# Patient Record
Sex: FEMALE | Race: WHITE | NOT HISPANIC OR LATINO | URBAN - METROPOLITAN AREA
[De-identification: names, ages, dates, MRNs, and addresses within clinical notes are randomized per-mention and may not be internally consistent; named-entity substitution may affect disease eponyms.]

---

## 2015-11-05 RX ORDER — INSULIN LISPRO 100/ML
0 VIAL (ML) SUBCUTANEOUS
Qty: 0 | Refills: 0 | COMMUNITY
Start: 2015-11-05

## 2017-01-09 ENCOUNTER — INPATIENT (INPATIENT)
Facility: HOSPITAL | Age: 77
LOS: 2 days | Discharge: HOME | DRG: 602 | End: 2017-01-12
Attending: INTERNAL MEDICINE | Admitting: INTERNAL MEDICINE
Payer: MEDICARE

## 2017-01-09 VITALS
SYSTOLIC BLOOD PRESSURE: 142 MMHG | DIASTOLIC BLOOD PRESSURE: 56 MMHG | OXYGEN SATURATION: 96 % | RESPIRATION RATE: 18 BRPM | TEMPERATURE: 98 F | HEART RATE: 76 BPM

## 2017-01-09 DIAGNOSIS — L03.116 CELLULITIS OF LEFT LOWER LIMB: ICD-10-CM

## 2017-01-09 DIAGNOSIS — Z95.1 PRESENCE OF AORTOCORONARY BYPASS GRAFT: Chronic | ICD-10-CM

## 2017-01-09 DIAGNOSIS — Z98.89 OTHER SPECIFIED POSTPROCEDURAL STATES: Chronic | ICD-10-CM

## 2017-01-09 LAB
ALBUMIN SERPL ELPH-MCNC: 4.1 G/DL — SIGNIFICANT CHANGE UP (ref 3.3–5)
ALP SERPL-CCNC: 138 U/L — HIGH (ref 40–120)
ALT FLD-CCNC: 11 U/L RC — SIGNIFICANT CHANGE UP (ref 10–45)
ANION GAP SERPL CALC-SCNC: 19 MMOL/L — HIGH (ref 5–17)
AST SERPL-CCNC: 18 U/L — SIGNIFICANT CHANGE UP (ref 10–40)
BASOPHILS # BLD AUTO: 0 K/UL — SIGNIFICANT CHANGE UP (ref 0–0.2)
BASOPHILS NFR BLD AUTO: 0.2 % — SIGNIFICANT CHANGE UP (ref 0–2)
BILIRUB SERPL-MCNC: 0.7 MG/DL — SIGNIFICANT CHANGE UP (ref 0.2–1.2)
BUN SERPL-MCNC: 31 MG/DL — HIGH (ref 7–23)
CALCIUM SERPL-MCNC: 9.4 MG/DL — SIGNIFICANT CHANGE UP (ref 8.4–10.5)
CHLORIDE SERPL-SCNC: 91 MMOL/L — LOW (ref 96–108)
CO2 SERPL-SCNC: 25 MMOL/L — SIGNIFICANT CHANGE UP (ref 22–31)
CREAT SERPL-MCNC: 4.9 MG/DL — HIGH (ref 0.5–1.3)
EOSINOPHIL # BLD AUTO: 0 K/UL — SIGNIFICANT CHANGE UP (ref 0–0.5)
EOSINOPHIL NFR BLD AUTO: 0.5 % — SIGNIFICANT CHANGE UP (ref 0–6)
GAS PNL BLDV: SIGNIFICANT CHANGE UP
GLUCOSE SERPL-MCNC: 392 MG/DL — HIGH (ref 70–99)
HCT VFR BLD CALC: 37.9 % — SIGNIFICANT CHANGE UP (ref 34.5–45)
HGB BLD-MCNC: 12.9 G/DL — SIGNIFICANT CHANGE UP (ref 11.5–15.5)
LYMPHOCYTES # BLD AUTO: 0.8 K/UL — LOW (ref 1–3.3)
LYMPHOCYTES # BLD AUTO: 9.7 % — LOW (ref 13–44)
MCHC RBC-ENTMCNC: 33.4 PG — SIGNIFICANT CHANGE UP (ref 27–34)
MCHC RBC-ENTMCNC: 34 GM/DL — SIGNIFICANT CHANGE UP (ref 32–36)
MCV RBC AUTO: 98.2 FL — SIGNIFICANT CHANGE UP (ref 80–100)
MONOCYTES # BLD AUTO: 0.6 K/UL — SIGNIFICANT CHANGE UP (ref 0–0.9)
MONOCYTES NFR BLD AUTO: 7.6 % — SIGNIFICANT CHANGE UP (ref 2–14)
NEUTROPHILS # BLD AUTO: 6.5 K/UL — SIGNIFICANT CHANGE UP (ref 1.8–7.4)
NEUTROPHILS NFR BLD AUTO: 82 % — HIGH (ref 43–77)
PLATELET # BLD AUTO: 181 K/UL — SIGNIFICANT CHANGE UP (ref 150–400)
POTASSIUM SERPL-MCNC: 3.8 MMOL/L — SIGNIFICANT CHANGE UP (ref 3.5–5.3)
POTASSIUM SERPL-SCNC: 3.8 MMOL/L — SIGNIFICANT CHANGE UP (ref 3.5–5.3)
PROT SERPL-MCNC: 7.7 G/DL — SIGNIFICANT CHANGE UP (ref 6–8.3)
RBC # BLD: 3.86 M/UL — SIGNIFICANT CHANGE UP (ref 3.8–5.2)
RBC # FLD: 13.3 % — SIGNIFICANT CHANGE UP (ref 10.3–14.5)
SODIUM SERPL-SCNC: 135 MMOL/L — SIGNIFICANT CHANGE UP (ref 135–145)
WBC # BLD: 8 K/UL — SIGNIFICANT CHANGE UP (ref 3.8–10.5)
WBC # FLD AUTO: 8 K/UL — SIGNIFICANT CHANGE UP (ref 3.8–10.5)

## 2017-01-09 PROCEDURE — 99285 EMERGENCY DEPT VISIT HI MDM: CPT

## 2017-01-09 PROCEDURE — 99223 1ST HOSP IP/OBS HIGH 75: CPT

## 2017-01-09 PROCEDURE — 93971 EXTREMITY STUDY: CPT | Mod: 26

## 2017-01-09 RX ORDER — VANCOMYCIN HCL 1 G
1000 VIAL (EA) INTRAVENOUS ONCE
Qty: 0 | Refills: 0 | Status: COMPLETED | OUTPATIENT
Start: 2017-01-09 | End: 2017-01-09

## 2017-01-09 RX ORDER — INSULIN HUMAN 100 [IU]/ML
6 INJECTION, SOLUTION SUBCUTANEOUS ONCE
Qty: 0 | Refills: 0 | Status: COMPLETED | OUTPATIENT
Start: 2017-01-09 | End: 2017-01-09

## 2017-01-09 RX ORDER — SODIUM CHLORIDE 9 MG/ML
500 INJECTION INTRAMUSCULAR; INTRAVENOUS; SUBCUTANEOUS ONCE
Qty: 0 | Refills: 0 | Status: COMPLETED | OUTPATIENT
Start: 2017-01-09 | End: 2017-01-09

## 2017-01-09 RX ADMIN — SODIUM CHLORIDE 1000 MILLILITER(S): 9 INJECTION INTRAMUSCULAR; INTRAVENOUS; SUBCUTANEOUS at 20:30

## 2017-01-09 RX ADMIN — Medication 250 MILLIGRAM(S): at 22:30

## 2017-01-09 NOTE — ED PROVIDER NOTE - MEDICAL DECISION MAKING DETAILS
circumfrential cellulitis. labs. fluids- cautiously, vanco TBA circumfrential cellulitis. labs. fluids- cautiously, johnnie Thomas DO; see attending attestation

## 2017-01-09 NOTE — ED PROVIDER NOTE - PHYSICAL EXAMINATION
Gen: NAD, AOx3  Head: NCAT  HEENT: PERRL, oral mucosa moist, normal conjunctiva, neck supple  Lung: CTAB, no respiratory distress  CV: rrr, no murmur, Normal perfusion, midline sternotomy scar well healed, +1 b/l DP with cap refill <2 b/l  Abd: soft, NTND  MSK: + edema LT LE, from ankle to knee with ttp over proximal calf, no visible deformities, Lt UE fistula intact with palpable thrill  Neuro: decreased sensation b/l LE in stocking distribution up to mid calf  Skin: circumfrential erythema left LE with increased warmth  Psych: normal affect

## 2017-01-09 NOTE — H&P ADULT. - PROBLEM SELECTOR PLAN 5
As per pt and daughter she doses levemir every night her self and is on sliding scale.  -Frequent fingersticks  -Sliding scale  -Start basal insulin as needed

## 2017-01-09 NOTE — H&P ADULT. - PROBLEM SELECTOR PLAN 1
Doppler negative for DVT, also with limited risk factors. Most likely cellulitis at this point. Pt with severe rash to augmentin and levaquin, caution with this class of antibiotics.  -Will cont. renal dose vanc for now  -Vanc trough in AM. Give again s/p dialysis.

## 2017-01-09 NOTE — ED PROVIDER NOTE - PMH
Anemia  CKD  Charcot Foot  Right Foot  CHF (congestive heart failure)  Oct 2015- still sob  Chronic kidney disease (CKD)  ON DIALYSIS - Tue, Thur, Sat  Diabetic Neuropathy    Herniated Disc    Hydronephrosis  Right 1/2012  Hyperlipidemia    Hypertension  NOT ANY MORE  Hypotension    Hypothyroidism    Nephrolithiasis  2001  Shortness of breath    Type 2 diabetes mellitus    Ulcer  2001

## 2017-01-09 NOTE — ED PROVIDER NOTE - ATTENDING CONTRIBUTION TO CARE
I performed a history and physical exam of the patient and discussed their management with the resident. I reviewed the resident's note and agree with the documented findings and plan of care.    76yoF w/ ESRD with extensive cellulitis of L leg, hx of previous surgery on same leg (vein removal for graft.)  Well appearing with redness from knee to ankle on L. Warm to touch.   Labs, duplex r/o DVT, IV abx and admit.

## 2017-01-09 NOTE — H&P ADULT. - PROBLEM SELECTOR PROBLEM 5
Hypothyroidism, unspecified type Type 2 diabetes mellitus with diabetic nephropathy, with long-term current use of insulin

## 2017-01-09 NOTE — H&P ADULT. - PROBLEM SELECTOR PROBLEM 4
Type 2 diabetes mellitus with diabetic nephropathy, with long-term current use of insulin Chronic systolic congestive heart failure

## 2017-01-09 NOTE — H&P ADULT. - PROBLEM SELECTOR PLAN 4
EF 35% on TTE. Pt states her dyspnea is at baseline and is followed by cards and nephrology  -F/u with cardiology regarding need to start BB, although as pt on midodrine there may be a contraindication that is not clear at this point.

## 2017-01-09 NOTE — ED PROVIDER NOTE - OBJECTIVE STATEMENT
77yo F with swelling/redness x3 days, much worse today. +pain when walking. no fever/chills. never had before. no blood thinners. feels like leg going to explode when walking. redness tracking up leg. chronic numbness/paresthesias from DM. no change. sugars elevated recently, urinates small amount.     PMH- DM, ESRD (T/TH/SA)   PCP- Dr. Joaquim Dobbs

## 2017-01-09 NOTE — H&P ADULT. - PROBLEM SELECTOR PLAN 2
On HD, Tu, Th, Sat  -Need to call nephrology in AM to schedule dialysis tomorrow  -Sees Dr. Briones for nephrology?

## 2017-01-09 NOTE — ED PROVIDER NOTE - PSH
History of extraction of renal calculus    S/P Breast Lumpectomy  1994, 2003 - left breast benign  S/P CABG (coronary artery bypass graft)  x 4, May 2015  S/P Cholecystectomy  2001  S/P Foot Surgery  2002, 2014  S/P mitral valve repair  with CABG X 4 MAY 2015

## 2017-01-09 NOTE — H&P ADULT. - HISTORY OF PRESENT ILLNESS
76F w/ hx of CHF, CAD s/p CABG, ESRD on HD Tu, Th, Sat, IDDM2, hypothyroid, HTN, HLD p/w LLE pain. Pt states she was in her usual state of health until 3 days ago she noticed a little redness in her L leg which increased the following day. Otherwise she was at her baseline and did not recall injuring the extremity. Today the pain in her LLE was too much to bear and limited her mobility. She also stated the redness spread that day. She denies any recent travel including bus travel, flights or train travel. She denies fevers, chills, chest pain, palpitations. Has dyspnea and orthopnea at baseline. Due for dialysis tomorrow.    In ER: Given Vanc 1gm, Insulin 6u, NS 500ccs

## 2017-01-10 ENCOUNTER — TRANSCRIPTION ENCOUNTER (OUTPATIENT)
Age: 77
End: 2017-01-10

## 2017-01-10 DIAGNOSIS — E78.5 HYPERLIPIDEMIA, UNSPECIFIED: ICD-10-CM

## 2017-01-10 DIAGNOSIS — N18.6 END STAGE RENAL DISEASE: ICD-10-CM

## 2017-01-10 DIAGNOSIS — I50.22 CHRONIC SYSTOLIC (CONGESTIVE) HEART FAILURE: ICD-10-CM

## 2017-01-10 DIAGNOSIS — L03.116 CELLULITIS OF LEFT LOWER LIMB: ICD-10-CM

## 2017-01-10 DIAGNOSIS — Z95.1 PRESENCE OF AORTOCORONARY BYPASS GRAFT: ICD-10-CM

## 2017-01-10 DIAGNOSIS — E03.9 HYPOTHYROIDISM, UNSPECIFIED: ICD-10-CM

## 2017-01-10 DIAGNOSIS — E11.21 TYPE 2 DIABETES MELLITUS WITH DIABETIC NEPHROPATHY: ICD-10-CM

## 2017-01-10 DIAGNOSIS — Z29.9 ENCOUNTER FOR PROPHYLACTIC MEASURES, UNSPECIFIED: ICD-10-CM

## 2017-01-10 LAB
ANION GAP SERPL CALC-SCNC: 17 MMOL/L — SIGNIFICANT CHANGE UP (ref 5–17)
APTT BLD: 32.4 SEC — SIGNIFICANT CHANGE UP (ref 27.5–37.4)
BASOPHILS # BLD AUTO: 0.05 K/UL — SIGNIFICANT CHANGE UP (ref 0–0.2)
BASOPHILS NFR BLD AUTO: 0.7 % — SIGNIFICANT CHANGE UP (ref 0–2)
BUN SERPL-MCNC: 32 MG/DL — HIGH (ref 7–23)
CALCIUM SERPL-MCNC: 9 MG/DL — SIGNIFICANT CHANGE UP (ref 8.4–10.5)
CHLORIDE SERPL-SCNC: 94 MMOL/L — LOW (ref 96–108)
CO2 SERPL-SCNC: 24 MMOL/L — SIGNIFICANT CHANGE UP (ref 22–31)
CREAT SERPL-MCNC: 4.92 MG/DL — HIGH (ref 0.5–1.3)
EOSINOPHIL # BLD AUTO: 0.05 K/UL — SIGNIFICANT CHANGE UP (ref 0–0.5)
EOSINOPHIL NFR BLD AUTO: 0.7 % — SIGNIFICANT CHANGE UP (ref 0–6)
GLUCOSE SERPL-MCNC: 121 MG/DL — HIGH (ref 70–99)
HAV IGM SER-ACNC: SIGNIFICANT CHANGE UP
HBA1C BLD-MCNC: 10 % — HIGH (ref 4–5.6)
HBV CORE AB SER-ACNC: SIGNIFICANT CHANGE UP
HBV CORE IGM SER-ACNC: SIGNIFICANT CHANGE UP
HBV SURFACE AB SER-ACNC: <3 MIU/ML — LOW
HBV SURFACE AG SER-ACNC: SIGNIFICANT CHANGE UP
HCT VFR BLD CALC: 35.2 % — SIGNIFICANT CHANGE UP (ref 34.5–45)
HCV AB S/CO SERPL IA: 0.08 S/CO — SIGNIFICANT CHANGE UP
HCV AB SERPL-IMP: SIGNIFICANT CHANGE UP
HGB BLD-MCNC: 11.5 G/DL — SIGNIFICANT CHANGE UP (ref 11.5–15.5)
IMM GRANULOCYTES NFR BLD AUTO: 0.3 % — SIGNIFICANT CHANGE UP (ref 0–1.5)
INR BLD: 1.12 RATIO — SIGNIFICANT CHANGE UP (ref 0.88–1.16)
LYMPHOCYTES # BLD AUTO: 0.93 K/UL — LOW (ref 1–3.3)
LYMPHOCYTES # BLD AUTO: 12.7 % — LOW (ref 13–44)
MAGNESIUM SERPL-MCNC: 1.8 MG/DL — SIGNIFICANT CHANGE UP (ref 1.6–2.6)
MCHC RBC-ENTMCNC: 31 PG — SIGNIFICANT CHANGE UP (ref 27–34)
MCHC RBC-ENTMCNC: 32.7 GM/DL — SIGNIFICANT CHANGE UP (ref 32–36)
MCV RBC AUTO: 94.9 FL — SIGNIFICANT CHANGE UP (ref 80–100)
MONOCYTES # BLD AUTO: 0.69 K/UL — SIGNIFICANT CHANGE UP (ref 0–0.9)
MONOCYTES NFR BLD AUTO: 9.4 % — SIGNIFICANT CHANGE UP (ref 2–14)
NEUTROPHILS # BLD AUTO: 5.61 K/UL — SIGNIFICANT CHANGE UP (ref 1.8–7.4)
NEUTROPHILS NFR BLD AUTO: 76.2 % — SIGNIFICANT CHANGE UP (ref 43–77)
PLATELET # BLD AUTO: 193 K/UL — SIGNIFICANT CHANGE UP (ref 150–400)
POTASSIUM SERPL-MCNC: 3.3 MMOL/L — LOW (ref 3.5–5.3)
POTASSIUM SERPL-SCNC: 3.3 MMOL/L — LOW (ref 3.5–5.3)
PROTHROM AB SERPL-ACNC: 12.7 SEC — SIGNIFICANT CHANGE UP (ref 10–13.1)
RBC # BLD: 3.71 M/UL — LOW (ref 3.8–5.2)
RBC # FLD: 15.2 % — HIGH (ref 10.3–14.5)
SODIUM SERPL-SCNC: 135 MMOL/L — SIGNIFICANT CHANGE UP (ref 135–145)
VANCOMYCIN TROUGH SERPL-MCNC: 2.3 UG/ML — LOW (ref 10–20)
VANCOMYCIN TROUGH SERPL-MCNC: 9 UG/ML — LOW (ref 10–20)
WBC # BLD: 7.35 K/UL — SIGNIFICANT CHANGE UP (ref 3.8–10.5)
WBC # FLD AUTO: 7.35 K/UL — SIGNIFICANT CHANGE UP (ref 3.8–10.5)

## 2017-01-10 PROCEDURE — 99222 1ST HOSP IP/OBS MODERATE 55: CPT

## 2017-01-10 RX ORDER — PANTOPRAZOLE SODIUM 20 MG/1
40 TABLET, DELAYED RELEASE ORAL
Qty: 0 | Refills: 0 | Status: DISCONTINUED | OUTPATIENT
Start: 2017-01-10 | End: 2017-01-12

## 2017-01-10 RX ORDER — DEXTROSE 50 % IN WATER 50 %
1 SYRINGE (ML) INTRAVENOUS ONCE
Qty: 0 | Refills: 0 | Status: DISCONTINUED | OUTPATIENT
Start: 2017-01-10 | End: 2017-01-12

## 2017-01-10 RX ORDER — INSULIN LISPRO 100/ML
VIAL (ML) SUBCUTANEOUS
Qty: 0 | Refills: 0 | Status: DISCONTINUED | OUTPATIENT
Start: 2017-01-10 | End: 2017-01-12

## 2017-01-10 RX ORDER — DEXTROSE 50 % IN WATER 50 %
25 SYRINGE (ML) INTRAVENOUS ONCE
Qty: 0 | Refills: 0 | Status: DISCONTINUED | OUTPATIENT
Start: 2017-01-10 | End: 2017-01-12

## 2017-01-10 RX ORDER — LEVOTHYROXINE SODIUM 125 MCG
150 TABLET ORAL DAILY
Qty: 0 | Refills: 0 | Status: DISCONTINUED | OUTPATIENT
Start: 2017-01-10 | End: 2017-01-12

## 2017-01-10 RX ORDER — DEXTROSE 50 % IN WATER 50 %
12.5 SYRINGE (ML) INTRAVENOUS ONCE
Qty: 0 | Refills: 0 | Status: DISCONTINUED | OUTPATIENT
Start: 2017-01-10 | End: 2017-01-12

## 2017-01-10 RX ORDER — HEPARIN SODIUM 5000 [USP'U]/ML
5000 INJECTION INTRAVENOUS; SUBCUTANEOUS EVERY 8 HOURS
Qty: 0 | Refills: 0 | Status: DISCONTINUED | OUTPATIENT
Start: 2017-01-09 | End: 2017-01-12

## 2017-01-10 RX ORDER — VANCOMYCIN HCL 1 G
1000 VIAL (EA) INTRAVENOUS
Qty: 0 | Refills: 0 | Status: COMPLETED | OUTPATIENT
Start: 2017-01-10 | End: 2017-01-10

## 2017-01-10 RX ORDER — SODIUM CHLORIDE 9 MG/ML
1000 INJECTION, SOLUTION INTRAVENOUS
Qty: 0 | Refills: 0 | Status: DISCONTINUED | OUTPATIENT
Start: 2017-01-10 | End: 2017-01-12

## 2017-01-10 RX ORDER — ASPIRIN/CALCIUM CARB/MAGNESIUM 324 MG
81 TABLET ORAL DAILY
Qty: 0 | Refills: 0 | Status: DISCONTINUED | OUTPATIENT
Start: 2017-01-10 | End: 2017-01-12

## 2017-01-10 RX ORDER — ATORVASTATIN CALCIUM 80 MG/1
80 TABLET, FILM COATED ORAL AT BEDTIME
Qty: 0 | Refills: 0 | Status: DISCONTINUED | OUTPATIENT
Start: 2017-01-10 | End: 2017-01-12

## 2017-01-10 RX ORDER — INSULIN LISPRO 100/ML
10 VIAL (ML) SUBCUTANEOUS ONCE
Qty: 0 | Refills: 0 | Status: COMPLETED | OUTPATIENT
Start: 2017-01-10 | End: 2017-01-10

## 2017-01-10 RX ORDER — MIDODRINE HYDROCHLORIDE 2.5 MG/1
10 TABLET ORAL THREE TIMES A DAY
Qty: 0 | Refills: 0 | Status: DISCONTINUED | OUTPATIENT
Start: 2017-01-10 | End: 2017-01-12

## 2017-01-10 RX ADMIN — INSULIN HUMAN 6 UNIT(S): 100 INJECTION, SOLUTION SUBCUTANEOUS at 00:27

## 2017-01-10 RX ADMIN — ATORVASTATIN CALCIUM 80 MILLIGRAM(S): 80 TABLET, FILM COATED ORAL at 22:57

## 2017-01-10 RX ADMIN — MIDODRINE HYDROCHLORIDE 10 MILLIGRAM(S): 2.5 TABLET ORAL at 05:00

## 2017-01-10 RX ADMIN — MIDODRINE HYDROCHLORIDE 10 MILLIGRAM(S): 2.5 TABLET ORAL at 22:57

## 2017-01-10 RX ADMIN — Medication 250 MILLIGRAM(S): at 22:58

## 2017-01-10 RX ADMIN — HEPARIN SODIUM 5000 UNIT(S): 5000 INJECTION INTRAVENOUS; SUBCUTANEOUS at 22:57

## 2017-01-10 RX ADMIN — Medication 10 UNIT(S): at 01:38

## 2017-01-10 RX ADMIN — Medication 2: at 13:44

## 2017-01-10 RX ADMIN — Medication 150 MICROGRAM(S): at 05:00

## 2017-01-10 RX ADMIN — PANTOPRAZOLE SODIUM 40 MILLIGRAM(S): 20 TABLET, DELAYED RELEASE ORAL at 10:24

## 2017-01-10 RX ADMIN — HEPARIN SODIUM 5000 UNIT(S): 5000 INJECTION INTRAVENOUS; SUBCUTANEOUS at 05:00

## 2017-01-10 NOTE — ED ADULT NURSE NOTE - OBJECTIVE STATEMENT
76 y.o female A+Ox3 with pmh DM, HTN, CABG, dialysis with fistula to L. upper arm, CHF, and CKD BIBA c./o worsening left lower extremity redness and pain x 2 days. pt states she noticed her left lower leg was mildly red 2 days ago and has now worsened with pain felt upon standing and painful palpation to the leg. Pts left lower leg with significant redness noted to left lower leg with spreading up to behind the left knee. no significant swelling or warmth noted when compared to the right leg. no visible or apparent open wounds or drainage noted. pt c/o pain upon palpation. no pain behind the left knee. pt denies any fever, chills, body aches. weakness, numbness, tingling or cp. pt due for dialysis tomm, makes very little urine. EKG and labs obtained, daughter at the bedside. safety and fall precautions maintained.

## 2017-01-10 NOTE — PROVIDER CONTACT NOTE (OTHER) - ASSESSMENT
Pt received from [facility]. Pt is AO4. Denies any pain at this time. Hx of DM2. Pt reports being noncompliant with medication at home

## 2017-01-10 NOTE — ED ADULT NURSE REASSESSMENT NOTE - NS ED NURSE REASSESS COMMENT FT1
pt resting in stretcher, tba for left lower leg cellulitis, 1g of Vancomycin administered IVPB over 1 hour with 500 ml NS bolus, pt tolerated well. denies any body aches, chills, weakness, dizziness, cp or sob. pending bed assignment, safety and fall precautions maintained.

## 2017-01-10 NOTE — DISCHARGE NOTE ADULT - SECONDARY DIAGNOSIS.
Chronic kidney disease (CKD) Hyperlipidemia Hypothyroidism, unspecified type S/P CABG (coronary artery bypass graft) Type 2 diabetes mellitus with other skin complication

## 2017-01-10 NOTE — ED ADULT NURSE NOTE - ED STAT RN HANDOFF DETAILS
verbal hand off report given to CLAIRE Jeong in holding area, pt pending bed assignment. VS stable. safety and fall precautions maintained.

## 2017-01-10 NOTE — DISCHARGE NOTE ADULT - MEDICATION SUMMARY - MEDICATIONS TO TAKE
I will START or STAY ON the medications listed below when I get home from the hospital:    aspirin 81 mg oral delayed release tablet  -- 1 tab(s) by mouth once a day  -- Indication: For S/P CABG (coronary artery bypass graft)    insulin lispro 100 units/mL subcutaneous solution  -- please take as instructed by sliding scale  -- Indication: For diabetes type 2    insulin lispro 100 units/mL subcutaneous solution  --  subcutaneous 3 times a day (before meals); 2 Unit(s) if Glucose 151 - 200  4 Unit(s) if Glucose 201 - 250  6 Unit(s) if Glucose 251 - 300  8 Unit(s) if Glucose 301 - 350  10  Unit(s) if Glucose 351 - 400  12 Unit(s) if Glucose Greater Than 400  -- Indication: For diabetes type 2    Levemir FlexPen 100 units/mL subcutaneous solution  -- 1 dose(s) subcutaneous once a day (at bedtime)  -- Indication: For diabetes type 2    atorvastatin 80 mg oral tablet  -- 1 tab(s) by mouth once a day (at bedtime)  -- Indication: For dyslipidemia    albuterol-ipratropium 2.5 mg-0.5 mg/3 mL inhalation solution  -- 3 milliliter(s) inhaled every 6 hours  -- Indication: For wheezing    vancomycin 1 g intravenous injection  -- 1 gram(s) intravenous once post dialysis on saturday. Dr chavez will arrange to order for dialysis  -- Indication: For Cellulitis of left lower extremity    midodrine 10 mg oral tablet  -- 1 tab(s) by mouth 3 times a day  -- Indication: For Hypotension    pantoprazole 40 mg oral delayed release tablet  -- 1 tab(s) by mouth once a day (before a meal)  -- Indication: For gerd    levothyroxine 150 mcg (0.15 mg) oral tablet  -- 1 tab(s) by mouth once a day  -- Indication: For Hypothyroidism, unspecified type    Nephro-Tyshawn oral tablet  -- 1 tab(s) by mouth once a day  -- Indication: For Ckd I will START or STAY ON the medications listed below when I get home from the hospital:    commode  -- Indication: For generalized weakness    B-d ultra fine   -- B-D ultra fine christiano needles 32 guage  -- Indication: For diabetes type 2    free style test strips daily   -- 4x a day  -- Indication: For diabetes type 2    aspirin 81 mg oral delayed release tablet  -- 1 tab(s) by mouth once a day  -- Indication: For S/P CABG (coronary artery bypass graft)    insulin lispro 100 units/mL subcutaneous solution  -- please take as instructed by sliding scale  -- Indication: For diabetes type 2    insulin lispro 100 units/mL subcutaneous solution  --  subcutaneous 3 times a day (before meals); 2 Unit(s) if Glucose 151 - 200  4 Unit(s) if Glucose 201 - 250  6 Unit(s) if Glucose 251 - 300  8 Unit(s) if Glucose 301 - 350  10  Unit(s) if Glucose 351 - 400  12 Unit(s) if Glucose Greater Than 400  -- Indication: For diabetes type 2    Levemir FlexPen 100 units/mL subcutaneous solution  -- 50 unit(s) subcutaneous once a day (at bedtime)  -- Check with your doctor before becoming pregnant.  Do not drink alcoholic beverages when taking this medication.  Keep in refrigerator.  Do not freeze.    -- Indication: For diabetes type 2    NovoLOG FlexPen 100 units/mL subcutaneous solution  -- 20 unit(s) subcutaneous 3 times a day (before meals)  3 month supplies  -- Do not drink alcoholic beverages when taking this medication.  Keep in refrigerator.  Do not freeze.  Obtain medical advice before taking any non-prescription drugs as some may affect the action of this medication.    -- Indication: For diabetes type 2    atorvastatin 80 mg oral tablet  -- 1 tab(s) by mouth once a day (at bedtime)  -- Indication: For dyslipidemia    albuterol-ipratropium 2.5 mg-0.5 mg/3 mL inhalation solution  -- 3 milliliter(s) inhaled every 6 hours  -- Indication: For wheezing    vancomycin 1 g intravenous injection  -- 1 gram(s) intravenous once post dialysis on saturday. Dr chavez will arrange to order for dialysis  -- Indication: For Cellulitis of left lower extremity    midodrine 10 mg oral tablet  -- 1 tab(s) by mouth 3 times a day  -- Indication: For Hypotension    pantoprazole 40 mg oral delayed release tablet  -- 1 tab(s) by mouth once a day (before a meal)  -- Indication: For gerd    levothyroxine 150 mcg (0.15 mg) oral tablet  -- 1 tab(s) by mouth once a day  -- Indication: For Hypothyroidism, unspecified type    Nephro-Tyshawn oral tablet  -- 1 tab(s) by mouth once a day  -- Indication: For Ckd I will START or STAY ON the medications listed below when I get home from the hospital:    commode  -- Indication: For generalized weakness    B-d ultra fine   -- B-D ultra fine christiano needles 32 guage  -- Indication: For diabetes type 2    free style test strips daily   -- 4x a day  -- Indication: For diabetes type 2    aspirin 81 mg oral delayed release tablet  -- 1 tab(s) by mouth once a day  -- Indication: For S/P CABG (coronary artery bypass graft)    NovoLOG FlexPen 100 units/mL subcutaneous solution  -- 20 unit(s) subcutaneous 3 times a day (before meals)  3 month supplies  -- Do not drink alcoholic beverages when taking this medication.  Keep in refrigerator.  Do not freeze.  Obtain medical advice before taking any non-prescription drugs as some may affect the action of this medication.    -- Indication: For diabetes type 2    Levemir FlexPen 100 units/mL subcutaneous solution  -- 50 unit(s) subcutaneous once a day (at bedtime)  -- Check with your doctor before becoming pregnant.  Do not drink alcoholic beverages when taking this medication.  Keep in refrigerator.  Do not freeze.    -- Indication: For diabetes type 2    insulin lispro 100 units/mL subcutaneous solution  -- 1 subcutaneous 3 times a day (before meals) ; 2 Unit(s) if Glucose 151 - 200 4 Unit(s) if Glucose 201 - 250 6 Unit(s) if Glucose 251 - 300 8 Unit(s) if Glucose 301 - 350 10 Unit(s) if Glucose 351 - 400 12 Unit(s) if Glucose Greater Than 400  -- Indication: For diabetes type 2    insulin lispro 100 units/mL subcutaneous solution  -- please take as instructed by sliding scale  -- Indication: For diabetes type 2    atorvastatin 80 mg oral tablet  -- 1 tab(s) by mouth once a day (at bedtime)  -- Indication: For dyslipidemia    albuterol-ipratropium 2.5 mg-0.5 mg/3 mL inhalation solution  -- 3 milliliter(s) inhaled every 6 hours  -- Indication: For wheezing    vancomycin 1 g intravenous injection  -- 1 gram(s) intravenous once post dialysis on saturday. Dr chavez will arrange to order for dialysis  -- Indication: For Cellulitis of left lower extremity    midodrine 10 mg oral tablet  -- 1 tab(s) by mouth 3 times a day  -- Indication: For Hypotension    pantoprazole 40 mg oral delayed release tablet  -- 1 tab(s) by mouth once a day (before a meal)  -- Indication: For gerd    levothyroxine 150 mcg (0.15 mg) oral tablet  -- 1 tab(s) by mouth once a day  -- Indication: For Hypothyroidism, unspecified type    Nephro-Tyshawn oral tablet  -- 1 tab(s) by mouth once a day  -- Indication: For Ckd I will START or STAY ON the medications listed below when I get home from the hospital:    commode  -- Indication: For generalized weakness    B-d ultra fine   -- B-D ultra fine christiano needles 32 guage  -- Indication: For diabetes    free style test strips daily   -- 4x a day  -- Indication: For diabetes    aspirin 81 mg oral delayed release tablet  -- 1 tab(s) by mouth once a day  -- Indication: For S/P CABG (coronary artery bypass graft)    NovoLOG FlexPen 100 units/mL subcutaneous solution  -- 20 unit(s) subcutaneous 3 times a day (before meals)  3 month supplies  -- Do not drink alcoholic beverages when taking this medication.  Keep in refrigerator.  Do not freeze.  Obtain medical advice before taking any non-prescription drugs as some may affect the action of this medication.    -- Indication: For diabetes type 2    Levemir FlexPen 100 units/mL subcutaneous solution  -- 50 unit(s) subcutaneous once a day (at bedtime)  -- Check with your doctor before becoming pregnant.  Do not drink alcoholic beverages when taking this medication.  Keep in refrigerator.  Do not freeze.    -- Indication: For diabetes type 2    atorvastatin 80 mg oral tablet  -- 1 tab(s) by mouth once a day (at bedtime)  -- Indication: For dyslipidemia    Proventil HFA 90 mcg/inh inhalation aerosol  -- 1 puff(s) inhaled 4 times a day as needed  -- For inhalation only.  It is very important that you take or use this exactly as directed.  Do not skip doses or discontinue unless directed by your doctor.  Obtain medical advice before taking any non-prescription drugs as some may affect the action of this medication.  Shake well before use.    -- Indication: For asthma    vancomycin 1 g intravenous injection  -- 1 gram(s) intravenous once post dialysis on saturday. Dr chavez will arrange to order for dialysis  -- Indication: For Cellulitis of left lower extremity    midodrine 10 mg oral tablet  -- 1 tab(s) by mouth 3 times a day  -- Indication: For Hypotension    pantoprazole 40 mg oral delayed release tablet  -- 1 tab(s) by mouth once a day (before a meal)  -- Indication: For gerd    levothyroxine 150 mcg (0.15 mg) oral tablet  -- 1 tab(s) by mouth once a day  -- Indication: For Hypothyroidism, unspecified type    Nephro-Tyshawn oral tablet  -- 1 tab(s) by mouth once a day  -- Indication: For Ckd

## 2017-01-10 NOTE — DISCHARGE NOTE ADULT - CARE PLAN
Principal Discharge DX:	Cellulitis of left lower extremity  Goal:	improved with IV antibiotics  Instructions for follow-up, activity and diet:	continue vancomycin 1gm post hemodialysis on sat and same dose on tuesday. this will be the last dose on tuesday  Secondary Diagnosis:	Chronic kidney disease (CKD)  Instructions for follow-up, activity and diet:	Avoid taking (NSAIDs) - (ex: Ibuprofen, Advil, Celebrex, Naprosyn)  Avoid taking any nephrotoxic agents (can harm kidneys) - Intravenous contrast for diagnostic testing, combination cold medications.  Have all medications adjusted for your renal function by your Health Care Provider.  Blood pressure control is important.  Take all medication as prescribed.  Secondary Diagnosis:	Hyperlipidemia  Instructions for follow-up, activity and diet:	Coronary artery disease is a condition where the arteries the supply the heart muscle get clogges with fatty deposits & puts you at risk for a heart attack  Call your doctor if you have any new pain, pressure, or discomfort in the center of your chest, pain, tingling or discomfort in arms, back, neck, jaw, or stomach, shortness of breath, nausea, vomiting, burping or heartburn, sweating, cold and clammy skin, racing or abnormal heartbeat for more than 10 minutes or if they keep coming & going.  Call 911 and do not tr to get to hospital by care  You can help yourself with lefestyle changes (quitting smoking if you smoke), eat lots of fruits & vegetables & low fat dairy products, not a lot of meat & fatty foods, walk or some form of physical activity most days of the week, lose weight if you are overweight  Take your cardiac medication as prescribed to lower cholesterol, to lower blood pressure, aspirin to prevent blood clots, and diabetes control  Make sure to keep appointments with doctor for cardiac follow up care  Secondary Diagnosis:	Hypothyroidism, unspecified type  Instructions for follow-up, activity and diet:	you do not make enough thyroid hormone  signs & symptoms of low levels of thyroid hormone - tired, getting cold easily, coarse or thin hair, constipation, shortness of breath, swelling, irregular periods  your doctor will do thyroid hormone blood tests at least once a year to monitor if medication dose is adequate  take your thyroid medicine as directed by your doctor & on empty stomach  Secondary Diagnosis:	S/P CABG (coronary artery bypass graft)  Instructions for follow-up, activity and diet:	Coronary artery disease is a condition where the arteries the supply the heart muscle get clogges with fatty deposits & puts you at risk for a heart attack  Call your doctor if you have any new pain, pressure, or discomfort in the center of your chest, pain, tingling or discomfort in arms, back, neck, jaw, or stomach, shortness of breath, nausea, vomiting, burping or heartburn, sweating, cold and clammy skin, racing or abnormal heartbeat for more than 10 minutes or if they keep coming & going.  Call 911 and do not tr to get to hospital by care  You can help yourself with lefestyle changes (quitting smoking if you smoke), eat lots of fruits & vegetables & low fat dairy products, not a lot of meat & fatty foods, walk or some form of physical activity most days of the week, lose weight if you are overweight  Take your cardiac medication as prescribed to lower cholesterol, to lower blood pressure, aspirin to prevent blood clots, and diabetes control  Make sure to keep appointments with doctor for cardiac follow up care  Secondary Diagnosis:	Type 2 diabetes mellitus with other skin complication  Instructions for follow-up, activity and diet:	HgA1C this admission.  Make sure you get your HgA1c checked every three months.  If you take oral diabetes medications, check your blood glucose two times a day.  If you take insulin, check your blood glucose before meals and at bedtime.  It's important not to skip any meals.  Keep a log of your blood glucose results and always take it with you to your doctor appointments.  Keep a list of your current medications including injectables and over the counter medications and bring this medication list with you to all your doctor appointments.  If you have not seen your ophthalmologist this year call for appointment.  Check your feet daily for redness, sores, or openings. Do not self treat. If no improvement in two days call your primary care physician for an appointment.  Low blood sugar (hypoglycemia) is a blood sugar below 70mg/dl. Check your blood sugar if you feel signs/symptoms of hypoglycemia. If your blood sugar is below 70 take 15 grams of carbohydrates (ex 4 oz of apple juice, 3-4 glucose tablets, or 4-6 oz of regular soda) wait 15 minutes and repeat blood sugar to make sure it comes up above 70.  If your blood sugar is above 70 and you are due for a meal, have a meal.  If you are not due for a meal have a snack.  This snack helps keeps your blood sugar at a safe range.

## 2017-01-10 NOTE — DISCHARGE NOTE ADULT - HOSPITAL COURSE
left  leg pain,  cellulitis   legs  below  knees, with  small feet  ulcers,  seen by id, on iv  vanco,  crf 5,  dr ling to  f/p , for  hd, anemia  chronic, ,

## 2017-01-10 NOTE — DISCHARGE NOTE ADULT - PATIENT PORTAL LINK FT
“You can access the [facility] Patient Portal, offered by [facility], by registering with the following website: [email or web address]”

## 2017-01-10 NOTE — DISCHARGE NOTE ADULT - PLAN OF CARE
improved with IV antibiotics continue vancomycin 1gm post hemodialysis on sat and same dose on tuesday. this will be the last dose on tuesday Avoid taking (NSAIDs) - (ex: Ibuprofen, Advil, Celebrex, Naprosyn)  Avoid taking any nephrotoxic agents (can harm kidneys) - Intravenous contrast for diagnostic testing, combination cold medications.  Have all medications adjusted for your renal function by your Health Care Provider.  Blood pressure control is important.  Take all medication as prescribed. Coronary artery disease is a condition where the arteries the supply the heart muscle get clogges with fatty deposits & puts you at risk for a heart attack  Call your doctor if you have any new pain, pressure, or discomfort in the center of your chest, pain, tingling or discomfort in arms, back, neck, jaw, or stomach, shortness of breath, nausea, vomiting, burping or heartburn, sweating, cold and clammy skin, racing or abnormal heartbeat for more than 10 minutes or if they keep coming & going.  Call 911 and do not tr to get to hospital by care  You can help yourself with lefestyle changes (quitting smoking if you smoke), eat lots of fruits & vegetables & low fat dairy products, not a lot of meat & fatty foods, walk or some form of physical activity most days of the week, lose weight if you are overweight  Take your cardiac medication as prescribed to lower cholesterol, to lower blood pressure, aspirin to prevent blood clots, and diabetes control  Make sure to keep appointments with doctor for cardiac follow up care you do not make enough thyroid hormone  signs & symptoms of low levels of thyroid hormone - tired, getting cold easily, coarse or thin hair, constipation, shortness of breath, swelling, irregular periods  your doctor will do thyroid hormone blood tests at least once a year to monitor if medication dose is adequate  take your thyroid medicine as directed by your doctor & on empty stomach HgA1C this admission.  Make sure you get your HgA1c checked every three months.  If you take oral diabetes medications, check your blood glucose two times a day.  If you take insulin, check your blood glucose before meals and at bedtime.  It's important not to skip any meals.  Keep a log of your blood glucose results and always take it with you to your doctor appointments.  Keep a list of your current medications including injectables and over the counter medications and bring this medication list with you to all your doctor appointments.  If you have not seen your ophthalmologist this year call for appointment.  Check your feet daily for redness, sores, or openings. Do not self treat. If no improvement in two days call your primary care physician for an appointment.  Low blood sugar (hypoglycemia) is a blood sugar below 70mg/dl. Check your blood sugar if you feel signs/symptoms of hypoglycemia. If your blood sugar is below 70 take 15 grams of carbohydrates (ex 4 oz of apple juice, 3-4 glucose tablets, or 4-6 oz of regular soda) wait 15 minutes and repeat blood sugar to make sure it comes up above 70.  If your blood sugar is above 70 and you are due for a meal, have a meal.  If you are not due for a meal have a snack.  This snack helps keeps your blood sugar at a safe range.

## 2017-01-10 NOTE — DISCHARGE NOTE ADULT - CARE PROVIDER_API CALL
Jaiden Brown), EndocrinologyMetabDiabetes; Internal Medicine  [address]  [place] [address]  Phone: [phone]  Fax: [phone]    Dmitriy Estrada), Internal Medicine; Nephrology  [address]  [place] [address]  Phone: [phone]  Fax: [phone] Jaiden Brown), EndocrinologyMetabDiabetes; Internal Medicine  [address]  [place] [address]  Phone: [phone]  Fax: [phone]    Dilia March (DO), Nephrology  [address]  [place] [address]  Phone: [phone]  Fax: [phone]

## 2017-01-11 LAB
ANION GAP SERPL CALC-SCNC: 15 MMOL/L — SIGNIFICANT CHANGE UP (ref 5–17)
BUN SERPL-MCNC: 19 MG/DL — SIGNIFICANT CHANGE UP (ref 7–23)
CALCIUM SERPL-MCNC: 9.3 MG/DL — SIGNIFICANT CHANGE UP (ref 8.4–10.5)
CHLORIDE SERPL-SCNC: 98 MMOL/L — SIGNIFICANT CHANGE UP (ref 96–108)
CO2 SERPL-SCNC: 22 MMOL/L — SIGNIFICANT CHANGE UP (ref 22–31)
CREAT SERPL-MCNC: 3.55 MG/DL — HIGH (ref 0.5–1.3)
GLUCOSE SERPL-MCNC: 172 MG/DL — HIGH (ref 70–99)
HCT VFR BLD CALC: 37.6 % — SIGNIFICANT CHANGE UP (ref 34.5–45)
HGB BLD-MCNC: 11.9 G/DL — SIGNIFICANT CHANGE UP (ref 11.5–15.5)
MCHC RBC-ENTMCNC: 31.5 PG — SIGNIFICANT CHANGE UP (ref 27–34)
MCHC RBC-ENTMCNC: 31.6 GM/DL — LOW (ref 32–36)
MCV RBC AUTO: 99.5 FL — SIGNIFICANT CHANGE UP (ref 80–100)
PLATELET # BLD AUTO: 203 K/UL — SIGNIFICANT CHANGE UP (ref 150–400)
POTASSIUM SERPL-MCNC: 4.3 MMOL/L — SIGNIFICANT CHANGE UP (ref 3.5–5.3)
POTASSIUM SERPL-SCNC: 4.3 MMOL/L — SIGNIFICANT CHANGE UP (ref 3.5–5.3)
RBC # BLD: 3.78 M/UL — LOW (ref 3.8–5.2)
RBC # FLD: 15.5 % — HIGH (ref 10.3–14.5)
SODIUM SERPL-SCNC: 135 MMOL/L — SIGNIFICANT CHANGE UP (ref 135–145)
VANCOMYCIN TROUGH SERPL-MCNC: 17.8 UG/ML — SIGNIFICANT CHANGE UP (ref 10–20)
WBC # BLD: 6.48 K/UL — SIGNIFICANT CHANGE UP (ref 3.8–10.5)
WBC # FLD AUTO: 6.48 K/UL — SIGNIFICANT CHANGE UP (ref 3.8–10.5)

## 2017-01-11 PROCEDURE — 99232 SBSQ HOSP IP/OBS MODERATE 35: CPT

## 2017-01-11 RX ORDER — VANCOMYCIN HCL 1 G
1000 VIAL (EA) INTRAVENOUS ONCE
Qty: 0 | Refills: 0 | Status: COMPLETED | OUTPATIENT
Start: 2017-01-12 | End: 2017-01-12

## 2017-01-11 RX ORDER — ACETAMINOPHEN 500 MG
650 TABLET ORAL ONCE
Qty: 0 | Refills: 0 | Status: COMPLETED | OUTPATIENT
Start: 2017-01-11 | End: 2017-01-11

## 2017-01-11 RX ADMIN — Medication 2: at 08:21

## 2017-01-11 RX ADMIN — Medication 150 MICROGRAM(S): at 05:50

## 2017-01-11 RX ADMIN — HEPARIN SODIUM 5000 UNIT(S): 5000 INJECTION INTRAVENOUS; SUBCUTANEOUS at 05:50

## 2017-01-11 RX ADMIN — ATORVASTATIN CALCIUM 80 MILLIGRAM(S): 80 TABLET, FILM COATED ORAL at 21:10

## 2017-01-11 RX ADMIN — Medication 2: at 17:29

## 2017-01-11 RX ADMIN — MIDODRINE HYDROCHLORIDE 10 MILLIGRAM(S): 2.5 TABLET ORAL at 21:10

## 2017-01-11 RX ADMIN — Medication 2: at 12:30

## 2017-01-11 RX ADMIN — HEPARIN SODIUM 5000 UNIT(S): 5000 INJECTION INTRAVENOUS; SUBCUTANEOUS at 21:10

## 2017-01-11 RX ADMIN — MIDODRINE HYDROCHLORIDE 10 MILLIGRAM(S): 2.5 TABLET ORAL at 13:38

## 2017-01-11 RX ADMIN — PANTOPRAZOLE SODIUM 40 MILLIGRAM(S): 20 TABLET, DELAYED RELEASE ORAL at 05:49

## 2017-01-11 RX ADMIN — HEPARIN SODIUM 5000 UNIT(S): 5000 INJECTION INTRAVENOUS; SUBCUTANEOUS at 13:38

## 2017-01-11 RX ADMIN — MIDODRINE HYDROCHLORIDE 10 MILLIGRAM(S): 2.5 TABLET ORAL at 05:50

## 2017-01-11 RX ADMIN — Medication 650 MILLIGRAM(S): at 01:33

## 2017-01-11 RX ADMIN — Medication 81 MILLIGRAM(S): at 13:38

## 2017-01-12 VITALS
DIASTOLIC BLOOD PRESSURE: 65 MMHG | RESPIRATION RATE: 18 BRPM | OXYGEN SATURATION: 93 % | HEART RATE: 73 BPM | SYSTOLIC BLOOD PRESSURE: 110 MMHG | TEMPERATURE: 98 F

## 2017-01-12 LAB
ANION GAP SERPL CALC-SCNC: 14 MMOL/L — SIGNIFICANT CHANGE UP (ref 5–17)
BUN SERPL-MCNC: 28 MG/DL — HIGH (ref 7–23)
CALCIUM SERPL-MCNC: 9 MG/DL — SIGNIFICANT CHANGE UP (ref 8.4–10.5)
CHLORIDE SERPL-SCNC: 98 MMOL/L — SIGNIFICANT CHANGE UP (ref 96–108)
CO2 SERPL-SCNC: 20 MMOL/L — LOW (ref 22–31)
CREAT SERPL-MCNC: 4.43 MG/DL — HIGH (ref 0.5–1.3)
GLUCOSE SERPL-MCNC: 166 MG/DL — HIGH (ref 70–99)
HCT VFR BLD CALC: 36.1 % — SIGNIFICANT CHANGE UP (ref 34.5–45)
HGB BLD-MCNC: 12.4 G/DL — SIGNIFICANT CHANGE UP (ref 11.5–15.5)
MAGNESIUM SERPL-MCNC: 2.1 MG/DL — SIGNIFICANT CHANGE UP (ref 1.6–2.6)
MCHC RBC-ENTMCNC: 34.2 GM/DL — SIGNIFICANT CHANGE UP (ref 32–36)
MCHC RBC-ENTMCNC: 34.3 PG — HIGH (ref 27–34)
MCV RBC AUTO: 100 FL — SIGNIFICANT CHANGE UP (ref 80–100)
PHOSPHATE SERPL-MCNC: 4.1 MG/DL — SIGNIFICANT CHANGE UP (ref 2.5–4.5)
PLATELET # BLD AUTO: 194 K/UL — SIGNIFICANT CHANGE UP (ref 150–400)
POTASSIUM SERPL-MCNC: 4.7 MMOL/L — SIGNIFICANT CHANGE UP (ref 3.5–5.3)
POTASSIUM SERPL-SCNC: 4.7 MMOL/L — SIGNIFICANT CHANGE UP (ref 3.5–5.3)
RBC # BLD: 3.6 M/UL — LOW (ref 3.8–5.2)
RBC # FLD: 14.1 % — SIGNIFICANT CHANGE UP (ref 10.3–14.5)
SODIUM SERPL-SCNC: 132 MMOL/L — LOW (ref 135–145)
WBC # BLD: 6.1 K/UL — SIGNIFICANT CHANGE UP (ref 3.8–10.5)
WBC # FLD AUTO: 6.1 K/UL — SIGNIFICANT CHANGE UP (ref 3.8–10.5)

## 2017-01-12 PROCEDURE — 83036 HEMOGLOBIN GLYCOSYLATED A1C: CPT

## 2017-01-12 PROCEDURE — 82330 ASSAY OF CALCIUM: CPT

## 2017-01-12 PROCEDURE — 85014 HEMATOCRIT: CPT

## 2017-01-12 PROCEDURE — 99232 SBSQ HOSP IP/OBS MODERATE 35: CPT

## 2017-01-12 PROCEDURE — 86803 HEPATITIS C AB TEST: CPT

## 2017-01-12 PROCEDURE — 85730 THROMBOPLASTIN TIME PARTIAL: CPT

## 2017-01-12 PROCEDURE — 80202 ASSAY OF VANCOMYCIN: CPT

## 2017-01-12 PROCEDURE — 84295 ASSAY OF SERUM SODIUM: CPT

## 2017-01-12 PROCEDURE — 82803 BLOOD GASES ANY COMBINATION: CPT

## 2017-01-12 PROCEDURE — 83735 ASSAY OF MAGNESIUM: CPT

## 2017-01-12 PROCEDURE — 93923 UPR/LXTR ART STDY 3+ LVLS: CPT

## 2017-01-12 PROCEDURE — 86709 HEPATITIS A IGM ANTIBODY: CPT

## 2017-01-12 PROCEDURE — 85027 COMPLETE CBC AUTOMATED: CPT

## 2017-01-12 PROCEDURE — 86704 HEP B CORE ANTIBODY TOTAL: CPT

## 2017-01-12 PROCEDURE — 83605 ASSAY OF LACTIC ACID: CPT

## 2017-01-12 PROCEDURE — 82947 ASSAY GLUCOSE BLOOD QUANT: CPT

## 2017-01-12 PROCEDURE — 84100 ASSAY OF PHOSPHORUS: CPT

## 2017-01-12 PROCEDURE — 96374 THER/PROPH/DIAG INJ IV PUSH: CPT

## 2017-01-12 PROCEDURE — 99285 EMERGENCY DEPT VISIT HI MDM: CPT | Mod: 25

## 2017-01-12 PROCEDURE — 93971 EXTREMITY STUDY: CPT

## 2017-01-12 PROCEDURE — 84132 ASSAY OF SERUM POTASSIUM: CPT

## 2017-01-12 PROCEDURE — 86705 HEP B CORE ANTIBODY IGM: CPT

## 2017-01-12 PROCEDURE — 87340 HEPATITIS B SURFACE AG IA: CPT

## 2017-01-12 PROCEDURE — 85610 PROTHROMBIN TIME: CPT

## 2017-01-12 PROCEDURE — 99261: CPT

## 2017-01-12 PROCEDURE — 86706 HEP B SURFACE ANTIBODY: CPT

## 2017-01-12 PROCEDURE — 80048 BASIC METABOLIC PNL TOTAL CA: CPT

## 2017-01-12 PROCEDURE — 82435 ASSAY OF BLOOD CHLORIDE: CPT

## 2017-01-12 PROCEDURE — 87040 BLOOD CULTURE FOR BACTERIA: CPT

## 2017-01-12 PROCEDURE — 80053 COMPREHEN METABOLIC PANEL: CPT

## 2017-01-12 RX ORDER — PANTOPRAZOLE SODIUM 20 MG/1
1 TABLET, DELAYED RELEASE ORAL
Qty: 30 | Refills: 0 | OUTPATIENT
Start: 2017-01-12 | End: 2017-02-11

## 2017-01-12 RX ORDER — INSULIN LISPRO 100/ML
1 VIAL (ML) SUBCUTANEOUS
Qty: 1 | Refills: 0 | OUTPATIENT
Start: 2017-01-12

## 2017-01-12 RX ORDER — INSULIN ASPART 100 [IU]/ML
20 INJECTION, SOLUTION SUBCUTANEOUS
Qty: 30 | Refills: 0 | OUTPATIENT
Start: 2017-01-12

## 2017-01-12 RX ORDER — VANCOMYCIN HCL 1 G
1 VIAL (EA) INTRAVENOUS
Qty: 0 | Refills: 0 | COMMUNITY
Start: 2017-01-12

## 2017-01-12 RX ORDER — ALBUTEROL 90 UG/1
1 AEROSOL, METERED ORAL
Qty: 1 | Refills: 0 | OUTPATIENT
Start: 2017-01-12

## 2017-01-12 RX ORDER — MIDODRINE HYDROCHLORIDE 2.5 MG/1
1 TABLET ORAL
Qty: 90 | Refills: 0 | OUTPATIENT
Start: 2017-01-12 | End: 2017-02-11

## 2017-01-12 RX ORDER — LEVOTHYROXINE SODIUM 125 MCG
1 TABLET ORAL
Qty: 30 | Refills: 0 | OUTPATIENT
Start: 2017-01-12 | End: 2017-02-11

## 2017-01-12 RX ORDER — ASPIRIN/CALCIUM CARB/MAGNESIUM 324 MG
1 TABLET ORAL
Qty: 30 | Refills: 0 | OUTPATIENT
Start: 2017-01-12

## 2017-01-12 RX ORDER — INSULIN DETEMIR 100/ML (3)
1 INSULIN PEN (ML) SUBCUTANEOUS
Qty: 1 | Refills: 0 | OUTPATIENT
Start: 2017-01-12

## 2017-01-12 RX ORDER — IPRATROPIUM/ALBUTEROL SULFATE 18-103MCG
3 AEROSOL WITH ADAPTER (GRAM) INHALATION
Qty: 100 | Refills: 0 | OUTPATIENT
Start: 2017-01-12 | End: 2017-02-11

## 2017-01-12 RX ORDER — INSULIN DETEMIR 100/ML (3)
50 INSULIN PEN (ML) SUBCUTANEOUS
Qty: 30 | Refills: 0 | OUTPATIENT
Start: 2017-01-12

## 2017-01-12 RX ORDER — ATORVASTATIN CALCIUM 80 MG/1
1 TABLET, FILM COATED ORAL
Qty: 30 | Refills: 0 | OUTPATIENT
Start: 2017-01-12 | End: 2017-02-11

## 2017-01-12 RX ADMIN — Medication 81 MILLIGRAM(S): at 12:14

## 2017-01-12 RX ADMIN — HEPARIN SODIUM 5000 UNIT(S): 5000 INJECTION INTRAVENOUS; SUBCUTANEOUS at 05:25

## 2017-01-12 RX ADMIN — Medication 250 MILLIGRAM(S): at 18:15

## 2017-01-12 RX ADMIN — PANTOPRAZOLE SODIUM 40 MILLIGRAM(S): 20 TABLET, DELAYED RELEASE ORAL at 05:25

## 2017-01-12 RX ADMIN — HEPARIN SODIUM 5000 UNIT(S): 5000 INJECTION INTRAVENOUS; SUBCUTANEOUS at 12:14

## 2017-01-12 RX ADMIN — Medication 4: at 12:14

## 2017-01-12 RX ADMIN — Medication 150 MICROGRAM(S): at 05:25

## 2017-01-12 RX ADMIN — Medication 2: at 18:10

## 2017-01-12 RX ADMIN — MIDODRINE HYDROCHLORIDE 10 MILLIGRAM(S): 2.5 TABLET ORAL at 05:25

## 2017-01-12 RX ADMIN — MIDODRINE HYDROCHLORIDE 10 MILLIGRAM(S): 2.5 TABLET ORAL at 12:14

## 2017-01-15 LAB
CULTURE RESULTS: SIGNIFICANT CHANGE UP
CULTURE RESULTS: SIGNIFICANT CHANGE UP
SPECIMEN SOURCE: SIGNIFICANT CHANGE UP
SPECIMEN SOURCE: SIGNIFICANT CHANGE UP

## 2017-01-27 ENCOUNTER — APPOINTMENT (OUTPATIENT)
Age: 77
End: 2017-01-27

## 2017-02-17 ENCOUNTER — APPOINTMENT (OUTPATIENT)
Age: 77
End: 2017-02-17

## 2017-04-24 ENCOUNTER — APPOINTMENT (OUTPATIENT)
Dept: VASCULAR SURGERY | Facility: CLINIC | Age: 77
End: 2017-04-24

## 2017-04-24 VITALS
HEART RATE: 75 BPM | BODY MASS INDEX: 20.47 KG/M2 | WEIGHT: 143 LBS | SYSTOLIC BLOOD PRESSURE: 146 MMHG | HEIGHT: 70 IN | RESPIRATION RATE: 16 BRPM | DIASTOLIC BLOOD PRESSURE: 66 MMHG

## 2017-05-31 ENCOUNTER — APPOINTMENT (OUTPATIENT)
Dept: CARDIOLOGY | Facility: CLINIC | Age: 77
End: 2017-05-31

## 2017-05-31 ENCOUNTER — NON-APPOINTMENT (OUTPATIENT)
Age: 77
End: 2017-05-31

## 2017-05-31 VITALS
HEART RATE: 83 BPM | BODY MASS INDEX: 20.95 KG/M2 | SYSTOLIC BLOOD PRESSURE: 138 MMHG | OXYGEN SATURATION: 93 % | DIASTOLIC BLOOD PRESSURE: 74 MMHG | WEIGHT: 146 LBS | RESPIRATION RATE: 14 BRPM

## 2017-05-31 RX ORDER — BLOOD SUGAR DIAGNOSTIC
STRIP MISCELLANEOUS
Qty: 100 | Refills: 0 | Status: ACTIVE | COMMUNITY
Start: 2017-02-24

## 2017-05-31 RX ORDER — ASPIRIN ENTERIC COATED TABLETS 81 MG 81 MG/1
81 TABLET, DELAYED RELEASE ORAL
Qty: 90 | Refills: 0 | Status: ACTIVE | COMMUNITY
Start: 2017-02-24

## 2017-05-31 RX ORDER — INSULIN ASPART 100 [IU]/ML
100 INJECTION, SOLUTION INTRAVENOUS; SUBCUTANEOUS
Qty: 30 | Refills: 0 | Status: ACTIVE | COMMUNITY
Start: 2017-01-12

## 2017-05-31 RX ORDER — PEN NEEDLE, DIABETIC 29 G X1/2"
32G X 4 MM NEEDLE, DISPOSABLE MISCELLANEOUS
Qty: 100 | Refills: 0 | Status: ACTIVE | COMMUNITY
Start: 2017-02-24

## 2017-05-31 RX ORDER — INSULIN DETEMIR 100 [IU]/ML
100 INJECTION, SOLUTION SUBCUTANEOUS
Qty: 30 | Refills: 0 | Status: ACTIVE | COMMUNITY
Start: 2017-01-12

## 2017-06-26 ENCOUNTER — APPOINTMENT (OUTPATIENT)
Dept: VASCULAR SURGERY | Facility: CLINIC | Age: 77
End: 2017-06-26

## 2017-07-05 ENCOUNTER — LABORATORY RESULT (OUTPATIENT)
Age: 77
End: 2017-07-05

## 2017-10-09 ENCOUNTER — APPOINTMENT (OUTPATIENT)
Dept: VASCULAR SURGERY | Facility: CLINIC | Age: 77
End: 2017-10-09
Payer: MEDICARE

## 2017-10-09 PROCEDURE — 93990 DOPPLER FLOW TESTING: CPT

## 2017-10-09 PROCEDURE — 99214 OFFICE O/P EST MOD 30 MIN: CPT | Mod: 25

## 2017-11-29 ENCOUNTER — NON-APPOINTMENT (OUTPATIENT)
Age: 77
End: 2017-11-29

## 2017-11-29 ENCOUNTER — APPOINTMENT (OUTPATIENT)
Dept: CARDIOLOGY | Facility: CLINIC | Age: 77
End: 2017-11-29
Payer: MEDICARE

## 2017-11-29 VITALS — HEIGHT: 70 IN | SYSTOLIC BLOOD PRESSURE: 120 MMHG | HEART RATE: 80 BPM | DIASTOLIC BLOOD PRESSURE: 70 MMHG

## 2017-11-29 DIAGNOSIS — I34.0 NONRHEUMATIC MITRAL (VALVE) INSUFFICIENCY: ICD-10-CM

## 2017-11-29 PROCEDURE — 93000 ELECTROCARDIOGRAM COMPLETE: CPT

## 2017-11-29 PROCEDURE — 99214 OFFICE O/P EST MOD 30 MIN: CPT

## 2017-11-29 RX ORDER — ALBUTEROL SULFATE 108 UG/1
108 (90 BASE) AEROSOL, METERED RESPIRATORY (INHALATION)
Qty: 7 | Refills: 0 | Status: COMPLETED | COMMUNITY
Start: 2017-01-12 | End: 2017-11-29

## 2017-12-16 ENCOUNTER — TRANSCRIPTION ENCOUNTER (OUTPATIENT)
Age: 77
End: 2017-12-16

## 2018-02-20 NOTE — PATIENT PROFILE ADULT. - SURGICAL SITE INCISION
Silver Nitrate Text: The wound bed was treated with silver nitrate after the biopsy was performed. Lab Facility: [id number] Anesthesia Volume In Cc (Will Not Render If 0): 0.5 Path Notes (To The Dermatopathologist): 1 mm Electrodesiccation Text: The wound bed was treated with electrodesiccation after the biopsy was performed. Billing Type: Third-Party Bill Additional Anesthesia Volume In Cc (Will Not Render If 0): 0 Biopsy Type: H and E Bill 39851 For Specimen Handling/Conveyance To Laboratory?: no Post-Care Instructions: I reviewed with the patient in detail post-care instructions. Patient is to keep the biopsy site dry overnight, and then apply bacitracin twice daily until healed. Patient may apply hydrogen peroxide soaks to remove any crusting. Anesthesia Type: 1% lidocaine with epinephrine Biopsy Method: Personna blade Consent: Written consent was obtained and risks were reviewed including but not limited to scarring, infection, bleeding, scabbing, incomplete removal, nerve damage and allergy to anesthesia. Wound Care: Vaseline Hemostasis: Electrocautery Size Of Lesion In Cm: 0.1 Electrodesiccation And Curettage Text: The wound bed was treated with electrodesiccation and curettage after the biopsy was performed. Lab: [id number] Dressing: bandage Detail Level: Detailed no Cryotherapy Text: The wound bed was treated with cryotherapy after the biopsy was performed. Type Of Destruction Used: Curettage Notification Instructions: Patient will be notified of biopsy results. However, patient instructed to call the office if not contacted within 2 weeks.

## 2018-04-09 ENCOUNTER — APPOINTMENT (OUTPATIENT)
Dept: VASCULAR SURGERY | Facility: CLINIC | Age: 78
End: 2018-04-09

## 2018-04-09 ENCOUNTER — NON-APPOINTMENT (OUTPATIENT)
Age: 78
End: 2018-04-09

## 2018-04-09 ENCOUNTER — APPOINTMENT (OUTPATIENT)
Dept: CARDIOLOGY | Facility: CLINIC | Age: 78
End: 2018-04-09
Payer: MEDICARE

## 2018-04-09 VITALS
SYSTOLIC BLOOD PRESSURE: 128 MMHG | HEART RATE: 70 BPM | OXYGEN SATURATION: 93 % | WEIGHT: 65.08 LBS | DIASTOLIC BLOOD PRESSURE: 78 MMHG | HEIGHT: 70 IN | BODY MASS INDEX: 9.32 KG/M2

## 2018-04-09 DIAGNOSIS — Z95.1 PRESENCE OF AORTOCORONARY BYPASS GRAFT: ICD-10-CM

## 2018-04-09 PROCEDURE — 99215 OFFICE O/P EST HI 40 MIN: CPT

## 2018-04-09 PROCEDURE — 93000 ELECTROCARDIOGRAM COMPLETE: CPT

## 2018-06-25 ENCOUNTER — APPOINTMENT (OUTPATIENT)
Dept: VASCULAR SURGERY | Facility: CLINIC | Age: 78
End: 2018-06-25

## 2018-06-29 ENCOUNTER — APPOINTMENT (OUTPATIENT)
Dept: VASCULAR SURGERY | Facility: CLINIC | Age: 78
End: 2018-06-29

## 2018-07-08 ENCOUNTER — RX RENEWAL (OUTPATIENT)
Age: 78
End: 2018-07-08

## 2018-07-18 ENCOUNTER — APPOINTMENT (OUTPATIENT)
Dept: VASCULAR SURGERY | Facility: CLINIC | Age: 78
End: 2018-07-18
Payer: MEDICARE

## 2018-07-18 PROCEDURE — 93990 DOPPLER FLOW TESTING: CPT

## 2018-08-03 ENCOUNTER — APPOINTMENT (OUTPATIENT)
Dept: OPHTHALMOLOGY | Facility: CLINIC | Age: 78
End: 2018-08-03
Payer: MEDICARE

## 2018-08-03 PROCEDURE — 68761 CLOSE TEAR DUCT OPENING: CPT | Mod: E2,E4

## 2018-08-03 PROCEDURE — 65430 CORNEAL SMEAR: CPT | Mod: LT

## 2018-08-03 PROCEDURE — 99204 OFFICE O/P NEW MOD 45 MIN: CPT

## 2018-08-03 PROCEDURE — 92285 EXTERNAL OCULAR PHOTOGRAPHY: CPT

## 2018-08-03 RX ORDER — DOXYCYCLINE HYCLATE 50 MG/1
50 CAPSULE ORAL TWICE DAILY
Qty: 1 | Refills: 0 | Status: ACTIVE | COMMUNITY
Start: 2018-08-03 | End: 1900-01-01

## 2018-08-07 ENCOUNTER — MEDICATION RENEWAL (OUTPATIENT)
Age: 78
End: 2018-08-07

## 2018-08-07 RX ORDER — NEOMYCIN AND POLYMYXIN B SULFATES AND DEXAMETHASONE 3.5; 10000; 1 MG/G; [IU]/G; MG/G
3.5-10000-0.1 OINTMENT OPHTHALMIC
Qty: 1 | Refills: 1 | Status: ACTIVE | COMMUNITY
Start: 2018-08-07 | End: 1900-01-01

## 2018-08-10 LAB — BACTERIA EYE AEROBE CULT: ABNORMAL

## 2018-08-16 ENCOUNTER — APPOINTMENT (OUTPATIENT)
Dept: OPHTHALMOLOGY | Facility: CLINIC | Age: 78
End: 2018-08-16
Payer: MEDICARE

## 2018-08-16 PROCEDURE — 92012 INTRM OPH EXAM EST PATIENT: CPT

## 2018-08-17 LAB — VIRAL CULTURE, GENERAL: NORMAL

## 2018-08-23 ENCOUNTER — FORM ENCOUNTER (OUTPATIENT)
Age: 78
End: 2018-08-23

## 2018-08-24 ENCOUNTER — APPOINTMENT (OUTPATIENT)
Dept: ENDOVASCULAR SURGERY | Facility: CLINIC | Age: 78
End: 2018-08-24
Payer: MEDICARE

## 2018-08-24 PROCEDURE — 36902Z: CUSTOM

## 2018-08-24 PROCEDURE — 36215Z: CUSTOM | Mod: 59

## 2018-08-28 ENCOUNTER — APPOINTMENT (OUTPATIENT)
Dept: OPHTHALMOLOGY | Facility: CLINIC | Age: 78
End: 2018-08-28
Payer: MEDICARE

## 2018-08-28 PROCEDURE — 92012 INTRM OPH EXAM EST PATIENT: CPT

## 2018-09-04 LAB — FUNGUS SPEC CULT ORG #8: NORMAL

## 2018-09-11 ENCOUNTER — RX RENEWAL (OUTPATIENT)
Age: 78
End: 2018-09-11

## 2018-10-11 ENCOUNTER — APPOINTMENT (OUTPATIENT)
Dept: OPHTHALMOLOGY | Facility: CLINIC | Age: 78
End: 2018-10-11
Payer: MEDICARE

## 2018-10-11 PROCEDURE — 92012 INTRM OPH EXAM EST PATIENT: CPT

## 2018-10-22 ENCOUNTER — APPOINTMENT (OUTPATIENT)
Dept: CARDIOLOGY | Facility: CLINIC | Age: 78
End: 2018-10-22

## 2018-12-09 PROBLEM — I05.9 MITRAL VALVE DISEASE: Status: ACTIVE | Noted: 2018-12-09

## 2018-12-10 ENCOUNTER — APPOINTMENT (OUTPATIENT)
Dept: CARDIOLOGY | Facility: CLINIC | Age: 78
End: 2018-12-10
Payer: MEDICARE

## 2018-12-10 ENCOUNTER — NON-APPOINTMENT (OUTPATIENT)
Age: 78
End: 2018-12-10

## 2018-12-10 VITALS
DIASTOLIC BLOOD PRESSURE: 71 MMHG | SYSTOLIC BLOOD PRESSURE: 145 MMHG | RESPIRATION RATE: 14 BRPM | OXYGEN SATURATION: 95 % | HEART RATE: 75 BPM

## 2018-12-10 DIAGNOSIS — I25.10 ATHEROSCLEROTIC HEART DISEASE OF NATIVE CORONARY ARTERY W/OUT ANGINA PECTORIS: ICD-10-CM

## 2018-12-10 DIAGNOSIS — E78.5 HYPERLIPIDEMIA, UNSPECIFIED: ICD-10-CM

## 2018-12-10 DIAGNOSIS — I05.9 RHEUMATIC MITRAL VALVE DISEASE, UNSPECIFIED: ICD-10-CM

## 2018-12-10 DIAGNOSIS — I10 ESSENTIAL (PRIMARY) HYPERTENSION: ICD-10-CM

## 2018-12-10 DIAGNOSIS — I50.9 HEART FAILURE, UNSPECIFIED: ICD-10-CM

## 2018-12-10 PROCEDURE — 93000 ELECTROCARDIOGRAM COMPLETE: CPT

## 2018-12-10 PROCEDURE — 99214 OFFICE O/P EST MOD 30 MIN: CPT

## 2018-12-10 NOTE — HISTORY OF PRESENT ILLNESS
[FreeTextEntry1] : I saw her last in April.  Since that time, she tolerated cataract surgery on each eye without problem from a cardiac standpoint.  She continues to tolerate dialysis without problem from a cardiac standpoint.  She describes no chest discomfort or significant BENAVIDEZ.  As in the past, she experiences a mild degree of orthopnea, and sleeps in a recliner, but reports no PND.  She recounts no palpitations or lightheadedness. She has not suffered any syncopal events.\par \par Her medications are unchanged.  She reports no new interval medical problems.

## 2018-12-10 NOTE — REASON FOR VISIT
[FreeTextEntry1] : Mercy Kelley returns today for f/u regarding coronary artery disease, mitral valve disease and congestive heart failure.

## 2018-12-10 NOTE — PHYSICAL EXAM

## 2018-12-10 NOTE — ASSESSMENT
[FreeTextEntry1] : Coronary artery disease\par      s/p CABG x 4, with LIMA to LAD June 2015 by Dr. Pallazzo, for 90% lesion in mid LAD, a 99% stenosis in the first diag a., 99% stenosis in first OM of non-dominant circ, and mild atherosclerosis in dominant RCA \par  \par S/P mitral valve repair for severe mitral valve insufficiency.\par \par Moderate LV dysfunction, with an estimated EF by echo of 35%. \par \par CHF, due to chronic systolic LV dysfunction and propensity to volume retention because of chronic renal insufficiency. \par \par ESRD on HD.\par \par Diabetes mellitus.\par \par Hypertension, well-controlled at present.\par \par Hyperlipidemia, on Lipitor.

## 2018-12-10 NOTE — DISCUSSION/SUMMARY
[FreeTextEntry1] : CAD s/p CABG; remains free of ischemic sxs.  Will continue ASA and statin.\par \par HLD - will check hepatic and lipid profile; continue statin\par \par MR, S/P mitral valve repair.  Remains reasonably compensated; mild to moderate MR on most recent echo.\par \par Chronic biventricular systolic CHF.  Activity level remains limited by BENAVIDEZ, but she is in a reasonably compensated state with ongoing dialysis.\par \par ESRD on HD.\par \par Hypertension, well-controlled at present.\par \par She will return for a visit in 6 months.

## 2018-12-11 LAB
ALBUMIN SERPL ELPH-MCNC: 4.3 G/DL
ALP BLD-CCNC: 159 U/L
ALT SERPL-CCNC: 8 U/L
AST SERPL-CCNC: 22 U/L
BILIRUB DIRECT SERPL-MCNC: 0.2 MG/DL
BILIRUB INDIRECT SERPL-MCNC: 0.2 MG/DL
BILIRUB SERPL-MCNC: 0.4 MG/DL
CHOLEST SERPL-MCNC: 118 MG/DL
CHOLEST/HDLC SERPL: 4.1 RATIO
HDLC SERPL-MCNC: 29 MG/DL
LDLC SERPL CALC-MCNC: 69 MG/DL
LDLC SERPL DIRECT ASSAY-MCNC: 76 MG/DL
PROT SERPL-MCNC: 8.5 G/DL
TRIGL SERPL-MCNC: 98 MG/DL

## 2019-01-07 ENCOUNTER — APPOINTMENT (OUTPATIENT)
Dept: VASCULAR SURGERY | Facility: CLINIC | Age: 79
End: 2019-01-07

## 2019-01-10 ENCOUNTER — APPOINTMENT (OUTPATIENT)
Dept: OPHTHALMOLOGY | Facility: CLINIC | Age: 79
End: 2019-01-10
Payer: COMMERCIAL

## 2019-01-10 DIAGNOSIS — B00.52 HERPESVIRAL KERATITIS: ICD-10-CM

## 2019-01-10 PROCEDURE — 92012 INTRM OPH EXAM EST PATIENT: CPT

## 2019-01-10 RX ORDER — GANCICLOVIR 1.5 MG/G
0.15 GEL OPHTHALMIC 3 TIMES DAILY
Qty: 1 | Refills: 5 | Status: ACTIVE | COMMUNITY
Start: 2018-08-03 | End: 1900-01-01

## 2019-04-09 ENCOUNTER — INPATIENT (INPATIENT)
Facility: HOSPITAL | Age: 79
LOS: 13 days | DRG: 562 | End: 2019-04-23
Attending: STUDENT IN AN ORGANIZED HEALTH CARE EDUCATION/TRAINING PROGRAM | Admitting: INTERNAL MEDICINE
Payer: MEDICARE

## 2019-04-09 VITALS
SYSTOLIC BLOOD PRESSURE: 102 MMHG | TEMPERATURE: 99 F | DIASTOLIC BLOOD PRESSURE: 60 MMHG | RESPIRATION RATE: 16 BRPM | HEART RATE: 76 BPM | OXYGEN SATURATION: 90 %

## 2019-04-09 DIAGNOSIS — Z95.1 PRESENCE OF AORTOCORONARY BYPASS GRAFT: Chronic | ICD-10-CM

## 2019-04-09 DIAGNOSIS — Z98.89 OTHER SPECIFIED POSTPROCEDURAL STATES: Chronic | ICD-10-CM

## 2019-04-09 DIAGNOSIS — R09.02 HYPOXEMIA: ICD-10-CM

## 2019-04-09 LAB
ALBUMIN SERPL ELPH-MCNC: 3.4 G/DL — SIGNIFICANT CHANGE UP (ref 3.3–5)
ALP SERPL-CCNC: 126 U/L — HIGH (ref 40–120)
ALT FLD-CCNC: 30 U/L — SIGNIFICANT CHANGE UP (ref 10–45)
ANION GAP SERPL CALC-SCNC: 20 MMOL/L — HIGH (ref 5–17)
APTT BLD: 36.8 SEC — HIGH (ref 27.5–36.3)
AST SERPL-CCNC: 62 U/L — HIGH (ref 10–40)
BASOPHILS # BLD AUTO: 0 K/UL — SIGNIFICANT CHANGE UP (ref 0–0.2)
BASOPHILS NFR BLD AUTO: 0 % — SIGNIFICANT CHANGE UP (ref 0–2)
BILIRUB SERPL-MCNC: 0.5 MG/DL — SIGNIFICANT CHANGE UP (ref 0.2–1.2)
BLD GP AB SCN SERPL QL: NEGATIVE — SIGNIFICANT CHANGE UP
BUN SERPL-MCNC: 42 MG/DL — HIGH (ref 7–23)
CALCIUM SERPL-MCNC: 8.8 MG/DL — SIGNIFICANT CHANGE UP (ref 8.4–10.5)
CHLORIDE SERPL-SCNC: 85 MMOL/L — LOW (ref 96–108)
CO2 SERPL-SCNC: 21 MMOL/L — LOW (ref 22–31)
CREAT SERPL-MCNC: 5.23 MG/DL — HIGH (ref 0.5–1.3)
EOSINOPHIL # BLD AUTO: 0.1 K/UL — SIGNIFICANT CHANGE UP (ref 0–0.5)
EOSINOPHIL NFR BLD AUTO: 1.6 % — SIGNIFICANT CHANGE UP (ref 0–6)
GLUCOSE SERPL-MCNC: 288 MG/DL — HIGH (ref 70–99)
HCT VFR BLD CALC: 34.3 % — LOW (ref 34.5–45)
HGB BLD-MCNC: 11.4 G/DL — LOW (ref 11.5–15.5)
INR BLD: 1.13 RATIO — SIGNIFICANT CHANGE UP (ref 0.88–1.16)
LYMPHOCYTES # BLD AUTO: 0.5 K/UL — LOW (ref 1–3.3)
LYMPHOCYTES # BLD AUTO: 5.9 % — LOW (ref 13–44)
MAGNESIUM SERPL-MCNC: 1.6 MG/DL — SIGNIFICANT CHANGE UP (ref 1.6–2.6)
MCHC RBC-ENTMCNC: 31.1 PG — SIGNIFICANT CHANGE UP (ref 27–34)
MCHC RBC-ENTMCNC: 33.2 GM/DL — SIGNIFICANT CHANGE UP (ref 32–36)
MCV RBC AUTO: 93.6 FL — SIGNIFICANT CHANGE UP (ref 80–100)
MONOCYTES # BLD AUTO: 0.7 K/UL — SIGNIFICANT CHANGE UP (ref 0–0.9)
MONOCYTES NFR BLD AUTO: 7.9 % — SIGNIFICANT CHANGE UP (ref 2–14)
NEUTROPHILS # BLD AUTO: 7 K/UL — SIGNIFICANT CHANGE UP (ref 1.8–7.4)
NEUTROPHILS NFR BLD AUTO: 84.6 % — HIGH (ref 43–77)
PHOSPHATE SERPL-MCNC: 4.1 MG/DL — SIGNIFICANT CHANGE UP (ref 2.5–4.5)
PLATELET # BLD AUTO: 205 K/UL — SIGNIFICANT CHANGE UP (ref 150–400)
POTASSIUM SERPL-MCNC: 4.9 MMOL/L — SIGNIFICANT CHANGE UP (ref 3.5–5.3)
POTASSIUM SERPL-SCNC: 4.9 MMOL/L — SIGNIFICANT CHANGE UP (ref 3.5–5.3)
PROT SERPL-MCNC: 7.6 G/DL — SIGNIFICANT CHANGE UP (ref 6–8.3)
PROTHROM AB SERPL-ACNC: 12.9 SEC — SIGNIFICANT CHANGE UP (ref 10–12.9)
RBC # BLD: 3.67 M/UL — LOW (ref 3.8–5.2)
RBC # FLD: 14.7 % — HIGH (ref 10.3–14.5)
RH IG SCN BLD-IMP: POSITIVE — SIGNIFICANT CHANGE UP
SODIUM SERPL-SCNC: 126 MMOL/L — LOW (ref 135–145)
WBC # BLD: 8.3 K/UL — SIGNIFICANT CHANGE UP (ref 3.8–10.5)
WBC # FLD AUTO: 8.3 K/UL — SIGNIFICANT CHANGE UP (ref 3.8–10.5)

## 2019-04-09 PROCEDURE — 73610 X-RAY EXAM OF ANKLE: CPT | Mod: 26,RT

## 2019-04-09 PROCEDURE — 99285 EMERGENCY DEPT VISIT HI MDM: CPT

## 2019-04-09 PROCEDURE — 73552 X-RAY EXAM OF FEMUR 2/>: CPT | Mod: 26,RT

## 2019-04-09 PROCEDURE — 73560 X-RAY EXAM OF KNEE 1 OR 2: CPT | Mod: 26,RT

## 2019-04-09 PROCEDURE — 73590 X-RAY EXAM OF LOWER LEG: CPT | Mod: 26,RT

## 2019-04-09 PROCEDURE — 71045 X-RAY EXAM CHEST 1 VIEW: CPT | Mod: 26

## 2019-04-09 PROCEDURE — 73502 X-RAY EXAM HIP UNI 2-3 VIEWS: CPT | Mod: 26,RT

## 2019-04-09 RX ORDER — CEFAZOLIN SODIUM 1 G
1000 VIAL (EA) INJECTION EVERY 24 HOURS
Qty: 0 | Refills: 0 | Status: DISCONTINUED | OUTPATIENT
Start: 2019-04-09 | End: 2019-04-11

## 2019-04-09 RX ORDER — TETANUS AND DIPHTHERIA TOXOIDS ADSORBED 2; 2 [LF]/.5ML; [LF]/.5ML
0.5 INJECTION INTRAMUSCULAR ONCE
Qty: 0 | Refills: 0 | Status: COMPLETED | OUTPATIENT
Start: 2019-04-09 | End: 2019-04-09

## 2019-04-09 RX ORDER — MIDODRINE HYDROCHLORIDE 2.5 MG/1
10 TABLET ORAL ONCE
Qty: 0 | Refills: 0 | Status: COMPLETED | OUTPATIENT
Start: 2019-04-09 | End: 2019-04-09

## 2019-04-09 RX ORDER — ACETAMINOPHEN 500 MG
975 TABLET ORAL ONCE
Qty: 0 | Refills: 0 | Status: COMPLETED | OUTPATIENT
Start: 2019-04-09 | End: 2019-04-09

## 2019-04-09 RX ADMIN — Medication 975 MILLIGRAM(S): at 19:57

## 2019-04-09 RX ADMIN — MIDODRINE HYDROCHLORIDE 10 MILLIGRAM(S): 2.5 TABLET ORAL at 20:46

## 2019-04-09 RX ADMIN — Medication 975 MILLIGRAM(S): at 19:27

## 2019-04-09 NOTE — CONSULT NOTE ADULT - SUBJECTIVE AND OBJECTIVE BOX
[facility]  [phone]  NEPHROLOGY      INITIAL CONSULT NOTE  --------------------------------------------------------------------------------  HPI:    79 yo F with hx of cad, cabg, DM, esrd, on HD, chronic hypotension on midodrine 10 mg tid  who had difficulty ambulating, sob worsened. also twisted her leg today came to er. sob renal consult called.       PAST HISTORY:   --------------------------------------------------------------------------------  PAST MEDICAL & SURGICAL HISTORY:  CHF (congestive heart failure): Oct 2015- still sob  Shortness of breath  Hypotension  Type 2 diabetes mellitus  Chronic kidney disease (CKD): ON DIALYSIS - Tue, Thur, Sat  Nephrolithiasis: 2001  Ulcer: 2001  Hydronephrosis: Right 1/2012  Charcot Foot: Right Foot  Herniated Disc  Diabetic Neuropathy  Anemia: CKD  Hypothyroidism  Hypertension: NOT ANY MORE  Hyperlipidemia  S/P mitral valve repair: with CABG X 4 MAY 2015  S/P CABG (coronary artery bypass graft): x 4, May 2015  History of extraction of renal calculus  S/P Foot Surgery: 2002, 2014  S/P Cholecystectomy: 2001  S/P Breast Lumpectomy: 1994, 2003 - left breast benign    FAMILY HISTORY:  No pertinent family history in first degree relatives    PAST SOCIAL HISTORY:    ALLERGIES & MEDICATIONS  --------------------------------------------------------------------------------  Allergies    allopurinol (Rash)  Augmentin (Rash)  Levaquin (Rash)    Intolerances      Standing Inpatient Medications    PRN Inpatient Medications      REVIEW OF SYSTEMS  --------------------------------------------------------------------------------  Gen: No  fevers/chills  +   Skin: No rashes  Head/Eyes/Ears/Mouth: No headache; Normal hearing;  No sinus pain/discomfort, sore throat  Respiratory: No dyspnea, cough, wheezing, hemoptysis  CV: No chest pain, or palp   GI: No abdominal pain, diarrhea, nausea, vomiting, melena  : No dysuria,   MSK: + leg  joint pain/swelling; no back pain  Neuro: No dizziness/lightheadedness,  also with no edema     All other systems were reviewed and are negative, except as noted.    VITALS/PHYSICAL EXAM  --------------------------------------------------------------------------------  T(C): 37.4 (04-09-19 @ 21:45), Max: 37.4 (04-09-19 @ 21:45)  HR: 64 (04-09-19 @ 21:45) (64 - 76)  BP: 95/44 (04-09-19 @ 21:45) (88/47 - 102/60)  RR: 16 (04-09-19 @ 21:45) (16 - 18)  SpO2: 95% (04-09-19 @ 21:45) (90% - 100%)  Wt(kg): --        Physical Exam:  	Gen: Non toxic in pain  on O2   	no jvd   	Pulm: decrease bs  no rales or ronchi or wheezing  	CV: RRR, S1S2; no rub  	Abd: +BS, soft,  + distended   	: No suprapubic tenderness  	UE: Warm, no cyanosis  no clubbing,  no edema; no asterixis   	LE: Warm, no cyanosis  no clubbing, no edema  	Neuro: alert and oriented. speech coherent   	Skin: Warm, no decrease skin turgor  right forearm abrasion   	Vascular access: + avf + bruit and thrill     LABS/STUDIES  --------------------------------------------------------------------------------              11.4   8.3   >-----------<  205      [04-09-19 @ 18:30]              34.3     126  |  85  |  42  ----------------------------<  288      [04-09-19 @ 18:30]  4.9   |  21  |  5.23        Ca     8.8     [04-09-19 @ 18:30]      Mg     1.6     [04-09-19 @ 18:30]      Phos  4.1     [04-09-19 @ 18:30]    TPro  7.6  /  Alb  3.4  /  TBili  0.5  /  DBili  x   /  AST  62  /  ALT  30  /  AlkPhos  126  [04-09-19 @ 18:30]    PT/INR: PT 12.9 , INR 1.13       [04-09-19 @ 18:30]  PTT: 36.8       [04-09-19 @ 18:30]      Creatinine Trend:  SCr 5.23 [04-09 @ 18:30]    Urinalysis - [10-06-15 @ 18:43]      Color PL Yellow / Appearance Clear / SG 1.009 / pH 6.0      Gluc 150 / Ketone Negative  / Bili Negative / Urobili Normal       Blood Negative / Protein 300 / Leuk Est Trace / Nitrite Negative      RBC 0-2 / WBC 11-25 / Hyaline  / Gran  / Sq Epi  / Non Sq Epi OCC / Bacteria Few      HbA1c 10.0      [01-10-17 @ 07:39]    HBsAb <3.0      [01-10-17 @ 14:19]  HBsAg Nonreact      [01-10-17 @ 14:19]  HBcAb Nonreact      [01-10-17 @ 14:19]  HCV 0.08, Nonreact      [01-10-17 @ 14:19]      < from: Xray Chest 1 View- PORTABLE-Urgent (04.09.19 @ 20:28) >  ******PRELIMINARY REPORT******    ******PRELIMINARY REPORT******          EXAM:  XR CHEST PORTABLE URGENT 1V                            PROCEDURE DATE:  04/09/2019      ******PRELIMINARY REPORT******    ******PRELIMINARY REPORT******              INTERPRETATION:  Bilateral pleural effusions with associated passive   atelectasis.              ******PRELIMINARY REPORT******    ******PRELIMINARY REPORT******          MONSE MUNIZ M.D., RADIOLOGY RESIDENT      < end of copied text >    < from: Xray Tibia + Fibula 2 Views, Right (04.09.19 @ 20:28) >  ******PRELIMINARY REPORT******    ******PRELIMINARY REPORT******          EXAM:  TIBIA AND FIBULA AP AND LAT RT                            PROCEDURE DATE:  04/09/2019      ******PRELIMINARY REPORT******    ******PRELIMINARY REPORT******              INTERPRETATION:  Proximal tibial fracture with questionable   intra-articular extension. Additional proximal fibular fracture. CT of   the knee can be performed for further evaluation.    The ankle is poorly visualized secondary to technique. Repeatradiographs   can be performed for further evaluation.              ******PRELIMINARY REPORT******    ******PRELIMINARY REPORT******          MONSE MUNIZ M.D., RADIOLOGY RESIDENT    < end of copied text >

## 2019-04-09 NOTE — ED ADULT NURSE NOTE - OBJECTIVE STATEMENT
78 year old female a/ox3 brought in by ambulance 78 year old female a/ox3 brought in by ambulance s/p slip and fall today while going up steps in home. patient fell off the first step and landed on her right leg after I "gave way" from under her. with PMH of ESRD, on dialysis Tues, thurs, saturday and hypotension, on midodrine TID.  Additionally pt notes not feeling well, generalized weakness, and decreased PO intake x 2 days. Pt denies URI sxs, urinary complaints, or fevers, did have +chills today. noted with multiple scabs to upper back and shoulders, patient states she scratches a lot due to dry skin and noted with small stage II to coccyx area. pt states she has had it for many years and it has been unable to heal.

## 2019-04-09 NOTE — ED PROVIDER NOTE - PHYSICAL EXAMINATION
GEN: Pt in NAD, A&O x3.  PSYCH: Affect appropriate.  EYES: Sclera white w/o injection, PERRLA.   ENT: Head NCAT. Nose w/o deformity. No auricular TTP. MMM. Neck supple FROM.   RESP: No chest wall tenderness, decreased breath sounds at bases otherwise CTA b/l, no wheezes, rales, or rhonchi.   CARDIAC: RRR, clear distinct S1, S2, +3/6 systolic murmur.  ABD: Abdomen soft, non-tender. No CVAT b/l.  VASC: 2+ radial and dorsalis pedis pulses b/l. No edema or calf tenderness.  SKIN: No rashes on the trunk.

## 2019-04-09 NOTE — ED PROVIDER NOTE - OBJECTIVE STATEMENT
79 y/o F with PMHx of ESRD on HD T/R/S, hypotension on midodrine, CAd s/p CABG, DM with neuropathy presents c/o leg pain s/p fall. Pt states she was climbing the stairs at home on her way to dialysis, pt states her R leg "gave way" and buckled at the knee, she fell back and was caught by her daughter, no head trauma or LOC. BIBEMS in a splint on the R leg, pt notes she has minimal pain at rest severe pain in the R leg with movement, abrasions to R knee UTD on tetanus. Pt does not want pain meds at this time. Additionally pt notes not feeling well, generalized weakness, and decreased PO intake x 2 days. Pt denies URI sxs, urinary complaints, or fevers, did have +chills today. No AC. No other complaints at this time.

## 2019-04-09 NOTE — ED PROVIDER NOTE - ATTENDING CONTRIBUTION TO CARE
basement apartment  daughter and granddaughter were holding her up  was leaving for dialysis 5pm  [facility]  Dr. JAMIE Johnson nephro [phone]  No PMD  Denies CP/SOB  Denies abdominal pain, nausea, vomiting, diarrhea, blood in stools.   LUE AVF    CAD s/p open heart sx, DM, CKD on HD TuThSa, low blood pressure on Mididrone    Allergies: Allopurinol, Augmentin, Levaquin  Former tobacco Attending MD Gomez:   I personally have seen and examined this patient.  Physician assistant note reviewed and agree on plan of care and except where noted.  See below for details.     Seen in [facility], accompanied by daughter and granddaughter  Dr. JAMIE Johnson nephro [phone]  No PMD    78F with PMH/PSH including ESRD on HD (Tu,Th,Sa) via LUE AVF, chronic hypotension on Midodrine, CAD s/p CABG and MV repair, DM, diabetic neuropathy, HFrEF, HTN, HLD presents to the ED with RLE pain s/p fall.  Reports lives in basement apartment and she was leaving for dialysis 5pm at the [facility].  Reports she felt her RLE gave way.  Reports her daughter and granddaughter caught her and were holding her up.  Denies hitting head, LOC.  Patient was BIBEMS with splint on RLE.  Reports RLE does not hurt as long as she does not move it.  Denies chest pain, shortness of breath, palpitations. Denies abdominal pain, nausea, vomiting, diarrhea, blood in stools. Denies numbness or tingling in extremities.  Reports has not really been feeling at her baseline, reports has not been feeling well, with generalized weakness, decreased po intake for two days.  Reports had chills earlier today, denies fevers.  Denies cough, runny nose, cold/URI symptoms.  Reports allergies: Allopurinol, Augmentin, Levaquin.  Reports former tobacco.  Reviewed meds in computer, unsure.  On exam, NAD, on O2 via NC, AAOx3, head NCAT, PERRL, FROM at neck, no tenderness to midline palpation, no stepoffs along length of spine, lungs CTAB with fair inspiratory effort, decreased at bases, no rhonchi, rales, wheezing, no accessory muscle use, +S1S2, +3/6 murmur, no m/r/g, LUE AVF with thrill and bruit, abdomen soft with +BS, NT, ND, no CVAT, L anterior shin with 1cm abrasion, +2 , good and equal  strength bilaterally, +1 bilateral LE edema; A/P: 78F with suspected RLE bony injury, will obtain XRs of R hip and R femur/knee/tib-fib, CXR, pre op labs, ortho consult, EKG, will contact nephro, will likely be admitted

## 2019-04-09 NOTE — ED PROVIDER NOTE - PROGRESS NOTE DETAILS
Dr. March evaluated pt at bedside requesting admission to Dr. Butler. -Basilio Albarado PA-C Attending MD Gomez: Spoke to Dr. Dilia March in the ED.  Discussed patient case, will admit to Dr. Anna, no emergent imaging at this time, will defer to inpatient team.  Dr. March will arrange for dialysis. c/d/w ortho who states pt needs CT knee, will eventually need surgery but should be admitted to medicine for further management and optimization prior to surgery. -Basilio Albarado PA-C c/d/w ortho who states pt needs CT knee, will eventually need surgery but should be admitted to medicine for further management and optimization prior to surgery. Ortho will follow on admission. -Basilio Albarado PA-C

## 2019-04-09 NOTE — ED ADULT NURSE REASSESSMENT NOTE - NS ED NURSE REASSESS COMMENT FT1
Patient returned from xray. Repeat BP 88/47, patient reports hx of hypotension and takes midodrine TID. CATALINA Borges and MD Gomez made aware, okay to give pre-dialysis dose now. EKG completed. Patient aware of plan of care. Patient returned from xray. Repeat BP 88/47, patient reports hx of hypotension and takes midodrine TID but only took one dose today. Denies current dizziness or lightheadedness.  CATALINA Borges and MD Gomez made aware, okay to give pre-dialysis dose now. EKG completed. Patient aware of plan of care. Family at bedside.

## 2019-04-09 NOTE — CONSULT NOTE ADULT - ASSESSMENT
77 yo F with hx of cad, cabg, DM, esrd, on HD, chronic hypotension on midodrine 10 mg tid with sob, pleural effusions, chf, ascites, and new tib/fib fx    1- esrd  2- hypotension   3- chf  4- ascites   5- tib/fib fx     hd consent obtained witnessed and placed in chart  hd arrangements made  cont midodrine 10 mg tid  abd sono for ascites likely will need paracentesis  echo to evaluate pre op   d/w ortho and hospitalist   cards clearance needed

## 2019-04-10 DIAGNOSIS — I25.10 ATHEROSCLEROTIC HEART DISEASE OF NATIVE CORONARY ARTERY WITHOUT ANGINA PECTORIS: ICD-10-CM

## 2019-04-10 DIAGNOSIS — S82.101B: ICD-10-CM

## 2019-04-10 DIAGNOSIS — R11.10 VOMITING, UNSPECIFIED: ICD-10-CM

## 2019-04-10 DIAGNOSIS — E11.65 TYPE 2 DIABETES MELLITUS WITH HYPERGLYCEMIA: ICD-10-CM

## 2019-04-10 DIAGNOSIS — N18.6 END STAGE RENAL DISEASE: ICD-10-CM

## 2019-04-10 DIAGNOSIS — Z01.818 ENCOUNTER FOR OTHER PREPROCEDURAL EXAMINATION: ICD-10-CM

## 2019-04-10 DIAGNOSIS — Z29.9 ENCOUNTER FOR PROPHYLACTIC MEASURES, UNSPECIFIED: ICD-10-CM

## 2019-04-10 DIAGNOSIS — I95.3 HYPOTENSION OF HEMODIALYSIS: ICD-10-CM

## 2019-04-10 DIAGNOSIS — E87.1 HYPO-OSMOLALITY AND HYPONATREMIA: ICD-10-CM

## 2019-04-10 DIAGNOSIS — R09.02 HYPOXEMIA: ICD-10-CM

## 2019-04-10 LAB
ANION GAP SERPL CALC-SCNC: 18 MMOL/L — HIGH (ref 5–17)
APTT BLD: 31.7 SEC — SIGNIFICANT CHANGE UP (ref 27.5–36.3)
BASOPHILS # BLD AUTO: 0 K/UL — SIGNIFICANT CHANGE UP (ref 0–0.2)
BASOPHILS NFR BLD AUTO: 0.5 % — SIGNIFICANT CHANGE UP (ref 0–2)
BLD GP AB SCN SERPL QL: NEGATIVE — SIGNIFICANT CHANGE UP
BUN SERPL-MCNC: 21 MG/DL — SIGNIFICANT CHANGE UP (ref 7–23)
CALCIUM SERPL-MCNC: 8.9 MG/DL — SIGNIFICANT CHANGE UP (ref 8.4–10.5)
CHLORIDE SERPL-SCNC: 91 MMOL/L — LOW (ref 96–108)
CO2 SERPL-SCNC: 26 MMOL/L — SIGNIFICANT CHANGE UP (ref 22–31)
CREAT SERPL-MCNC: 3.07 MG/DL — HIGH (ref 0.5–1.3)
EOSINOPHIL # BLD AUTO: 0.1 K/UL — SIGNIFICANT CHANGE UP (ref 0–0.5)
EOSINOPHIL NFR BLD AUTO: 1.2 % — SIGNIFICANT CHANGE UP (ref 0–6)
GLUCOSE BLDC GLUCOMTR-MCNC: 125 MG/DL — HIGH (ref 70–99)
GLUCOSE BLDC GLUCOMTR-MCNC: 136 MG/DL — HIGH (ref 70–99)
GLUCOSE BLDC GLUCOMTR-MCNC: 167 MG/DL — HIGH (ref 70–99)
GLUCOSE BLDC GLUCOMTR-MCNC: 174 MG/DL — HIGH (ref 70–99)
GLUCOSE SERPL-MCNC: 164 MG/DL — HIGH (ref 70–99)
HAV IGM SER-ACNC: SIGNIFICANT CHANGE UP
HBV CORE AB SER-ACNC: SIGNIFICANT CHANGE UP
HBV CORE IGM SER-ACNC: SIGNIFICANT CHANGE UP
HBV SURFACE AB SER-ACNC: <3 MIU/ML — LOW
HBV SURFACE AG SER-ACNC: SIGNIFICANT CHANGE UP
HCT VFR BLD CALC: 29.5 % — LOW (ref 34.5–45)
HCV AB S/CO SERPL IA: 0.08 S/CO — SIGNIFICANT CHANGE UP (ref 0–0.99)
HCV AB SERPL-IMP: SIGNIFICANT CHANGE UP
HGB BLD-MCNC: 10 G/DL — LOW (ref 11.5–15.5)
INR BLD: 1.15 RATIO — SIGNIFICANT CHANGE UP (ref 0.88–1.16)
LYMPHOCYTES # BLD AUTO: 0.4 K/UL — LOW (ref 1–3.3)
LYMPHOCYTES # BLD AUTO: 4.8 % — LOW (ref 13–44)
MAGNESIUM SERPL-MCNC: 1.6 MG/DL — SIGNIFICANT CHANGE UP (ref 1.6–2.6)
MCHC RBC-ENTMCNC: 31.8 PG — SIGNIFICANT CHANGE UP (ref 27–34)
MCHC RBC-ENTMCNC: 33.9 GM/DL — SIGNIFICANT CHANGE UP (ref 32–36)
MCV RBC AUTO: 93.6 FL — SIGNIFICANT CHANGE UP (ref 80–100)
MONOCYTES # BLD AUTO: 0.6 K/UL — SIGNIFICANT CHANGE UP (ref 0–0.9)
MONOCYTES NFR BLD AUTO: 6.5 % — SIGNIFICANT CHANGE UP (ref 2–14)
NEUTROPHILS # BLD AUTO: 8 K/UL — HIGH (ref 1.8–7.4)
NEUTROPHILS NFR BLD AUTO: 87.1 % — HIGH (ref 43–77)
PHOSPHATE SERPL-MCNC: 2.5 MG/DL — SIGNIFICANT CHANGE UP (ref 2.5–4.5)
PLATELET # BLD AUTO: 190 K/UL — SIGNIFICANT CHANGE UP (ref 150–400)
POTASSIUM SERPL-MCNC: 3.9 MMOL/L — SIGNIFICANT CHANGE UP (ref 3.5–5.3)
POTASSIUM SERPL-SCNC: 3.9 MMOL/L — SIGNIFICANT CHANGE UP (ref 3.5–5.3)
PROTHROM AB SERPL-ACNC: 13.2 SEC — HIGH (ref 10–12.9)
RBC # BLD: 3.15 M/UL — LOW (ref 3.8–5.2)
RBC # FLD: 14.7 % — HIGH (ref 10.3–14.5)
RH IG SCN BLD-IMP: POSITIVE — SIGNIFICANT CHANGE UP
SODIUM SERPL-SCNC: 135 MMOL/L — SIGNIFICANT CHANGE UP (ref 135–145)
WBC # BLD: 9.1 K/UL — SIGNIFICANT CHANGE UP (ref 3.8–10.5)
WBC # FLD AUTO: 9.1 K/UL — SIGNIFICANT CHANGE UP (ref 3.8–10.5)

## 2019-04-10 PROCEDURE — 93010 ELECTROCARDIOGRAM REPORT: CPT

## 2019-04-10 PROCEDURE — 73700 CT LOWER EXTREMITY W/O DYE: CPT | Mod: 26,RT

## 2019-04-10 PROCEDURE — 76377 3D RENDER W/INTRP POSTPROCES: CPT | Mod: 26

## 2019-04-10 PROCEDURE — 73590 X-RAY EXAM OF LOWER LEG: CPT | Mod: 26,RT

## 2019-04-10 PROCEDURE — 73562 X-RAY EXAM OF KNEE 3: CPT | Mod: 26,RT

## 2019-04-10 PROCEDURE — 76705 ECHO EXAM OF ABDOMEN: CPT | Mod: 26

## 2019-04-10 PROCEDURE — 99223 1ST HOSP IP/OBS HIGH 75: CPT

## 2019-04-10 PROCEDURE — 99232 SBSQ HOSP IP/OBS MODERATE 35: CPT

## 2019-04-10 PROCEDURE — 99223 1ST HOSP IP/OBS HIGH 75: CPT | Mod: GC

## 2019-04-10 PROCEDURE — 71250 CT THORAX DX C-: CPT | Mod: 26

## 2019-04-10 RX ORDER — DEXTROSE 50 % IN WATER 50 %
25 SYRINGE (ML) INTRAVENOUS ONCE
Qty: 0 | Refills: 0 | Status: DISCONTINUED | OUTPATIENT
Start: 2019-04-10 | End: 2019-04-11

## 2019-04-10 RX ORDER — ATORVASTATIN CALCIUM 80 MG/1
80 TABLET, FILM COATED ORAL AT BEDTIME
Qty: 0 | Refills: 0 | Status: DISCONTINUED | OUTPATIENT
Start: 2019-04-10 | End: 2019-04-11

## 2019-04-10 RX ORDER — PANTOPRAZOLE SODIUM 20 MG/1
40 TABLET, DELAYED RELEASE ORAL DAILY
Qty: 0 | Refills: 0 | Status: DISCONTINUED | OUTPATIENT
Start: 2019-04-10 | End: 2019-04-11

## 2019-04-10 RX ORDER — GLUCAGON INJECTION, SOLUTION 0.5 MG/.1ML
1 INJECTION, SOLUTION SUBCUTANEOUS ONCE
Qty: 0 | Refills: 0 | Status: DISCONTINUED | OUTPATIENT
Start: 2019-04-10 | End: 2019-04-16

## 2019-04-10 RX ORDER — MIDODRINE HYDROCHLORIDE 2.5 MG/1
10 TABLET ORAL THREE TIMES A DAY
Qty: 0 | Refills: 0 | Status: DISCONTINUED | OUTPATIENT
Start: 2019-04-10 | End: 2019-04-22

## 2019-04-10 RX ORDER — INSULIN LISPRO 100/ML
VIAL (ML) SUBCUTANEOUS EVERY 6 HOURS
Qty: 0 | Refills: 0 | Status: DISCONTINUED | OUTPATIENT
Start: 2019-04-10 | End: 2019-04-11

## 2019-04-10 RX ORDER — METOCLOPRAMIDE HCL 10 MG
10 TABLET ORAL ONCE
Qty: 0 | Refills: 0 | Status: COMPLETED | OUTPATIENT
Start: 2019-04-10 | End: 2019-04-10

## 2019-04-10 RX ORDER — INSULIN DETEMIR 100/ML (3)
0 INSULIN PEN (ML) SUBCUTANEOUS
Qty: 0 | Refills: 0 | COMMUNITY

## 2019-04-10 RX ORDER — SODIUM CHLORIDE 9 MG/ML
1000 INJECTION, SOLUTION INTRAVENOUS
Qty: 0 | Refills: 0 | Status: DISCONTINUED | OUTPATIENT
Start: 2019-04-10 | End: 2019-04-11

## 2019-04-10 RX ORDER — SUCRALFATE 1 G
1 TABLET ORAL ONCE
Qty: 0 | Refills: 0 | Status: COMPLETED | OUTPATIENT
Start: 2019-04-10 | End: 2019-04-10

## 2019-04-10 RX ORDER — DEXTROSE 50 % IN WATER 50 %
15 SYRINGE (ML) INTRAVENOUS ONCE
Qty: 0 | Refills: 0 | Status: DISCONTINUED | OUTPATIENT
Start: 2019-04-10 | End: 2019-04-11

## 2019-04-10 RX ORDER — ASPIRIN/CALCIUM CARB/MAGNESIUM 324 MG
81 TABLET ORAL DAILY
Qty: 0 | Refills: 0 | Status: DISCONTINUED | OUTPATIENT
Start: 2019-04-10 | End: 2019-04-11

## 2019-04-10 RX ORDER — PANTOPRAZOLE SODIUM 20 MG/1
40 TABLET, DELAYED RELEASE ORAL
Qty: 0 | Refills: 0 | Status: DISCONTINUED | OUTPATIENT
Start: 2019-04-10 | End: 2019-04-10

## 2019-04-10 RX ORDER — ONDANSETRON 8 MG/1
4 TABLET, FILM COATED ORAL EVERY 12 HOURS
Qty: 0 | Refills: 0 | Status: DISCONTINUED | OUTPATIENT
Start: 2019-04-10 | End: 2019-04-23

## 2019-04-10 RX ORDER — ACETAMINOPHEN 500 MG
650 TABLET ORAL EVERY 6 HOURS
Qty: 0 | Refills: 0 | Status: DISCONTINUED | OUTPATIENT
Start: 2019-04-10 | End: 2019-04-11

## 2019-04-10 RX ORDER — LEVOTHYROXINE SODIUM 125 MCG
150 TABLET ORAL DAILY
Qty: 0 | Refills: 0 | Status: DISCONTINUED | OUTPATIENT
Start: 2019-04-10 | End: 2019-04-11

## 2019-04-10 RX ORDER — DEXTROSE 50 % IN WATER 50 %
12.5 SYRINGE (ML) INTRAVENOUS ONCE
Qty: 0 | Refills: 0 | Status: DISCONTINUED | OUTPATIENT
Start: 2019-04-10 | End: 2019-04-11

## 2019-04-10 RX ORDER — INSULIN ASPART 100 [IU]/ML
0 INJECTION, SOLUTION SUBCUTANEOUS
Qty: 0 | Refills: 0 | COMMUNITY

## 2019-04-10 RX ADMIN — ATORVASTATIN CALCIUM 80 MILLIGRAM(S): 80 TABLET, FILM COATED ORAL at 22:47

## 2019-04-10 RX ADMIN — PANTOPRAZOLE SODIUM 40 MILLIGRAM(S): 20 TABLET, DELAYED RELEASE ORAL at 05:16

## 2019-04-10 RX ADMIN — MIDODRINE HYDROCHLORIDE 10 MILLIGRAM(S): 2.5 TABLET ORAL at 17:34

## 2019-04-10 RX ADMIN — Medication 650 MILLIGRAM(S): at 04:35

## 2019-04-10 RX ADMIN — Medication 650 MILLIGRAM(S): at 05:22

## 2019-04-10 RX ADMIN — Medication 1 TABLET(S): at 12:24

## 2019-04-10 RX ADMIN — Medication 650 MILLIGRAM(S): at 20:47

## 2019-04-10 RX ADMIN — Medication 1 GRAM(S): at 05:16

## 2019-04-10 RX ADMIN — ONDANSETRON 4 MILLIGRAM(S): 8 TABLET, FILM COATED ORAL at 04:32

## 2019-04-10 RX ADMIN — Medication 100 MILLIGRAM(S): at 03:09

## 2019-04-10 RX ADMIN — Medication 1: at 06:58

## 2019-04-10 RX ADMIN — Medication 81 MILLIGRAM(S): at 17:33

## 2019-04-10 RX ADMIN — TETANUS AND DIPHTHERIA TOXOIDS ADSORBED 0.5 MILLILITER(S): 2; 2 INJECTION INTRAMUSCULAR at 04:56

## 2019-04-10 RX ADMIN — MIDODRINE HYDROCHLORIDE 10 MILLIGRAM(S): 2.5 TABLET ORAL at 05:17

## 2019-04-10 RX ADMIN — Medication 650 MILLIGRAM(S): at 19:47

## 2019-04-10 RX ADMIN — MIDODRINE HYDROCHLORIDE 10 MILLIGRAM(S): 2.5 TABLET ORAL at 22:47

## 2019-04-10 RX ADMIN — Medication 150 MICROGRAM(S): at 05:17

## 2019-04-10 RX ADMIN — Medication 10 MILLIGRAM(S): at 12:24

## 2019-04-10 NOTE — H&P ADULT - NSHPLABSRESULTS_GEN_ALL_CORE
Labs personally reviewed  no leukocytosis, chronic stable anemia, rest cbc unrevealing, coags unrevealing, hypoNa 126, HAGMA with cmp c/w known ESRD, hyperglycemia, chronic stable alkp elevation mild AST elevation.   Imaging personally reviewed XR tib/fib/femur/hip/knee reveals acute prox tib/fib fxs with possible intraarticular extension.   No ekg in chart awaiting upload.

## 2019-04-10 NOTE — H&P ADULT - NSICDXPASTSURGICALHX_GEN_ALL_CORE_FT
PAST SURGICAL HISTORY:  History of extraction of renal calculus     S/P Breast Lumpectomy 1994, 2003 - left breast benign    S/P CABG (coronary artery bypass graft) x 4, May 2015    S/P Cholecystectomy 2001    S/P Foot Surgery 2002, 2014    S/P mitral valve repair with CABG X 4 MAY 2015

## 2019-04-10 NOTE — PROGRESS NOTE ADULT - ASSESSMENT
79 yo F with hx of cad, cabg, DM, esrd, on HD, chronic hypotension on midodrine 10 mg tid with sob, pleural effusions, chf, ascites, and new tib/fib fx    1- esrd  2- hypotension   3- chf  4- ascites   5- tib/fib fx     hd consent obtained witnessed and placed in chart  hd arrangements made  cont midodrine 10 mg tid  abd nataliia for ascites and paracentesis

## 2019-04-10 NOTE — PROGRESS NOTE ADULT - ASSESSMENT
78 F PMH CAD s/p CABG x 4 2015, T2DM with peripheral neuropathy, ESRD on HD T/Th/S via L AVF, hypotension on midodrine, HFrEF, HTN, HLD, b/l cataracts s/p surgery, p/w mechanical fall and R leg pain, found to have R tib/fib fracture.     open fracture of proximal end of right tibia,  confirmed on XR and CT  -ortho recs appreciated: plan for add on case 4/10/19, c/w knee immobilizer and NWB RLE  -tetanus vaccine  -ancef per ortho  cards eval noted. pt high risk candidate for surg  -currently pain well controlled while immobilized, tylenol prn ordered.  -fall precautions.     Coronary artery disease without angina pectoris  -c/w aspirin, statin  -f/u cards .     Type 2 diabetes mellitus with hyperglycemia, with long-term current use of insulin.  start HISS  endo consult  -dash, CC diet,    ESRD on hemodialysis.  HD as per renal     -c/w midodrine  -abd sono for ascites.      emesis  gi consulted  f/u guaiac  ppi  monitor cbc     sob, hypoxia, pulm effusion  pulm consulted  f/u CT chest    preoperative examination.    pt denies active chest pain, sob at rest currently  pt would be high risk candidate for surg      dvt ppx

## 2019-04-10 NOTE — CONSULT NOTE ADULT - SUBJECTIVE AND OBJECTIVE BOX
[facility]  Zander Chicas PA-C  [address] [place] [address]  [phone]      Chief Complaint:  Patient is a 78y old  Female who presents with a chief complaint of R tib/fib fx (10 Apr 2019 12:16)      HPI: 78 F PMH CAD s/p CABG x 4 , T2DM with peripheral neuropathy, ESRD on HD T//S via L AVF, hypotension on midodrine, HFrEF, HTN, HLD, b/l cataracts s/p surgery, p/w mechanical fall and R leg pain. Pt lives in basement apartment and ordinarily ambulates with walker. Today was getting ready for HD, made it to the first step on her set of stairs, and reportedly felt her R leg "give out." Pt fell backwards but was caught by her family member who was assisting her up the stairs. She denies LOC/syncope or head strike. +Pain in R leg post fall, worse with movement and much improved with immobilization. Could not bear weight or walk after fall.  Denies cp, sob at rest, f/c, n/v/d, dysuria, cough. +chronic BENAVIDEZ, reportedly stable since her CABG. Family at bedside providing collateral and confirms above details.     Allergies:  allopurinol (Rash)  Augmentin (Rash)  Levaquin (Rash)      Medications:  acetaminophen   Tablet .. 650 milliGRAM(s) Oral every 6 hours PRN  aspirin enteric coated 81 milliGRAM(s) Oral daily  atorvastatin 80 milliGRAM(s) Oral at bedtime  ceFAZolin   IVPB 1000 milliGRAM(s) IV Intermittent every 24 hours  dextrose 40% Gel 15 Gram(s) Oral once PRN  dextrose 5%. 1000 milliLiter(s) IV Continuous <Continuous>  dextrose 50% Injectable 12.5 Gram(s) IV Push once  dextrose 50% Injectable 25 Gram(s) IV Push once  dextrose 50% Injectable 25 Gram(s) IV Push once  glucagon  Injectable 1 milliGRAM(s) IntraMuscular once PRN  insulin lispro (HumaLOG) corrective regimen sliding scale   SubCutaneous every 6 hours  levothyroxine 150 MICROGram(s) Oral daily  midodrine 10 milliGRAM(s) Oral three times a day  Nephro-svetlana 1 Tablet(s) Oral daily  ondansetron Injectable 4 milliGRAM(s) IV Push every 12 hours PRN  pantoprazole  Injectable 40 milliGRAM(s) IV Push daily      PMHX/PSHX:  CHF (congestive heart failure)  Shortness of breath  Hypotension  Type 2 diabetes mellitus  Chronic kidney disease (CKD)  Diabetes mellitus  Nephrolithiasis  Ulcer  Hydronephrosis  Charcot Foot  Herniated Disc  Diabetic Neuropathy  Anemia  Bladder Cancer  Hypothyroidism  Renal Colic  Hypertension  Hyperlipidemia  S/P mitral valve repair  S/P CABG (coronary artery bypass graft)  History of extraction of renal calculus  S/P Foot Surgery  S/P Cholecystectomy  S/P Breast Lumpectomy      Family history:  FH: breast cancer  FH: CHF (congestive heart failure)  No pertinent family history in first degree relatives      Social History:     ROS:     General:  No wt loss, fevers, chills, night sweats, fatigue,   Eyes:  Good vision, no reported pain  ENT:  No sore throat, pain, runny nose, dysphagia  CV:  No pain, palpitations, hypo/hypertension  Resp:  No dyspnea, cough, tachypnea, wheezing  GI:  No pain, No nausea, No vomiting, No diarrhea, No constipation, No weight loss, No fever, No pruritis, No rectal bleeding, No tarry stools, No dysphagia,  :  No pain, bleeding, incontinence, nocturia  Muscle:  No pain, weakness  Neuro:  No weakness, tingling, memory problems  Psych:  No fatigue, insomnia, mood problems, depression  Endocrine:  No polyuria, polydipsia, cold/heat intolerance  Heme:  No petechiae, ecchymosis, easy bruisability  Skin:  No rash, tattoos, scars, edema      PHYSICAL EXAM:   Vital Signs:  Vital Signs Last 24 Hrs  T(C): 37.6 (10 Apr 2019 08:05), Max: 37.8 (10 Apr 2019 02:12)  T(F): 99.7 (10 Apr 2019 08:05), Max: 100 (10 Apr 2019 02:12)  HR: 72 (10 Apr 2019 08:05) (64 - 103)  BP: 90/50 (10 Apr 2019 08:05) (88/47 - 129/69)  BP(mean): --  RR: 16 (10 Apr 2019 08:05) (16 - 18)  SpO2: 97% (10 Apr 2019 08:05) (90% - 100%)  Daily Height in cm: 177.8 (10 Apr 2019 02:12)    Daily Weight in k.3 (10 Apr 2019 10:28)    GENERAL:  Appears stated age, well-groomed, well-nourished, no distress  HEENT:  NC/AT,  conjunctivae clear and pink, no thyromegaly, nodules, adenopathy, no JVD, sclera -anicteric  CHEST:  Full & symmetric excursion, no increased effort, breath sounds clear  HEART:  Regular rhythm, S1, S2, no murmur/rub/S3/S4, no abdominal bruit, no edema  ABDOMEN:  Soft, non-tender, non-distended, normoactive bowel sounds,  no masses ,no hepato-splenomegaly, no signs of chronic liver disease  EXTEREMITIES:  no cyanosis,clubbing or edema  SKIN:  No rash/erythema/ecchymoses/petechiae/wounds/abscess/warm/dry  NEURO:  Alert, oriented, no asterixis, no tremor, no encephalopathy    LABS:                        10.0   9.1   )-----------( 190      ( 10 Apr 2019 04:45 )             29.5     04-10    135  |  91<L>  |  21  ----------------------------<  164<H>  3.9   |  26  |  3.07<H>    Ca    8.9      10 Apr 2019 04:46  Phos  2.5     04-10  Mg     1.6     04-10    TPro  7.6  /  Alb  3.4  /  TBili  0.5  /  DBili  x   /  AST  62<H>  /  ALT  30  /  AlkPhos  126<H>  04-09    LIVER FUNCTIONS - ( 2019 18:30 )  Alb: 3.4 g/dL / Pro: 7.6 g/dL / ALK PHOS: 126 U/L / ALT: 30 U/L / AST: 62 U/L / GGT: x           PT/INR - ( 10 Apr 2019 04:46 )   PT: 13.2 sec;   INR: 1.15 ratio         PTT - ( 10 Apr 2019 04:46 )  PTT:31.7 sec        Imaging:

## 2019-04-10 NOTE — H&P ADULT - NSHPPHYSICALEXAM_GEN_ALL_CORE
PHYSICAL EXAM:  GENERAL: NAD, well-groomed, well-developed  HEAD:  Atraumatic, Normocephalic  EYES: EOMI, PERRLA, conjunctiva and sclera clear  ENMT: No tonsillar erythema, exudates, or enlargement; Moist mucous membranes, Good dentition, No lesions  NECK: Supple, No JVD, Normal thyroid  CHEST/LUNG: dec bs b/l at bases. no overt ronchi/wheezing. no resp distress while at rest no accessory muscle usage.   HEART: Regular rate and rhythm; No murmurs, rubs, or gallops, 1+ edema b/l LE  ABDOMEN: Soft, Nontender, Nondistended; Bowel sounds present, no rebound/guarding, neg smith  EXTREMITIES:  2+ Peripheral Pulses, No clubbing, cyanosis.  R leg immobilized in splint/wrapped.   LYMPH: No lymphadenopathy noted, no lymphangitis  SKIN: No rashes or lesions, no excoriations  NERVOUS SYSTEM:  Alert & Oriented X3, +[place], Good concentration; Motor Strength 5/5 B/L upper and lower extremities

## 2019-04-10 NOTE — H&P ADULT - PROBLEM SELECTOR PLAN 3
-start HISS  -clarify home dosing when able, pt states she takes sliding scale of both novolog based on fsg pre meal as well as sliding scale of levemir based on fsg at bedtime  -dash, CC diet, NPO past midnight

## 2019-04-10 NOTE — H&P ADULT - NSHPSOCIALHISTORY_GEN_ALL_CORE
Social History:    Occupation: retired  Lives with: ( x ) alone  (  ) children   (  ) spouse   (  ) parents  (  ) other    Substance Use (street drugs): (  x) never used  (  ) other:  Tobacco Usage:  (   ) never smoked   (  x ) former smoker   (   ) current smoker  (     ) pack year  (        ) last cigarette date  Alcohol Usage: denies, former

## 2019-04-10 NOTE — H&P ADULT - NSICDXPASTMEDICALHX_GEN_ALL_CORE_FT
PAST MEDICAL HISTORY:  Anemia CKD    Charcot Foot Right Foot    CHF (congestive heart failure) Oct 2015- still sob    Chronic kidney disease (CKD) ON DIALYSIS - Tue, Thur, Sat    Diabetic Neuropathy     Herniated Disc     Hydronephrosis Right 1/2012    Hyperlipidemia     Hypertension NOT ANY MORE    Hypotension     Hypothyroidism     Nephrolithiasis 2001    Shortness of breath     Type 2 diabetes mellitus     Ulcer 2001

## 2019-04-10 NOTE — H&P ADULT - ATTENDING COMMENTS
Care to be assumed by Dr. Vandana Lincoln at 8 am.  This patient was assigned to me by the hospitalist in charge; my involvement in this case has consisted of the initial history, physical, chart review, and management plan.  This patient was previously unknown to me.

## 2019-04-10 NOTE — PROGRESS NOTE ADULT - SUBJECTIVE AND OBJECTIVE BOX
[facility]   [phone]  RENAL FOLLOW UP NOTE  --------------------------------------------------------------------------------  Chief Complaint:    24 hour events/subjective:    still with pain in leg.   no sob     PAST HISTORY  --------------------------------------------------------------------------------  No significant changes to PMH, PSH, FHx, SHx, unless otherwise noted    ALLERGIES & MEDICATIONS  --------------------------------------------------------------------------------  Allergies    allopurinol (Rash)  Augmentin (Rash)  Levaquin (Rash)    Intolerances      Standing Inpatient Medications  aspirin enteric coated 81 milliGRAM(s) Oral daily  atorvastatin 80 milliGRAM(s) Oral at bedtime  ceFAZolin   IVPB 1000 milliGRAM(s) IV Intermittent every 24 hours  dextrose 5%. 1000 milliLiter(s) IV Continuous <Continuous>  dextrose 50% Injectable 12.5 Gram(s) IV Push once  dextrose 50% Injectable 25 Gram(s) IV Push once  dextrose 50% Injectable 25 Gram(s) IV Push once  insulin lispro (HumaLOG) corrective regimen sliding scale   SubCutaneous every 6 hours  levothyroxine 150 MICROGram(s) Oral daily  midodrine 10 milliGRAM(s) Oral three times a day  Nephro-svetlana 1 Tablet(s) Oral daily  pantoprazole  Injectable 40 milliGRAM(s) IV Push daily    PRN Inpatient Medications  acetaminophen   Tablet .. 650 milliGRAM(s) Oral every 6 hours PRN  dextrose 40% Gel 15 Gram(s) Oral once PRN  glucagon  Injectable 1 milliGRAM(s) IntraMuscular once PRN  ondansetron Injectable 4 milliGRAM(s) IV Push every 12 hours PRN      REVIEW OF SYSTEMS  --------------------------------------------------------------------------------    Gen: denies  fevers/chills,  CVS: denies chest pain/palpitations  Resp: denies SOB/Cough  GI: Denies N/V/Abd pain  : Denies dysuria    All other systems were reviewed and are negative, except as noted.    VITALS/PHYSICAL EXAM  --------------------------------------------------------------------------------  T(C): 37.2 (04-10-19 @ 19:10), Max: 37.8 (04-10-19 @ 02:12)  HR: 70 (04-10-19 @ 19:10) (70 - 103)  BP: 102/59 (04-10-19 @ 19:10) (90/50 - 129/69)  RR: 18 (04-10-19 @ 19:10) (16 - 18)  SpO2: 98% (04-10-19 @ 19:10) (94% - 98%)  Wt(kg): --  Height (cm): 177.8 (04-10-19 @ 02:12)  Weight (kg): 68.3 (04-10-19 @ 02:12)  BMI (kg/m2): 21.6 (04-10-19 @ 02:12)  BSA (m2): 1.85 (04-10-19 @ 02:12)      04-10-19 @ 07:01  -  04-10-19 @ 22:14  --------------------------------------------------------  IN: 0 mL / OUT: 0 mL / NET: 0 mL      Physical Exam:  	  Gen: Non toxic in pain  on O2   	no jvd   	Pulm: decrease bs  no rales or ronchi or wheezing  	CV: RRR, S1S2; no rub  	Abd: +BS, soft,  + distended   	: No suprapubic tenderness  	UE: Warm, no cyanosis  no clubbing,  no edema;   	LE: Warm, no cyanosis  no clubbing, no edema RLE cast   	Neuro: alert and oriented. speech coherent   	Skin: Warm, no decrease skin turgor  right forearm abrasion   	Vascular access: + avf + bruit and thrill         LABS/STUDIES  --------------------------------------------------------------------------------              10.0   9.1   >-----------<  190      [04-10-19 @ 04:45]              29.5     135  |  91  |  21  ----------------------------<  164      [04-10-19 @ 04:46]  3.9   |  26  |  3.07        Ca     8.9     [04-10-19 @ 04:46]      Mg     1.6     [04-10-19 @ 04:46]      Phos  2.5     [04-10-19 @ 04:46]    TPro  7.6  /  Alb  3.4  /  TBili  0.5  /  DBili  x   /  AST  62  /  ALT  30  /  AlkPhos  126  [04-09-19 @ 18:30]    PT/INR: PT 13.2 , INR 1.15       [04-10-19 @ 04:46]  PTT: 31.7       [04-10-19 @ 04:46]      Creatinine Trend:  SCr 3.07 [04-10 @ 04:46]  SCr 5.23 [04-09 @ 18:30]        HbA1c 10.0      [01-10-17 @ 07:39]

## 2019-04-10 NOTE — H&P ADULT - HISTORY OF PRESENT ILLNESS
78 F PMH CAD s/p CABG x 4 2015, T2DM with peripheral neuropathy, ESRD on HD T/Th/S via L AVF, hypotension on midodrine, HFrEF, HTN, HLD, b/l cataracts s/p surgery, p/w mechanical fall and R leg pain. Pt lives in basement apartment and ordinarily ambulates with walker. Today was getting ready for HD, made it to the first step on her set of stairs, and reportedly felt her R leg "give out." Pt fell backwards but was caught by her family member who was assisting her up the stairs. She denies LOC/syncope or head strike. +Pain in R leg post fall, worse with movement and much improved with immobilization. Could not bear weight or walk after fall.  Denies cp, sob at rest, f/c, n/v/d, dysuria, cough. +chronic BENAVIDEZ, reportedly stable since her CABG. Family at bedside providing collateral and confirms above details.     Vs: 99.1, 76, 102/60, 16, 90% RA --> 100% 4LNC.  Labs: no leukocytosis, chronic stable anemia, rest cbc unrevealing, coags unrevealing, hypoNa 126, HAGMA with cmp c/w known ESRD, hyperglycemia, chronic stable alkp elevation mild AST elevation. XR tib/fib/femur/hip/knee reveals acute prox tib/fib fxs with possible intraarticular extension.

## 2019-04-10 NOTE — H&P ADULT - ASSESSMENT
78 F PMH CAD s/p CABG x 4 2015, T2DM with peripheral neuropathy, ESRD on HD T/Th/S via L AVF, hypotension on midodrine, HFrEF, HTN, HLD, b/l cataracts s/p surgery, p/w mechanical fall and R leg pain, f/w R tib/fib fracture.

## 2019-04-10 NOTE — H&P ADULT - NSHPREVIEWOFSYSTEMS_GEN_ALL_CORE
REVIEW OF SYSTEMS:  CONSTITUTIONAL: +weakness. No fevers. No chills. No weight loss. Good appetite.  EYES: No visual changes. No eye pain.  ENT: No hearing difficulty. No vertigo. No dysphagia. No Sinusitis/rhinorrhea.  NECK: No pain. No stiffness/rigidity.  CARDIAC: No chest pain at rest. No palpitations.  RESPIRATORY: No cough. No SOB at rest. No hemoptysis.  GASTROINTESTINAL: No abdominal pain. No nausea. No vomiting. No hematemesis. No diarrhea. No constipation. No melena. No hematochezia.  GENITOURINARY: No dysuria. No frequency. No hesitancy. No hematuria.  NEUROLOGICAL: No numbness. No focal weakness. No incontinence. No headache.  BACK: No back pain.  EXTREMITIES: stable lower extremity edema. Full ROM.  SKIN: No rashes. No itching. No other lesions.  PSYCHIATRIC: No depression. No anxiety. No SI/HI.  ALLERGIC: No lip swelling. No hives.  All other review of systems is negative unless indicated above.

## 2019-04-10 NOTE — CHART NOTE - NSCHARTNOTEFT_GEN_A_CORE
C/C: Called by RN. Dark coffee colored emesis. Pt seen at bedside, c/o reflux and nausea. Emesis collected approx 60cc mucous mixed with Dark brown emesis. +BM today brown in color per RN. Pt states she had sandwich with tea yesterday afternoon.  +flatus. Abdomen distended. c/o shortness of breath ever since her open heart surgery. Currently appears SOB when having a conversation. Denies chest pain, palpitation, abdominal pain, fevers, chills, headaches      HPI:  78 F PMH CAD s/p CABG x 4 2015, T2DM with peripheral neuropathy, ESRD on HD T/Th/S via L AVF, hypotension on midodrine, HFrEF, HTN, HLD, b/l cataracts s/p surgery, p/w mechanical fall and R leg pain. Pt lives in basement apartment and ordinarily ambulates with walker. Today was getting ready for HD, made it to the first step on her set of stairs, and reportedly felt her R leg "give out." Pt fell backwards but was caught by her family member who was assisting her up the stairs. She denies LOC/syncope or head strike. +Pain in R leg post fall, worse with movement and much improved with immobilization. Could not bear weight or walk after fall.  Denies cp, sob at rest, f/c, n/v/d, dysuria, cough. +chronic BENAVIDEZ, reportedly stable since her CABG. Family at bedside providing collateral and confirms above details.     Vs: 99.1, 76, 102/60, 16, 90% RA --> 100% 4LNC.  Labs: no leukocytosis, chronic stable anemia, rest cbc unrevealing, coags unrevealing, hypoNa 126, HAGMA with cmp c/w known ESRD, hyperglycemia, chronic stable alkp elevation mild AST elevation. XR tib/fib/femur/hip/knee reveals acute prox tib/fib fxs with possible intraarticular extension. (10 Apr 2019 00:56)      REVIEW OF SYSTEMS:    CONSTITUTIONAL: No weakness, fevers or chills  EYES/ENT: No visual changes;  No vertigo or throat pain   NECK: No pain or stiffness  RESPIRATORY: +shortness of breath  CARDIOVASCULAR: +LE edema  GASTROINTESTINAL: +nausea and +vomiting  GENITOURINARY: No dysuria, frequency or hematuria  NEUROLOGICAL: No numbness or weakness  SKIN: No itching, rashes      FAMILY HISTORY:  FH: breast cancer: mother  FH: CHF (congestive heart failure): father        SOCIAL HISTORY:  Lives with: ( x ) alone  (  ) children   (  ) spouse   (  ) parents  (  ) other  Recent Travel: No recent travel  Occupation:      VITAL SIGNS:   T(C): 37.8 (04-10-19 @ 02:12), Max: 37.8 (04-10-19 @ 02:12)  HR: 89 (04-10-19 @ 02:12)  BP: 129/69 (04-10-19 @ 02:12)  RR: 18 (04-10-19 @ 02:12)  SpO2: 95% (04-10-19 @ 02:12)          LABS:                        11.4   8.3   )-----------( 205      ( 09 Apr 2019 18:30 )             34.3     04-09    126<L>  |  85<L>  |  42<H>  ----------------------------<  288<H>  4.9   |  21<L>  |  5.23<H>    Ca    8.8      09 Apr 2019 18:30  Phos  4.1     04-09  Mg     1.6     04-09    TPro  7.6  /  Alb  3.4  /  TBili  0.5  /  DBili  x   /  AST  62<H>  /  ALT  30  /  AlkPhos  126<H>  04-09  PT/INR - ( 09 Apr 2019 18:30 )   PT: 12.9 sec;   INR: 1.13 ratio    PTT - ( 09 Apr 2019 18:30 )  PTT:36.8 sec           RADIOLOGY RESULTS:  < from: Xray Chest 1 View- PORTABLE-Urgent (04.09.19 @ 20:28) >    PROCEDURE DATE:  04/09/2019      ******PRELIMINARY REPORT******    ******PRELIMINARY REPORT******              INTERPRETATION:  Bilateral pleural effusions with associated passive   atelectasis.              ******PRELIMINARY REPORT******    ******PRELIMINARY REPORT******          < end of copied text >        PHYSICAL EXAM:  General: Awake and alert, shortness of breath  HEENT: No mucositis or thrush, mucous membrane pink and moist  CV: S1, S2 present, RRR, no JVD  Pulm: Respirations non-labored, Diminished b/l, tachypneic   GI: Abdominal distended, +BS, +tympanic, Dark brown emesis approx 60cc  : No CVA tenderness, voiding without difficulty  Ext: No peripheral edema, +2 pedal pulses b/l, no peripheral cyanosis. RLE soft brace  Neuro: AAO x 3, PERRLA, CN II-XII grossly intact, unable to lift right LE secondary to pain    A/P:  This is a 78yFemale with h/o CHF, CKD on HD, DM2, CABG        NICK VICKERS, ANP-C  Spectra Link # C/C: Called by RN. Dark coffee colored emesis. Pt seen at bedside, c/o reflux and nausea. Emesis collected approx 60cc mucous mixed with Dark brown emesis. +BM today brown in color per RN. Pt states she had sandwich with tea yesterday afternoon.  +flatus. Abdomen distended. c/o shortness of breath ever since her open heart surgery. Currently appears SOB when having a conversation. Denies chest pain, palpitation, abdominal pain, fevers, chills, headaches      HPI:  78 F PMH CAD s/p CABG x 4 2015, T2DM with peripheral neuropathy, ESRD on HD T/Th/S via L AVF, hypotension on midodrine, HFrEF, HTN, HLD, b/l cataracts s/p surgery, p/w mechanical fall and R leg pain. Pt lives in basement apartment and ordinarily ambulates with walker. Today was getting ready for HD, made it to the first step on her set of stairs, and reportedly felt her R leg "give out." Pt fell backwards but was caught by her family member who was assisting her up the stairs. She denies LOC/syncope or head strike. +Pain in R leg post fall, worse with movement and much improved with immobilization. Could not bear weight or walk after fall.  Denies cp, sob at rest, f/c, n/v/d, dysuria, cough. +chronic BENAVIDEZ, reportedly stable since her CABG. Family at bedside providing collateral and confirms above details.     Vs: 99.1, 76, 102/60, 16, 90% RA --> 100% 4LNC.  Labs: no leukocytosis, chronic stable anemia, rest cbc unrevealing, coags unrevealing, hypoNa 126, HAGMA with cmp c/w known ESRD, hyperglycemia, chronic stable alkp elevation mild AST elevation. XR tib/fib/femur/hip/knee reveals acute prox tib/fib fxs with possible intraarticular extension. (10 Apr 2019 00:56)      REVIEW OF SYSTEMS:    CONSTITUTIONAL: No weakness, fevers or chills  EYES/ENT: No visual changes;  No vertigo or throat pain   NECK: No pain or stiffness  RESPIRATORY: +shortness of breath  CARDIOVASCULAR: +LE edema  GASTROINTESTINAL: +nausea and +vomiting  GENITOURINARY: No dysuria, frequency or hematuria  NEUROLOGICAL: No numbness or weakness  SKIN: No itching, rashes      FAMILY HISTORY:  FH: breast cancer: mother  FH: CHF (congestive heart failure): father        SOCIAL HISTORY:  Lives with: ( x ) alone  (  ) children   (  ) spouse   (  ) parents  (  ) other  Recent Travel: No recent travel  Occupation:      VITAL SIGNS:   T(C): 37.8 (04-10-19 @ 02:12), Max: 37.8 (04-10-19 @ 02:12)  HR: 89 (04-10-19 @ 02:12)  BP: 129/69 (04-10-19 @ 02:12)  RR: 18 (04-10-19 @ 02:12)  SpO2: 95% (04-10-19 @ 02:12)          LABS:                        11.4   8.3   )-----------( 205      ( 09 Apr 2019 18:30 )             34.3     04-09    126<L>  |  85<L>  |  42<H>  ----------------------------<  288<H>  4.9   |  21<L>  |  5.23<H>    Ca    8.8      09 Apr 2019 18:30  Phos  4.1     04-09  Mg     1.6     04-09    TPro  7.6  /  Alb  3.4  /  TBili  0.5  /  DBili  x   /  AST  62<H>  /  ALT  30  /  AlkPhos  126<H>  04-09  PT/INR - ( 09 Apr 2019 18:30 )   PT: 12.9 sec;   INR: 1.13 ratio    PTT - ( 09 Apr 2019 18:30 )  PTT:36.8 sec           RADIOLOGY RESULTS:  < from: Xray Chest 1 View- PORTABLE-Urgent (04.09.19 @ 20:28) >    PROCEDURE DATE:  04/09/2019      ******PRELIMINARY REPORT******    ******PRELIMINARY REPORT******              INTERPRETATION:  Bilateral pleural effusions with associated passive   atelectasis.              ******PRELIMINARY REPORT******    ******PRELIMINARY REPORT******          < end of copied text >        PHYSICAL EXAM:  General: Awake and alert, shortness of breath  HEENT: No mucositis or thrush, mucous membrane pink and moist  CV: S1, S2 present, RRR, no JVD  Pulm: Respirations non-labored, Diminished b/l, tachypneic   GI: Abdominal distended, +BS, +tympanic, Dark brown emesis approx 60cc  : No CVA tenderness, voiding without difficulty  Ext: No peripheral edema, +2 pedal pulses b/l, no peripheral cyanosis. RLE soft brace  Neuro: AAO x 3, PERRLA, CN II-XII grossly intact, unable to lift right LE secondary to pain    A/P:  This is a 78yFemale with h/o CHF, CKD on HD (Tu/Thr/St), DM2, CABG x 4 2015, HTN. Admit for s/p fall at home after "my legs gave out". Pt endured Right tib/fib fracture. '  1) r/o Upper GIB  - check guiac  - Repeat AM CBC  - Protonix changed to IV  - Carafate 1g susp x 1 now.  - zofran 4mg IVP given   - Will f/u with primary team in AM      MEDARDO ROBLEDO-C  Spectra Link # [id number]

## 2019-04-10 NOTE — CONSULT NOTE ADULT - SUBJECTIVE AND OBJECTIVE BOX
Initial Cardiology Attending Consult    CHIEF COMPLAINT: fall and leg pain    HISTORY OF PRESENT ILLNESS:  VIRGILIO KAPLAN is a 78y Female patient PMH CABG x4  with MV repair 6/2015, CM EF 35%, ESRD on HD who is presenting with a mechanical fall while walking up the stairs, she lost her balance and had right leg pain  (no LOC) with Rt prox tib/fib fx.   Cardiology is now consulted for pre-op evaluation  She denies chest pain, or new SOB, She is able to lie flat but has back pain with lying flat.    Allergies    allopurinol (Rash)  Augmentin (Rash)  Levaquin (Rash)    Intolerances      PAST MEDICAL & SURGICAL HISTORY:  CHF (congestive heart failure): Oct 2015- still sob  Shortness of breath  Hypotension  Type 2 diabetes mellitus  Chronic kidney disease (CKD): ON DIALYSIS - Tue, Thur, Sat  Nephrolithiasis: 2001  Ulcer: 2001  Hydronephrosis: Right 1/2012  Charcot Foot: Right Foot  Herniated Disc  Diabetic Neuropathy  Anemia: CKD  Hypothyroidism  Hypertension: NOT ANY MORE  Hyperlipidemia  S/P mitral valve repair: with CABG X 4 MAY 2015  S/P CABG (coronary artery bypass graft): x 4, May 2015  History of extraction of renal calculus  S/P Foot Surgery: 2002, 2014  S/P Cholecystectomy: 2001  S/P Breast Lumpectomy: 1994, 2003 - left breast benign      FAMILY HISTORY:  FH: breast cancer: mother  FH: CHF (congestive heart failure): father      SOCIAL HISTORY:    [x] Non-smoker  [ ] Smoker  [ ] Alcohol      REVIEW OF SYSTEMS:  CONSTITUTIONAL: No fever, weight loss, or fatigue  EYES: No eye pain, visual disturbances, or discharge  ENMT:  No difficulty hearing, tinnitus, vertigo; No sinus or throat pain  NECK: No pain or stiffness  RESPIRATORY: No cough, wheezing, chills or hemoptysis; No Shortness of Breath  CARDIOVASCULAR: No chest pain, palpitations, passing out, dizziness, or leg swelling  GASTROINTESTINAL: No abdominal or epigastric pain. No nausea, vomiting, or hematemesis; No diarrhea or constipation. No melena or hematochezia.  GENITOURINARY: No dysuria, frequency, hematuria, or incontinence  NEUROLOGICAL: No headaches, memory loss, loss of strength, numbness, or tremors  SKIN: No itching, burning, rashes, or lesions   LYMPH Nodes: No enlarged glands  ENDOCRINE: No heat or cold intolerance; No hair loss  MUSCULOSKELETAL: No joint pain or swelling; No muscle, back, or extremity pain  PSYCHIATRIC: No depression, anxiety, mood swings, or difficulty sleeping  HEME/LYMPH: No easy bruising, or bleeding gums  ALLERY AND IMMUNOLOGIC: No hives or eczema	    [ ] All others negative	  [ ] Unable to obtain    PHYSICAL EXAM:  I&O's Summary    Vital Signs Last 24 Hrs  T(C): 37.3 (10 Apr 2019 01:00), Max: 37.4 (09 Apr 2019 21:45)  T(F): 99.1 (10 Apr 2019 01:00), Max: 99.4 (09 Apr 2019 21:45)  HR: 103 (10 Apr 2019 01:00) (64 - 103)  BP: 114/54 (10 Apr 2019 01:00) (88/47 - 114/54)  BP(mean): --  RR: 18 (10 Apr 2019 01:00) (16 - 18)  SpO2: 94% (10 Apr 2019 01:00) (90% - 100%)    Appearance: Normal	  HEENT:   Normal oral mucosa, PERRL, EOMI	  Lymphatic: No lymphadenopathy  Cardiovascular: Normal S1 S2, No JVD, No murmurs, No edema  Respiratory: Lungs clear to auscultation	  Psychiatry: A & O x 3, Mood & affect appropriate  Gastrointestinal:  Soft, Non-tender, + BS	  Skin: No rashes, No ecchymoses, No cyanosis	  Neurologic: Non-focal  Extremities: Normal range of motion, No clubbing, cyanosis or edema  Vascular: Peripheral pulses palpable 2+ bilaterally    MEDICATIONS:  Home Medications:  Levemir 100 units/mL subcutaneous solution: varying units as needed? (10 Apr 2019 01:12)  NovoLOG 100 units/mL injectable solution: sliding scale (10 Apr 2019 01:12)    MEDICATIONS  (STANDING):  aspirin enteric coated 81 milliGRAM(s) Oral daily  atorvastatin 80 milliGRAM(s) Oral at bedtime  ceFAZolin   IVPB 1000 milliGRAM(s) IV Intermittent every 24 hours  dextrose 5%. 1000 milliLiter(s) (50 mL/Hr) IV Continuous <Continuous>  dextrose 50% Injectable 12.5 Gram(s) IV Push once  dextrose 50% Injectable 25 Gram(s) IV Push once  dextrose 50% Injectable 25 Gram(s) IV Push once  diphtheria/tetanus Vaccine - Adult 0.5 milliLiter(s) IntraMuscular once  insulin lispro (HumaLOG) corrective regimen sliding scale   SubCutaneous every 6 hours  levothyroxine 150 MICROGram(s) Oral daily  midodrine 10 milliGRAM(s) Oral three times a day  Nephro-svetlana 1 Tablet(s) Oral daily  pantoprazole    Tablet 40 milliGRAM(s) Oral before breakfast    MEDICATIONS  (PRN):  acetaminophen   Tablet .. 650 milliGRAM(s) Oral every 6 hours PRN Temp greater or equal to 38C (100.4F), Mild Pain (1 - 3), Moderate Pain (4 - 6), Severe Pain (7 - 10)  dextrose 40% Gel 15 Gram(s) Oral once PRN Blood Glucose LESS THAN 70 milliGRAM(s)/deciliter  glucagon  Injectable 1 milliGRAM(s) IntraMuscular once PRN Glucose LESS THAN 70 milligrams/deciliter  ondansetron Injectable 4 milliGRAM(s) IV Push every 12 hours PRN Nausea and/or Vomiting      LABS:	 	    CBC Full  -  ( 09 Apr 2019 18:30 )  WBC Count : 8.3 K/uL  Hemoglobin : 11.4 g/dL  Hematocrit : 34.3 %  Platelet Count - Automated : 205 K/uL  Mean Cell Volume : 93.6 fl  Mean Cell Hemoglobin : 31.1 pg  Mean Cell Hemoglobin Concentration : 33.2 gm/dL  Auto Neutrophil # : 7.0 K/uL  Auto Lymphocyte # : 0.5 K/uL  Auto Monocyte # : 0.7 K/uL  Auto Eosinophil # : 0.1 K/uL  Auto Basophil # : 0.0 K/uL  Auto Neutrophil % : 84.6 %  Auto Lymphocyte % : 5.9 %  Auto Monocyte % : 7.9 %  Auto Eosinophil % : 1.6 %  Auto Basophil % : 0.0 %    04-09    126<L>  |  85<L>  |  42<H>  ----------------------------<  288<H>  4.9   |  21<L>  |  5.23<H>    Ca    8.8      09 Apr 2019 18:30  Phos  4.1     04-09  Mg     1.6     04-09    TPro  7.6  /  Alb  3.4  /  TBili  0.5  /  DBili  x   /  AST  62<H>  /  ALT  30  /  AlkPhos  126<H>  04-09      proBNP:   Lipid Profile:   HgA1c:   TSH:     TROPONIN:    TELEMETRY: 	    ECG:  	  RADIOLOGY:  OTHER: 	    CARDIAC TESTING/STUDIES:    [ ] Echocardiogram:  [ ]  Catheterization:  [ ] Stress Test:  	  	  ASSESSMENT/PLAN: 	  VIRGILIO KAPLAN is a 78y Female patient PMH CABG x4  with MV repair 6/2015, CM EF 35%, ESRD on HD who is presenting with a mechanical fall while walking up the stairs, she lost her balance and had right leg pain  (no LOC) with Rt prox tib/fib fx.   Cardiology is now consulted for pre-op evaluation  She denies chest pain, or new SOB, She is able to lie flat but has back pain with lying flat.  1. Cardiovascular Risk Stratification - RCRI =  (4 ).  3 predictors = >11%  - Overall this patient is as _>11%_ risk  for cardiac death, nonfatal myocardial infarction, and nonfatal cardiac arrest perioperatively for this _moderate_ risk procedure.  -No further diagnostic or interventional procedures are required prior to the planned procedure.    Ok to get an ECHO during hospitalization but not required preop    -please obtain pre-op EKG (EKG not in chart)  -continue ASA, lipitor  continue midodrine in the sahara-op period  -avoid excess IVF  -diuresis with HD      Jared Crowder MD, PhD  Cardiology Attending  [facility]  [phone]    (Cardiology [facility] cell number available 7 pm - 7 am every night; available daytime week days for follow-up only; daytime weekends covered by general cardiology consult service)

## 2019-04-10 NOTE — CONSULT NOTE ADULT - SUBJECTIVE AND OBJECTIVE BOX
Wound SURGERY CONSULT NOTE    HPI:  78 F PMH CAD s/p CABG x 4 2015, T2DM with peripheral neuropathy, ESRD on HD T/Th/S via L AVF, hypotension on midodrine, HFrEF, HTN, HLD, b/l cataracts s/p surgery, p/w mechanical fall and R leg pain. Pt lives in basement apartment and ordinarily ambulates with walker. Today was getting ready for HD, made it to the first step on her set of stairs, and reportedly felt her R leg "give out." Pt fell backwards but was caught by her family member who was assisting her up the stairs. She denies LOC/syncope or head strike. +Pain in R leg post fall, worse with movement and much improved with immobilization. Could not bear weight or walk after fall.  Denies cp, sob at rest, f/c, n/v/d, dysuria, cough. +chronic BENAVIDEZ, reportedly stable since her CABG. Family at bedside providing collateral and confirms above details.     Wound consult requested to assist w/ management of RUE & sacral wound.   ALLEvyn placed awaiting consult. No drainage, odor, redness, warmth, pain, f/c/s noted.  Pt c/o dry itchy skin,  noted to be scratching,  didn't noticed when RUE wound started.  Pt w/ h/o BCC RUE about 2yrs ago.   Recently pt noted 'pain' area on buttocks.  Pt has lost about 40lbs over last year, but abdominal bloating stable - 2/2 renal disease.   Pt's daughter at bedside.  All questions asked and answered to pt & family's satisfaction.       PAST MEDICAL & SURGICAL HISTORY:  CHF (congestive heart failure): Oct 2015- still sob  Type 2 diabetes mellitus  Chronic kidney disease (CKD): ON DIALYSIS - Tue, Thur, Sat  Nephrolithiasis: 2001s/p  extraction of renal calculus   Ulcer: 2001  Hydronephrosis: Right 1/2012  Charcot Foot: Right Foot  Herniated Disc  Diabetic Neuropathy  Anemia: CKD  RUE BCC s/p Excision of BCC  Hypothyroidism  Hypertension: NOT ANY MORE  Hyperlipidemia  S/P mitral valve repair: with CABG X 4 MAY 2015  S/P CABG (coronary artery bypass graft): x 4, May 2015  S/P Foot Surgery: 2002, 2014  S/P Cholecystectomy: 2001  S/P Breast Lumpectomy: 1994, 2003 - left breast benign      REVIEW OF SYSTEMS  Skin/ MSK: see HPI  All other systems negative    MEDICATIONS  (STANDING):  aspirin enteric coated 81 milliGRAM(s) Oral daily  atorvastatin 80 milliGRAM(s) Oral at bedtime  ceFAZolin   IVPB 1000 milliGRAM(s) IV Intermittent every 24 hours  dextrose 5%. 1000 milliLiter(s) (50 mL/Hr) IV Continuous <Continuous>  dextrose 50% Injectable 12.5 Gram(s) IV Push once  dextrose 50% Injectable 25 Gram(s) IV Push once  dextrose 50% Injectable 25 Gram(s) IV Push once  insulin lispro (HumaLOG) corrective regimen sliding scale   SubCutaneous every 6 hours  levothyroxine 150 MICROGram(s) Oral daily  midodrine 10 milliGRAM(s) Oral three times a day  Nephro-svetlana 1 Tablet(s) Oral daily  pantoprazole  Injectable 40 milliGRAM(s) IV Push daily    MEDICATIONS  (PRN):  acetaminophen   Tablet .. 650 milliGRAM(s) Oral every 6 hours PRN Temp greater or equal to 38C (100.4F), Mild Pain (1 - 3), Moderate Pain (4 - 6), Severe Pain (7 - 10)  dextrose 40% Gel 15 Gram(s) Oral once PRN Blood Glucose LESS THAN 70 milliGRAM(s)/deciliter  glucagon  Injectable 1 milliGRAM(s) IntraMuscular once PRN Glucose LESS THAN 70 milligrams/deciliter  ondansetron Injectable 4 milliGRAM(s) IV Push every 12 hours PRN Nausea and/or Vomiting      Allergies    allopurinol (Rash)  Augmentin (Rash)  Levaquin (Rash)      SOCIAL HISTORY: ; Former smoker, Denies current smoking, ETOH, drugs    FAMILY HISTORY:  FH: breast cancer: mother  FH: CHF (congestive heart failure): father      Vital Signs Last 24 Hrs  T(C): 37.1 (10 Apr 2019 14:16), Max: 37.8 (10 Apr 2019 02:12)  T(F): 98.8 (10 Apr 2019 14:16), Max: 100 (10 Apr 2019 02:12)  HR: 70 (10 Apr 2019 14:16) (64 - 103)  BP: 118/67 (10 Apr 2019 14:16) (88/47 - 129/69)  BP(mean): --  RR: 18 (10 Apr 2019 14:16) (16 - 18)  SpO2: 98% (10 Apr 2019 14:16) (90% - 100%)    NAD /  A&Ox3  central Obesity/ frail  WD/ WN/ WG  Versa Care P500 bed    Cardiovascular: RRR     Respiratory: CTA    Gastrointestinal softly distended NT (+)BS     Neurology  strength & sensation grossly intact    Musculoskeletal/Vascular:  FROM x 3  RLE ROM limited 2/2 fx and RLE splint & knee immobilizer  BLE edema equal  LUE Av Fistula (+)thrill  All extrem x4 equally warm  no acute ischemia noted  hemosiderin staining  no deformities/ contractures    Skin:  moist w/ good turgor    RUE upper lateral arm w/ ulcerating lesion   3cm  x2cm x 0.2cm  hypergranular w/rolled hyperpigmented skin & wound edges  friable  No odor, erythema, increased warmth, tenderness, induration, fluctuance    Buttock w/ protuberant sacrum w  0.1cm x 0.1cm x 0.1cm fibrinous tissue  No odor, erythema, increased warmth, drainage,  tenderness, induration, fluctuance    LABS:  04-10    135  |  91<L>  |  21  ----------------------------<  164<H>  3.9   |  26  |  3.07<H>    Ca    8.9      10 Apr 2019 04:46  Phos  2.5     04-10  Mg     1.6     04-10    TPro  7.6  /  Alb  3.4  /  TBili  0.5  /  DBili  x   /  AST  62<H>  /  ALT  30  /  AlkPhos  126<H>  04-09                          10.0   9.1   )-----------( 190      ( 10 Apr 2019 04:45 )             29.5     PT/INR - ( 10 Apr 2019 04:46 )   PT: 13.2 sec;   INR: 1.15 ratio    PTT - ( 10 Apr 2019 04:46 )  PTT:31.7 sec      RADIOLOGY & ADDITIONAL STUDIES:    < from: Xray Chest 1 View- PORTABLE-Urgent (04.09.19 @ 20:28) >  FINDINGS:     There are median sternotomy wires status post mitral annuloplasty.  Cardiac silhouette is not accurately evaluated on this projection.  Bilateral pleural effusions with associated passive atelectasis. No   pneumothorax.    IMPRESSION:    Bilateral pleural effusions with associated passive atelectasis.      Cultures:  Culture - Blood (01.10.17 @ 00:57)    Specimen Source: .Blood Blood    Culture Results:   No growth at 5 days.

## 2019-04-10 NOTE — CONSULT NOTE ADULT - SUBJECTIVE AND OBJECTIVE BOX
HPI:  Ms. Mercy Kelley is a 78 year old F PMH CAD s/p CABG x 4, T2DM with peripheral neuropathy, ESRD on HD who is admitted with tib/fib fracture after fall. Dermatology is consulted for skin lesion on R upper arm. Patient is unsure of duration but says it has been there for months. It is occasionally itchy and bleeds. Denies any pain. Of note, patient has a hx of a BCC on R dorsal hand ~2 years ago.     PAST MEDICAL & SURGICAL HISTORY:  CHF (congestive heart failure): Oct 2015- still sob  Shortness of breath  Hypotension  Type 2 diabetes mellitus  Chronic kidney disease (CKD): ON DIALYSIS - Tue, Thur, Sat  Nephrolithiasis: 2001  Ulcer: 2001  Hydronephrosis: Right 1/2012  Charcot Foot: Right Foot  Herniated Disc  Diabetic Neuropathy  Anemia: CKD  Hypothyroidism  Hypertension: NOT ANY MORE  Hyperlipidemia  S/P mitral valve repair: with CABG X 4 MAY 2015  S/P CABG (coronary artery bypass graft): x 4, May 2015  History of extraction of renal calculus  S/P Foot Surgery: 2002, 2014  S/P Cholecystectomy: 2001  S/P Breast Lumpectomy: 1994, 2003 - left breast benign      Review of Systems:  Constitutional: denies fevers, chills  HEENT: odynophagia or dysphagia  Cardiovascular: denies palpitations  Respiratory: denies SOB, wheezing  Gastrointestinal: denies N/V/D, abdominal pain  : denies dysuria  MSK: denies weakness, joint pain  Skin: denies new rashes or masses unless otherwise specified in hpi    MEDICATIONS  (STANDING):  aspirin enteric coated 81 milliGRAM(s) Oral daily  atorvastatin 80 milliGRAM(s) Oral at bedtime  ceFAZolin   IVPB 1000 milliGRAM(s) IV Intermittent every 24 hours  dextrose 5%. 1000 milliLiter(s) (50 mL/Hr) IV Continuous <Continuous>  dextrose 50% Injectable 12.5 Gram(s) IV Push once  dextrose 50% Injectable 25 Gram(s) IV Push once  dextrose 50% Injectable 25 Gram(s) IV Push once  insulin lispro (HumaLOG) corrective regimen sliding scale   SubCutaneous every 6 hours  levothyroxine 150 MICROGram(s) Oral daily  midodrine 10 milliGRAM(s) Oral three times a day  Nephro-svetlana 1 Tablet(s) Oral daily  pantoprazole  Injectable 40 milliGRAM(s) IV Push daily    MEDICATIONS  (PRN):  acetaminophen   Tablet .. 650 milliGRAM(s) Oral every 6 hours PRN Temp greater or equal to 38C (100.4F), Mild Pain (1 - 3), Moderate Pain (4 - 6), Severe Pain (7 - 10)  dextrose 40% Gel 15 Gram(s) Oral once PRN Blood Glucose LESS THAN 70 milliGRAM(s)/deciliter  glucagon  Injectable 1 milliGRAM(s) IntraMuscular once PRN Glucose LESS THAN 70 milligrams/deciliter  ondansetron Injectable 4 milliGRAM(s) IV Push every 12 hours PRN Nausea and/or Vomiting      Allergies    allopurinol (Rash)  Augmentin (Rash)  Levaquin (Rash)    Intolerances        SOCIAL HISTORY: denies drug use    FAMILY HISTORY:  FH: breast cancer: mother  FH: CHF (congestive heart failure): father      Vital Signs Last 24 Hrs  T(C): 37.6 (10 Apr 2019 08:05), Max: 37.8 (10 Apr 2019 02:12)  T(F): 99.7 (10 Apr 2019 08:05), Max: 100 (10 Apr 2019 02:12)  HR: 72 (10 Apr 2019 08:05) (64 - 103)  BP: 90/50 (10 Apr 2019 08:05) (88/47 - 129/69)  BP(mean): --  RR: 16 (10 Apr 2019 08:05) (16 - 18)  SpO2: 97% (10 Apr 2019 08:05) (90% - 100%)    Physical Exam:  The patient was alert and oriented X 3, well nourished, and in no apparent distress. Oropharynx showed no ulcerations. There was no visible lymphadenopathy. Conjunctiva were non-injected. There was no clubbing or edema of extremities.    The scalp, hair, face, eyebrows, lips, oropharynx , neck, chest, back, buttocks, extremities X 4, hands, feet, nails were examined. There was no hyperhidrosis or bromhidrosis.     The following lesions are noted:   ulceration with erythematous border and central crusting    LABS:                        10.0   9.1   )-----------( 190      ( 10 Apr 2019 04:45 )             29.5     04-10    135  |  91<L>  |  21  ----------------------------<  164<H>  3.9   |  26  |  3.07<H>    Ca    8.9      10 Apr 2019 04:46  Phos  2.5     04-10  Mg     1.6     04-10    TPro  7.6  /  Alb  3.4  /  TBili  0.5  /  DBili  x   /  AST  62<H>  /  ALT  30  /  AlkPhos  126<H>  04-09    PT/INR - ( 10 Apr 2019 04:46 )   PT: 13.2 sec;   INR: 1.15 ratio         PTT - ( 10 Apr 2019 04:46 )  PTT:31.7 sec      RADIOLOGY & ADDITIONAL STUDIES: no relevant studies HPI:  Ms. Mercy Kelley is a 78 year old F PMH CAD s/p CABG x 4, T2DM with peripheral neuropathy, ESRD on HD who is admitted with tib/fib fracture after fall. Dermatology is consulted for skin lesion on R upper arm. Patient is unsure of duration but says it has been there for months. It is occasionally itchy and bleeds. Denies any pain. Of note, patient has a hx of a BCC on R dorsal hand ~2 years ago.     PAST MEDICAL & SURGICAL HISTORY:  CHF (congestive heart failure): Oct 2015- still sob  Shortness of breath  Hypotension  Type 2 diabetes mellitus  Chronic kidney disease (CKD): ON DIALYSIS - Tue, Thur, Sat  Nephrolithiasis: 2001  Ulcer: 2001  Hydronephrosis: Right 1/2012  Charcot Foot: Right Foot  Herniated Disc  Diabetic Neuropathy  Anemia: CKD  Hypothyroidism  Hypertension: NOT ANY MORE  Hyperlipidemia  S/P mitral valve repair: with CABG X 4 MAY 2015  S/P CABG (coronary artery bypass graft): x 4, May 2015  History of extraction of renal calculus  S/P Foot Surgery: 2002, 2014  S/P Cholecystectomy: 2001  S/P Breast Lumpectomy: 1994, 2003 - left breast benign      Review of Systems:  Constitutional: denies fevers, chills  HEENT: odynophagia or dysphagia  Cardiovascular: denies palpitations  Respiratory: denies SOB, wheezing  Gastrointestinal: denies N/V/D, abdominal pain  : denies dysuria  MSK: denies weakness, joint pain  Skin: denies new rashes or masses unless otherwise specified in hpi    MEDICATIONS  (STANDING):  aspirin enteric coated 81 milliGRAM(s) Oral daily  atorvastatin 80 milliGRAM(s) Oral at bedtime  ceFAZolin   IVPB 1000 milliGRAM(s) IV Intermittent every 24 hours  dextrose 5%. 1000 milliLiter(s) (50 mL/Hr) IV Continuous <Continuous>  dextrose 50% Injectable 12.5 Gram(s) IV Push once  dextrose 50% Injectable 25 Gram(s) IV Push once  dextrose 50% Injectable 25 Gram(s) IV Push once  insulin lispro (HumaLOG) corrective regimen sliding scale   SubCutaneous every 6 hours  levothyroxine 150 MICROGram(s) Oral daily  midodrine 10 milliGRAM(s) Oral three times a day  Nephro-svetlana 1 Tablet(s) Oral daily  pantoprazole  Injectable 40 milliGRAM(s) IV Push daily    MEDICATIONS  (PRN):  acetaminophen   Tablet .. 650 milliGRAM(s) Oral every 6 hours PRN Temp greater or equal to 38C (100.4F), Mild Pain (1 - 3), Moderate Pain (4 - 6), Severe Pain (7 - 10)  dextrose 40% Gel 15 Gram(s) Oral once PRN Blood Glucose LESS THAN 70 milliGRAM(s)/deciliter  glucagon  Injectable 1 milliGRAM(s) IntraMuscular once PRN Glucose LESS THAN 70 milligrams/deciliter  ondansetron Injectable 4 milliGRAM(s) IV Push every 12 hours PRN Nausea and/or Vomiting      Allergies    allopurinol (Rash)  Augmentin (Rash)  Levaquin (Rash)    Intolerances        SOCIAL HISTORY: denies drug use    FAMILY HISTORY:  FH: breast cancer: mother  FH: CHF (congestive heart failure): father      Vital Signs Last 24 Hrs  T(C): 37.6 (10 Apr 2019 08:05), Max: 37.8 (10 Apr 2019 02:12)  T(F): 99.7 (10 Apr 2019 08:05), Max: 100 (10 Apr 2019 02:12)  HR: 72 (10 Apr 2019 08:05) (64 - 103)  BP: 90/50 (10 Apr 2019 08:05) (88/47 - 129/69)  BP(mean): --  RR: 16 (10 Apr 2019 08:05) (16 - 18)  SpO2: 97% (10 Apr 2019 08:05) (90% - 100%)    Physical Exam:  The patient was alert and oriented X 3, well nourished, and in no apparent distress. Oropharynx showed no ulcerations. There was no visible lymphadenopathy. Conjunctiva were non-injected. There was no clubbing or edema of extremities.    The scalp, hair, face, eyebrows, lips, oropharynx , neck, chest, back, buttocks, extremities X 4, hands, feet, nails were examined. There was no hyperhidrosis or bromhidrosis.     The following lesions are noted:   ulceration with erythematous border and central crusting on R posterolateral arm    LABS:                        10.0   9.1   )-----------( 190      ( 10 Apr 2019 04:45 )             29.5     04-10    135  |  91<L>  |  21  ----------------------------<  164<H>  3.9   |  26  |  3.07<H>    Ca    8.9      10 Apr 2019 04:46  Phos  2.5     04-10  Mg     1.6     04-10    TPro  7.6  /  Alb  3.4  /  TBili  0.5  /  DBili  x   /  AST  62<H>  /  ALT  30  /  AlkPhos  126<H>  04-09    PT/INR - ( 10 Apr 2019 04:46 )   PT: 13.2 sec;   INR: 1.15 ratio         PTT - ( 10 Apr 2019 04:46 )  PTT:31.7 sec      RADIOLOGY & ADDITIONAL STUDIES: no relevant studies HPI:  Ms. Mercy Kelley is a 78 year old F PMH CAD s/p CABG x 4, T2DM with peripheral neuropathy, ESRD on HD who is admitted with tib/fib fracture after fall. Dermatology is consulted for skin lesion on R upper arm. Patient is unsure of duration but says it has been there for months. It is occasionally itchy and bleeds. Denies any pain. Of note, patient has a hx of a BCC on R dorsal hand s/p Mohs ~2 years ago. Patient follows with dermatologist Dr. Eric Mathews in [place].     PAST MEDICAL & SURGICAL HISTORY:  CHF (congestive heart failure): Oct 2015- still sob  Shortness of breath  Hypotension  Type 2 diabetes mellitus  Chronic kidney disease (CKD): ON DIALYSIS - Tue, Thur, Sat  Nephrolithiasis: 2001  Ulcer: 2001  Hydronephrosis: Right 1/2012  Charcot Foot: Right Foot  Herniated Disc  Diabetic Neuropathy  Anemia: CKD  Hypothyroidism  Hypertension: NOT ANY MORE  Hyperlipidemia  S/P mitral valve repair: with CABG X 4 MAY 2015  S/P CABG (coronary artery bypass graft): x 4, May 2015  History of extraction of renal calculus  S/P Foot Surgery: 2002, 2014  S/P Cholecystectomy: 2001  S/P Breast Lumpectomy: 1994, 2003 - left breast benign      Review of Systems:  Constitutional: denies fevers, chills  HEENT: odynophagia or dysphagia  Cardiovascular: denies palpitations  Respiratory: denies SOB, wheezing  Gastrointestinal: denies N/V/D, abdominal pain  : denies dysuria  MSK: denies weakness, joint pain  Skin: denies new rashes or masses unless otherwise specified in hpi    MEDICATIONS  (STANDING):  aspirin enteric coated 81 milliGRAM(s) Oral daily  atorvastatin 80 milliGRAM(s) Oral at bedtime  ceFAZolin   IVPB 1000 milliGRAM(s) IV Intermittent every 24 hours  dextrose 5%. 1000 milliLiter(s) (50 mL/Hr) IV Continuous <Continuous>  dextrose 50% Injectable 12.5 Gram(s) IV Push once  dextrose 50% Injectable 25 Gram(s) IV Push once  dextrose 50% Injectable 25 Gram(s) IV Push once  insulin lispro (HumaLOG) corrective regimen sliding scale   SubCutaneous every 6 hours  levothyroxine 150 MICROGram(s) Oral daily  midodrine 10 milliGRAM(s) Oral three times a day  Nephro-svetlana 1 Tablet(s) Oral daily  pantoprazole  Injectable 40 milliGRAM(s) IV Push daily    MEDICATIONS  (PRN):  acetaminophen   Tablet .. 650 milliGRAM(s) Oral every 6 hours PRN Temp greater or equal to 38C (100.4F), Mild Pain (1 - 3), Moderate Pain (4 - 6), Severe Pain (7 - 10)  dextrose 40% Gel 15 Gram(s) Oral once PRN Blood Glucose LESS THAN 70 milliGRAM(s)/deciliter  glucagon  Injectable 1 milliGRAM(s) IntraMuscular once PRN Glucose LESS THAN 70 milligrams/deciliter  ondansetron Injectable 4 milliGRAM(s) IV Push every 12 hours PRN Nausea and/or Vomiting      Allergies    allopurinol (Rash)  Augmentin (Rash)  Levaquin (Rash)    Intolerances        SOCIAL HISTORY: denies drug use    FAMILY HISTORY:  FH: breast cancer: mother  FH: CHF (congestive heart failure): father      Vital Signs Last 24 Hrs  T(C): 37.6 (10 Apr 2019 08:05), Max: 37.8 (10 Apr 2019 02:12)  T(F): 99.7 (10 Apr 2019 08:05), Max: 100 (10 Apr 2019 02:12)  HR: 72 (10 Apr 2019 08:05) (64 - 103)  BP: 90/50 (10 Apr 2019 08:05) (88/47 - 129/69)  BP(mean): --  RR: 16 (10 Apr 2019 08:05) (16 - 18)  SpO2: 97% (10 Apr 2019 08:05) (90% - 100%)    Physical Exam:  The patient was alert and oriented X 3, well nourished, and in no apparent distress. Oropharynx showed no ulcerations. There was no visible lymphadenopathy. Conjunctiva were non-injected. There was no clubbing or edema of extremities.    The scalp, hair, face, eyebrows, lips, oropharynx , neck, chest, back, buttocks, extremities X 4, hands, feet, nails were examined. There was no hyperhidrosis or bromhidrosis.     The following lesions are noted:   ulceration with erythematous border and central crusting on R upper arm    LABS:                        10.0   9.1   )-----------( 190      ( 10 Apr 2019 04:45 )             29.5     04-10    135  |  91<L>  |  21  ----------------------------<  164<H>  3.9   |  26  |  3.07<H>    Ca    8.9      10 Apr 2019 04:46  Phos  2.5     04-10  Mg     1.6     04-10    TPro  7.6  /  Alb  3.4  /  TBili  0.5  /  DBili  x   /  AST  62<H>  /  ALT  30  /  AlkPhos  126<H>  04-09    PT/INR - ( 10 Apr 2019 04:46 )   PT: 13.2 sec;   INR: 1.15 ratio         PTT - ( 10 Apr 2019 04:46 )  PTT:31.7 sec      RADIOLOGY & ADDITIONAL STUDIES: no relevant studies

## 2019-04-10 NOTE — CONSULT NOTE ADULT - SUBJECTIVE AND OBJECTIVE BOX
78 year old female presented to the [facility] ED following fall at home. Patient lives in a basement apartment by herself and typically walks with a walker. She was being helped up the stairs by her grandchildren, on her way to dialysis, when she lost her balance and fell to the ground. She felt immediate right leg pain and was unable to ambulate. EMS was called and the patient presented to the [facility] ED. Patient had missed her dialysis appointment and was urgently taken to dialysis. No chest pain/SOB. No fevers/chills. No other injuries. No head strike/LOC. Patient has a history of diabetic neuropathy and has no sensation below mid calf bilaterally.    PAST MEDICAL & SURGICAL HISTORY:  CHF (congestive heart failure): Oct 2015- still sob  Shortness of breath  Hypotension  Type 2 diabetes mellitus  Chronic kidney disease (CKD): ON DIALYSIS - Tue, Thur, Sat  Nephrolithiasis: 2001  Ulcer: 2001  Hydronephrosis: Right 1/2012  Charcot Foot: Right Foot  Herniated Disc  Diabetic Neuropathy  Anemia: CKD  Hypothyroidism  Hypertension: NOT ANY MORE  Hyperlipidemia  S/P mitral valve repair: with CABG X 4 MAY 2015  S/P CABG (coronary artery bypass graft): x 4, May 2015  History of extraction of renal calculus  S/P Foot Surgery: 2002, 2014  S/P Cholecystectomy: 2001  S/P Breast Lumpectomy: 1994, 2003 - left breast benign    Vital Signs Last 24 Hrs  T(C): 37.4 (09 Apr 2019 21:45), Max: 37.4 (09 Apr 2019 21:45)  T(F): 99.4 (09 Apr 2019 21:45), Max: 99.4 (09 Apr 2019 21:45)  HR: 64 (09 Apr 2019 21:45) (64 - 76)  BP: 95/44 (09 Apr 2019 21:45) (88/47 - 102/60)  BP(mean): --  RR: 16 (09 Apr 2019 21:45) (16 - 18)  SpO2: 95% (09 Apr 2019 21:45) (90% - 100%)    XRay: R proximal tib/fib fracture    Exam:  Gen: NAD, 1cm abrasion over anterior tibia, possible Grade 1 open fracture, compartments soft, nontender, non-distended  Motor: wiggles toes  Sensory: Numbness below mid tibia bilaterally    A/P: 78 year old female with R proximal tib/fib fracture, possible Grade 1 open fracture  - Pain control  - NWB RLE in [place] Knee Immobilizer  - Needs Tetanus  - Needs Ancef - 1g every 24 hours per Nephro  - Needs Medical, Cards, Nephrology clearance  - NPO past midnight  - Preop Labs in AM  - Plan for OR on 4/10/19  - Discussed with Dr. Branham

## 2019-04-10 NOTE — CONSULT NOTE ADULT - PROBLEM SELECTOR RECOMMENDATION 9
iv proton pump inhibitor bid  doubt gi bleed  patient states emesis was her chocolate milk  monitor cbc   await abdomen u/s  d/w family

## 2019-04-10 NOTE — CONSULT NOTE ADULT - ASSESSMENT
A/P:  78 F PMH CAD s/p CABG x 4 2015, T2DM with peripheral neuropathy, ESRD on HD T/Th/S via L AVF, hypotension on midodrine, HFrEF, HTN, HLD, b/l cataracts s/p surgery, p/w mechanical fall and R leg pain, f/w R tib/fib fracture.     RUE lesion - ADAPTIC  COnsider Derm Consult  BLE elevation  Moisturize intact skin w/ SWEEN cream BID  con't Nutrition (as tolerated), Nutrition Consult  con't Offloading   con't Pericare  Care as per medicine will follow w/ you  Upon discharge f/u as outpatient at [facility] [address] [place] [address] [phone]  Seen w/ attng and D/w team  Thank you for this consult  Karina Andujar PA-C [id number]

## 2019-04-10 NOTE — PROGRESS NOTE ADULT - SUBJECTIVE AND OBJECTIVE BOX
VIRGILIO KAPLAN  78y Female  MRN:[id number]    Patient is a 78y old  Female who presents with a chief complaint of R tib/fib fx (10 Apr 2019 12:01)    HPI:  78 F PMH CAD s/p CABG x 4 2015, T2DM with peripheral neuropathy, ESRD on HD T/Th/S via L AVF, hypotension on midodrine, HFrEF, HTN, HLD, b/l cataracts s/p surgery, p/w mechanical fall and R leg pain. Pt lives in basement apartment and ordinarily ambulates with walker. Today was getting ready for HD, made it to the first step on her set of stairs, and reportedly felt her R leg "give out." Pt fell backwards but was caught by her family member who was assisting her up the stairs. She denies LOC/syncope or head strike. +Pain in R leg post fall, worse with movement and much improved with immobilization. Could not bear weight or walk after fall.  Denies cp, sob at rest, f/c, n/v/d, dysuria, cough. +chronic BENAVIDEZ, reportedly stable since her CABG. Family at bedside providing collateral and confirms above details.     Vs: 99.1, 76, 102/60, 16, 90% RA --> 100% 4LNC.  Labs: no leukocytosis, chronic stable anemia, rest cbc unrevealing, coags unrevealing, hypoNa 126, HAGMA with cmp c/w known ESRD, hyperglycemia, chronic stable alkp elevation mild AST elevation. XR tib/fib/femur/hip/knee reveals acute prox tib/fib fxs with possible intraarticular extension. (10 Apr 2019 00:56)      Patient seen and evaluated at bedside.      Interval HPI: dark emesis overnight     PAST MEDICAL & SURGICAL HISTORY:  CHF (congestive heart failure): Oct 2015- still sob  Shortness of breath  Hypotension  Type 2 diabetes mellitus  Chronic kidney disease (CKD): ON DIALYSIS - [facility], Thur, Sat  Nephrolithiasis: 2001  Ulcer: 2001  Hydronephrosis: Right 1/2012  Charcot Foot: Right Foot  Herniated Disc  Diabetic Neuropathy  Anemia: CKD  Hypothyroidism  Hypertension: NOT ANY MORE  Hyperlipidemia  S/P mitral valve repair: with CABG X 4 MAY 2015  S/P CABG (coronary artery bypass graft): x 4, May 2015  History of extraction of renal calculus  S/P Foot Surgery: 2002, 2014  S/P Cholecystectomy: 2001  S/P Breast Lumpectomy: 1994, 2003 - left breast benign      REVIEW OF SYSTEMS:  as per hpi    VITALS:  Vital Signs Last 24 Hrs  T(C): 37.6 (10 Apr 2019 08:05), Max: 37.8 (10 Apr 2019 02:12)  T(F): 99.7 (10 Apr 2019 08:05), Max: 100 (10 Apr 2019 02:12)  HR: 72 (10 Apr 2019 08:05) (64 - 103)  BP: 90/50 (10 Apr 2019 08:05) (88/47 - 129/69)  BP(mean): --  RR: 16 (10 Apr 2019 08:05) (16 - 18)  SpO2: 97% (10 Apr 2019 08:05) (90% - 100%)  CAPILLARY BLOOD GLUCOSE      POCT Blood Glucose.: 174 mg/dL (10 Apr 2019 06:50)    I&O's Summary    10 Apr 2019 07:01  -  10 Apr 2019 12:16  --------------------------------------------------------  IN: 0 mL / OUT: 0 mL / NET: 0 mL        PHYSICAL EXAM:  GENERAL: NAD, well-developed  HEAD:  Atraumatic, Normocephalic  EYES: EOMI, PERRLA, conjunctiva and sclera clear  NECK: Supple,    CHEST/LUNG: dec bs b/l  HEART: S1, S2;   ABDOMEN: Soft, Nontender, mild distended; Bowel sounds present  EXTREMITIES:  2+ Peripheral Pulses, No clubbing, cyanosis, or edema,  right knee immobilizer   PSYCH: Normal affect  Neuro: Aox3    Consultant(s) Notes Reviewed:  [x ] YES  [ ] NO  Care Discussed with Consultants/Other Providers [ x] YES  [ ] NO    MEDS:  MEDICATIONS  (STANDING):  aspirin enteric coated 81 milliGRAM(s) Oral daily  atorvastatin 80 milliGRAM(s) Oral at bedtime  ceFAZolin   IVPB 1000 milliGRAM(s) IV Intermittent every 24 hours  dextrose 5%. 1000 milliLiter(s) (50 mL/Hr) IV Continuous <Continuous>  dextrose 50% Injectable 12.5 Gram(s) IV Push once  dextrose 50% Injectable 25 Gram(s) IV Push once  dextrose 50% Injectable 25 Gram(s) IV Push once  insulin lispro (HumaLOG) corrective regimen sliding scale   SubCutaneous every 6 hours  levothyroxine 150 MICROGram(s) Oral daily  metoclopramide Injectable 10 milliGRAM(s) IV Push once  midodrine 10 milliGRAM(s) Oral three times a day  Nephro-svetlana 1 Tablet(s) Oral daily  pantoprazole  Injectable 40 milliGRAM(s) IV Push daily    MEDICATIONS  (PRN):  acetaminophen   Tablet .. 650 milliGRAM(s) Oral every 6 hours PRN Temp greater or equal to 38C (100.4F), Mild Pain (1 - 3), Moderate Pain (4 - 6), Severe Pain (7 - 10)  dextrose 40% Gel 15 Gram(s) Oral once PRN Blood Glucose LESS THAN 70 milliGRAM(s)/deciliter  glucagon  Injectable 1 milliGRAM(s) IntraMuscular once PRN Glucose LESS THAN 70 milligrams/deciliter  ondansetron Injectable 4 milliGRAM(s) IV Push every 12 hours PRN Nausea and/or Vomiting    ALLERGIES:  allopurinol (Rash)  Augmentin (Rash)  Levaquin (Rash)      LABS:                        10.0   9.1   )-----------( 190      ( 10 Apr 2019 04:45 )             29.5     04-10    135  |  91<L>  |  21  ----------------------------<  164<H>  3.9   |  26  |  3.07<H>    Ca    8.9      10 Apr 2019 04:46  Phos  2.5     04-10  Mg     1.6     04-10    TPro  7.6  /  Alb  3.4  /  TBili  0.5  /  DBili  x   /  AST  62<H>  /  ALT  30  /  AlkPhos  126<H>  04-09    PT/INR - ( 10 Apr 2019 04:46 )   PT: 13.2 sec;   INR: 1.15 ratio         PTT - ( 10 Apr 2019 04:46 )  PTT:31.7 sec      LIVER FUNCTIONS - ( 09 Apr 2019 18:30 )  Alb: 3.4 g/dL / Pro: 7.6 g/dL / ALK PHOS: 126 U/L / ALT: 30 U/L / AST: 62 U/L / GGT: x             < from: CT Knee No Cont, Right (04.10.19 @ 03:34) >  Impression:    Acute, impacted fractures of the right proximal tibia and fibula as   described.      < end of copied text >           < from: Xray Tibia + Fibula 2 Views, Right (04.09.19 @ 20:28) >  impression:    There is an acute, comminuted fracture of the proximal tibial metaphysis   with mild anterior displacement of the distal fracture fragment. There is   questionable intra-articular extension on the lateral view. There is a   large knee joint effusion. There is a proximal fibular fracture.    There is no fracture of the hip or femur. The ankle is poorly visualized   secondary to technique and may be externally rotated with respect to the   knee. There is no ankle joint effusion. Vascular calcifications are noted.     CT of the knee can be performed for further evaluation.    < end of copied text >

## 2019-04-10 NOTE — H&P ADULT - PROBLEM SELECTOR PROBLEM 1
Type I or II open fracture of proximal end of right tibia, unspecified fracture morphology, initial encounter

## 2019-04-10 NOTE — H&P ADULT - PROBLEM SELECTOR PLAN 8
-pt denies active chest pain, sob at rest currently  -pt does appear fluid overloaded in setting of missed HD, currently getting HD now; noted pleural effusions b/l and 1+ edema b/l LE  -Pt has tolerated general anesthesia in past (AVF, cataract surgery) without issues other than n/v post procedure, would recommend zofran atc post procedure  -No high risk surgery, +hx ischemic heart dz +chf, no prior cva, +preop insulin use +ESRD, thus RCRI = 4 which suggests Class IV risk, indicating 15% risk of MACE  -Mccullough periop risk for KIM = 2.7%.   -Pt ordinarily can walk only few blocks without shortness of breath, doesn't often do housework alone, has had ongoing dyspnea with exertion since CABG.  It is unclear whether she can attain 4 mets.  -Given the above, she appears to be a high risk candidate for a moderate risk procedure. She does not currently appear to be fully medically optimized, as she is actively being dialysed, with evidence of b/l pleural effusions and recent complaints of chronic dyspnea.   -Agree with cardiac clearance; plan for patient to be seen by nocturnist cardiologist attending  -Would get baseline EKG (apparently done in ER, awaiting for upload)  -Get preop TTE, will order now  -Get preop labs  -It appears pt has tolerated recent surgery (cataracts) from a cardiac perspective despite here ASA 3 status and ongoing dyspnea. Would defer to cardiology team to see if she is optimized for this procedure, as she has hx of quadruple bypass and appears to be fluid overloaded currently.

## 2019-04-10 NOTE — CONSULT NOTE ADULT - ASSESSMENT
78 year old woman presenting with non-healing ulcer on R upper arm, likely secondary to nonmelanoma skin cancer (basal cell carcinoma vs squamous cell carcinoma).     #Ulceration  - Recommend skin biopsy as an outpatient  - Dermatology to sign off on this patient. Please re-consult for further questions or concerns. Pt can follow up with her outpatient dermatologist Dr. Eric Mathews in [place] or with [facility] within 1-2 weeks after discharge from the hospital, [phone].

## 2019-04-10 NOTE — H&P ADULT - PROBLEM SELECTOR PLAN 1
-confirmed on XR  -f/u CT knee  -ortho recs appreciated: plan for add on case 4/10/19, c/w knee immobilizer and NWB RLE  -tetanus vaccine  -ancef per ortho  -needs cards optimization prior to surgery, refer below  -currently pain well controlled while immobilized, tylenol prn ordered.  -fall precautions

## 2019-04-10 NOTE — CONSULT NOTE ADULT - SUBJECTIVE AND OBJECTIVE BOX
Patient is a 78y old  Female who presents with a chief complaint of R tib/fib fx (10 Apr 2019 01:45)      HPI:  78 F PMH CAD s/p CABG x 4 2015, T2DM with peripheral neuropathy, ESRD on HD T/Th/S via L AVF, hypotension on midodrine, HFrEF, HTN, HLD, b/l cataracts s/p surgery, p/w mechanical fall and R leg pain. Pt lives in basement apartment and ordinarily ambulates with walker. Today was getting ready for HD, made it to the first step on her set of stairs, and reportedly felt her R leg "give out." Pt fell backwards but was caught by her family member who was assisting her up the stairs. She denies LOC/syncope or head strike. +Pain in R leg post fall, worse with movement and much improved with immobilization. Could not bear weight or walk after fall.  Denies cp, sob at rest, f/c, n/v/d, dysuria, cough. +chronic BENAVIDEZ, reportedly stable since her CABG. Family at bedside providing collateral and confirms above details.     Vs: 99.1, 76, 102/60, 16, 90% RA --> 100% 4LNC.  Labs: no leukocytosis, chronic stable anemia, rest cbc unrevealing, coags unrevealing, hypoNa 126, HAGMA with cmp c/w known ESRD, hyperglycemia, chronic stable alkp elevation mild AST elevation. XR tib/fib/femur/hip/knee reveals acute prox tib/fib fxs with possible intraarticular extension. (10 Apr 2019 00:56)      ?FOLLOWING PRESENT  [ x] Hx of PE/DVT, [x ] Hx COPD, [x ] Hx of Asthma, [ y] Hx of Hospitalization, [x ]  Hx of BiPAP/CPAP use, [ x] Hx of MAYRA    Allergies    allopurinol (Rash)  Augmentin (Rash)  Levaquin (Rash)    Intolerances        PAST MEDICAL & SURGICAL HISTORY:  CHF (congestive heart failure): Oct 2015- still sob  Shortness of breath  Hypotension  Type 2 diabetes mellitus  Chronic kidney disease (CKD): ON DIALYSIS - Tue, Thur, Sat  Nephrolithiasis: 2001  Ulcer: 2001  Hydronephrosis: Right 1/2012  Charcot Foot: Right Foot  Herniated Disc  Diabetic Neuropathy  Anemia: CKD  Hypothyroidism  Hypertension: NOT ANY MORE  Hyperlipidemia  S/P mitral valve repair: with CABG X 4 MAY 2015  S/P CABG (coronary artery bypass graft): x 4, May 2015  History of extraction of renal calculus  S/P Foot Surgery: 2002, 2014  S/P Cholecystectomy: 2001  S/P Breast Lumpectomy: 1994, 2003 - left breast benign      FAMILY HISTORY:  FH: breast cancer: mother  FH: CHF (congestive heart failure): father      Social History: [  < 20 years ] TOBACCO                  [  x] ETOH                                 [  x] IVDA/DRUGS    REVIEW OF SYSTEMS      General:	x    Skin/Breast:x  	  Ophthalmologic:x  	  ENMT:	x    Respiratory and Thorax: Hypoxia: Chronic BENAVIDEZ   	  Cardiovascular:	x    Gastrointestinal:	Distended    Genitourinary:	x    Musculoskeletal:	x    Neurological:	x    Psychiatric:	x    Hematology/Lymphatics:	x    Endocrine:	x    Allergic/Immunologic:	x    MEDICATIONS  (STANDING):  aspirin enteric coated 81 milliGRAM(s) Oral daily  atorvastatin 80 milliGRAM(s) Oral at bedtime  ceFAZolin   IVPB 1000 milliGRAM(s) IV Intermittent every 24 hours  dextrose 5%. 1000 milliLiter(s) (50 mL/Hr) IV Continuous <Continuous>  dextrose 50% Injectable 12.5 Gram(s) IV Push once  dextrose 50% Injectable 25 Gram(s) IV Push once  dextrose 50% Injectable 25 Gram(s) IV Push once  insulin lispro (HumaLOG) corrective regimen sliding scale   SubCutaneous every 6 hours  levothyroxine 150 MICROGram(s) Oral daily  metoclopramide Injectable 10 milliGRAM(s) IV Push once  midodrine 10 milliGRAM(s) Oral three times a day  Nephro-svetlana 1 Tablet(s) Oral daily  pantoprazole  Injectable 40 milliGRAM(s) IV Push daily    MEDICATIONS  (PRN):  acetaminophen   Tablet .. 650 milliGRAM(s) Oral every 6 hours PRN Temp greater or equal to 38C (100.4F), Mild Pain (1 - 3), Moderate Pain (4 - 6), Severe Pain (7 - 10)  dextrose 40% Gel 15 Gram(s) Oral once PRN Blood Glucose LESS THAN 70 milliGRAM(s)/deciliter  glucagon  Injectable 1 milliGRAM(s) IntraMuscular once PRN Glucose LESS THAN 70 milligrams/deciliter  ondansetron Injectable 4 milliGRAM(s) IV Push every 12 hours PRN Nausea and/or Vomiting       Vital Signs Last 24 Hrs  T(C): 37.6 (10 Apr 2019 08:05), Max: 37.8 (10 Apr 2019 02:12)  T(F): 99.7 (10 Apr 2019 08:05), Max: 100 (10 Apr 2019 02:12)  HR: 72 (10 Apr 2019 08:05) (64 - 103)  BP: 90/50 (10 Apr 2019 08:05) (88/47 - 129/69)  BP(mean): --  RR: 16 (10 Apr 2019 08:05) (16 - 18)  SpO2: 97% (10 Apr 2019 08:05) (90% - 100%)        I&O's Summary    10 Apr 2019 07:01  -  10 Apr 2019 11:39  --------------------------------------------------------  IN: 0 mL / OUT: 0 mL / NET: 0 mL        Physical Exam:   GENERAL: NAD, well-groomed, well-developed  HEENT: FLOWER/   Atraumatic, Normocephalic  ENMT: No tonsillar erythema, exudates, or enlargement; Moist mucous membranes, Good dentition, No lesions  NECK: Supple, No JVD, Normal thyroid  CHEST/LUNG: DECREASED AIR ENTRY BILATERALLY   CVS: Regular rate and rhythm; No murmurs, rubs, or gallops  GI: : dISTENDED AND NO pain: aSCIITES  NERVOUS SYSTEM:  Alert & Oriented X3, Good concentration; Motor Strength 5/5 B/L upper and lower extremities; DTRs 2+ intact and symmetric  EXTREMITIES:+edema  LYMPH: No lymphadenopathy noted  SKIN: No rashes or lesions  ENDOCRINOLOGY: No Thyromegaly  PSYCH: Appropriate    Labs:                              10.0   9.1   )-----------( 190      ( 10 Apr 2019 04:45 )             29.5                         11.4   8.3   )-----------( 205      ( 09 Apr 2019 18:30 )             34.3     04-10    135  |  91<L>  |  21  ----------------------------<  164<H>  3.9   |  26  |  3.07<H>  04-09    126<L>  |  85<L>  |  42<H>  ----------------------------<  288<H>  4.9   |  21<L>  |  5.23<H>    Ca    8.9      10 Apr 2019 04:46  Ca    8.8      09 Apr 2019 18:30  Phos  2.5     04-10  Phos  4.1     04-09  Mg     1.6     04-10  Mg     1.6     04-09    TPro  7.6  /  Alb  3.4  /  TBili  0.5  /  DBili  x   /  AST  62<H>  /  ALT  30  /  AlkPhos  126<H>  04-09    CAPILLARY BLOOD GLUCOSE      POCT Blood Glucose.: 174 mg/dL (10 Apr 2019 06:50)    LIVER FUNCTIONS - ( 09 Apr 2019 18:30 )  Alb: 3.4 g/dL / Pro: 7.6 g/dL / ALK PHOS: 126 U/L / ALT: 30 U/L / AST: 62 U/L / GGT: x           PT/INR - ( 10 Apr 2019 04:46 )   PT: 13.2 sec;   INR: 1.15 ratio         PTT - ( 10 Apr 2019 04:46 )  PTT:31.7 sec    D DImer      Studies  Chest X-RAY  CT SCAN Chest   CT Abdomen  Venous Dopplers: LE:   Others      < from: CT Chest w/o Cont (08.07.15 @ 13:28) >    There are small mediastinal and hilar lymph nodes with a representative   precarinal node measuring 1.6 x 1.0 cm (image 48). There is no axillary   lymphadenopathy.    There is cardiomegaly without pericardial effusion. There are coronary   artery atherosclerotic calcifications and the patient is status post   mitral valve replacement. The thoracic aorta is normal caliber with mild   atherosclerotic calcification. The pulmonary artery is enlarged to 3.4   cm, suggestive of pulmonary arterial hypertension.    The central tracheobronchial tree is patent. There is a 2 mm right apical   pulmonary nodule (series 2: Image 22). There is right middle and lower   lobe ground glass opacity with more consolidative right middle lobe   opacity. There are small bilateral pleural effusions, loculated on the   left, with adjacent passive atelectasis.     The patient is status post cholecystectomy.    The patient is status post sternotomy on 05/29/2015 without callus   formation. There is an age indeterminate mildly displaced posterior left   first rib fracture. There are multilevel degenerative changes of the   visualized spine.    IMPRESSION: Right middle and lower lobe pneumonia and/or asymmetric   pulmonary edema.    2 mm right apical nodule, 1 year follow-up CT recommended if there are   risk factors for malignancy.    Small bilateral pleural effusions, loculated on the left, with partial   atelectasis of the left lower lobe.         RICK CENTENO M.D., RADIOLOGY RESIDENT  This document has been electronically signed.  MARY ANN BENITEZ M.D., ATTENDING RADIOLOGIST  This document has been electronically signed. Aug  7 2015  4:50P          < end of copied text >      < from: Xray Chest 1 View- PORTABLE-Urgent (04.09.19 @ 20:28) >  EXAM:  XR CHEST PORTABLE URGENT 1V                            PROCEDURE DATE:  04/09/2019            INTERPRETATION:    CLINICAL INDICATION: Shortness of breath.    TECHNIQUE: Portable frontal view of the chest.    COMPARISON: Frontal chest radiograph from 11/2/2015.    FINDINGS:     There are median sternotomy wires status post mitral annuloplasty.  Cardiac silhouette is not accurately evaluated on this projection.  Bilateral pleural effusions with associated passive atelectasis. No   pneumothorax.    IMPRESSION:    Bilateral pleural effusions with associated passive atelectasis.        < from: TTE with Doppler (w/Cont) (10.27.15 @ 00:00) >  Hemodynamic: Estimated right atrial pressure is 8 mm Hg.  ------------------------------------------------------------------------  Conclusions:  1. An annuloplasty ring is seen in the mitral position.  Thickened mitral valve leaflets. Mild-moderate mitral  regurgitation. Mean transmitral valve gradient equals 5 mm  Hg  2. Mildly dilated left atrium.  LA volume index = 31 cc/m2.  3. Normal left ventricular internal dimensions and wall  thicknesses.  4. Endocardial visualization enhanced with intravenous  injection of echo contrast (Definity).  Endocardium not  well visualized in all myocardial segments despite  administration of echo contrast.  Severely reduced overall  left ventricular systolic function with an estimated  ejection fraction of 30-35%.  The basal and mid inferior  segments and mid inferoseptal and anteroseptal segments are  hypokinetic.  5. Normal right ventricular size with decreased right  ventricular systolic function.  6. Trace pericardial effusion.  7. Bilateral pleural effusions.  ------------------------------------------------------------------------  Confirmed on  10/27/2015 - 20:18:39 by Olga Mei M.D.  ------------------------------------------------------------------------    < end of copied text >          MONSE MUNIZ M.D., RADIOLOGY RESIDENT  This document has been electronically signed.  JOJO LARA M.D., ATTENDING RADIOLOGIST  This document has been electronically signed. Apr 10 2019 10:05AM        < end of copied text >

## 2019-04-11 DIAGNOSIS — Z71.89 OTHER SPECIFIED COUNSELING: ICD-10-CM

## 2019-04-11 LAB
ALBUMIN SERPL ELPH-MCNC: 2.9 G/DL — LOW (ref 3.3–5)
ALP SERPL-CCNC: 147 U/L — HIGH (ref 40–120)
ALT FLD-CCNC: 59 U/L — HIGH (ref 10–45)
ANION GAP SERPL CALC-SCNC: 17 MMOL/L — SIGNIFICANT CHANGE UP (ref 5–17)
ANION GAP SERPL CALC-SCNC: 19 MMOL/L — HIGH (ref 5–17)
APTT BLD: 34 SEC — SIGNIFICANT CHANGE UP (ref 27.5–36.3)
AST SERPL-CCNC: 147 U/L — HIGH (ref 10–40)
BASOPHILS # BLD AUTO: 0 K/UL — SIGNIFICANT CHANGE UP (ref 0–0.2)
BASOPHILS NFR BLD AUTO: 0.2 % — SIGNIFICANT CHANGE UP (ref 0–2)
BILIRUB SERPL-MCNC: 0.5 MG/DL — SIGNIFICANT CHANGE UP (ref 0.2–1.2)
BUN SERPL-MCNC: 17 MG/DL — SIGNIFICANT CHANGE UP (ref 7–23)
BUN SERPL-MCNC: 34 MG/DL — HIGH (ref 7–23)
CALCIUM SERPL-MCNC: 7.8 MG/DL — LOW (ref 8.4–10.5)
CALCIUM SERPL-MCNC: 8.4 MG/DL — SIGNIFICANT CHANGE UP (ref 8.4–10.5)
CHLORIDE SERPL-SCNC: 91 MMOL/L — LOW (ref 96–108)
CHLORIDE SERPL-SCNC: 95 MMOL/L — LOW (ref 96–108)
CO2 SERPL-SCNC: 21 MMOL/L — LOW (ref 22–31)
CO2 SERPL-SCNC: 25 MMOL/L — SIGNIFICANT CHANGE UP (ref 22–31)
CREAT SERPL-MCNC: 2.45 MG/DL — HIGH (ref 0.5–1.3)
CREAT SERPL-MCNC: 4.43 MG/DL — HIGH (ref 0.5–1.3)
EOSINOPHIL # BLD AUTO: 0.1 K/UL — SIGNIFICANT CHANGE UP (ref 0–0.5)
EOSINOPHIL NFR BLD AUTO: 1 % — SIGNIFICANT CHANGE UP (ref 0–6)
FERRITIN SERPL-MCNC: 6834 NG/ML — HIGH (ref 15–150)
GAS PNL BLDA: SIGNIFICANT CHANGE UP
GAS PNL BLDA: SIGNIFICANT CHANGE UP
GLUCOSE BLDC GLUCOMTR-MCNC: 106 MG/DL — HIGH (ref 70–99)
GLUCOSE BLDC GLUCOMTR-MCNC: 124 MG/DL — HIGH (ref 70–99)
GLUCOSE BLDC GLUCOMTR-MCNC: 131 MG/DL — HIGH (ref 70–99)
GLUCOSE BLDC GLUCOMTR-MCNC: 138 MG/DL — HIGH (ref 70–99)
GLUCOSE BLDC GLUCOMTR-MCNC: 165 MG/DL — HIGH (ref 70–99)
GLUCOSE SERPL-MCNC: 128 MG/DL — HIGH (ref 70–99)
GLUCOSE SERPL-MCNC: 149 MG/DL — HIGH (ref 70–99)
HBA1C BLD-MCNC: 8.4 % — HIGH (ref 4–5.6)
HCT VFR BLD CALC: 26.7 % — LOW (ref 34.5–45)
HCT VFR BLD CALC: 29.5 % — LOW (ref 34.5–45)
HGB BLD-MCNC: 8.8 G/DL — LOW (ref 11.5–15.5)
HGB BLD-MCNC: 9.4 G/DL — LOW (ref 11.5–15.5)
INR BLD: 1.19 RATIO — HIGH (ref 0.88–1.16)
IRON SATN MFR SERPL: 53 % — HIGH (ref 14–50)
IRON SATN MFR SERPL: 71 UG/DL — SIGNIFICANT CHANGE UP (ref 30–160)
LYMPHOCYTES # BLD AUTO: 1.1 K/UL — SIGNIFICANT CHANGE UP (ref 1–3.3)
LYMPHOCYTES # BLD AUTO: 10 % — LOW (ref 13–44)
MAGNESIUM SERPL-MCNC: 1.7 MG/DL — SIGNIFICANT CHANGE UP (ref 1.6–2.6)
MCHC RBC-ENTMCNC: 30.6 PG — SIGNIFICANT CHANGE UP (ref 27–34)
MCHC RBC-ENTMCNC: 30.9 PG — SIGNIFICANT CHANGE UP (ref 27–34)
MCHC RBC-ENTMCNC: 31.7 GM/DL — LOW (ref 32–36)
MCHC RBC-ENTMCNC: 33 GM/DL — SIGNIFICANT CHANGE UP (ref 32–36)
MCV RBC AUTO: 93.7 FL — SIGNIFICANT CHANGE UP (ref 80–100)
MCV RBC AUTO: 96.5 FL — SIGNIFICANT CHANGE UP (ref 80–100)
MONOCYTES # BLD AUTO: 1.2 K/UL — HIGH (ref 0–0.9)
MONOCYTES NFR BLD AUTO: 11.4 % — SIGNIFICANT CHANGE UP (ref 2–14)
NEUTROPHILS # BLD AUTO: 8.3 K/UL — HIGH (ref 1.8–7.4)
NEUTROPHILS NFR BLD AUTO: 77.5 % — HIGH (ref 43–77)
PHOSPHATE SERPL-MCNC: 4.5 MG/DL — SIGNIFICANT CHANGE UP (ref 2.5–4.5)
PLATELET # BLD AUTO: 200 K/UL — SIGNIFICANT CHANGE UP (ref 150–400)
PLATELET # BLD AUTO: 203 K/UL — SIGNIFICANT CHANGE UP (ref 150–400)
POTASSIUM SERPL-MCNC: 3.4 MMOL/L — LOW (ref 3.5–5.3)
POTASSIUM SERPL-MCNC: 4 MMOL/L — SIGNIFICANT CHANGE UP (ref 3.5–5.3)
POTASSIUM SERPL-SCNC: 3.4 MMOL/L — LOW (ref 3.5–5.3)
POTASSIUM SERPL-SCNC: 4 MMOL/L — SIGNIFICANT CHANGE UP (ref 3.5–5.3)
PROT SERPL-MCNC: 6.8 G/DL — SIGNIFICANT CHANGE UP (ref 6–8.3)
PROTHROM AB SERPL-ACNC: 13.6 SEC — HIGH (ref 10–12.9)
RBC # BLD: 2.85 M/UL — LOW (ref 3.8–5.2)
RBC # BLD: 3.06 M/UL — LOW (ref 3.8–5.2)
RBC # FLD: 14.8 % — HIGH (ref 10.3–14.5)
RBC # FLD: 16 % — HIGH (ref 10.3–14.5)
SODIUM SERPL-SCNC: 133 MMOL/L — LOW (ref 135–145)
SODIUM SERPL-SCNC: 135 MMOL/L — SIGNIFICANT CHANGE UP (ref 135–145)
TIBC SERPL-MCNC: 135 UG/DL — LOW (ref 220–430)
TROPONIN T, HIGH SENSITIVITY RESULT: 178 NG/L — HIGH (ref 0–51)
UIBC SERPL-MCNC: 64 UG/DL — LOW (ref 110–370)
WBC # BLD: 10.7 K/UL — HIGH (ref 3.8–10.5)
WBC # BLD: 11.07 K/UL — HIGH (ref 3.8–10.5)
WBC # FLD AUTO: 10.7 K/UL — HIGH (ref 3.8–10.5)
WBC # FLD AUTO: 11.07 K/UL — HIGH (ref 3.8–10.5)

## 2019-04-11 PROCEDURE — 99291 CRITICAL CARE FIRST HOUR: CPT

## 2019-04-11 RX ORDER — DEXTROSE 50 % IN WATER 50 %
25 SYRINGE (ML) INTRAVENOUS ONCE
Qty: 0 | Refills: 0 | Status: DISCONTINUED | OUTPATIENT
Start: 2019-04-11 | End: 2019-04-16

## 2019-04-11 RX ORDER — ERYTHROPOIETIN 10000 [IU]/ML
10000 INJECTION, SOLUTION INTRAVENOUS; SUBCUTANEOUS ONCE
Qty: 0 | Refills: 0 | Status: COMPLETED | OUTPATIENT
Start: 2019-04-11 | End: 2019-04-11

## 2019-04-11 RX ORDER — LIDOCAINE AND PRILOCAINE CREAM 25; 25 MG/G; MG/G
1 CREAM TOPICAL DAILY
Qty: 0 | Refills: 0 | Status: DISCONTINUED | OUTPATIENT
Start: 2019-04-11 | End: 2019-04-23

## 2019-04-11 RX ORDER — OXYCODONE AND ACETAMINOPHEN 5; 325 MG/1; MG/1
1 TABLET ORAL ONCE
Qty: 0 | Refills: 0 | Status: DISCONTINUED | OUTPATIENT
Start: 2019-04-11 | End: 2019-04-11

## 2019-04-11 RX ORDER — INSULIN LISPRO 100/ML
VIAL (ML) SUBCUTANEOUS EVERY 6 HOURS
Qty: 0 | Refills: 0 | Status: DISCONTINUED | OUTPATIENT
Start: 2019-04-11 | End: 2019-04-23

## 2019-04-11 RX ORDER — OXYCODONE HYDROCHLORIDE 5 MG/1
10 TABLET ORAL ONCE
Qty: 0 | Refills: 0 | Status: DISCONTINUED | OUTPATIENT
Start: 2019-04-11 | End: 2019-04-11

## 2019-04-11 RX ORDER — DEXTROSE 50 % IN WATER 50 %
15 SYRINGE (ML) INTRAVENOUS ONCE
Qty: 0 | Refills: 0 | Status: DISCONTINUED | OUTPATIENT
Start: 2019-04-11 | End: 2019-04-16

## 2019-04-11 RX ORDER — PANTOPRAZOLE SODIUM 20 MG/1
40 TABLET, DELAYED RELEASE ORAL EVERY 12 HOURS
Qty: 0 | Refills: 0 | Status: DISCONTINUED | OUTPATIENT
Start: 2019-04-11 | End: 2019-04-23

## 2019-04-11 RX ORDER — CEFAZOLIN SODIUM 1 G
1000 VIAL (EA) INJECTION EVERY 24 HOURS
Qty: 0 | Refills: 0 | Status: DISCONTINUED | OUTPATIENT
Start: 2019-04-11 | End: 2019-04-13

## 2019-04-11 RX ORDER — DEXTROSE 50 % IN WATER 50 %
12.5 SYRINGE (ML) INTRAVENOUS ONCE
Qty: 0 | Refills: 0 | Status: DISCONTINUED | OUTPATIENT
Start: 2019-04-11 | End: 2019-04-16

## 2019-04-11 RX ORDER — OXYCODONE HYDROCHLORIDE 5 MG/1
10 TABLET ORAL EVERY 6 HOURS
Qty: 0 | Refills: 0 | Status: DISCONTINUED | OUTPATIENT
Start: 2019-04-11 | End: 2019-04-11

## 2019-04-11 RX ORDER — LEVOTHYROXINE SODIUM 125 MCG
75 TABLET ORAL AT BEDTIME
Qty: 0 | Refills: 0 | Status: DISCONTINUED | OUTPATIENT
Start: 2019-04-11 | End: 2019-04-22

## 2019-04-11 RX ORDER — SODIUM CHLORIDE 9 MG/ML
1000 INJECTION, SOLUTION INTRAVENOUS
Qty: 0 | Refills: 0 | Status: DISCONTINUED | OUTPATIENT
Start: 2019-04-11 | End: 2019-04-23

## 2019-04-11 RX ORDER — ACETAMINOPHEN 500 MG
650 TABLET ORAL EVERY 6 HOURS
Qty: 0 | Refills: 0 | Status: DISCONTINUED | OUTPATIENT
Start: 2019-04-11 | End: 2019-04-11

## 2019-04-11 RX ORDER — ASPIRIN/CALCIUM CARB/MAGNESIUM 324 MG
81 TABLET ORAL DAILY
Qty: 0 | Refills: 0 | Status: DISCONTINUED | OUTPATIENT
Start: 2019-04-11 | End: 2019-04-12

## 2019-04-11 RX ADMIN — OXYCODONE AND ACETAMINOPHEN 1 TABLET(S): 5; 325 TABLET ORAL at 10:56

## 2019-04-11 RX ADMIN — Medication 1 TABLET(S): at 11:57

## 2019-04-11 RX ADMIN — OXYCODONE AND ACETAMINOPHEN 1 TABLET(S): 5; 325 TABLET ORAL at 10:01

## 2019-04-11 RX ADMIN — Medication 1: at 13:09

## 2019-04-11 RX ADMIN — OXYCODONE HYDROCHLORIDE 10 MILLIGRAM(S): 5 TABLET ORAL at 15:30

## 2019-04-11 RX ADMIN — OXYCODONE AND ACETAMINOPHEN 1 TABLET(S): 5; 325 TABLET ORAL at 01:29

## 2019-04-11 RX ADMIN — PANTOPRAZOLE SODIUM 40 MILLIGRAM(S): 20 TABLET, DELAYED RELEASE ORAL at 11:57

## 2019-04-11 RX ADMIN — OXYCODONE AND ACETAMINOPHEN 1 TABLET(S): 5; 325 TABLET ORAL at 00:29

## 2019-04-11 RX ADMIN — OXYCODONE HYDROCHLORIDE 10 MILLIGRAM(S): 5 TABLET ORAL at 16:15

## 2019-04-11 RX ADMIN — MIDODRINE HYDROCHLORIDE 10 MILLIGRAM(S): 2.5 TABLET ORAL at 05:44

## 2019-04-11 RX ADMIN — ERYTHROPOIETIN 10000 UNIT(S): 10000 INJECTION, SOLUTION INTRAVENOUS; SUBCUTANEOUS at 15:26

## 2019-04-11 RX ADMIN — MIDODRINE HYDROCHLORIDE 10 MILLIGRAM(S): 2.5 TABLET ORAL at 13:09

## 2019-04-11 RX ADMIN — Medication 150 MICROGRAM(S): at 05:44

## 2019-04-11 RX ADMIN — LIDOCAINE AND PRILOCAINE CREAM 1 APPLICATION(S): 25; 25 CREAM TOPICAL at 11:57

## 2019-04-11 RX ADMIN — Medication 100 MILLIGRAM(S): at 05:44

## 2019-04-11 RX ADMIN — Medication 81 MILLIGRAM(S): at 11:57

## 2019-04-11 NOTE — CHART NOTE - NSCHARTNOTEFT_GEN_A_CORE
Pt placed into well-padded cylinder cast RLE.  pt tolerated well, XR show improved alignment at fracture site.    Recommend NWB RLE in cylinder cast, keep clean and dry  continue iv ancef followed by duricef po  no acute orthopedic surgical intervention at this time  follow up outpatient with Dr Eron Branham in 10-14 days  d/w attending

## 2019-04-11 NOTE — PROGRESS NOTE ADULT - PROBLEM SELECTOR PLAN 1
iv proton pump inhibitor bid  doubt gi bleed  patient states emesis was her chocolate milk  monitor cbc, decline today is likely dilutional   abdomen u/s with moderate to large volume ascites, for IR paracentesis today

## 2019-04-11 NOTE — PROGRESS NOTE ADULT - ASSESSMENT
77 yo F with hx of cad, cabg, DM, esrd, on HD, chronic hypotension on midodrine 10 mg tid with sob, pleural effusions, chf, ascites, and new tib/fib fx    1- esrd  2- hypotension   3- chf  4- ascites   5- tib/fib fx     hd  revaclear 300 3.5 hr 2 liter bfr 400 cc dfr 600

## 2019-04-11 NOTE — DIETITIAN INITIAL EVALUATION ADULT. - PERTINENT LABORATORY DATA
POCT glucose: 4/11: 124, 138, 4/10: 125-174, 4/11: sodium 133, noted potassium and phosphorus levels WNL, BUN/Cr: 34/4.43 c/w ESRD, 4/11: A1C 8.4%

## 2019-04-11 NOTE — CHART NOTE - NSCHARTNOTEFT_GEN_A_CORE
Notified by RN for excruciating pain. Pt seen and examined at the bedside. Endorses "my leg is on fire and cannot sleep." Pain is located in RLE, burning in nature, 10/10 in severity. Not new, but more intense in severity. On physical exam, patient has no sensation on both LE from knee down, but intact pulses. Warm to touch. Tibia and Fibula Fx noted. Ortho is closely following the patient.    # RLE     - Percocet x 1      - Neuropathy reported per pt. No sensation from b/l knee down is not new for her Hx of diabetic neuropathy      - Will closely monitor clinical status, and vitals     - Will endorse to primary team in AM    Hanna Andujar PA-C

## 2019-04-11 NOTE — DIETITIAN INITIAL EVALUATION ADULT. - ADHERENCE
renal diet- per diet recall, pt tries to limit salt in diet, reports her potassium and phosphorus levels are usually WNL at monthly labs at dialysis, typical diet recall reveals pt does not consume too many foods high in these minerals; pt reports her protein levels have been low at times and she will take chocolate Boost which her doctors were aware of, reports she does not like Nepro, finds it too thick; reports on nights she has dialysis she consumes dinner very late and Finger sticks in the morning can be close to 200 at times/fair

## 2019-04-11 NOTE — DIETITIAN INITIAL EVALUATION ADULT. - NS FNS WEIGHT USED FOR CALC
current/141.7 pounds (noted dosing wt of 150.5 pounds, likely due to fluid prior to dialysis); defer fluid needs to team at this time

## 2019-04-11 NOTE — PROGRESS NOTE ADULT - SUBJECTIVE AND OBJECTIVE BOX
[facility]   [phone]  RENAL FOLLOW UP NOTE  --------------------------------------------------------------------------------  Chief Complaint:    24 hour events/subjective:    seen earlier. + pain in leg     PAST HISTORY  --------------------------------------------------------------------------------  No significant changes to PMH, PSH, FHx, SHx, unless otherwise noted    ALLERGIES & MEDICATIONS  --------------------------------------------------------------------------------  Allergies    allopurinol (Rash)  Augmentin (Rash)  Levaquin (Rash)    Intolerances      Standing Inpatient Medications  aspirin enteric coated 81 milliGRAM(s) Oral daily  atorvastatin 80 milliGRAM(s) Oral at bedtime  ceFAZolin   IVPB 1000 milliGRAM(s) IV Intermittent every 24 hours  dextrose 5%. 1000 milliLiter(s) IV Continuous <Continuous>  dextrose 50% Injectable 12.5 Gram(s) IV Push once  dextrose 50% Injectable 25 Gram(s) IV Push once  dextrose 50% Injectable 25 Gram(s) IV Push once  insulin lispro (HumaLOG) corrective regimen sliding scale   SubCutaneous every 6 hours  levothyroxine 150 MICROGram(s) Oral daily  midodrine 10 milliGRAM(s) Oral three times a day  Nephro-svetlana 1 Tablet(s) Oral daily  pantoprazole  Injectable 40 milliGRAM(s) IV Push daily    PRN Inpatient Medications  acetaminophen   Tablet .. 650 milliGRAM(s) Oral every 6 hours PRN  dextrose 40% Gel 15 Gram(s) Oral once PRN  glucagon  Injectable 1 milliGRAM(s) IntraMuscular once PRN  lidocaine/prilocaine Cream 1 Application(s) Topical daily PRN  ondansetron Injectable 4 milliGRAM(s) IV Push every 12 hours PRN  oxyCODONE    IR 10 milliGRAM(s) Oral every 6 hours PRN      REVIEW OF SYSTEMS  --------------------------------------------------------------------------------    Gen: denies fevers/chills,  CVS: denies chest pain/palpitations  Resp: denies SOB/Cough  GI: Denies N/V/Abd pain  : Denies dysuria    All other systems were reviewed and are negative, except as noted.    VITALS/PHYSICAL EXAM  --------------------------------------------------------------------------------  T(C): 36.3 (04-11-19 @ 18:20), Max: 36.7 (04-11-19 @ 13:45)  HR: 73 (04-11-19 @ 18:20) (62 - 73)  BP: 101/62 (04-11-19 @ 18:20) (98/54 - 105/60)  RR: 18 (04-11-19 @ 18:20) (17 - 18)  SpO2: 95% (04-11-19 @ 18:20) (95% - 99%)  Wt(kg): --  Height (cm): 177.8 (04-10-19 @ 02:12)  Weight (kg): 68.3 (04-10-19 @ 02:12)  BMI (kg/m2): 21.6 (04-10-19 @ 02:12)  BSA (m2): 1.85 (04-10-19 @ 02:12)      04-10-19 @ 07:01  -  04-11-19 @ 07:00  --------------------------------------------------------  IN: 50 mL / OUT: 0 mL / NET: 50 mL    04-11-19 @ 07:01  -  04-11-19 @ 20:07  --------------------------------------------------------  IN: 270 mL / OUT: 0 mL / NET: 270 mL      Physical Exam:  	  Gen: Non toxic in pain  on O2   	no jvd   	Pulm: decrease bs  no rales or ronchi or wheezing  	CV: RRR, S1S2; no rub  	Abd: +BS, soft,  + distended   	: No suprapubic tenderness  	UE: Warm, no cyanosis  no clubbing,  no edema;   	LE: Warm, no cyanosis  no clubbing, no edema RLE cast   	Neuro: alert and oriented. speech coherent   	Skin: Warm, no decrease skin turgor  right forearm abrasion   	Vascular access: + avf + bruit and thrill   	  LABS/STUDIES  --------------------------------------------------------------------------------              8.8    11.07 >-----------<  200      [04-11-19 @ 08:09]              26.7     133  |  91  |  34  ----------------------------<  128      [04-11-19 @ 07:08]  4.0   |  25  |  4.43        Ca     8.4     [04-11-19 @ 07:08]      Mg     1.6     [04-10-19 @ 04:46]      Phos  2.5     [04-10-19 @ 04:46]      PT/INR: PT 13.2 , INR 1.15       [04-10-19 @ 04:46]  PTT: 31.7       [04-10-19 @ 04:46]      Creatinine Trend:  SCr 4.43 [04-11 @ 07:08]  SCr 3.07 [04-10 @ 04:46]  SCr 5.23 [04-09 @ 18:30]                  Iron 71, TIBC 135, %sat 53      [04-11-19 @ 17:50]  Ferritin 6834      [04-11-19 @ 17:57]  HbA1c 8.4      [04-11-19 @ 08:09]

## 2019-04-11 NOTE — PROGRESS NOTE ADULT - ASSESSMENT
Tib-Fib fracture s/p mechanical fall  ESRD  ACute on chronic sytolic and diastolic CHF  CAD  Bilateral R>L pleural effusions, ascites due to fluid overload in setting of CHF and ESRD    REC    Patient currently relatively asymptomatic and is not dyspneic at rest.   Favor "diuresis" with HD as tolerated pre-operatively: would deferr thorcentesis at this time  Awaiting TTE  Paracentesis per primary team

## 2019-04-11 NOTE — PROGRESS NOTE ADULT - SUBJECTIVE AND OBJECTIVE BOX
Follow-up Pulm Progress Note  Shin Weeks MD  [phone]    Stable cardiorespiratory status: breathing comfortably supine  CT Chest Rviewed: Small to moderate R effusion, small L effusion with comprresive atelectasis    Medications:  Vital Signs Last 24 Hrs  T(C): 36.3 (11 Apr 2019 07:21), Max: 37.2 (10 Apr 2019 19:10)  T(F): 97.4 (11 Apr 2019 07:21), Max: 99 (10 Apr 2019 19:10)  HR: 62 (11 Apr 2019 07:21) (62 - 70)  BP: 98/54 (11 Apr 2019 07:21) (98/54 - 118/67)  BP(mean): --  RR: 18 (11 Apr 2019 07:21) (18 - 18)  SpO2: 96% (11 Apr 2019 07:21) (96% - 99%)      04-10 @ 07:01  -  04-11 @ 07:00  --------------------------------------------------------  IN: 50 mL / OUT: 0 mL / NET: 50 mL    LABS:                        8.8    11.07 )-----------( 200      ( 11 Apr 2019 08:09 )             26.7     04-11    133<L>  |  91<L>  |  34<H>  ----------------------------<  128<H>  4.0   |  25  |  4.43<H>    Ca    8.4      11 Apr 2019 07:08  Phos  2.5     04-10  Mg     1.6     04-10    TPro  7.6  /  Alb  3.4  /  TBili  0.5  /  DBili  x   /  AST  62<H>  /  ALT  30  /  AlkPhos  126<H>  04-09      PT/INR - ( 10 Apr 2019 04:46 )   PT: 13.2 sec;   INR: 1.15 ratio         PTT - ( 10 Apr 2019 04:46 )  PTT:31.7 sec      Physical Examination:  PULM: L clear, R basilar decr BS; no wheeze or rhonchi  CVS: Regular rate and rhythm, no murmurs, rubs, or gallops  ABD: Soft, non-tender  EXT:  No clubbing, cyanosis, or edema    RADIOLOGY REVIEWED  CXR:    CT chest:    TTE:    PROCEDURE: Transthoracic echocardiogram with 2-D, M-Mode  and complete spectral and color flowDoppler. Verbal  consent was obtained for injection of echo contrast  following a discussion of risks and benefits. Following  intravenous injection of contrast, harmonic imaging was  performed.  INDICATION: Atherosclerotic heart disease of native  coronary artery with unstable angina pectoris (I25.110),  Other nonrheumatic mitral valve disorders (I34.8)  ------------------------------------------------------------------------  Dimensions:    Normal Values:  LA:     3.7    2.0 - 4.0 cm  Ao:     3.2    2.0 - 3.8 cm  SEPTUM: 1.0    0.6 - 1.2 cm  PWT:    1.0    0.6 - 1.1 cm  LVIDd:  5.3    3.0 - 5.6 cm  LVIDs:  4.2    1.8 - 4.0 cm  EF (Visual Estimate): 30-35 %  Doppler Peak Velocity (m/sec): AoV=1.0  ------------------------------------------------------------------------  Observations:  Mitral Valve: An annuloplasty ring is seen in the mitral  position.  Thickened mitral valve leaflets. Mild-moderate  mitral regurgitation. Mean transmitral valve gradient  equals 5 mm Hg.  Aortic Valve/Aorta: Tricuspid aortic valve with normal cusp  excursion. No aortic valve regurgitation seen.  Normal aortic root dimension.  Left Atrium: Mildly dilated left atrium.  LA volume index =  31 cc/m2.  Left Ventricle: Endocardial visualization enhanced with  intravenous injection of echo contrast (Definity).  Endocardium not well visualized in all myocardial segments  despite administration of echo contrast.  Severely reduced  overall left ventricular systolic function with an  estimated ejection fraction of 30-35%.  The basal and mid  inferior segments and mid inferoseptal and anteroseptal  segments are hypokinetic. Normal left ventricular internal  dimensions and wall thicknesses.  Right Heart: Normal right atrial size. Normal right  ventricular size with decreased right ventricular systolic  function. Minimal tricuspid regurgitation.  Pericardium/Pleura: Trace pericardial effusion.  Bilateral pleural effusions.  Hemodynamic: Estimated right atrial pressure is 8 mm Hg.  ------------------------------------------------------------------------  Conclusions:  1. An annuloplasty ring is seen in the mitral position.  Thickened mitral valve leaflets. Mild-moderate mitral  regurgitation. Mean transmitral valve gradient equals 5 mm  Hg  2. Mildly dilated left atrium.  LA volume index = 31 cc/m2.  3. Normal left ventricular internal dimensions and wall  thicknesses.  4. Endocardial visualization enhanced with intravenous  injection of echo contrast (Definity).  Endocardium not  well visualized in all myocardial segments despite  administration of echo contrast.  Severely reduced overall  left ventricular systolic function with an estimated  ejection fraction of 30-35%.  The basal and mid inferior  segments and mid inferoseptal and anteroseptal segments are  hypokinetic.  5. Normal right ventricular size with decreased right  ventricular systolic function.  6. Trace pericardial effusion.  7. Bilateral pleural effusions.

## 2019-04-11 NOTE — DIETITIAN INITIAL EVALUATION ADULT. - ENERGY NEEDS
ht:5'10" , wt:141.7 pounds, BMI:20.3 kg/m2, IBW: 150pounds +/- 10%    Pt admitted S/P fall, tib/fib fracture-grade 1 open fracture per Ortho, pt c ESRD on HD. Noted pt c dark emesis previously, concern for upper GIB, pt reports having thrown up some chocolate milk she consumed, GI continues to follow, pt on PPI. Noted pt c pleural effusion, Pulmonary following. Noted pt is being seen by Wound Care as well.

## 2019-04-11 NOTE — DIETITIAN INITIAL EVALUATION ADULT. - FACTORS AFF FOOD INTAKE
pt reports in house this morning she did have some roll and yogurt, typical small breakfast for pt; denies any chewing/swallowing issues; reports no N+V over last 24 hours, reports last BM was yesterday

## 2019-04-11 NOTE — CHART NOTE - NSCHARTNOTEFT_GEN_A_CORE
Upon Nutritional Assessment by the Registered Dietitian your patient was determined to meet criteria / has evidence of the following diagnosis/diagnoses:          [ ]  Mild Protein Calorie Malnutrition        [x]  Moderate Protein Calorie Malnutrition        [ ] Severe Protein Calorie Malnutrition        [ ] Unspecified Protein Calorie Malnutrition        [ ] Underweight / BMI <19        [ ] Morbid Obesity / BMI > 40      Findings as based on:  [x] Comprehensive nutrition assessment- increased demand for nutrients, previous wt loss, periods of suboptimal PO intake   [x] Nutrition Focused Physical Exam- areas of muscle and fat loss   [ ] Other:       Nutrition Plan/Recommendations:      Continue c current liberalized diet. Consider Glucerna shakes x 2 daily.     PROVIDER Section:     By signing this assessment you are acknowledging and agree with the diagnosis/diagnoses assigned by the Registered Dietitian    Comments:

## 2019-04-11 NOTE — DIETITIAN INITIAL EVALUATION ADULT. - PHYSICAL APPEARANCE
underweight/pt agreeable to nutrition focused physical exam, pt c moderate muscle wasting around temples and clavicles, severe muscle loss around shoulders and interosseous muscle; moderate to severe fat loss around orbital and buccal region and mild fat loss around tricep region; pt c distended abdomen, states it has been this way for a long time

## 2019-04-11 NOTE — DIETITIAN INITIAL EVALUATION ADULT. - NS AS NUTRI INTERV MEDICAL AND FOOD SUPPLEMENTS
Consider Glucerna shake x 2 daily to help pt obtain extra protein. Recommend Sherman x 2 packets daily to provide conditionally essential amino acids for wound healing.

## 2019-04-11 NOTE — DIETITIAN INITIAL EVALUATION ADULT. - NS AS NUTRI INTERV MEALS SNACK
Continue c current diet- consider continuing without renal restrictions at this time given potassium and phosphorus levels WNL. never used never used

## 2019-04-11 NOTE — PROGRESS NOTE ADULT - ASSESSMENT
78 F PMH CAD s/p CABG x 4 2015, T2DM with peripheral neuropathy, ESRD on HD T/Th/S via L AVF, hypotension on midodrine, HFrEF, HTN, HLD, b/l cataracts s/p surgery, p/w mechanical fall and R leg pain, found to have R tib/fib fracture.     open fracture of proximal end of right tibia,  confirmed on XR and CT  -ortho recs appreciated:  s/p cast. no surgical intervention at this time  outpt f/u  NWB RLE     cards eval noted. pt high risk candidate for surg  -currently pain well controlled    -fall precautions.     Coronary artery disease without angina pectoris  -c/w aspirin, statin  -f/u cards .   f/u echo    Type 2 diabetes mellitus with hyperglycemia, with long-term current use of insulin.  start HISS  endo consult dr simons  -james, CC diet,    ESRD on hemodialysis.  HD as per renal     -c/w midodrine  -abd sono for ascites.   plan for paracentesis by ir     emesis  gi consulted  f/u guaiac  ppi  monitor cbc     sob, hypoxia, pulm effusion  pulm f/u  CT chest noted    preoperative examination.    pt denies active chest pain, sob at rest currently  pt would be high risk candidate for surg      dvt ppx

## 2019-04-11 NOTE — CHART NOTE - NSCHARTNOTEFT_GEN_A_CORE
RRT NOTE    Pt is a 78 F PMH CAD s/p CABG x 4 2015, T2DM with peripheral neuropathy, ESRD on HD T/Th/S via L AVF, hypotension on midodrine, HFrEF, HTN, HLD, b/l cataracts s/p surgery, p/w mechanical fall and R leg pain, found to have R tib/fib fracture, also with ascites and pleural effusions. RRT was called for unresponsiveness. On my arrival, patient was afebrile, BP 90's/60's, HR 70's-80's with palpable carotid pulse, RR 5-6, SpO2 100% on NC. She was unresponsive to sternal rub. She had recently received oxycodone 5mg, so Narcan 0.4 mg x 1 was administered. Respiratory drive and level of consciousness did not improve with first Narcan dose, so second dose was administered. She still did not respond initially. Given poor respiratory drive and pending labs, anesthesia was called for respiratory failure. However, prior to anesthesia's arrival, her mental status improved as she started moaning, and respiratory rate increased. During this time, she also became hypotensive to BP 60's-70's/40's-50's, so IV fluid 500 cc bolus was started with levophed gtt. MICU arrived at bedside to examine the patient, and given her improved, decision was made to transport to MICU without intubation.  -transferred to MICU without complication  -Narcan 0.4 x 2.  -deferred intubation as mental status improved  -full set of labs, ABG kannan.    Nelson Chavez, PGY-3  -[phone] RRT NOTE    Pt is a 78 F PMH CAD s/p CABG x 4 2015, T2DM with peripheral neuropathy, ESRD on HD T/Th/S via L AVF, hypotension on midodrine, HFrEF, HTN, HLD, b/l cataracts s/p surgery, p/w mechanical fall and R leg pain, found to have R tib/fib fracture, also with ascites and pleural effusions. RRT was called for unresponsiveness. On my arrival, patient was afebrile, BP 90's/60's, HR 70's-80's with palpable carotid pulse, RR 5-6, SpO2 100% on NC. She was unresponsive to sternal rub. She had recently received oxycodone 5mg, so Narcan 0.4 mg x 1 was administered. Respiratory drive and level of consciousness did not improve with first Narcan dose, so second dose was administered. She still did not respond initially. Given poor respiratory drive and pending labs, anesthesia was called for respiratory failure. However, prior to anesthesia's arrival, her mental status improved as she started moaning, and respiratory rate increased. During this time, she also became hypotensive to BP 60's-70's/40's-50's, so IV fluid 500 cc bolus was started with levophed gtt. MICU arrived at bedside to examine the patient, and given her improved, decision was made to transport to MICU without intubation.  -transferred to MICU without complication  -Narcan 0.4 x 2.  -deferred intubation as mental status improved  -full set of labs, ABG lytes.    Nelson Chavez, PGY-3  -[phone]    ATTENDING ATTESTATION  I was physically present for the key portions of the evaluation and management (E/M) service provided.  I agree with the above history, physical, and plan which I have reviewed and edited where appropriate.     See RRT sheet for detailed assessment and plan. I personally provided 35minutes for critical care time for acute encephalopathy and hypotension, transferred to ICU for further care. Case discussed with housestaff.

## 2019-04-11 NOTE — DIETITIAN INITIAL EVALUATION ADULT. - ORAL INTAKE PTA
fair/reports she usually consumes meals at home, daughter who lives upstairs helps c cooking, consumes 3 meals daily, was eating small breakfast and lunch and dinner would be more of a full meals, usually consumes small portions

## 2019-04-11 NOTE — DIETITIAN INITIAL EVALUATION ADULT. - NS AS NUTRI INTERV ED CONTENT
Encouraged pt to avoid concentrated sweets as feasible and continue to monitor usual intake, limit salt intake and monitor intake of high potassium and phosphorus foods.

## 2019-04-11 NOTE — PROGRESS NOTE ADULT - SUBJECTIVE AND OBJECTIVE BOX
INTERVAL HPI/OVERNIGHT EVENTS:    pt seen and examined. she denies abdominal pain  no further vomiting tolerating PO  h/h declined no brbpr/melena    MEDICATIONS  (STANDING):  aspirin enteric coated 81 milliGRAM(s) Oral daily  atorvastatin 80 milliGRAM(s) Oral at bedtime  ceFAZolin   IVPB 1000 milliGRAM(s) IV Intermittent every 24 hours  dextrose 5%. 1000 milliLiter(s) (50 mL/Hr) IV Continuous <Continuous>  dextrose 50% Injectable 12.5 Gram(s) IV Push once  dextrose 50% Injectable 25 Gram(s) IV Push once  dextrose 50% Injectable 25 Gram(s) IV Push once  epoetin halle Injectable 47521 Unit(s) IV Push once  insulin lispro (HumaLOG) corrective regimen sliding scale   SubCutaneous every 6 hours  levothyroxine 150 MICROGram(s) Oral daily  midodrine 10 milliGRAM(s) Oral three times a day  Nephro-svetlana 1 Tablet(s) Oral daily  oxyCODONE    IR 10 milliGRAM(s) Oral once  pantoprazole  Injectable 40 milliGRAM(s) IV Push daily    MEDICATIONS  (PRN):  acetaminophen   Tablet .. 650 milliGRAM(s) Oral every 6 hours PRN Temp greater or equal to 38C (100.4F), Mild Pain (1 - 3), Moderate Pain (4 - 6), Severe Pain (7 - 10)  dextrose 40% Gel 15 Gram(s) Oral once PRN Blood Glucose LESS THAN 70 milliGRAM(s)/deciliter  glucagon  Injectable 1 milliGRAM(s) IntraMuscular once PRN Glucose LESS THAN 70 milligrams/deciliter  lidocaine/prilocaine Cream 1 Application(s) Topical daily PRN hd days  ondansetron Injectable 4 milliGRAM(s) IV Push every 12 hours PRN Nausea and/or Vomiting      Allergies    allopurinol (Rash)  Augmentin (Rash)  Levaquin (Rash)    Intolerances        Review of Systems:    General:  No wt loss, fevers, chills, night sweats,fatigue,   Eyes:  Good vision, no reported pain  ENT:  No sore throat, pain, runny nose, dysphagia  CV:  No pain, palpitations, hypo/hypertension  Resp:  No dyspnea, cough, tachypnea, wheezing  GI:  No pain, No nausea, No vomiting, No diarrhea, No constipation, No weight loss, No fever, No pruritis, No rectal bleeding, No melena, No dysphagia  :  No pain, bleeding, incontinence, nocturia  Muscle:  No pain, weakness  Neuro:  No weakness, tingling, memory problems  Psych:  No fatigue, insomnia, mood problems, depression  Endocrine:  No polyuria, polydypsia, cold/heat intolerance  Heme:  No petechiae, ecchymosis, easy bruisability  Skin:  No rash, tattoos, scars, edema      Vital Signs Last 24 Hrs  T(C): 36.7 (11 Apr 2019 13:45), Max: 37.2 (10 Apr 2019 19:10)  T(F): 98.1 (11 Apr 2019 13:45), Max: 99 (10 Apr 2019 19:10)  HR: 70 (11 Apr 2019 13:45) (62 - 70)  BP: 100/45 (11 Apr 2019 13:45) (98/54 - 105/60)  BP(mean): --  RR: 17 (11 Apr 2019 13:45) (17 - 18)  SpO2: 96% (11 Apr 2019 13:45) (96% - 99%)    PHYSICAL EXAM:    Constitutional: NAD, well-developed  HEENT: EOMI, throat clear  Neck: No LAD, supple  Respiratory: CTA and P  Cardiovascular: S1 and S2, RRR, no M  Gastrointestinal: BS+, soft, NT, + shifting dullness  Extremities: No peripheral edema, neg clubbing, cyanosis  Vascular: 2+ peripheral pulses  Neurological: A/O x 3, no focal deficits  Psychiatric: Normal mood, normal affect  Skin: No rashes      LABS:                        8.8    11.07 )-----------( 200      ( 11 Apr 2019 08:09 )             26.7     04-11    133<L>  |  91<L>  |  34<H>  ----------------------------<  128<H>  4.0   |  25  |  4.43<H>    Ca    8.4      11 Apr 2019 07:08  Phos  2.5     04-10  Mg     1.6     04-10    TPro  7.6  /  Alb  3.4  /  TBili  0.5  /  DBili  x   /  AST  62<H>  /  ALT  30  /  AlkPhos  126<H>  04-09    PT/INR - ( 10 Apr 2019 04:46 )   PT: 13.2 sec;   INR: 1.15 ratio         PTT - ( 10 Apr 2019 04:46 )  PTT:31.7 sec      RADIOLOGY & ADDITIONAL TESTS:      < from: US Abdomen Limited (04.10.19 @ 12:13) >    EXAM:  US ABDOMEN LIMITED                            PROCEDURE DATE:  04/10/2019            INTERPRETATION:  Clinical indication: End-stage renal disease with   suspected ascites. Fall.    Technique: Limited sonography of all 4 quadrants was obtained to assess   for the presence of ascites fluid.    Comparison: Abdominal ultrasound performed 10/16/2015.    Findings:.    There is moderate to large volume ascites present in all 4 quadrants. The   largest pocket is within the left lower quadrant. There are bilateral   pleural effusions.    Pression:    Moderate to large volume ascites as above.    Bilateral pleural effusions.                    KOFI SHERWOOD M.D., ATTENDING RADIOLOGIST  This document has been electronically signed. Apr 10 2019 12:13PM

## 2019-04-11 NOTE — DIETITIAN INITIAL EVALUATION ADULT. - PROBLEM SELECTOR PLAN 7
-will hold vte ppx as it is currently 1:45 am and patient is an add on case for ortho either in am or early afternoon.  -would start vte ppx post procedure pending ortho recs

## 2019-04-11 NOTE — CHART NOTE - NSCHARTNOTEFT_GEN_A_CORE
Patient found unresponsive by RN, RRT activated. RRT is at the bedside, transferring to MICU. Dr. Ross and pt's daughter, Sheridan Lee, [phone], are made aware of the clinical status and MICU transfer. Please see RRT note for more information.     Hanna Andujar PA-C

## 2019-04-11 NOTE — DIETITIAN INITIAL EVALUATION ADULT. - OTHER INFO
Pt seen for consult for "pressure injury stage 2 or greater." Pt reports she lost wt about 3 years ago after she had open heart surgery while in the hospital and at rehab (2month period) and went from 180 pound to 140 pounds. Reports her dry weight for the last year and a half has been 63kg (138.6 pounds). Reports NKFA. States she dislikes fish, eggs in the hospital and ginger ale. Reports she has had her pressure injury on her coccyx now for many years. Pt is agreeable to taking Sherman supplement in house. Reports she also would like a chocolate supplement as well to keep her protein levels up. Pt confirmed she takes Nephro-svetlana and insulin at home.

## 2019-04-11 NOTE — PROGRESS NOTE ADULT - SUBJECTIVE AND OBJECTIVE BOX
VIRGILIO KAPLAN  78y Female  MRN:[id number]    Patient is a 78y old  Female who presents with a chief complaint of R tib/fib fx (10 Apr 2019 12:01)    HPI:  78 F PMH CAD s/p CABG x 4 2015, T2DM with peripheral neuropathy, ESRD on HD T/Th/S via L AVF, hypotension on midodrine, HFrEF, HTN, HLD, b/l cataracts s/p surgery, p/w mechanical fall and R leg pain. Pt lives in basement apartment and ordinarily ambulates with walker. Today was getting ready for HD, made it to the first step on her set of stairs, and reportedly felt her R leg "give out." Pt fell backwards but was caught by her family member who was assisting her up the stairs. She denies LOC/syncope or head strike. +Pain in R leg post fall, worse with movement and much improved with immobilization. Could not bear weight or walk after fall.  Denies cp, sob at rest, f/c, n/v/d, dysuria, cough. +chronic BENAVIDEZ, reportedly stable since her CABG. Family at bedside providing collateral and confirms above details.     Vs: 99.1, 76, 102/60, 16, 90% RA --> 100% 4LNC.  Labs: no leukocytosis, chronic stable anemia, rest cbc unrevealing, coags unrevealing, hypoNa 126, HAGMA with cmp c/w known ESRD, hyperglycemia, chronic stable alkp elevation mild AST elevation. XR tib/fib/femur/hip/knee reveals acute prox tib/fib fxs with possible intraarticular extension. (10 Apr 2019 00:56)      Patient seen and evaluated at bedside.      Interval HPI: no acute events overnight     PAST MEDICAL & SURGICAL HISTORY:  CHF (congestive heart failure): Oct 2015- still sob  Shortness of breath  Hypotension  Type 2 diabetes mellitus  Chronic kidney disease (CKD): ON DIALYSIS - Tue, Thur, Sat  Nephrolithiasis: 2001  Ulcer: 2001  Hydronephrosis: Right 1/2012  Charcot Foot: Right Foot  Herniated Disc  Diabetic Neuropathy  Anemia: CKD  Hypothyroidism  Hypertension: NOT ANY MORE  Hyperlipidemia  S/P mitral valve repair: with CABG X 4 MAY 2015  S/P CABG (coronary artery bypass graft): x 4, May 2015  History of extraction of renal calculus  S/P Foot Surgery: 2002, 2014  S/P Cholecystectomy: 2001  S/P Breast Lumpectomy: 1994, 2003 - left breast benign      REVIEW OF SYSTEMS:  as per hpi    VITALS:  Vital Signs Last 24 Hrs  T(C): 36.3 (11 Apr 2019 18:20), Max: 36.7 (11 Apr 2019 13:45)  T(F): 97.4 (11 Apr 2019 18:20), Max: 98.1 (11 Apr 2019 13:45)  HR: 73 (11 Apr 2019 18:20) (62 - 73)  BP: 101/62 (11 Apr 2019 18:20) (98/54 - 105/60)  BP(mean): --  RR: 18 (11 Apr 2019 18:20) (17 - 18)  SpO2: 95% (11 Apr 2019 18:20) (95% - 99%)      PHYSICAL EXAM:  GENERAL: NAD, well-developed  HEAD:  Atraumatic, Normocephalic  EYES: EOMI, PERRLA, conjunctiva and sclera clear  NECK: Supple,    CHEST/LUNG: dec bs b/l  HEART: S1, S2;   ABDOMEN: Soft, Nontender, mild distended; Bowel sounds present  EXTREMITIES:  2+ Peripheral Pulses, No clubbing, cyanosis, or edema,  right knee cast  PSYCH: Normal affect  Neuro: Aox3    Consultant(s) Notes Reviewed:  [x ] YES  [ ] NO  Care Discussed with Consultants/Other Providers [ x] YES  [ ] NO    MEDS:  MEDICATIONS  (STANDING):  aspirin enteric coated 81 milliGRAM(s) Oral daily  atorvastatin 80 milliGRAM(s) Oral at bedtime  ceFAZolin   IVPB 1000 milliGRAM(s) IV Intermittent every 24 hours  dextrose 5%. 1000 milliLiter(s) (50 mL/Hr) IV Continuous <Continuous>  dextrose 50% Injectable 12.5 Gram(s) IV Push once  dextrose 50% Injectable 25 Gram(s) IV Push once  dextrose 50% Injectable 25 Gram(s) IV Push once  insulin lispro (HumaLOG) corrective regimen sliding scale   SubCutaneous every 6 hours  levothyroxine 150 MICROGram(s) Oral daily  midodrine 10 milliGRAM(s) Oral three times a day  Nephro-svetlana 1 Tablet(s) Oral daily  pantoprazole  Injectable 40 milliGRAM(s) IV Push daily    MEDICATIONS  (PRN):  acetaminophen   Tablet .. 650 milliGRAM(s) Oral every 6 hours PRN Moderate Pain (4 - 6)  dextrose 40% Gel 15 Gram(s) Oral once PRN Blood Glucose LESS THAN 70 milliGRAM(s)/deciliter  glucagon  Injectable 1 milliGRAM(s) IntraMuscular once PRN Glucose LESS THAN 70 milligrams/deciliter  lidocaine/prilocaine Cream 1 Application(s) Topical daily PRN hd days  ondansetron Injectable 4 milliGRAM(s) IV Push every 12 hours PRN Nausea and/or Vomiting  oxyCODONE    IR 10 milliGRAM(s) Oral every 6 hours PRN Severe Pain (7 - 10)      ALLERGIES:  allopurinol (Rash)  Augmentin (Rash)  Levaquin (Rash)      LABS:                                       8.8    11.07 )-----------( 200      ( 11 Apr 2019 08:09 )             26.7   04-11    133<L>  |  91<L>  |  34<H>  ----------------------------<  128<H>  4.0   |  25  |  4.43<H>    Ca    8.4      11 Apr 2019 07:08  Phos  2.5     04-10  Mg     1.6     04-10        < from: CT Knee No Cont, Right (04.10.19 @ 03:34) >  Impression:    Acute, impacted fractures of the right proximal tibia and fibula as   described.      < end of copied text >           < from: Xray Tibia + Fibula 2 Views, Right (04.09.19 @ 20:28) >  impression:    There is an acute, comminuted fracture of the proximal tibial metaphysis   with mild anterior displacement of the distal fracture fragment. There is   questionable intra-articular extension on the lateral view. There is a   large knee joint effusion. There is a proximal fibular fracture.    There is no fracture of the hip or femur. The ankle is poorly visualized   secondary to technique and may be externally rotated with respect to the   knee. There is no ankle joint effusion. Vascular calcifications are noted.     CT of the knee can be performed for further evaluation.    < end of copied text >

## 2019-04-12 ENCOUNTER — RESULT REVIEW (OUTPATIENT)
Age: 79
End: 2019-04-12

## 2019-04-12 LAB
ALBUMIN FLD-MCNC: 2.9 G/DL — SIGNIFICANT CHANGE UP
ALBUMIN SERPL ELPH-MCNC: 3.3 G/DL — SIGNIFICANT CHANGE UP (ref 3.3–5)
ALP SERPL-CCNC: 175 U/L — HIGH (ref 40–120)
ALT FLD-CCNC: 88 U/L — HIGH (ref 10–45)
AMMONIA BLD-MCNC: 34 UMOL/L — SIGNIFICANT CHANGE UP (ref 11–55)
ANION GAP SERPL CALC-SCNC: 21 MMOL/L — HIGH (ref 5–17)
APTT BLD: 32.8 SEC — SIGNIFICANT CHANGE UP (ref 27.5–36.3)
AST SERPL-CCNC: 217 U/L — HIGH (ref 10–40)
B PERT IGG+IGM PNL SER: CLEAR — SIGNIFICANT CHANGE UP
BILIRUB SERPL-MCNC: 0.6 MG/DL — SIGNIFICANT CHANGE UP (ref 0.2–1.2)
BLD GP AB SCN SERPL QL: NEGATIVE — SIGNIFICANT CHANGE UP
BUN SERPL-MCNC: 20 MG/DL — SIGNIFICANT CHANGE UP (ref 7–23)
CALCIUM SERPL-MCNC: 8.5 MG/DL — SIGNIFICANT CHANGE UP (ref 8.4–10.5)
CHLORIDE SERPL-SCNC: 93 MMOL/L — LOW (ref 96–108)
CHOLEST SERPL-MCNC: 63 MG/DL — SIGNIFICANT CHANGE UP (ref 10–199)
CK MB BLD-MCNC: 1.6 % — SIGNIFICANT CHANGE UP (ref 0–3.5)
CK MB CFR SERPL CALC: 5.1 NG/ML — HIGH (ref 0–3.8)
CK SERPL-CCNC: 313 U/L — HIGH (ref 25–170)
CO2 SERPL-SCNC: 18 MMOL/L — LOW (ref 22–31)
COLOR FLD: YELLOW — SIGNIFICANT CHANGE UP
CORTIS AM PEAK SERPL-MCNC: 26.8 UG/DL — HIGH (ref 6–18.4)
CREAT SERPL-MCNC: 2.72 MG/DL — HIGH (ref 0.5–1.3)
FLUID INTAKE SUBSTANCE CLASS: SIGNIFICANT CHANGE UP
FLUID SEGMENTED GRANULOCYTES: 50 % — SIGNIFICANT CHANGE UP
GAS PNL BLDA: SIGNIFICANT CHANGE UP
GLUCOSE BLDC GLUCOMTR-MCNC: 111 MG/DL — HIGH (ref 70–99)
GLUCOSE BLDC GLUCOMTR-MCNC: 136 MG/DL — HIGH (ref 70–99)
GLUCOSE BLDC GLUCOMTR-MCNC: 159 MG/DL — HIGH (ref 70–99)
GLUCOSE BLDC GLUCOMTR-MCNC: 98 MG/DL — SIGNIFICANT CHANGE UP (ref 70–99)
GLUCOSE FLD-MCNC: 125 MG/DL — SIGNIFICANT CHANGE UP
GLUCOSE SERPL-MCNC: 167 MG/DL — HIGH (ref 70–99)
GRAM STN FLD: SIGNIFICANT CHANGE UP
HBA1C BLD-MCNC: 8.3 % — HIGH (ref 4–5.6)
HCT VFR BLD CALC: 30.6 % — LOW (ref 34.5–45)
HDLC SERPL-MCNC: 12 MG/DL — LOW
HGB BLD-MCNC: 10 G/DL — LOW (ref 11.5–15.5)
INR BLD: 1.15 RATIO — SIGNIFICANT CHANGE UP (ref 0.88–1.16)
LDH SERPL L TO P-CCNC: 91 U/L — SIGNIFICANT CHANGE UP
LIPID PNL WITH DIRECT LDL SERPL: 23 MG/DL — SIGNIFICANT CHANGE UP
LYMPHOCYTES # FLD: 6 % — SIGNIFICANT CHANGE UP
MAGNESIUM SERPL-MCNC: 1.7 MG/DL — SIGNIFICANT CHANGE UP (ref 1.6–2.6)
MCHC RBC-ENTMCNC: 31.5 PG — SIGNIFICANT CHANGE UP (ref 27–34)
MCHC RBC-ENTMCNC: 32.7 GM/DL — SIGNIFICANT CHANGE UP (ref 32–36)
MCV RBC AUTO: 96.3 FL — SIGNIFICANT CHANGE UP (ref 80–100)
MONOS+MACROS # FLD: 42 % — SIGNIFICANT CHANGE UP
OTHER CELLS FLD MANUAL: 2 % — SIGNIFICANT CHANGE UP
PHOSPHATE SERPL-MCNC: 2.8 MG/DL — SIGNIFICANT CHANGE UP (ref 2.5–4.5)
PLATELET # BLD AUTO: 224 K/UL — SIGNIFICANT CHANGE UP (ref 150–400)
POTASSIUM SERPL-MCNC: 3.6 MMOL/L — SIGNIFICANT CHANGE UP (ref 3.5–5.3)
POTASSIUM SERPL-SCNC: 3.6 MMOL/L — SIGNIFICANT CHANGE UP (ref 3.5–5.3)
PROT FLD-MCNC: 5.5 G/DL — SIGNIFICANT CHANGE UP
PROT SERPL-MCNC: 7.6 G/DL — SIGNIFICANT CHANGE UP (ref 6–8.3)
PROTHROM AB SERPL-ACNC: 13.2 SEC — HIGH (ref 10–12.9)
RBC # BLD: 3.18 M/UL — LOW (ref 3.8–5.2)
RBC # FLD: 14.8 % — HIGH (ref 10.3–14.5)
RCV VOL RI: 14 /UL — HIGH (ref 0–5)
RH IG SCN BLD-IMP: POSITIVE — SIGNIFICANT CHANGE UP
SODIUM SERPL-SCNC: 132 MMOL/L — LOW (ref 135–145)
SPECIMEN SOURCE: SIGNIFICANT CHANGE UP
T4 FREE SERPL-MCNC: 1.1 NG/DL — SIGNIFICANT CHANGE UP (ref 0.9–1.8)
TOTAL CHOLESTEROL/HDL RATIO MEASUREMENT: 5.3 RATIO — SIGNIFICANT CHANGE UP (ref 3.3–7.1)
TOTAL NUCLEATED CELL COUNT, BODY FLUID: 18 /UL — HIGH (ref 0–5)
TRIGL SERPL-MCNC: 138 MG/DL — SIGNIFICANT CHANGE UP (ref 10–149)
TROPONIN T, HIGH SENSITIVITY RESULT: 247 NG/L — HIGH (ref 0–51)
TSH SERPL-MCNC: 34.7 UIU/ML — HIGH (ref 0.27–4.2)
TUBE TYPE: SIGNIFICANT CHANGE UP
WBC # BLD: 13 K/UL — HIGH (ref 3.8–10.5)
WBC # FLD AUTO: 13 K/UL — HIGH (ref 3.8–10.5)

## 2019-04-12 PROCEDURE — 93010 ELECTROCARDIOGRAM REPORT: CPT

## 2019-04-12 PROCEDURE — 36556 INSERT NON-TUNNEL CV CATH: CPT

## 2019-04-12 PROCEDURE — 88112 CYTOPATH CELL ENHANCE TECH: CPT | Mod: 26

## 2019-04-12 PROCEDURE — 71045 X-RAY EXAM CHEST 1 VIEW: CPT | Mod: 26

## 2019-04-12 PROCEDURE — 31500 INSERT EMERGENCY AIRWAY: CPT

## 2019-04-12 PROCEDURE — 76604 US EXAM CHEST: CPT | Mod: 26,GC,59

## 2019-04-12 PROCEDURE — 99292 CRITICAL CARE ADDL 30 MIN: CPT | Mod: 25

## 2019-04-12 PROCEDURE — 76705 ECHO EXAM OF ABDOMEN: CPT | Mod: 26,GC,59

## 2019-04-12 PROCEDURE — 76705 ECHO EXAM OF ABDOMEN: CPT | Mod: 26,RT

## 2019-04-12 PROCEDURE — 93306 TTE W/DOPPLER COMPLETE: CPT | Mod: 26

## 2019-04-12 PROCEDURE — 99291 CRITICAL CARE FIRST HOUR: CPT | Mod: 25

## 2019-04-12 PROCEDURE — 92960 CARDIOVERSION ELECTRIC EXT: CPT | Mod: 59

## 2019-04-12 PROCEDURE — 88305 TISSUE EXAM BY PATHOLOGIST: CPT | Mod: 26

## 2019-04-12 PROCEDURE — 76604 US EXAM CHEST: CPT | Mod: 26

## 2019-04-12 PROCEDURE — 93308 TTE F-UP OR LMTD: CPT | Mod: 26,GC,59

## 2019-04-12 RX ORDER — ALBUMIN HUMAN 25 %
50 VIAL (ML) INTRAVENOUS
Qty: 0 | Refills: 0 | Status: DISCONTINUED | OUTPATIENT
Start: 2019-04-12 | End: 2019-04-12

## 2019-04-12 RX ORDER — FENTANYL CITRATE 50 UG/ML
50 INJECTION INTRAVENOUS ONCE
Qty: 0 | Refills: 0 | Status: DISCONTINUED | OUTPATIENT
Start: 2019-04-12 | End: 2019-04-12

## 2019-04-12 RX ORDER — HEPARIN SODIUM 5000 [USP'U]/ML
5000 INJECTION INTRAVENOUS; SUBCUTANEOUS EVERY 8 HOURS
Qty: 0 | Refills: 0 | Status: DISCONTINUED | OUTPATIENT
Start: 2019-04-12 | End: 2019-04-23

## 2019-04-12 RX ORDER — NOREPINEPHRINE BITARTRATE/D5W 8 MG/250ML
0.05 PLASTIC BAG, INJECTION (ML) INTRAVENOUS
Qty: 8 | Refills: 0 | Status: DISCONTINUED | OUTPATIENT
Start: 2019-04-12 | End: 2019-04-12

## 2019-04-12 RX ORDER — ALBUMIN HUMAN 25 %
50 VIAL (ML) INTRAVENOUS ONCE
Qty: 0 | Refills: 0 | Status: COMPLETED | OUTPATIENT
Start: 2019-04-12 | End: 2019-04-12

## 2019-04-12 RX ORDER — CALCIUM GLUCONATE 100 MG/ML
2 VIAL (ML) INTRAVENOUS ONCE
Qty: 0 | Refills: 0 | Status: COMPLETED | OUTPATIENT
Start: 2019-04-12 | End: 2019-04-12

## 2019-04-12 RX ORDER — ACETAMINOPHEN 500 MG
1000 TABLET ORAL ONCE
Qty: 0 | Refills: 0 | Status: COMPLETED | OUTPATIENT
Start: 2019-04-12 | End: 2019-04-12

## 2019-04-12 RX ORDER — NOREPINEPHRINE BITARTRATE/D5W 8 MG/250ML
0.05 PLASTIC BAG, INJECTION (ML) INTRAVENOUS
Qty: 16 | Refills: 0 | Status: DISCONTINUED | OUTPATIENT
Start: 2019-04-12 | End: 2019-04-12

## 2019-04-12 RX ORDER — CHLORHEXIDINE GLUCONATE 213 G/1000ML
1 SOLUTION TOPICAL
Qty: 0 | Refills: 0 | Status: DISCONTINUED | OUTPATIENT
Start: 2019-04-12 | End: 2019-04-23

## 2019-04-12 RX ORDER — FENTANYL CITRATE 50 UG/ML
100 INJECTION INTRAVENOUS ONCE
Qty: 0 | Refills: 0 | Status: DISCONTINUED | OUTPATIENT
Start: 2019-04-12 | End: 2019-04-12

## 2019-04-12 RX ORDER — ASPIRIN/CALCIUM CARB/MAGNESIUM 324 MG
81 TABLET ORAL DAILY
Qty: 0 | Refills: 0 | Status: DISCONTINUED | OUTPATIENT
Start: 2019-04-12 | End: 2019-04-12

## 2019-04-12 RX ORDER — CHLORHEXIDINE GLUCONATE 213 G/1000ML
1 SOLUTION TOPICAL DAILY
Qty: 0 | Refills: 0 | Status: DISCONTINUED | OUTPATIENT
Start: 2019-04-12 | End: 2019-04-23

## 2019-04-12 RX ORDER — ASPIRIN/CALCIUM CARB/MAGNESIUM 324 MG
81 TABLET ORAL DAILY
Qty: 0 | Refills: 0 | Status: DISCONTINUED | OUTPATIENT
Start: 2019-04-12 | End: 2019-04-23

## 2019-04-12 RX ORDER — ALBUMIN HUMAN 25 %
100 VIAL (ML) INTRAVENOUS ONCE
Qty: 0 | Refills: 0 | Status: DISCONTINUED | OUTPATIENT
Start: 2019-04-12 | End: 2019-04-12

## 2019-04-12 RX ORDER — KETAMINE HYDROCHLORIDE 100 MG/ML
7 INJECTION INTRAMUSCULAR; INTRAVENOUS ONCE
Qty: 0 | Refills: 0 | Status: DISCONTINUED | OUTPATIENT
Start: 2019-04-12 | End: 2019-04-12

## 2019-04-12 RX ORDER — MIDAZOLAM HYDROCHLORIDE 1 MG/ML
4 INJECTION, SOLUTION INTRAMUSCULAR; INTRAVENOUS ONCE
Qty: 0 | Refills: 0 | Status: DISCONTINUED | OUTPATIENT
Start: 2019-04-12 | End: 2019-04-12

## 2019-04-12 RX ORDER — PHENYLEPHRINE HYDROCHLORIDE 10 MG/ML
1 INJECTION INTRAVENOUS
Qty: 160 | Refills: 0 | Status: DISCONTINUED | OUTPATIENT
Start: 2019-04-12 | End: 2019-04-12

## 2019-04-12 RX ORDER — DEXMEDETOMIDINE HYDROCHLORIDE IN 0.9% SODIUM CHLORIDE 4 UG/ML
0.2 INJECTION INTRAVENOUS
Qty: 200 | Refills: 0 | Status: DISCONTINUED | OUTPATIENT
Start: 2019-04-12 | End: 2019-04-18

## 2019-04-12 RX ORDER — PHENYLEPHRINE HYDROCHLORIDE 10 MG/ML
1 INJECTION INTRAVENOUS
Qty: 40 | Refills: 0 | Status: DISCONTINUED | OUTPATIENT
Start: 2019-04-12 | End: 2019-04-16

## 2019-04-12 RX ADMIN — MIDAZOLAM HYDROCHLORIDE 4 MILLIGRAM(S): 1 INJECTION, SOLUTION INTRAMUSCULAR; INTRAVENOUS at 23:10

## 2019-04-12 RX ADMIN — FENTANYL CITRATE 100 MICROGRAM(S): 50 INJECTION INTRAVENOUS at 03:06

## 2019-04-12 RX ADMIN — MIDODRINE HYDROCHLORIDE 10 MILLIGRAM(S): 2.5 TABLET ORAL at 05:27

## 2019-04-12 RX ADMIN — Medication 75 MICROGRAM(S): at 21:34

## 2019-04-12 RX ADMIN — FENTANYL CITRATE 50 MICROGRAM(S): 50 INJECTION INTRAVENOUS at 11:45

## 2019-04-12 RX ADMIN — Medication 50 MILLILITER(S): at 18:10

## 2019-04-12 RX ADMIN — Medication 1: at 02:46

## 2019-04-12 RX ADMIN — MIDODRINE HYDROCHLORIDE 10 MILLIGRAM(S): 2.5 TABLET ORAL at 21:31

## 2019-04-12 RX ADMIN — PHENYLEPHRINE HYDROCHLORIDE 25.61 MICROGRAM(S)/KG/MIN: 10 INJECTION INTRAVENOUS at 03:07

## 2019-04-12 RX ADMIN — Medication 50 MILLILITER(S): at 18:33

## 2019-04-12 RX ADMIN — PHENYLEPHRINE HYDROCHLORIDE 25.61 MICROGRAM(S)/KG/MIN: 10 INJECTION INTRAVENOUS at 20:31

## 2019-04-12 RX ADMIN — Medication 50 MILLILITER(S): at 20:30

## 2019-04-12 RX ADMIN — Medication 200 GRAM(S): at 02:47

## 2019-04-12 RX ADMIN — Medication 100 MILLIGRAM(S): at 04:08

## 2019-04-12 RX ADMIN — Medication 81 MILLIGRAM(S): at 13:01

## 2019-04-12 RX ADMIN — FENTANYL CITRATE 100 MICROGRAM(S): 50 INJECTION INTRAVENOUS at 17:55

## 2019-04-12 RX ADMIN — CHLORHEXIDINE GLUCONATE 1 APPLICATION(S): 213 SOLUTION TOPICAL at 11:56

## 2019-04-12 RX ADMIN — Medication 1: at 05:27

## 2019-04-12 RX ADMIN — PANTOPRAZOLE SODIUM 40 MILLIGRAM(S): 20 TABLET, DELAYED RELEASE ORAL at 05:27

## 2019-04-12 RX ADMIN — FENTANYL CITRATE 100 MICROGRAM(S): 50 INJECTION INTRAVENOUS at 18:19

## 2019-04-12 RX ADMIN — Medication 150 MILLILITER(S): at 19:35

## 2019-04-12 RX ADMIN — FENTANYL CITRATE 50 MICROGRAM(S): 50 INJECTION INTRAVENOUS at 14:00

## 2019-04-12 RX ADMIN — FENTANYL CITRATE 50 MICROGRAM(S): 50 INJECTION INTRAVENOUS at 11:30

## 2019-04-12 RX ADMIN — PANTOPRAZOLE SODIUM 40 MILLIGRAM(S): 20 TABLET, DELAYED RELEASE ORAL at 18:19

## 2019-04-12 RX ADMIN — Medication 1000 MILLIGRAM(S): at 22:00

## 2019-04-12 RX ADMIN — DEXMEDETOMIDINE HYDROCHLORIDE IN 0.9% SODIUM CHLORIDE 3.42 MICROGRAM(S)/KG/HR: 4 INJECTION INTRAVENOUS at 20:31

## 2019-04-12 RX ADMIN — Medication 150 MILLILITER(S): at 18:58

## 2019-04-12 RX ADMIN — HEPARIN SODIUM 5000 UNIT(S): 5000 INJECTION INTRAVENOUS; SUBCUTANEOUS at 21:32

## 2019-04-12 RX ADMIN — Medication 400 MILLIGRAM(S): at 21:33

## 2019-04-12 RX ADMIN — KETAMINE HYDROCHLORIDE 7 MILLIGRAM(S): 100 INJECTION INTRAMUSCULAR; INTRAVENOUS at 03:05

## 2019-04-12 RX ADMIN — FENTANYL CITRATE 100 MICROGRAM(S): 50 INJECTION INTRAVENOUS at 03:26

## 2019-04-12 RX ADMIN — CHLORHEXIDINE GLUCONATE 1 APPLICATION(S): 213 SOLUTION TOPICAL at 06:21

## 2019-04-12 RX ADMIN — MIDODRINE HYDROCHLORIDE 10 MILLIGRAM(S): 2.5 TABLET ORAL at 13:01

## 2019-04-12 NOTE — PROCEDURE NOTE - NSINFORMCONSENT_GEN_A_CORE
This was an emergent procedure. Benefits, risks, and possible complications of procedure explained to patient/caregiver who verbalized understanding and gave verbal consent.

## 2019-04-12 NOTE — PROGRESS NOTE ADULT - SUBJECTIVE AND OBJECTIVE BOX
Follow-up Pulm Progress Note  Shin Weeks MD  [phone]    Events noted  Intubated with lethargy and acute hypercapnea - likely secondary to opiates  Currently intubated on VC in MICU  Hemodynamically stable on BENNY    Vital Signs Last 24 Hrs  T(C): 37.5 (12 Apr 2019 12:00), Max: 37.7 (12 Apr 2019 08:00)  T(F): 99.5 (12 Apr 2019 12:00), Max: 99.9 (12 Apr 2019 08:00)  HR: 57 (12 Apr 2019 12:30) (48 - 162)  BP: 101/50 (12 Apr 2019 12:15) (82/43 - 167/72)  BP(mean): 72 (12 Apr 2019 12:15) (60 - 111)  RR: 29 (12 Apr 2019 12:30) (15 - 47)  SpO2: 100% (12 Apr 2019 12:30) (85% - 100%)    ABG - ( 12 Apr 2019 06:40 )  pH, Arterial: 7.55  pH, Blood: x     /  pCO2: 26    /  pO2: 128   / HCO3: 23    / Base Excess: 1.1   /  SaO2: 98                            10.0   13.0  )-----------( 224      ( 12 Apr 2019 01:15 )             30.6       04-12    132<L>  |  93<L>  |  20  ----------------------------<  167<H>  3.6   |  18<L>  |  2.72<H>    Ca    8.5      12 Apr 2019 01:15  Phos  2.8     04-12  Mg     1.7     04-12    TPro  7.6  /  Alb  3.3  /  TBili  0.6  /  DBili  x   /  AST  217<H>  /  ALT  88<H>  /  AlkPhos  175<H>  04-12      Physical Examination:  PULM: No sign wheeze or rhonchi  CVS: Regular rate and rhythm, no murmurs, rubs, or gallops  ABD: Soft, non-tender  EXT:  No clubbing, cyanosis, or edema    RADIOLOGY REVIEWED  CXR:    CT chest:    TTE:    PROCEDURE: Transthoracic echocardiogram with 2-D, M-Mode  and complete spectral and color flowDoppler. Verbal  consent was obtained for injection of echo contrast  following a discussion of risks and benefits. Following  intravenous injection of contrast, harmonic imaging was  performed.  INDICATION: Atherosclerotic heart disease of native  coronary artery with unstable angina pectoris (I25.110),  Other nonrheumatic mitral valve disorders (I34.8)  ------------------------------------------------------------------------  Dimensions:    Normal Values:  LA:     3.7    2.0 - 4.0 cm  Ao:     3.2    2.0 - 3.8 cm  SEPTUM: 1.0    0.6 - 1.2 cm  PWT:    1.0    0.6 - 1.1 cm  LVIDd:  5.3    3.0 - 5.6 cm  LVIDs:  4.2    1.8 - 4.0 cm  EF (Visual Estimate): 30-35 %  Doppler Peak Velocity (m/sec): AoV=1.0  ------------------------------------------------------------------------  Observations:  Mitral Valve: An annuloplasty ring is seen in the mitral  position.  Thickened mitral valve leaflets. Mild-moderate  mitral regurgitation. Mean transmitral valve gradient  equals 5 mm Hg.  Aortic Valve/Aorta: Tricuspid aortic valve with normal cusp  excursion. No aortic valve regurgitation seen.  Normal aortic root dimension.  Left Atrium: Mildly dilated left atrium.  LA volume index =  31 cc/m2.  Left Ventricle: Endocardial visualization enhanced with  intravenous injection of echo contrast (Definity).  Endocardium not well visualized in all myocardial segments  despite administration of echo contrast.  Severely reduced  overall left ventricular systolic function with an  estimated ejection fraction of 30-35%.  The basal and mid  inferior segments and mid inferoseptal and anteroseptal  segments are hypokinetic. Normal left ventricular internal  dimensions and wall thicknesses.  Right Heart: Normal right atrial size. Normal right  ventricular size with decreased right ventricular systolic  function. Minimal tricuspid regurgitation.  Pericardium/Pleura: Trace pericardial effusion.  Bilateral pleural effusions.  Hemodynamic: Estimated right atrial pressure is 8 mm Hg.  ------------------------------------------------------------------------  Conclusions:  1. An annuloplasty ring is seen in the mitral position.  Thickened mitral valve leaflets. Mild-moderate mitral  regurgitation. Mean transmitral valve gradient equals 5 mm  Hg  2. Mildly dilated left atrium.  LA volume index = 31 cc/m2.  3. Normal left ventricular internal dimensions and wall  thicknesses.  4. Endocardial visualization enhanced with intravenous  injection of echo contrast (Definity).  Endocardium not  well visualized in all myocardial segments despite  administration of echo contrast.  Severely reduced overall  left ventricular systolic function with an estimated  ejection fraction of 30-35%.  The basal and mid inferior  segments and mid inferoseptal and anteroseptal segments are  hypokinetic.  5. Normal right ventricular size with decreased right  ventricular systolic function.  6. Trace pericardial effusion.  7. Bilateral pleural effusions.

## 2019-04-12 NOTE — PROGRESS NOTE ADULT - SUBJECTIVE AND OBJECTIVE BOX
INTERVAL HPI/OVERNIGHT EVENTS: events noted    required emergent intubation overnight for respiratory distress  now intubated/sedated in MICU    MEDICATIONS  (STANDING):  albumin human 25% IVPB 100 milliLiter(s) IV Intermittent once  albumin human 25% IVPB 100 milliLiter(s) IV Intermittent once  aspirin  chewable 81 milliGRAM(s) Oral daily  ceFAZolin   IVPB 1000 milliGRAM(s) IV Intermittent every 24 hours  chlorhexidine 2% Cloths 1 Application(s) Topical daily  chlorhexidine 4% Liquid 1 Application(s) Topical <User Schedule>  dexmedetomidine Infusion 0.2 MICROgram(s)/kG/Hr (3.415 mL/Hr) IV Continuous <Continuous>  dextrose 5%. 1000 milliLiter(s) (50 mL/Hr) IV Continuous <Continuous>  dextrose 50% Injectable 12.5 Gram(s) IV Push once  dextrose 50% Injectable 25 Gram(s) IV Push once  dextrose 50% Injectable 25 Gram(s) IV Push once  heparin  Injectable 5000 Unit(s) SubCutaneous every 8 hours  insulin lispro (HumaLOG) corrective regimen sliding scale   SubCutaneous every 6 hours  levothyroxine Injectable 75 MICROGram(s) IV Push at bedtime  midodrine 10 milliGRAM(s) Oral three times a day  norepinephrine Infusion 0.05 MICROgram(s)/kG/Min (3.202 mL/Hr) IV Continuous <Continuous>  norepinephrine Infusion 0.05 MICROgram(s)/kG/Min (6.403 mL/Hr) IV Continuous <Continuous>  pantoprazole  Injectable 40 milliGRAM(s) IV Push every 12 hours  phenylephrine    Infusion 1 MICROgram(s)/kG/Min (25.613 mL/Hr) IV Continuous <Continuous>  phenylephrine    Infusion 1 MICROgram(s)/kG/Min (12.806 mL/Hr) IV Continuous <Continuous>    MEDICATIONS  (PRN):  dextrose 40% Gel 15 Gram(s) Oral once PRN Blood Glucose LESS THAN 70 milliGRAM(s)/deciliter  glucagon  Injectable 1 milliGRAM(s) IntraMuscular once PRN Glucose LESS THAN 70 milligrams/deciliter  lidocaine/prilocaine Cream 1 Application(s) Topical daily PRN hd days  ondansetron Injectable 4 milliGRAM(s) IV Push every 12 hours PRN Nausea and/or Vomiting      Allergies    allopurinol (Rash)  Augmentin (Rash)  Levaquin (Rash)    Intolerances    Review of Systems: unable to obtain    Vital Signs Last 24 Hrs  T(C): 37.5 (12 Apr 2019 12:00), Max: 37.7 (12 Apr 2019 08:00)  T(F): 99.5 (12 Apr 2019 12:00), Max: 99.9 (12 Apr 2019 08:00)  HR: 62 (12 Apr 2019 13:30) (48 - 162)  BP: 108/53 (12 Apr 2019 13:30) (82/43 - 167/72)  BP(mean): 77 (12 Apr 2019 13:30) (60 - 111)  RR: 21 (12 Apr 2019 13:30) (15 - 47)  SpO2: 100% (12 Apr 2019 13:30) (85% - 100%)    PHYSICAL EXAM:    Constitutional: intubated/sedated  HEENT: EOMI, throat clear  Neck: No LAD, supple  Respiratory: intubated   Cardiovascular: S1 and S2, RRR, no M  Gastrointestinal: BS+, soft, NT/ND, neg HSM,  Extremities: No peripheral edema, neg clubing, cyanosis  Vascular: 2+ peripheral pulses  Neurological: sedated  Skin: No rashes      LABS:                        10.0   13.0  )-----------( 224      ( 12 Apr 2019 01:15 )             30.6     04-12    132<L>  |  93<L>  |  20  ----------------------------<  167<H>  3.6   |  18<L>  |  2.72<H>    Ca    8.5      12 Apr 2019 01:15  Phos  2.8     04-12  Mg     1.7     04-12    TPro  7.6  /  Alb  3.3  /  TBili  0.6  /  DBili  x   /  AST  217<H>  /  ALT  88<H>  /  AlkPhos  175<H>  04-12    PT/INR - ( 12 Apr 2019 01:15 )   PT: 13.2 sec;   INR: 1.15 ratio         PTT - ( 12 Apr 2019 01:15 )  PTT:32.8 sec      RADIOLOGY & ADDITIONAL TESTS:

## 2019-04-12 NOTE — PROGRESS NOTE ADULT - SUBJECTIVE AND OBJECTIVE BOX
CHIEF COMPLAINT: Patient is a 78y old  Female who presents with a chief complaint of R tib/fib fx (11 Apr 2019 20:07)    Interval Events:    REVIEW OF SYSTEMS:  Constitutional:   Eyes:  ENT:  CV:  Resp:  GI:  :  MSK:  Integumentary:  Neurological:  Psychiatric:  Endocrine:  Hematologic/Lymphatic:  Allergic/Immunologic:  [ ] All other systems negative  [ ] Unable to assess ROS because ________    OBJECTIVE:  ICU Vital Signs Last 24 Hrs  T(C): 36.1 (12 Apr 2019 04:00), Max: 36.7 (11 Apr 2019 13:45)  T(F): 97 (12 Apr 2019 04:00), Max: 98.1 (11 Apr 2019 13:45)  HR: 50 (12 Apr 2019 07:00) (48 - 162)  BP: 90/55 (12 Apr 2019 07:00) (85/49 - 167/72)  BP(mean): 70 (12 Apr 2019 07:00) (65 - 111)  ABP: --  ABP(mean): --  RR: 21 (12 Apr 2019 07:00) (16 - 47)  SpO2: 100% (12 Apr 2019 07:00) (85% - 100%)    Mode: AC/ CMV (Assist Control/ Continuous Mandatory Ventilation), RR (machine): 12, TV (machine): 450, FiO2: 40, PEEP: 5, ITime: 1, PIP: 30    04-11 @ 07:01  -  04-12 @ 07:00  --------------------------------------------------------  IN: 963.3 mL / OUT: 1600 mL / NET: -636.7 mL      CAPILLARY BLOOD GLUCOSE      POCT Blood Glucose.: 159 mg/dL (12 Apr 2019 05:17)      PHYSICAL EXAM:  General:   HEENT:   Lymph Nodes:  Neck:   Respiratory:   Cardiovascular:   Abdomen:   Extremities:   Skin:   Neurological:  Psychiatry:    HOSPITAL MEDICATIONS:  MEDICATIONS  (STANDING):  aspirin  chewable 81 milliGRAM(s) Oral daily  ceFAZolin   IVPB 1000 milliGRAM(s) IV Intermittent every 24 hours  chlorhexidine 2% Cloths 1 Application(s) Topical daily  chlorhexidine 4% Liquid 1 Application(s) Topical <User Schedule>  dexmedetomidine Infusion 0.2 MICROgram(s)/kG/Hr (3.415 mL/Hr) IV Continuous <Continuous>  dextrose 5%. 1000 milliLiter(s) (50 mL/Hr) IV Continuous <Continuous>  dextrose 50% Injectable 12.5 Gram(s) IV Push once  dextrose 50% Injectable 25 Gram(s) IV Push once  dextrose 50% Injectable 25 Gram(s) IV Push once  insulin lispro (HumaLOG) corrective regimen sliding scale   SubCutaneous every 6 hours  levothyroxine Injectable 75 MICROGram(s) IV Push at bedtime  midodrine 10 milliGRAM(s) Oral three times a day  norepinephrine Infusion 0.05 MICROgram(s)/kG/Min (3.202 mL/Hr) IV Continuous <Continuous>  norepinephrine Infusion 0.05 MICROgram(s)/kG/Min (6.403 mL/Hr) IV Continuous <Continuous>  pantoprazole  Injectable 40 milliGRAM(s) IV Push every 12 hours  phenylephrine    Infusion 1 MICROgram(s)/kG/Min (25.613 mL/Hr) IV Continuous <Continuous>  phenylephrine    Infusion 1 MICROgram(s)/kG/Min (12.806 mL/Hr) IV Continuous <Continuous>    MEDICATIONS  (PRN):  dextrose 40% Gel 15 Gram(s) Oral once PRN Blood Glucose LESS THAN 70 milliGRAM(s)/deciliter  glucagon  Injectable 1 milliGRAM(s) IntraMuscular once PRN Glucose LESS THAN 70 milligrams/deciliter  lidocaine/prilocaine Cream 1 Application(s) Topical daily PRN hd days  ondansetron Injectable 4 milliGRAM(s) IV Push every 12 hours PRN Nausea and/or Vomiting      LABS:  (04-12 @ 01:15)                        10.0  13.0 )-----------( 224                 30.6    Neutrophils = -- (--%)  Lymphocytes = -- (--%)  Eosinophils = -- (--%)  Basophils = -- (--%)  Monocytes = -- (--%)  Bands = --%    WBC Trend: 13.0<--, 10.7<--, 11.07<--  Hb Trend: 10.0<--, 9.4<--, 8.8<--, 10.0<--, 11.4<--  Plt Trend: 224<--, 203<--, 200<--, 190<--, 205<--  04-12    132<L>  |  93<L>  |  20  ----------------------------<  167<H>  3.6   |  18<L>  |  2.72<H>    Ca    8.5      12 Apr 2019 01:15  Phos  2.8     04-12  Mg     1.7     04-12    TPro  7.6  /  Alb  3.3  /  TBili  0.6  /  DBili  x   /  AST  217<H>  /  ALT  88<H>  /  AlkPhos  175<H>  04-12    Creatinine Trend: 2.72<--, 2.45<--, 4.43<--, 3.07<--, 5.23<--  PT/INR - ( 12 Apr 2019 01:15 )   PT: 13.2 sec;   INR: 1.15 ratio         PTT - ( 12 Apr 2019 01:15 )  PTT:32.8 sec    Arterial Blood Gas:  04-12 @ 06:40  7.55/26/128/23/98/1.1  ABG lactate: --  Arterial Blood Gas:  04-12 @ 06:19  7.42/41/<20/26/23/1.6  ABG lactate: --  Arterial Blood Gas:  04-12 @ 02:06  7.52/25/359/20/100/-1.4  ABG lactate: --  Arterial Blood Gas:  04-11 @ 23:55  7.37/38/33/22/64/-2.9  ABG lactate: --  Arterial Blood Gas:  04-11 @ 21:59  7.25/55/219/23/99/-3.5  ABG lactate: --            MICROBIOLOGY:   Blood Cx:  Urine Cx:  Sputum Cx:  Legionella:  RVP:    RADIOLOGY:  X Ray:  CT:  MRI:  Ultrasound:  [ ] Reviewed and interpreted by me    EKG: CHIEF COMPLAINT: Patient is a 78y old  Female who presents with a chief complaint of R tib/fib fx (11 Apr 2019 20:07)    Interval Events: patient intubated overnight for respiratory distress.     REVIEW OF SYSTEMS:  Constitutional:   Eyes:  ENT:  CV:  Resp:  GI:  :  MSK:  Integumentary:  Neurological:  Psychiatric:  Endocrine:  Hematologic/Lymphatic:  Allergic/Immunologic:  [ ] All other systems negative  [x] Unable to assess ROS because patient is intubated    OBJECTIVE:  ICU Vital Signs Last 24 Hrs  T(C): 36.1 (12 Apr 2019 04:00), Max: 36.7 (11 Apr 2019 13:45)  T(F): 97 (12 Apr 2019 04:00), Max: 98.1 (11 Apr 2019 13:45)  HR: 50 (12 Apr 2019 07:00) (48 - 162)  BP: 90/55 (12 Apr 2019 07:00) (85/49 - 167/72)  BP(mean): 70 (12 Apr 2019 07:00) (65 - 111)  ABP: --  ABP(mean): --  RR: 21 (12 Apr 2019 07:00) (16 - 47)  SpO2: 100% (12 Apr 2019 07:00) (85% - 100%)    Mode: AC/ CMV (Assist Control/ Continuous Mandatory Ventilation), RR (machine): 12, TV (machine): 450, FiO2: 40, PEEP: 5, ITime: 1, PIP: 30    04-11 @ 07:01  -  04-12 @ 07:00  --------------------------------------------------------  IN: 963.3 mL / OUT: 1600 mL / NET: -636.7 mL      CAPILLARY BLOOD GLUCOSE      POCT Blood Glucose.: 159 mg/dL (12 Apr 2019 05:17)      PHYSICAL EXAM:  General: intubated, off sedation in no acute distress  HEENT: PERRLA  Neck: Supple   Respiratory: Breath sounds decreased at lung bases, CTA anteriorly.   Cardiovascular: S1, S2, RR  Abdomen: Distended, firm to palpation.   Extremities: RLE with cylinder cast (+)   Skin: warm and dry   Neurological: follows commands while intubated   Psychiatry: Unable to assess     HOSPITAL MEDICATIONS:  MEDICATIONS  (STANDING):  aspirin  chewable 81 milliGRAM(s) Oral daily  ceFAZolin   IVPB 1000 milliGRAM(s) IV Intermittent every 24 hours  chlorhexidine 2% Cloths 1 Application(s) Topical daily  chlorhexidine 4% Liquid 1 Application(s) Topical <User Schedule>  dexmedetomidine Infusion 0.2 MICROgram(s)/kG/Hr (3.415 mL/Hr) IV Continuous <Continuous>  dextrose 5%. 1000 milliLiter(s) (50 mL/Hr) IV Continuous <Continuous>  dextrose 50% Injectable 12.5 Gram(s) IV Push once  dextrose 50% Injectable 25 Gram(s) IV Push once  dextrose 50% Injectable 25 Gram(s) IV Push once  insulin lispro (HumaLOG) corrective regimen sliding scale   SubCutaneous every 6 hours  levothyroxine Injectable 75 MICROGram(s) IV Push at bedtime  midodrine 10 milliGRAM(s) Oral three times a day  norepinephrine Infusion 0.05 MICROgram(s)/kG/Min (3.202 mL/Hr) IV Continuous <Continuous>  norepinephrine Infusion 0.05 MICROgram(s)/kG/Min (6.403 mL/Hr) IV Continuous <Continuous>  pantoprazole  Injectable 40 milliGRAM(s) IV Push every 12 hours  phenylephrine    Infusion 1 MICROgram(s)/kG/Min (25.613 mL/Hr) IV Continuous <Continuous>  phenylephrine    Infusion 1 MICROgram(s)/kG/Min (12.806 mL/Hr) IV Continuous <Continuous>    MEDICATIONS  (PRN):  dextrose 40% Gel 15 Gram(s) Oral once PRN Blood Glucose LESS THAN 70 milliGRAM(s)/deciliter  glucagon  Injectable 1 milliGRAM(s) IntraMuscular once PRN Glucose LESS THAN 70 milligrams/deciliter  lidocaine/prilocaine Cream 1 Application(s) Topical daily PRN hd days  ondansetron Injectable 4 milliGRAM(s) IV Push every 12 hours PRN Nausea and/or Vomiting      LABS:  (04-12 @ 01:15)                        10.0  13.0 )-----------( 224                 30.6    Neutrophils = -- (--%)  Lymphocytes = -- (--%)  Eosinophils = -- (--%)  Basophils = -- (--%)  Monocytes = -- (--%)  Bands = --%    WBC Trend: 13.0<--, 10.7<--, 11.07<--  Hb Trend: 10.0<--, 9.4<--, 8.8<--, 10.0<--, 11.4<--  Plt Trend: 224<--, 203<--, 200<--, 190<--, 205<--  04-12    132<L>  |  93<L>  |  20  ----------------------------<  167<H>  3.6   |  18<L>  |  2.72<H>    Ca    8.5      12 Apr 2019 01:15  Phos  2.8     04-12  Mg     1.7     04-12    TPro  7.6  /  Alb  3.3  /  TBili  0.6  /  DBili  x   /  AST  217<H>  /  ALT  88<H>  /  AlkPhos  175<H>  04-12    Creatinine Trend: 2.72<--, 2.45<--, 4.43<--, 3.07<--, 5.23<--  PT/INR - ( 12 Apr 2019 01:15 )   PT: 13.2 sec;   INR: 1.15 ratio         PTT - ( 12 Apr 2019 01:15 )  PTT:32.8 sec    Arterial Blood Gas:  04-12 @ 06:40  7.55/26/128/23/98/1.1  ABG lactate: --  Arterial Blood Gas:  04-12 @ 06:19  7.42/41/<20/26/23/1.6  ABG lactate: --  Arterial Blood Gas:  04-12 @ 02:06  7.52/25/359/20/100/-1.4  ABG lactate: --  Arterial Blood Gas:  04-11 @ 23:55  7.37/38/33/22/64/-2.9  ABG lactate: --  Arterial Blood Gas:  04-11 @ 21:59  7.25/55/219/23/99/-3.5  ABG lactate: --            MICROBIOLOGY:   Blood Cx:  Urine Cx:  Sputum Cx:  Legionella:  RVP:    RADIOLOGY:  X Ray:  CT:  MRI:  Ultrasound:  [ ] Reviewed and interpreted by me    EKG:

## 2019-04-12 NOTE — PROGRESS NOTE ADULT - ASSESSMENT
Acute hyopercapneic repisratory failure rquiring mechanical intubataion  Tib-Fib fracture s/p mechanical fall  ESRD  ACute on chronic sytolic and diastolic CHF  CAD  Bilateral R>L pleural effusions, ascites due to fluid overload in setting of CHF and ESRD    REC    Plans for therapeutic paracentesis per MICU  Negative balance with HD as tolerated  CMV today: deferr weaning

## 2019-04-12 NOTE — PROGRESS NOTE ADULT - ASSESSMENT
78F with CAD s/p CABG x 4 2015, CHF, T2DM with peripheral neuropathy, ESRD on HD T/Th/S via L AVF, hypotension on midodrine, HFrEF, HTN, HLD, b/l cataract replacement initially admitted 4/9 after mechanical fall and R proximal tib/fib fracture that does not require urgent surgical eval per ortho now with hard cast, course complicated by melena and coffee ground emesis later determined to be chocolate milk without any intervention by GI a/w acute hypercapnic respiratory failure possibly secondary to opoid use given history of improvement with narcan during RRT vs secondary to worsening ascites causing restrictive lung dz. In MICU patient noted to be in SVT and received synchronized cardioversion with conversion to sinus rhythm after second shock. Patient later intubated in MICU due to respiratory failure.     #NEURO:   -AMS secondary to hypercarbic respiratory failure - opioid induced VS restrictive from worsening ascites   -intubated in MICU with improvement in blood gas now sedated   -Ammonia 34, Head CT negative for acute changes, patient responsive post intubation     #CV  -CAD with history of CABG - c/w asa and hold statin given elevated liver enzymes  -EF in 2015 30-25% pending repeat TTE  -patient with evidence of demand ischemia, trend cardiac enzymes  -SVT s/p cardioversion, continue with telemetry monitoring  -patient requiring phenylephrine and is on no sedation with differential for shock including hypovolemia 2/2 HD, cardiogenic given HFrEF    #PULM  -Hypercarbic Respiratory Failure multifactorial: opioid induced vs worsening ascites s/p intubation   -c/w mechanical ventilation, pressure support trials    #GI:   -Nutrition- maintain NPO accept meds at this time, bile coming from OGT  -patient with HFrEF with concern for congestive hepatopathy  -consider performing paracentesis today  -coffee ground emesis apparently chocolate milk. C/w PPI for now and monitor for GIB.     #Renal   -ESRD; d/w nephrology plans for fluid removal   -monitor electolytes    #Endo:   -f/u TSH, A1C and lipid profile if not completed   -Fingersticks q 4 while NPO     #ID:   -patient on cefazolin for fracture  -f/u blood cultures    DVT PPx:   -HSQ     Nahomy Ca, PGY 3  Internal Medicine  Pager [phone] | [phone]

## 2019-04-12 NOTE — PROGRESS NOTE ADULT - PROBLEM SELECTOR PLAN 1
iv proton pump inhibitor bid  doubt gi bleed  patient states emesis was her chocolate milk  monitor cbc, decline today is likely dilutional   abdomen u/s with moderate to large volume ascites, for IR paracentesis when stable  MICU care

## 2019-04-12 NOTE — PROGRESS NOTE ADULT - SUBJECTIVE AND OBJECTIVE BOX
[facility]   [phone]  RENAL FOLLOW UP NOTE  --------------------------------------------------------------------------------  Chief Complaint:    24 hour events/subjective:  overnight events noted.   seen earlier in icu and d/w icu team   intubated when seen     PAST HISTORY  --------------------------------------------------------------------------------  No significant changes to PMH, PSH, FHx, SHx, unless otherwise noted    ALLERGIES & MEDICATIONS  --------------------------------------------------------------------------------  Allergies    allopurinol (Rash)  Augmentin (Rash)  Levaquin (Rash)    Intolerances      Standing Inpatient Medications  albumin human 25% IVPB 50 milliLiter(s) IV Intermittent once  albumin human 25% IVPB 50 milliLiter(s) IV Intermittent once  aspirin  chewable 81 milliGRAM(s) Oral daily  ceFAZolin   IVPB 1000 milliGRAM(s) IV Intermittent every 24 hours  chlorhexidine 2% Cloths 1 Application(s) Topical daily  chlorhexidine 4% Liquid 1 Application(s) Topical <User Schedule>  dexmedetomidine Infusion 0.2 MICROgram(s)/kG/Hr IV Continuous <Continuous>  dextrose 5%. 1000 milliLiter(s) IV Continuous <Continuous>  dextrose 50% Injectable 12.5 Gram(s) IV Push once  dextrose 50% Injectable 25 Gram(s) IV Push once  dextrose 50% Injectable 25 Gram(s) IV Push once  fentaNYL    Injectable 100 MICROGram(s) IV Push once  heparin  Injectable 5000 Unit(s) SubCutaneous every 8 hours  insulin lispro (HumaLOG) corrective regimen sliding scale   SubCutaneous every 6 hours  levothyroxine Injectable 75 MICROGram(s) IV Push at bedtime  midodrine 10 milliGRAM(s) Oral three times a day  pantoprazole  Injectable 40 milliGRAM(s) IV Push every 12 hours  phenylephrine    Infusion 1 MICROgram(s)/kG/Min IV Continuous <Continuous>    PRN Inpatient Medications  dextrose 40% Gel 15 Gram(s) Oral once PRN  glucagon  Injectable 1 milliGRAM(s) IntraMuscular once PRN  lidocaine/prilocaine Cream 1 Application(s) Topical daily PRN  ondansetron Injectable 4 milliGRAM(s) IV Push every 12 hours PRN      REVIEW OF SYSTEMS  --------------------------------------------------------------------------------  intubated       VITALS/PHYSICAL EXAM  --------------------------------------------------------------------------------  T(C): 38 (04-12-19 @ 16:00), Max: 38 (04-12-19 @ 16:00)  HR: 53 (04-12-19 @ 17:45) (48 - 162)  BP: 105/55 (04-12-19 @ 17:45) (82/39 - 167/72)  RR: 20 (04-12-19 @ 17:45) (15 - 47)  SpO2: 100% (04-12-19 @ 17:45) (79% - 100%)  Wt(kg): --        04-11-19 @ 07:01  -  04-12-19 @ 07:00  --------------------------------------------------------  IN: 963.3 mL / OUT: 1600 mL / NET: -636.7 mL    04-12-19 @ 07:01  -  04-12-19 @ 18:04  --------------------------------------------------------  IN: 116.4 mL / OUT: 0 mL / NET: 116.4 mL      Physical Exam:  Gen: intubated    	no jvd   	Pulm: decrease bs  no rales or ronchi or wheezing  	CV: RRR, S1S2; no rub  	Abd: +BS, soft,  + distended ascites   	: No suprapubic tenderness  	UE: Warm, no cyanosis  no clubbing,  no edema;   	LE: Warm, no cyanosis  no clubbing, no edema RLE cast   	avf +  bruit and thrill 	      LABS/STUDIES  --------------------------------------------------------------------------------              10.0   13.0  >-----------<  224      [04-12-19 @ 01:15]              30.6     132  |  93  |  20  ----------------------------<  167      [04-12-19 @ 01:15]  3.6   |  18  |  2.72        Ca     8.5     [04-12-19 @ 01:15]      Mg     1.7     [04-12-19 @ 01:15]      Phos  2.8     [04-12-19 @ 01:15]    TPro  7.6  /  Alb  3.3  /  TBili  0.6  /  DBili  x   /  AST  217  /  ALT  88  /  AlkPhos  175  [04-12-19 @ 01:15]    PT/INR: PT 13.2 , INR 1.15       [04-12-19 @ 01:15]  PTT: 32.8       [04-12-19 @ 01:15]          [04-12-19 @ 11:43]    Creatinine Trend:  SCr 2.72 [04-12 @ 01:15]  SCr 2.45 [04-11 @ 22:02]  SCr 4.43 [04-11 @ 07:08]  SCr 3.07 [04-10 @ 04:46]  SCr 5.23 [04-09 @ 18:30]                  Iron 71, TIBC 135, %sat 53      [04-11-19 @ 17:50]  Ferritin 6834      [04-11-19 @ 17:57]  HbA1c 8.3      [04-12-19 @ 09:00]  TSH 34.70      [04-12-19 @ 07:41]  Lipid: chol 63, , HDL 12, LDL 23      [04-12-19 @ 09:06]

## 2019-04-12 NOTE — PROGRESS NOTE ADULT - ASSESSMENT
78 F PMH CAD s/p CABG x 4 2015, T2DM with peripheral neuropathy, ESRD on HD T/Th/S via L AVF, hypotension on midodrine, HFrEF, HTN, HLD, b/l cataracts s/p surgery, p/w mechanical fall and R leg pain, found to have R tib/fib fracture.   pt with resp failure, now intubated in micu    resp failure  likely multifactorial  vent support  pressure support  mngt as per MICU    open fracture of proximal end of right tibia,  confirmed on XR and CT  -ortho recs appreciated:  s/p cast. no surgical intervention at this time  outpt f/u  NWB RLE     Coronary artery disease without angina pectoris  -c/w aspirin, statin  -f/u cards .   f/u echo    Type 2 diabetes mellitus with hyperglycemia, with long-term current use of insulin.  start HISS  endo consult dr simons  -james, CC diet,    ESRD on hemodialysis.  HD as per renal      ascites.   plan for paracentesis   f/u fluid analysis     emesis  gi consulted  f/u guaiac  ppi  monitor cbc     mngt as per MICU

## 2019-04-12 NOTE — CHART NOTE - NSCHARTNOTEFT_GEN_A_CORE
:  Nahomy Ca, PGY 3  Internal Medicine  Pager [phone] | [phone]    INDICATION: eval for ascitic fluid, cardiac function, pleural effusion, consolidation    PROCEDURE:  [x ] LIMITED ECHO  [ x] LIMITED CHEST  [ ] LIMITED RETROPERITONEAL  [x ] LIMITED ABDOMINAL  [ ] LIMITED DVT  [ ] NEEDLE GUIDANCE VASCULAR  [ ] NEEDLE GUIDANCE THORACENTESIS  [ ] NEEDLE GUIDANCE PARACENTESIS  [ ] NEEDLE GUIDANCE PERICARDIOCENTESIS  [ ] OTHER    FINDINGS:  Cardiac: decreased LV function, decreased motion of septum, small pericardial effusion    Chest: bilateral pleural effusions right greater than left    Abd: large ascites       INTERPRETATION:    Large volume ascites, bilateral pleural effusions and decreased LV function

## 2019-04-12 NOTE — PROGRESS NOTE ADULT - ATTENDING COMMENTS
1.Acute hypercapnic respiratory failure likely due to narcotic usage. Pt intubated on mechanical intubation. Continue current AC vent settings.  2.Cardiac.Severe LV dysfunction with small pericardial effusion. No tamponade. Pt also with moderate bilateral effusions and large ascites most likely from cardiac failure. Plan for paracentesis today.  3. ESRD. For dialysis.  4. Fx R tb/fib casted. Start DVT prophylaxis with sq heparin. Continue abx.      cc time 35 min

## 2019-04-12 NOTE — CHART NOTE - NSCHARTNOTEFT_GEN_A_CORE
CHIEF COMPLAINT: Hypercarbic respiratory failure     HPI:  78 F PMH CAD s/p CABG x 4 2015, T2DM with peripheral neuropathy, ESRD on HD T/Th/S via L AVF, hypotension on midodrine, HFrEF, HTN, HLD, b/l cataracts s/p surgery, p/w mechanical fall and R leg pain. Orthopedic service was consulted and originally planned to have ORIF, however a hard cylinder cast was placed and  sx procedure was deemed non urgent. The patient was evaluated by cardiology and Pulmonary who declared that there is a moderate to high surgical risk. During the patients hospitalization (+) reports of melena was noted and stool was found to have (+) occult blood. The patient was placed on PPI BID. Today, an RRT was called due (+) unresponsiveness. During the RRT the patient was given Narcan .4mg with good response. The patient was placed on a non rebreather and was transferred to the MICU for further management.     On arrival to the MICU the patient was noted to have SVT rates 180's - 170's and associated hypotension. Synchronized cardioversion was performed with 100j and then 150j after administration of a total of 7 mg ketamine.   The patient was successfully converted to SR. The patient was noted to have further worsening respiratory status and was ultimately intubated. Daughter at bedside and has been made aware of clinical status.     PAST MEDICAL & SURGICAL HISTORY:  CHF (congestive heart failure): Oct 2015- still sob  Shortness of breath  Hypotension  Type 2 diabetes mellitus  Chronic kidney disease (CKD): ON DIALYSIS - Tue, Thur, Sat  Nephrolithiasis: 2001  Ulcer: 2001  Hydronephrosis: Right 1/2012  Charcot Foot: Right Foot  Herniated Disc  Diabetic Neuropathy  Anemia: CKD  Hypothyroidism  Hypertension: NOT ANY MORE  Hyperlipidemia  S/P mitral valve repair: with CABG X 4 MAY 2015  S/P CABG (coronary artery bypass graft): x 4, May 2015  History of extraction of renal calculus  S/P Foot Surgery: 2002, 2014  S/P Cholecystectomy: 2001  S/P Breast Lumpectomy: 1994, 2003 - left breast benign      FAMILY HISTORY:  FH: breast cancer: mother  FH: CHF (congestive heart failure): father      SOCIAL HISTORY:  Smoking: [ ] Never Smoked [ X] Former Smoker (__ packs x ___ years) [ ] Current Smoker  (__ packs x ___ years)  Substance Use: [ ] Never Used [ ] Used denies   EtOH Use: Denies   Marital Status: [ ] Single [ ]  [ ]  [x ]    Occupation: Retiree   Recent Travel: None   Advance Directives: Full Code     Allergies    allopurinol (Rash)  Augmentin (Rash)  Levaquin (Rash)    Intolerances        HOME MEDICATIONS:    REVIEW OF SYSTEMS:  Constitutional: [ ] fevers [ ] chills [ ] weight loss [ ] weight gain  HEENT: [ ] dry eyes [ ] eye irritation [ ] postnasal drip [ ] nasal congestion  CV: [ ] chest pain [ ] orthopnea [ ] palpitations [ ] murmur  Resp: [ ] cough [ ] shortness of breath [ ] dyspnea [ ] wheezing [ ] sputum [ ] hemoptysis  GI: [ ] nausea [ ] vomiting [ ] diarrhea [ ] constipation [ ] abd pain [ ] dysphagia   : [ ] dysuria [ ] nocturia [ ] hematuria [ ] increased urinary frequency  Musculoskeletal: [ ] back pain [ ] myalgias [ ] arthralgias [ ] fracture  Skin: [ ] rash [ ] itch  Neurological: [ ] headache [ ] dizziness [ ] syncope [ ] weakness [ ] numbness  Psychiatric: [ ] anxiety [ ] depression  Endocrine: [ ] diabetes [ ] thyroid problem  Hematologic/Lymphatic: [ ] anemia [ ] bleeding problem  Allergic/Immunologic: [ ] itchy eyes [ ] nasal discharge [ ] hives [ ] angioedema  [ ] All other systems negative  [ X] Unable to assess ROS because ________    OBJECTIVE:  ICU Vital Signs Last 24 Hrs  T(C): 36.5 (11 Apr 2019 22:15), Max: 36.7 (11 Apr 2019 13:45)  T(F): 97.7 (11 Apr 2019 22:15), Max: 98.1 (11 Apr 2019 13:45)  HR: 77 (12 Apr 2019 00:30) (62 - 162)  BP: 153/74 (12 Apr 2019 00:30) (87/63 - 167/72)  BP(mean): 107 (12 Apr 2019 00:30) (69 - 107)  ABP: --  ABP(mean): --  RR: 25 (12 Apr 2019 00:30) (17 - 47)  SpO2: 100% (12 Apr 2019 00:30) (85% - 100%)    Mode: AC/ CMV (Assist Control/ Continuous Mandatory Ventilation), RR (machine): 20, TV (machine): 400, FiO2: 100, PEEP: 5, ITime: 1, PIP: 32    04-10 @ 07:01 - 04-11 @ 07:00  --------------------------------------------------------  IN: 50 mL / OUT: 0 mL / NET: 50 mL    04-11 @ 07:01 - 04-12 @ 01:25  --------------------------------------------------------  IN: 270 mL / OUT: 1600 mL / NET: -1330 mL      CAPILLARY BLOOD GLUCOSE      POCT Blood Glucose.: 131 mg/dL (11 Apr 2019 21:32)      PHYSICAL EXAM:  General: Sedated   HEENT: Perrla 2mm bilaterally brisk    Lymph Nodes: No palpable lymph node adenopathy to cervical chain or pre/posterior auricular nodes    Neck: Supple (+) JVD noted   Respiratory: s/p Intubation diminished breath sounds noted to bases respirations are symmetrical non labored at this time    Cardiovascular: S1 S2 no M/C/R/G noted (+) peripheral pulses (+) 1-2   Abdomen: Grossly distended and firm hypoactive BS noted NGT in place   Extremities: Warm and dry right foot with cylinder cast (+) warm toes noted   Skin: warm and dry   Neurological: Sedated   Psychiatry: Unable to assess due to sedation     LINES: [facility]     HOSPITAL MEDICATIONS:  MEDICATIONS  (STANDING):  aspirin enteric coated 81 milliGRAM(s) Oral daily  calcium gluconate IVPB 2 Gram(s) IV Intermittent once  ceFAZolin   IVPB 1000 milliGRAM(s) IV Intermittent every 24 hours  dextrose 5%. 1000 milliLiter(s) (50 mL/Hr) IV Continuous <Continuous>  dextrose 50% Injectable 12.5 Gram(s) IV Push once  dextrose 50% Injectable 25 Gram(s) IV Push once  dextrose 50% Injectable 25 Gram(s) IV Push once  insulin lispro (HumaLOG) corrective regimen sliding scale   SubCutaneous every 6 hours  levothyroxine Injectable 75 MICROGram(s) IV Push at bedtime  midodrine 10 milliGRAM(s) Oral three times a day  pantoprazole  Injectable 40 milliGRAM(s) IV Push every 12 hours    MEDICATIONS  (PRN):  dextrose 40% Gel 15 Gram(s) Oral once PRN Blood Glucose LESS THAN 70 milliGRAM(s)/deciliter  glucagon  Injectable 1 milliGRAM(s) IntraMuscular once PRN Glucose LESS THAN 70 milligrams/deciliter  lidocaine/prilocaine Cream 1 Application(s) Topical daily PRN hd days  ondansetron Injectable 4 milliGRAM(s) IV Push every 12 hours PRN Nausea and/or Vomiting      LABS:                        10.0   13.0  )-----------( 224      ( 12 Apr 2019 01:15 )             30.6     Hgb Trend: 10.0<--, 9.4<--, 8.8<--, 10.0<--, 11.4<--  04-11    135  |  95<L>  |  17  ----------------------------<  149<H>  3.4<L>   |  21<L>  |  2.45<H>    Ca    7.8<L>      11 Apr 2019 22:02  Phos  4.5     04-11  Mg     1.7     04-11    TPro  6.8  /  Alb  2.9<L>  /  TBili  0.5  /  DBili  x   /  AST  147<H>  /  ALT  59<H>  /  AlkPhos  147<H>  04-11    Creatinine Trend: 2.45<--, 4.43<--, 3.07<--, 5.23<--  PT/INR - ( 11 Apr 2019 22:02 )   PT: 13.6 sec;   INR: 1.19 ratio         PTT - ( 11 Apr 2019 22:02 )  PTT:34.0 sec    Arterial Blood Gas:  04-11 @ 23:55  7.37/38/33/22/64/-2.9  ABG lactate: --  Arterial Blood Gas:  04-11 @ 21:59  7.25/55/219/23/99/-3.5  ABG lactate: --        MICROBIOLOGY:   Pending     RADIOLOGY:  < from: CT Chest No Cont (04.10.19 @ 21:59) >      New large right pleural effusion with large volume abdominal ascites and   unchanged left pleural effusion since 2015.    < from: Xray Knee 3 Views, Right (04.10.19 @ 18:37) >    IMPRESSION:     Again noted acute, comminuted fracture of the proximal tibial metaphysis  with mild anterior displacement of the distal fracture fragment. This   extends to the lateral tibial plateau. Alignment is unchanged. Also again   noted is an acute impacted fracture of the proximal fibula. There is new   overlying cast material.    < from: Xray Tibia + Fibula 2 Views, Right (04.10.19 @ 18:37) >    IMPRESSION:     Again noted acute, comminuted fracture of the proximal tibial metaphysis  with mild anterior displacement of the distal fracture fragment. This   extends to the lateral tibial plateau. Alignment is unchanged. Also again   noted is an acute impacted fracture of the proximal fibula. There is new   overlying cast material.      EKG:  < from: 12 Lead ECG (04.10.19 @ 02:28) >    Ventricular Rate 90 BPM    Atrial Rate 90 BPM    P-R Interval 224 ms    QRS Duration 132 ms    Q-T Interval 428 ms    QTC Calculation(Bezet) 523 ms    P Axis 75 degrees    R Axis 163 degrees    T Axis -7 degrees    Diagnosis Line SINUS RHYTHM WITH 1ST DEGREE A-V BLOCK  NON-SPECIFIC INTRA-VENTRICULAR CONDUCTION BLOCK  INFERIOR INFARCT , AGE UNDETERMINED  POSSIBLE ANTEROLATERAL INFARCT , AGE UNDETERMINED  ABNORMAL ECG    Confirmed by MD SHAILESH, GERRY ([id number]) on 4/10/2019 9:12:12 P    < end of copied text >      ASSESSMENT AND PLAN:   78 F PMH CAD s/p CABG x 4 2015, T2DM with peripheral neuropathy, ESRD on HD T/Th/S via L AVF, hypotension on midodrine, HFrEF, HTN, HLD a/w hypercarbic respiratory failure.     NEURO:   AMS secondary to hypercarbic respiratory failure - opiod induced VS worsening aciets   Sedated while intubated with seditive vacation per ICU proticole   will f/u with Ammonia level   Head CT (-) on admission and responsive post intubation   May re image if Neuro status declines     CV:   CAD / CABG - will continue with ASA hold Statin at this time will contiue to trend Hepatic Pannel     ACS - will trend cardiac enzymes daily EKG Hold anticoagulation in the setting of recent GI bleed   will check official echo   Cardilogy f/u appreciated     SVT - s/p cardioversion will continue to monitor heart rate once hemodynamically stable patient may need metoprolol     Shock: cardiogenic will consider inotropic support if ok with cardiology   at this time will continue to trend lactate and vasopressors     PULM:  Hypercarbic Respiratory Failure multifactoral Opioid induced vs worsening ascieties s/p intubation   f/u with chest x ray   ABG Dueonebs q 6   wean to extubate once medically appropriate     GI:   Nutrition- maintain NPO accept meds at this time     (+) Large Volume Ascietes probable cardiogenic hepatic congestion - Planned paracenthesis in am   May need further Abdominal imaging CT to r/o cirrhosis     GI bleed- will continue with PPI Bid dosing   trend CBC   transfuse for Hg<7 will also trend PT PTT INR   if acute bleed will give DDAVP     :   ESRD - will follow with Nephrology for HD and fluid removal as tolerated   will continue to trend electrolyte   Hold nephrovite and midodrine at this time   may straight cath for urine residual if any     Endo:   Will check TSH, A1C and lipid profile if not completed   Fingersticks q 4 while NPO     ID:   Low probability at this time for Infectiouse process   will continue Cefzolin for fx at this time   will follow up with routine cx   will broaden Abt therapy if pt becomes febrile     DVT PPx:   will place on Mechanical Devices       Lynn Paredes ACNP- BC ex [id number]      ATTENDING STATEMENT: CHIEF COMPLAINT: Hypercarbic respiratory failure     HPI:  78 F PMH CAD s/p CABG x 4 2015, T2DM with peripheral neuropathy, ESRD on HD T/Th/S via L AVF, hypotension on midodrine, HFrEF, HTN, HLD, b/l cataracts s/p surgery, p/w mechanical fall and R leg pain. Orthopedic service was consulted and originally planned to have ORIF, however a hard cylinder cast was placed and  sx procedure was deemed non urgent. The patient was evaluated by cardiology and Pulmonary who declared that there is a moderate to high surgical risk. During the patients hospitalization (+) reports of melena was noted and stool was found to have (+) occult blood. The patient was placed on PPI BID. Today, an RRT was called due (+) unresponsiveness. During the RRT the patient was given Narcan .4mg with good response. The patient was placed on a non rebreather and was transferred to the MICU for further management.     On arrival to the MICU the patient was noted to have SVT rates 180's - 170's and associated hypotension. Synchronized cardioversion was performed with 100j and then 150j after administration of a total of 7 mg ketamine.   The patient was successfully converted to SR. Unfortunately, despite all non invasive measurers  the patient was noted to have a worsening respiratory status and was ultimately intubated. Daughter at bedside and has been made aware of clinical status.     PAST MEDICAL & SURGICAL HISTORY:  CHF (congestive heart failure): Oct 2015- still sob  Shortness of breath  Hypotension  Type 2 diabetes mellitus  Chronic kidney disease (CKD): ON DIALYSIS - Tue, Thur, Sat  Nephrolithiasis: 2001  Ulcer: 2001  Hydronephrosis: Right 1/2012  Charcot Foot: Right Foot  Herniated Disc  Diabetic Neuropathy  Anemia: CKD  Hypothyroidism  Hypertension: NOT ANY MORE  Hyperlipidemia  S/P mitral valve repair: with CABG X 4 MAY 2015  S/P CABG (coronary artery bypass graft): x 4, May 2015  History of extraction of renal calculus  S/P Foot Surgery: 2002, 2014  S/P Cholecystectomy: 2001  S/P Breast Lumpectomy: 1994, 2003 - left breast benign      FAMILY HISTORY:  FH: breast cancer: mother  FH: CHF (congestive heart failure): father      SOCIAL HISTORY:  Smoking: [ ] Never Smoked [ X] Former Smoker (__ packs x ___ years) [ ] Current Smoker  (__ packs x ___ years)  Substance Use: [ ] Never Used [ ] Used denies   EtOH Use: Denies   Marital Status: [ ] Single [ ]  [ ]  [x ]    Occupation: Retiree   Recent Travel: None   Advance Directives: Full Code     Allergies    allopurinol (Rash)  Augmentin (Rash)  Levaquin (Rash)    Intolerances        HOME MEDICATIONS:    REVIEW OF SYSTEMS:  Constitutional: [ ] fevers [ ] chills [ ] weight loss [ ] weight gain  HEENT: [ ] dry eyes [ ] eye irritation [ ] postnasal drip [ ] nasal congestion  CV: [ ] chest pain [ ] orthopnea [ ] palpitations [ ] murmur  Resp: [ ] cough [ ] shortness of breath [ ] dyspnea [ ] wheezing [ ] sputum [ ] hemoptysis  GI: [ ] nausea [ ] vomiting [ ] diarrhea [ ] constipation [ ] abd pain [ ] dysphagia   : [ ] dysuria [ ] nocturia [ ] hematuria [ ] increased urinary frequency  Musculoskeletal: [ ] back pain [ ] myalgias [ ] arthralgias [ ] fracture  Skin: [ ] rash [ ] itch  Neurological: [ ] headache [ ] dizziness [ ] syncope [ ] weakness [ ] numbness  Psychiatric: [ ] anxiety [ ] depression  Endocrine: [ ] diabetes [ ] thyroid problem  Hematologic/Lymphatic: [ ] anemia [ ] bleeding problem  Allergic/Immunologic: [ ] itchy eyes [ ] nasal discharge [ ] hives [ ] angioedema  [ ] All other systems negative  [ X] Unable to assess ROS because ________    OBJECTIVE:  ICU Vital Signs Last 24 Hrs  T(C): 36.5 (11 Apr 2019 22:15), Max: 36.7 (11 Apr 2019 13:45)  T(F): 97.7 (11 Apr 2019 22:15), Max: 98.1 (11 Apr 2019 13:45)  HR: 77 (12 Apr 2019 00:30) (62 - 162)  BP: 153/74 (12 Apr 2019 00:30) (87/63 - 167/72)  BP(mean): 107 (12 Apr 2019 00:30) (69 - 107)  ABP: --  ABP(mean): --  RR: 25 (12 Apr 2019 00:30) (17 - 47)  SpO2: 100% (12 Apr 2019 00:30) (85% - 100%)    Mode: AC/ CMV (Assist Control/ Continuous Mandatory Ventilation), RR (machine): 20, TV (machine): 400, FiO2: 100, PEEP: 5, ITime: 1, PIP: 32    04-10 @ 07:01 - 04-11 @ 07:00  --------------------------------------------------------  IN: 50 mL / OUT: 0 mL / NET: 50 mL    04-11 @ 07:01 - 04-12 @ 01:25  --------------------------------------------------------  IN: 270 mL / OUT: 1600 mL / NET: -1330 mL      CAPILLARY BLOOD GLUCOSE      POCT Blood Glucose.: 131 mg/dL (11 Apr 2019 21:32)      PHYSICAL EXAM:  General: Sedated   HEENT: Perrla 2mm bilaterally brisk    Lymph Nodes: No palpable lymph node adenopathy to cervical chain or pre/posterior auricular nodes    Neck: Supple (+) JVD noted   Respiratory: s/p Intubation diminished breath sounds noted to bases respirations are symmetrical non labored at this time    Cardiovascular: S1 S2 no M/C/R/G noted (+) peripheral pulses (+) 1-2   Abdomen: Grossly distended and firm hypoactive BS noted NGT in place   Extremities: Warm and dry right foot with cylinder cast (+) warm toes noted   Skin: warm and dry   Neurological: Sedated   Psychiatry: Unable to assess due to sedation     LINES: New [facility]     HOSPITAL MEDICATIONS:  MEDICATIONS  (STANDING):  aspirin enteric coated 81 milliGRAM(s) Oral daily  calcium gluconate IVPB 2 Gram(s) IV Intermittent once  ceFAZolin   IVPB 1000 milliGRAM(s) IV Intermittent every 24 hours  dextrose 5%. 1000 milliLiter(s) (50 mL/Hr) IV Continuous <Continuous>  dextrose 50% Injectable 12.5 Gram(s) IV Push once  dextrose 50% Injectable 25 Gram(s) IV Push once  dextrose 50% Injectable 25 Gram(s) IV Push once  insulin lispro (HumaLOG) corrective regimen sliding scale   SubCutaneous every 6 hours  levothyroxine Injectable 75 MICROGram(s) IV Push at bedtime  midodrine 10 milliGRAM(s) Oral three times a day  pantoprazole  Injectable 40 milliGRAM(s) IV Push every 12 hours    MEDICATIONS  (PRN):  dextrose 40% Gel 15 Gram(s) Oral once PRN Blood Glucose LESS THAN 70 milliGRAM(s)/deciliter  glucagon  Injectable 1 milliGRAM(s) IntraMuscular once PRN Glucose LESS THAN 70 milligrams/deciliter  lidocaine/prilocaine Cream 1 Application(s) Topical daily PRN hd days  ondansetron Injectable 4 milliGRAM(s) IV Push every 12 hours PRN Nausea and/or Vomiting      LABS:                        10.0   13.0  )-----------( 224      ( 12 Apr 2019 01:15 )             30.6     Hgb Trend: 10.0<--, 9.4<--, 8.8<--, 10.0<--, 11.4<--  04-11    135  |  95<L>  |  17  ----------------------------<  149<H>  3.4<L>   |  21<L>  |  2.45<H>    Ca    7.8<L>      11 Apr 2019 22:02  Phos  4.5     04-11  Mg     1.7     04-11    TPro  6.8  /  Alb  2.9<L>  /  TBili  0.5  /  DBili  x   /  AST  147<H>  /  ALT  59<H>  /  AlkPhos  147<H>  04-11    Creatinine Trend: 2.45<--, 4.43<--, 3.07<--, 5.23<--  PT/INR - ( 11 Apr 2019 22:02 )   PT: 13.6 sec;   INR: 1.19 ratio         PTT - ( 11 Apr 2019 22:02 )  PTT:34.0 sec    Arterial Blood Gas:  04-11 @ 23:55  7.37/38/33/22/64/-2.9  ABG lactate: --  Arterial Blood Gas:  04-11 @ 21:59  7.25/55/219/23/99/-3.5  ABG lactate: --        MICROBIOLOGY:   Pending     RADIOLOGY:  < from: CT Chest No Cont (04.10.19 @ 21:59) >      New large right pleural effusion with large volume abdominal ascites and   unchanged left pleural effusion since 2015.    < from: Xray Knee 3 Views, Right (04.10.19 @ 18:37) >    IMPRESSION:     Again noted acute, comminuted fracture of the proximal tibial metaphysis  with mild anterior displacement of the distal fracture fragment. This   extends to the lateral tibial plateau. Alignment is unchanged. Also again   noted is an acute impacted fracture of the proximal fibula. There is new   overlying cast material.    < from: Xray Tibia + Fibula 2 Views, Right (04.10.19 @ 18:37) >    IMPRESSION:     Again noted acute, comminuted fracture of the proximal tibial metaphysis  with mild anterior displacement of the distal fracture fragment. This   extends to the lateral tibial plateau. Alignment is unchanged. Also again   noted is an acute impacted fracture of the proximal fibula. There is new   overlying cast material.      EKG:  < from: 12 Lead ECG (04.10.19 @ 02:28) >    Ventricular Rate 90 BPM    Atrial Rate 90 BPM    P-R Interval 224 ms    QRS Duration 132 ms    Q-T Interval 428 ms    QTC Calculation(Bezet) 523 ms    P Axis 75 degrees    R Axis 163 degrees    T Axis -7 degrees    Diagnosis Line SINUS RHYTHM WITH 1ST DEGREE A-V BLOCK  NON-SPECIFIC INTRA-VENTRICULAR CONDUCTION BLOCK  INFERIOR INFARCT , AGE UNDETERMINED  POSSIBLE ANTEROLATERAL INFARCT , AGE UNDETERMINED  ABNORMAL ECG    Confirmed by MD SHAILESH, GERRY ([id number]) on 4/10/2019 9:12:12 P    < end of copied text >      ASSESSMENT AND PLAN:   78 F PMH CAD s/p CABG x 4 2015, T2DM with peripheral neuropathy, ESRD on HD T/Th/S via L AVF, hypotension on midodrine, HFrEF, HTN, HLD a/w hypercarbic respiratory failure.     NEURO:   AMS secondary to hypercarbic respiratory failure - opiod induced VS worsening aciets   Sedated while intubated with seditive vacation per ICU proticole   will f/u with Ammonia level   Head CT (-) on admission and responsive post intubation   May re image if Neuro status declines     CV:   CAD / CABG - will continue with ASA hold Statin at this time will contiue to trend Hepatic Pannel     ACS - will trend cardiac enzymes daily EKG Hold anticoagulation in the setting of recent GI bleed   will check official echo   Cardilogy f/u appreciated     SVT - s/p cardioversion will continue to monitor heart rate once hemodynamically stable patient may need metoprolol     Shock: cardiogenic will consider inotropic support if ok with cardiology   at this time will continue to trend lactate and vasopressors     PULM:  Hypercarbic Respiratory Failure multifactoral Opioid induced vs worsening ascieties s/p intubation   f/u with chest x ray   ABG Dueonebs q 6   wean to extubate once medically appropriate     GI:   Nutrition- maintain NPO accept meds at this time     (+) Large Volume Ascietes probable cardiogenic hepatic congestion - Planned paracenthesis in am   May need further Abdominal imaging CT to r/o cirrhosis     GI bleed- will continue with PPI Bid dosing   trend CBC   transfuse for Hg<7 will also trend PT PTT INR   if acute bleed will give DDAVP     :   ESRD - will follow with Nephrology for HD and fluid removal as tolerated   will continue to trend electrolyte   Hold nephrovite and midodrine at this time   may straight cath for urine residual if any     Endo:   Will check TSH, A1C and lipid profile if not completed   Fingersticks q 4 while NPO     ID:   Low probability at this time for Infectiouse process   will continue Cefzolin for fx at this time   will follow up with routine cx   will broaden Abt therapy if pt becomes febrile     DVT PPx:   will place on Mechanical Devices       Lynn Paredes ACNP- BC ex [id number]      ATTENDING STATEMENT: CHIEF COMPLAINT: Hypercarbic respiratory failure     HPI:  78 F PMH CAD s/p CABG x 4 2015, T2DM with peripheral neuropathy, ESRD on HD T/Th/S via L AVF, hypotension on midodrine, HFrEF, HTN, HLD, b/l cataracts s/p surgery, p/w mechanical fall and R leg pain. Orthopedic service was consulted and originally planned to have ORIF, however a hard cylinder cast was placed and  sx procedure was deemed non urgent. The patient was evaluated by cardiology and Pulmonary who declared that there is a moderate to high surgical risk. During the patients hospitalization (+) reports of melena was noted and stool was found to have (+) occult blood. The patient was placed on PPI BID. Today, an RRT was called due (+) unresponsiveness. During the RRT the patient was given Narcan .4mg with good response. The patient was placed on a non rebreather and was transferred to the MICU for further management.     On arrival to the MICU the patient was noted to have SVT rates 180's - 170's and associated hypotension. Synchronized cardioversion was performed with 100j and then 150j after administration of a total of 7 mg ketamine.   The patient was successfully converted to SR. Unfortunately, despite all non invasive measurers  the patient was noted to have a worsening respiratory status and was ultimately intubated. Daughter at bedside and has been made aware of clinical status.     PAST MEDICAL & SURGICAL HISTORY:  CHF (congestive heart failure): Oct 2015- still sob  Shortness of breath  Hypotension  Type 2 diabetes mellitus  Chronic kidney disease (CKD): ON DIALYSIS - Tue, Thur, Sat  Nephrolithiasis: 2001  Ulcer: 2001  Hydronephrosis: Right 1/2012  Charcot Foot: Right Foot  Herniated Disc  Diabetic Neuropathy  Anemia: CKD  Hypothyroidism  Hypertension: NOT ANY MORE  Hyperlipidemia  S/P mitral valve repair: with CABG X 4 MAY 2015  S/P CABG (coronary artery bypass graft): x 4, May 2015  History of extraction of renal calculus  S/P Foot Surgery: 2002, 2014  S/P Cholecystectomy: 2001  S/P Breast Lumpectomy: 1994, 2003 - left breast benign      FAMILY HISTORY:  FH: breast cancer: mother  FH: CHF (congestive heart failure): father      SOCIAL HISTORY:  Smoking: [ ] Never Smoked [ X] Former Smoker (__ packs x ___ years) [ ] Current Smoker  (__ packs x ___ years)  Substance Use: [ ] Never Used [ ] Used denies   EtOH Use: Denies   Marital Status: [ ] Single [ ]  [ ]  [x ]    Occupation: Retiree   Recent Travel: None   Advance Directives: Full Code     Allergies    allopurinol (Rash)  Augmentin (Rash)  Levaquin (Rash)      REVIEW OF SYSTEMS:  [ X] Unable to assess ROS because __encephalopathy______    OBJECTIVE:  ICU Vital Signs Last 24 Hrs  T(C): 36.5 (11 Apr 2019 22:15), Max: 36.7 (11 Apr 2019 13:45)  T(F): 97.7 (11 Apr 2019 22:15), Max: 98.1 (11 Apr 2019 13:45)  HR: 77 (12 Apr 2019 00:30) (62 - 162)  BP: 153/74 (12 Apr 2019 00:30) (87/63 - 167/72)  BP(mean): 107 (12 Apr 2019 00:30) (69 - 107)  RR: 25 (12 Apr 2019 00:30) (17 - 47)  SpO2: 100% (12 Apr 2019 00:30) (85% - 100%)    Mode: AC/ CMV (Assist Control/ Continuous Mandatory Ventilation), RR (machine): 20, TV (machine): 400, FiO2: 100, PEEP: 5, ITime: 1, PIP: 32    04-10 @ 07:01 - 04-11 @ 07:00  --------------------------------------------------------  IN: 50 mL / OUT: 0 mL / NET: 50 mL    04-11 @ 07:01  -  04-12 @ 01:25  --------------------------------------------------------  IN: 270 mL / OUT: 1600 mL / NET: -1330 mL      CAPILLARY BLOOD GLUCOSE  POCT Blood Glucose.: 131 mg/dL (11 Apr 2019 21:32)      PHYSICAL EXAM:  General: Sedated   HEENT: Perrla 2mm bilaterally brisk    Lymph Nodes: No palpable lymph node adenopathy to cervical chain or pre/posterior auricular nodes    Neck: Supple (+) JVD noted   Respiratory: s/p Intubation diminished breath sounds noted to bases respirations are symmetrical non labored at this time    Cardiovascular: S1 S2 no M/C/R/G noted (+) peripheral pulses (+) 1-2   Abdomen: Grossly distended and firm hypoactive BS noted NGT in place   Extremities: Warm and dry right foot with cylinder cast (+) warm toes noted   Skin: warm and dry   Neurological: Sedated   Psychiatry: Unable to assess due to sedation     LINES: [facility] MEDICATIONS:  MEDICATIONS  (STANDING):  aspirin enteric coated 81 milliGRAM(s) Oral daily  calcium gluconate IVPB 2 Gram(s) IV Intermittent once  ceFAZolin   IVPB 1000 milliGRAM(s) IV Intermittent every 24 hours  dextrose 5%. 1000 milliLiter(s) (50 mL/Hr) IV Continuous <Continuous>  dextrose 50% Injectable 12.5 Gram(s) IV Push once  dextrose 50% Injectable 25 Gram(s) IV Push once  dextrose 50% Injectable 25 Gram(s) IV Push once  insulin lispro (HumaLOG) corrective regimen sliding scale   SubCutaneous every 6 hours  levothyroxine Injectable 75 MICROGram(s) IV Push at bedtime  midodrine 10 milliGRAM(s) Oral three times a day  pantoprazole  Injectable 40 milliGRAM(s) IV Push every 12 hours    MEDICATIONS  (PRN):  dextrose 40% Gel 15 Gram(s) Oral once PRN Blood Glucose LESS THAN 70 milliGRAM(s)/deciliter  glucagon  Injectable 1 milliGRAM(s) IntraMuscular once PRN Glucose LESS THAN 70 milligrams/deciliter  lidocaine/prilocaine Cream 1 Application(s) Topical daily PRN hd days  ondansetron Injectable 4 milliGRAM(s) IV Push every 12 hours PRN Nausea and/or Vomiting      LABS:                        10.0   13.0  )-----------( 224      ( 12 Apr 2019 01:15 )             30.6     Hgb Trend: 10.0<--, 9.4<--, 8.8<--, 10.0<--, 11.4<--  04-11    135  |  95<L>  |  17  ----------------------------<  149<H>  3.4<L>   |  21<L>  |  2.45<H>    Ca    7.8<L>      11 Apr 2019 22:02  Phos  4.5     04-11  Mg     1.7     04-11    TPro  6.8  /  Alb  2.9<L>  /  TBili  0.5  /  DBili  x   /  AST  147<H>  /  ALT  59<H>  /  AlkPhos  147<H>  04-11    Creatinine Trend: 2.45<--, 4.43<--, 3.07<--, 5.23<--  PT/INR - ( 11 Apr 2019 22:02 )   PT: 13.6 sec;   INR: 1.19 ratio         PTT - ( 11 Apr 2019 22:02 )  PTT:34.0 sec    Arterial Blood Gas:  04-11 @ 23:55  7.37/38/33/22/64/-2.9  ABG lactate: --  Arterial Blood Gas:  04-11 @ 21:59  7.25/55/219/23/99/-3.5  ABG lactate: --    MICROBIOLOGY:   Pending     RADIOLOGY:  < from: CT Chest No Cont (04.10.19 @ 21:59) >      New large right pleural effusion with large volume abdominal ascites and   unchanged left pleural effusion since 2015.    < from: Xray Knee 3 Views, Right (04.10.19 @ 18:37) >    IMPRESSION:     Again noted acute, comminuted fracture of the proximal tibial metaphysis  with mild anterior displacement of the distal fracture fragment. This   extends to the lateral tibial plateau. Alignment is unchanged. Also again   noted is an acute impacted fracture of the proximal fibula. There is new   overlying cast material.    < from: Xray Tibia + Fibula 2 Views, Right (04.10.19 @ 18:37) >    IMPRESSION:     Again noted acute, comminuted fracture of the proximal tibial metaphysis  with mild anterior displacement of the distal fracture fragment. This   extends to the lateral tibial plateau. Alignment is unchanged. Also again   noted is an acute impacted fracture of the proximal fibula. There is new   overlying cast material.      EKG:  < from: 12 Lead ECG (04.10.19 @ 02:28) >    Ventricular Rate 90 BPM    Atrial Rate 90 BPM    P-R Interval 224 ms    QRS Duration 132 ms    Q-T Interval 428 ms    QTC Calculation(Bezet) 523 ms    P Axis 75 degrees    R Axis 163 degrees    T Axis -7 degrees    Diagnosis Line SINUS RHYTHM WITH 1ST DEGREE A-V BLOCK  NON-SPECIFIC INTRA-VENTRICULAR CONDUCTION BLOCK  INFERIOR INFARCT , AGE UNDETERMINED  POSSIBLE ANTEROLATERAL INFARCT , AGE UNDETERMINED  ABNORMAL ECG    Confirmed by MD SHAILESH, GERRY ([id number]) on 4/10/2019 9:12:12 P    < end of copied text >      ASSESSMENT AND PLAN:   78 F PMH CAD s/p CABG x 4 2015, T2DM with peripheral neuropathy, ESRD on HD T/Th/S via L AVF, hypotension on midodrine, HFrEF, HTN, HLD a/w acute  hypercarbic respiratory failure.     NEURO:   AMS secondary to hypercarbic respiratory failure - opioid induced VS restrictive from worsening ascites   Sedated while intubated with sedative vacation per ICU protocol  will f/u with Ammonia level   Head CT (-) on admission and responsive post intubation   May re image if Neuro status declines     CV:   CAD / CABG - will continue with ASA hold Statin at this time will continue to trend Hepatic Panel     NSTEMI (demand ischemia) - will trend cardiac enzymes, daily EKG, Hold anticoagulation in the setting of recent GI bleed   will check official echo   Cardiology f/u appreciated     SVT - s/p cardioversion will continue to monitor heart rate once hemodynamically stable patient may need metoprolol     Shock: likely multifactorial - possibly hypovolemic from recent HD, cardiogenic with systolic dysfucntion and impaired filling from RVR, will r/o endocrinopathy  -check cortisol, TSH    PULM:  Hypercarbic Respiratory Failure multifactoral Opioid induced vs worsening ascites s/p intubation   f/u with chest x ray   ABG Dueonebs q 6   wean to extubate once medically appropriate  - avoid narcotics, paracentesis and possible thora to optimize respiratory mechanics    GI:   Nutrition- maintain NPO accept meds at this time     (+) Large Volume Ascietes probable cardiogenic hepatic congestion - Planned paracentesis in am   May need further Abdominal imaging CT to r/o cirrhosis     GI bleed- will continue with PPI Bid dosing   trend CBC   transfuse for Hg<7 will also trend PT PTT INR   if acute bleed will give DDAVP     :   ESRD - will follow with Nephrology for HD and fluid removal as tolerated   will continue to trend electrolyte   Hold nephrovite and midodrine at this time   may straight cath for urine residual if any     Endo:   Will check TSH, A1C and lipid profile if not completed   Fingersticks q 4 while NPO     ID:   Low probability at this time for Infectious process   will continue Cefzolin for fx at this time   will follow up with routine cx   will broaden Abt therapy if pt becomes febrile     DVT PPx:   will place on Mechanical Devices       Lynn Paredes [facility]- BC ex [id number]

## 2019-04-12 NOTE — CHART NOTE - NSCHARTNOTEFT_GEN_A_CORE
: Lynn Paredes     INDICATION: New admission     PROCEDURE:  [x ] LIMITED ECHO  [x ] LIMITED CHEST  [ ] LIMITED RETROPERITONEAL  [x ] LIMITED ABDOMINAL  [ ] LIMITED DVT  [ ] NEEDLE GUIDANCE VASCULAR  [ ] NEEDLE GUIDANCE THORACENTESIS  [ ] NEEDLE GUIDANCE PARACENTESIS  [ ] NEEDLE GUIDANCE PERICARDIOCENTESIS  [ ] OTHER    FINDINGS:    Echo: LV grossly enlarged with sever dysfunction   RV with enlarged size and sever dysfunction   IVC noted 1.75    Abd:   Large volume ascitics     Chest:   Elevated diaphragm   (+) pleural effusion moderate on right with associated atelectatic lung base   (+) Pleural effusion on the Left with also atelectatic lung base          INTERPRETATION:  Severe global dysfunction with reduced EF   Large volume ascites  (+) elevated diaphragm from ascites (+) bilateral Pleural effusions with atelectatic lung bases : Lynn Paredes     INDICATION: New admission     PROCEDURE:  [x ] LIMITED ECHO  [x ] LIMITED CHEST  [ ] LIMITED RETROPERITONEAL  [x ] LIMITED ABDOMINAL  [ ] LIMITED DVT  [ ] NEEDLE GUIDANCE VASCULAR  [ ] NEEDLE GUIDANCE THORACENTESIS  [ ] NEEDLE GUIDANCE PARACENTESIS  [ ] NEEDLE GUIDANCE PERICARDIOCENTESIS  [ ] OTHER    FINDINGS:    Echo: LV grossly enlarged with severe dysfunction   RV with enlarged size and severe dysfunction   IVC noted 1.75 nonvariable    Abd:   Large volume ascitics     Chest:   Elevated diaphragm   (+) pleural effusion moderate on right with associated atelectatic lung base   (+) Pleural effusion on the Left with also atelectatic lung base          INTERPRETATION:  Severe global dysfunction with reduced EF   Large volume ascites  (+) elevated diaphragm from ascites (+) bilateral Pleural effusions with atelectatic lung bases

## 2019-04-12 NOTE — PROGRESS NOTE ADULT - ASSESSMENT
77 yo F with hx of cad, cabg, DM, esrd, on HD, chronic hypotension on midodrine 10 mg tid with sob, pleural effusions, chf, ascites, and new tib/fib fx    1- esrd  2- hypotension   3- chf  4- ascites   5- tib/fib fx   6- respiratory failure  due to narcotics likely     vent support   hd in am   cont midodrine   paracentesis

## 2019-04-12 NOTE — PROGRESS NOTE ADULT - SUBJECTIVE AND OBJECTIVE BOX
VIRGILIO KAPLAN  78y Female  MRN:[id number]    Patient is a 78y old  Female who presents with a chief complaint of R tib/fib fx (10 Apr 2019 12:01)    HPI:  78 F PMH CAD s/p CABG x 4 2015, T2DM with peripheral neuropathy, ESRD on HD T/Th/S via L AVF, hypotension on midodrine, HFrEF, HTN, HLD, b/l cataracts s/p surgery, p/w mechanical fall and R leg pain. Pt lives in basement apartment and ordinarily ambulates with walker. Today was getting ready for HD, made it to the first step on her set of stairs, and reportedly felt her R leg "give out." Pt fell backwards but was caught by her family member who was assisting her up the stairs. She denies LOC/syncope or head strike. +Pain in R leg post fall, worse with movement and much improved with immobilization. Could not bear weight or walk after fall.  Denies cp, sob at rest, f/c, n/v/d, dysuria, cough. +chronic BENAVIDEZ, reportedly stable since her CABG. Family at bedside providing collateral and confirms above details.     Vs: 99.1, 76, 102/60, 16, 90% RA --> 100% 4LNC.  Labs: no leukocytosis, chronic stable anemia, rest cbc unrevealing, coags unrevealing, hypoNa 126, HAGMA with cmp c/w known ESRD, hyperglycemia, chronic stable alkp elevation mild AST elevation. XR tib/fib/femur/hip/knee reveals acute prox tib/fib fxs with possible intraarticular extension. (10 Apr 2019 00:56)      Patient seen and evaluated in micu.      Interval HPI: tx to micu overnight for resp distress, unresponsiveness     PAST MEDICAL & SURGICAL HISTORY:  CHF (congestive heart failure): Oct 2015- still sob  Shortness of breath  Hypotension  Type 2 diabetes mellitus  Chronic kidney disease (CKD): ON DIALYSIS - Tue, Thur, Sat  Nephrolithiasis: 2001  Ulcer: 2001  Hydronephrosis: Right 1/2012  Charcot Foot: Right Foot  Herniated Disc  Diabetic Neuropathy  Anemia: CKD  Hypothyroidism  Hypertension: NOT ANY MORE  Hyperlipidemia  S/P mitral valve repair: with CABG X 4 MAY 2015  S/P CABG (coronary artery bypass graft): x 4, May 2015  History of extraction of renal calculus  S/P Foot Surgery: 2002, 2014  S/P Cholecystectomy: 2001  S/P Breast Lumpectomy: 1994, 2003 - left breast benign      REVIEW OF SYSTEMS:  as per hpi    VITALS:  Vital Signs Last 24 Hrs  T(C): 37.5 (12 Apr 2019 12:00), Max: 37.7 (12 Apr 2019 08:00)  T(F): 99.5 (12 Apr 2019 12:00), Max: 99.9 (12 Apr 2019 08:00)  HR: 57 (12 Apr 2019 15:30) (48 - 162)  BP: 92/49 (12 Apr 2019 15:30) (82/39 - 167/72)  BP(mean): 69 (12 Apr 2019 15:30) (57 - 111)  RR: 37 (12 Apr 2019 15:30) (15 - 47)  SpO2: 86% (12 Apr 2019 15:30) (85% - 100%)      PHYSICAL EXAM:  GENERAL: intubated, sedated  HEAD:  Atraumatic, Normocephalic  EYES: EOMI, PERRLA, conjunctiva and sclera clear  NECK: no jvd  CHEST/LUNG: coarse bs b/l  HEART: S1, S2;   ABDOMEN: Soft, Nontender, mild distended; Bowel sounds present  EXTREMITIES:  2+ Peripheral Pulses, No clubbing, cyanosis, or edema,  right knee cast  Neuro: sedated    Consultant(s) Notes Reviewed:  [x ] YES  [ ] NO  Care Discussed with Consultants/Other Providers [ x] YES  [ ] NO    MEDS:  MEDICATIONS  (STANDING):  albumin human 25% IVPB 100 milliLiter(s) IV Intermittent once  albumin human 25% IVPB 100 milliLiter(s) IV Intermittent once  aspirin  chewable 81 milliGRAM(s) Oral daily  ceFAZolin   IVPB 1000 milliGRAM(s) IV Intermittent every 24 hours  chlorhexidine 2% Cloths 1 Application(s) Topical daily  chlorhexidine 4% Liquid 1 Application(s) Topical <User Schedule>  dexmedetomidine Infusion 0.2 MICROgram(s)/kG/Hr (3.415 mL/Hr) IV Continuous <Continuous>  dextrose 5%. 1000 milliLiter(s) (50 mL/Hr) IV Continuous <Continuous>  dextrose 50% Injectable 12.5 Gram(s) IV Push once  dextrose 50% Injectable 25 Gram(s) IV Push once  dextrose 50% Injectable 25 Gram(s) IV Push once  heparin  Injectable 5000 Unit(s) SubCutaneous every 8 hours  insulin lispro (HumaLOG) corrective regimen sliding scale   SubCutaneous every 6 hours  levothyroxine Injectable 75 MICROGram(s) IV Push at bedtime  midodrine 10 milliGRAM(s) Oral three times a day  pantoprazole  Injectable 40 milliGRAM(s) IV Push every 12 hours  phenylephrine    Infusion 1 MICROgram(s)/kG/Min (25.613 mL/Hr) IV Continuous <Continuous>    MEDICATIONS  (PRN):  dextrose 40% Gel 15 Gram(s) Oral once PRN Blood Glucose LESS THAN 70 milliGRAM(s)/deciliter  glucagon  Injectable 1 milliGRAM(s) IntraMuscular once PRN Glucose LESS THAN 70 milligrams/deciliter  lidocaine/prilocaine Cream 1 Application(s) Topical daily PRN hd days  ondansetron Injectable 4 milliGRAM(s) IV Push every 12 hours PRN Nausea and/or Vomiting      ALLERGIES:  allopurinol (Rash)  Augmentin (Rash)  Levaquin (Rash)      LABS:                                        10.0   13.0  )-----------( 224      ( 12 Apr 2019 01:15 )             30.6   04-12    132<L>  |  93<L>  |  20  ----------------------------<  167<H>  3.6   |  18<L>  |  2.72<H>    Ca    8.5      12 Apr 2019 01:15  Phos  2.8     04-12  Mg     1.7     04-12    TPro  7.6  /  Alb  3.3  /  TBili  0.6  /  DBili  x   /  AST  217<H>  /  ALT  88<H>  /  AlkPhos  175<H>  04-12          < from: CT Knee No Cont, Right (04.10.19 @ 03:34) >  Impression:    Acute, impacted fractures of the right proximal tibia and fibula as   described.      < end of copied text >           < from: Xray Tibia + Fibula 2 Views, Right (04.09.19 @ 20:28) >  impression:    There is an acute, comminuted fracture of the proximal tibial metaphysis   with mild anterior displacement of the distal fracture fragment. There is   questionable intra-articular extension on the lateral view. There is a   large knee joint effusion. There is a proximal fibular fracture.    There is no fracture of the hip or femur. The ankle is poorly visualized   secondary to technique and may be externally rotated with respect to the   knee. There is no ankle joint effusion. Vascular calcifications are noted.     CT of the knee can be performed for further evaluation.    < end of copied text >

## 2019-04-12 NOTE — PROCEDURE NOTE - NSPROCDETAILS_GEN_ALL_CORE
Seldinger technique/sterile dressing applied/ultrasound utilization/location identified, sterile technique used, catheter introduced, fluid drained

## 2019-04-12 NOTE — PROCEDURE NOTE - NSTIMEOUT_GEN_A_CORE
[address]            (For Outpatient Use Only) Initial Admit Date: 7/4/2017   Inpt/Obs Admit Date: Inpt: 7/6/17 / Obs: 07/04/17   Discharge Date:    Vinny Bradley:  [de-identified]   MRN: [de-identified]   CSN: [id number]        [facility] Patient's first and last name, , procedure, and correct site confirmed prior to the start of procedure. Subscriber ID:  Pt Rel to Subscriber:    Hospital Account Financial Class: Medicare    July 22, 2017

## 2019-04-12 NOTE — PROCEDURE NOTE - NSPROCDETAILS_GEN_ALL_CORE
guidewire recovered sterile dressing applied/sterile technique, catheter placed/guidewire recovered/lumen(s) aspirated and flushed/ultrasound guidance

## 2019-04-12 NOTE — PROGRESS NOTE ADULT - ATTENDING COMMENTS
suspect cirrhosis due to biventricular cardiac failure  paracentesis with ascitic fluid analysis  care per icu

## 2019-04-13 LAB
ALBUMIN SERPL ELPH-MCNC: 3.5 G/DL — SIGNIFICANT CHANGE UP (ref 3.3–5)
ALP SERPL-CCNC: 121 U/L — HIGH (ref 40–120)
ALT FLD-CCNC: 118 U/L — HIGH (ref 10–45)
ANION GAP SERPL CALC-SCNC: 23 MMOL/L — HIGH (ref 5–17)
APTT BLD: 33.3 SEC — SIGNIFICANT CHANGE UP (ref 27.5–36.3)
AST SERPL-CCNC: 335 U/L — HIGH (ref 10–40)
BILIRUB SERPL-MCNC: 0.9 MG/DL — SIGNIFICANT CHANGE UP (ref 0.2–1.2)
BUN SERPL-MCNC: 32 MG/DL — HIGH (ref 7–23)
CALCIUM SERPL-MCNC: 8.3 MG/DL — LOW (ref 8.4–10.5)
CHLORIDE SERPL-SCNC: 93 MMOL/L — LOW (ref 96–108)
CO2 SERPL-SCNC: 19 MMOL/L — LOW (ref 22–31)
CREAT SERPL-MCNC: 3.52 MG/DL — HIGH (ref 0.5–1.3)
CRP SERPL-MCNC: 9.48 MG/DL — HIGH (ref 0–0.4)
ERYTHROCYTE [SEDIMENTATION RATE] IN BLOOD: 25 MM/HR — HIGH (ref 0–20)
GLUCOSE BLDC GLUCOMTR-MCNC: 105 MG/DL — HIGH (ref 70–99)
GLUCOSE BLDC GLUCOMTR-MCNC: 112 MG/DL — HIGH (ref 70–99)
GLUCOSE BLDC GLUCOMTR-MCNC: 142 MG/DL — HIGH (ref 70–99)
GLUCOSE SERPL-MCNC: 135 MG/DL — HIGH (ref 70–99)
GRAM STN FLD: SIGNIFICANT CHANGE UP
HCT VFR BLD CALC: 30.2 % — LOW (ref 34.5–45)
HGB BLD-MCNC: 10.2 G/DL — LOW (ref 11.5–15.5)
INR BLD: 1.44 RATIO — HIGH (ref 0.88–1.16)
MAGNESIUM SERPL-MCNC: 1.8 MG/DL — SIGNIFICANT CHANGE UP (ref 1.6–2.6)
MCHC RBC-ENTMCNC: 31.7 PG — SIGNIFICANT CHANGE UP (ref 27–34)
MCHC RBC-ENTMCNC: 33.7 GM/DL — SIGNIFICANT CHANGE UP (ref 32–36)
MCV RBC AUTO: 93.9 FL — SIGNIFICANT CHANGE UP (ref 80–100)
METHOD TYPE: SIGNIFICANT CHANGE UP
MSSA DNA SPEC QL NAA+PROBE: SIGNIFICANT CHANGE UP
NIGHT BLUE STAIN TISS: SIGNIFICANT CHANGE UP
PHOSPHATE SERPL-MCNC: 3 MG/DL — SIGNIFICANT CHANGE UP (ref 2.5–4.5)
PLATELET # BLD AUTO: 221 K/UL — SIGNIFICANT CHANGE UP (ref 150–400)
POTASSIUM SERPL-MCNC: 3.8 MMOL/L — SIGNIFICANT CHANGE UP (ref 3.5–5.3)
POTASSIUM SERPL-SCNC: 3.8 MMOL/L — SIGNIFICANT CHANGE UP (ref 3.5–5.3)
PROCALCITONIN SERPL-MCNC: 10.8 NG/ML — HIGH (ref 0.02–0.1)
PROT SERPL-MCNC: 6.6 G/DL — SIGNIFICANT CHANGE UP (ref 6–8.3)
PROTHROM AB SERPL-ACNC: 16.7 SEC — HIGH (ref 10–12.9)
RBC # BLD: 3.22 M/UL — LOW (ref 3.8–5.2)
RBC # FLD: 14.5 % — SIGNIFICANT CHANGE UP (ref 10.3–14.5)
SODIUM SERPL-SCNC: 135 MMOL/L — SIGNIFICANT CHANGE UP (ref 135–145)
SPECIMEN SOURCE: SIGNIFICANT CHANGE UP
WBC # BLD: 11.4 K/UL — HIGH (ref 3.8–10.5)
WBC # FLD AUTO: 11.4 K/UL — HIGH (ref 3.8–10.5)

## 2019-04-13 PROCEDURE — 99291 CRITICAL CARE FIRST HOUR: CPT

## 2019-04-13 RX ORDER — MEROPENEM 1 G/30ML
INJECTION INTRAVENOUS
Qty: 0 | Refills: 0 | Status: DISCONTINUED | OUTPATIENT
Start: 2019-04-13 | End: 2019-04-14

## 2019-04-13 RX ORDER — MEROPENEM 1 G/30ML
1000 INJECTION INTRAVENOUS ONCE
Qty: 0 | Refills: 0 | Status: COMPLETED | OUTPATIENT
Start: 2019-04-13 | End: 2019-04-13

## 2019-04-13 RX ORDER — VANCOMYCIN HCL 1 G
1000 VIAL (EA) INTRAVENOUS ONCE
Qty: 0 | Refills: 0 | Status: COMPLETED | OUTPATIENT
Start: 2019-04-13 | End: 2019-04-13

## 2019-04-13 RX ORDER — MEROPENEM 1 G/30ML
1000 INJECTION INTRAVENOUS EVERY 24 HOURS
Qty: 0 | Refills: 0 | Status: DISCONTINUED | OUTPATIENT
Start: 2019-04-14 | End: 2019-04-14

## 2019-04-13 RX ORDER — POLYETHYLENE GLYCOL 3350 17 G/17G
17 POWDER, FOR SOLUTION ORAL DAILY
Qty: 0 | Refills: 0 | Status: DISCONTINUED | OUTPATIENT
Start: 2019-04-13 | End: 2019-04-23

## 2019-04-13 RX ORDER — SENNA PLUS 8.6 MG/1
10 TABLET ORAL AT BEDTIME
Qty: 0 | Refills: 0 | Status: DISCONTINUED | OUTPATIENT
Start: 2019-04-13 | End: 2019-04-23

## 2019-04-13 RX ORDER — LACTULOSE 10 G/15ML
15 SOLUTION ORAL ONCE
Qty: 0 | Refills: 0 | Status: COMPLETED | OUTPATIENT
Start: 2019-04-13 | End: 2019-04-13

## 2019-04-13 RX ADMIN — CHLORHEXIDINE GLUCONATE 1 APPLICATION(S): 213 SOLUTION TOPICAL at 12:07

## 2019-04-13 RX ADMIN — HEPARIN SODIUM 5000 UNIT(S): 5000 INJECTION INTRAVENOUS; SUBCUTANEOUS at 22:00

## 2019-04-13 RX ADMIN — SENNA PLUS 10 MILLILITER(S): 8.6 TABLET ORAL at 21:51

## 2019-04-13 RX ADMIN — HEPARIN SODIUM 5000 UNIT(S): 5000 INJECTION INTRAVENOUS; SUBCUTANEOUS at 05:29

## 2019-04-13 RX ADMIN — MIDODRINE HYDROCHLORIDE 10 MILLIGRAM(S): 2.5 TABLET ORAL at 21:51

## 2019-04-13 RX ADMIN — MIDODRINE HYDROCHLORIDE 10 MILLIGRAM(S): 2.5 TABLET ORAL at 05:29

## 2019-04-13 RX ADMIN — MEROPENEM 100 MILLIGRAM(S): 1 INJECTION INTRAVENOUS at 05:37

## 2019-04-13 RX ADMIN — MIDODRINE HYDROCHLORIDE 10 MILLIGRAM(S): 2.5 TABLET ORAL at 14:07

## 2019-04-13 RX ADMIN — PHENYLEPHRINE HYDROCHLORIDE 25.61 MICROGRAM(S)/KG/MIN: 10 INJECTION INTRAVENOUS at 19:45

## 2019-04-13 RX ADMIN — CHLORHEXIDINE GLUCONATE 1 APPLICATION(S): 213 SOLUTION TOPICAL at 05:29

## 2019-04-13 RX ADMIN — HEPARIN SODIUM 5000 UNIT(S): 5000 INJECTION INTRAVENOUS; SUBCUTANEOUS at 14:07

## 2019-04-13 RX ADMIN — PANTOPRAZOLE SODIUM 40 MILLIGRAM(S): 20 TABLET, DELAYED RELEASE ORAL at 05:29

## 2019-04-13 RX ADMIN — Medication 81 MILLIGRAM(S): at 12:07

## 2019-04-13 RX ADMIN — LACTULOSE 15 GRAM(S): 10 SOLUTION ORAL at 20:00

## 2019-04-13 RX ADMIN — PANTOPRAZOLE SODIUM 40 MILLIGRAM(S): 20 TABLET, DELAYED RELEASE ORAL at 17:23

## 2019-04-13 RX ADMIN — Medication 250 MILLIGRAM(S): at 01:48

## 2019-04-13 RX ADMIN — Medication 75 MICROGRAM(S): at 22:00

## 2019-04-13 RX ADMIN — POLYETHYLENE GLYCOL 3350 17 GRAM(S): 17 POWDER, FOR SOLUTION ORAL at 21:51

## 2019-04-13 NOTE — PHYSICAL THERAPY INITIAL EVALUATION ADULT - MANUAL MUSCLE TESTING RESULTS, REHAB EVAL
B UE 3-/5, L LE ~2+/5, R hip flex 1/5 (limited due to weight of cast), knee ext NA due to cast, ankle DF 1/5/grossly assessed due to

## 2019-04-13 NOTE — PROGRESS NOTE ADULT - SUBJECTIVE AND OBJECTIVE BOX
GASTROENTEROLOGY    Patient seen and examined at bedside. Remains intubated on pressor support  No overt melena, no brbpr per RN report      MEDICATIONS  (STANDING):  aspirin  chewable 81 milliGRAM(s) Oral daily  chlorhexidine 2% Cloths 1 Application(s) Topical daily  chlorhexidine 4% Liquid 1 Application(s) Topical <User Schedule>  dexmedetomidine Infusion 0.2 MICROgram(s)/kG/Hr (3.415 mL/Hr) IV Continuous <Continuous>  dextrose 5%. 1000 milliLiter(s) (50 mL/Hr) IV Continuous <Continuous>  dextrose 50% Injectable 12.5 Gram(s) IV Push once  dextrose 50% Injectable 25 Gram(s) IV Push once  dextrose 50% Injectable 25 Gram(s) IV Push once  heparin  Injectable 5000 Unit(s) SubCutaneous every 8 hours  insulin lispro (HumaLOG) corrective regimen sliding scale   SubCutaneous every 6 hours  levothyroxine Injectable 75 MICROGram(s) IV Push at bedtime  meropenem  IVPB      midodrine 10 milliGRAM(s) Oral three times a day  pantoprazole  Injectable 40 milliGRAM(s) IV Push every 12 hours  phenylephrine    Infusion 1 MICROgram(s)/kG/Min (25.613 mL/Hr) IV Continuous <Continuous>        T(F): 99.7 (04-13-19 @ 08:00), Max: 100.9 (04-12-19 @ 20:00)  HR: 59 (04-13-19 @ 10:45) (49 - 68)  BP: 91/44 (04-13-19 @ 10:45) (81/42 - 120/58)  RR: 19 (04-13-19 @ 10:45) (12 - 39)  SpO2: 100% (04-13-19 @ 10:45) (79% - 100%)  Wt(kg): --    PHYSICAL EXAM  GENERAL:   NAD  HEENT:  NC/AT,  , no JVD, sclera-anicteric  CHEST:  clear to ascultation bilaterally, respirations nonlabored  HEART:  +S1+S2 regular rate and rhythm   ABDOMEN:  Soft, non-tender, non-distended, normoactive bowel sounds, no masses  EXTEREMITIES:  no cyanosis,clubbing or edema  NEURO:  Alert, oriented, no asterixis, no tremor, no encephalopathy  SKIN:  No rash/warm/dry      LABS:                        10.2<L>  11.4<H> )-----------( 221      ( 12 Apr 2019 23:54 )             30.2<L>  12 Apr 2019 23:54    04-12    135  |  93<L>  |  32<H>  ----------------------------<  135<H>  3.8   |  19<L>  |  3.52<H>    Ca    8.3<L>      12 Apr 2019 23:54  Phos  3.0     04-12  Mg     1.8     04-12    TPro  6.6  /  Alb  3.5  /  TBili  0.9  /  DBili  x   /  AST  335<H>  /  ALT  118<H>  /  AlkPhos  121<H>  04-12    LIVER FUNCTIONS - ( 12 Apr 2019 23:54 )  Alb: 3.5 g/dL / Pro: 6.6 g/dL / ALK PHOS: 121 U/L / ALT: 118 U/L / AST: 335 U/L / GGT: x           PT/INR - ( 12 Apr 2019 23:54 )   PT: 16.7 sec;   INR: 1.44 ratio         PTT - ( 12 Apr 2019 23:54 )  PTT:33.3 sec  I&O's Detail    12 Apr 2019 07:01  -  13 Apr 2019 07:00  --------------------------------------------------------  IN:    dexmedetomidine Infusion: 108.9 mL    IV PiggyBack: 350 mL    phenylephrine   Infusion: 208.1 mL    Solution: 150 mL  Total IN: 817 mL    OUT:    Other: 4700 mL  Total OUT: 4700 mL    Total NET: -3883 mL      13 Apr 2019 07:01  -  13 Apr 2019 11:23  --------------------------------------------------------  IN:    dexmedetomidine Infusion: 12 mL    phenylephrine   Infusion: 20.4 mL  Total IN: 32.4 mL    OUT:  Total OUT: 0 mL    Total NET: 32.4 mL          Culture - Body Fluid with Gram Stain (collected 13 Apr 2019 00:45)  Source: .Body Fluid  Gram Stain (13 Apr 2019 02:05):    No polymorphonuclear leukocytes seen    No organisms seen    by cytocentrifuge    Culture - Blood (collected 12 Apr 2019 02:36)  Source: .Blood  Gram Stain (13 Apr 2019 01:39):    Growth in aerobic bottle: Gram Positive Cocci in Clusters    Growth in anaerobic bottle: Gram Positive Cocci in Clusters  Preliminary Report (13 Apr 2019 01:41):    Growth in aerobic bottle: Gram Positive Cocci in Clusters    "Due to technical problems, Proteus sp. will Not be reported as part of    the BCID panel until further notice"    ***Blood Panel PCR results on this specimen are available    approximately 3 hours after the Gram stain result.***    Gram stain, PCR, and/or culture results may not always    correspond due to difference in methodologies.    ************************************************************    This PCR assay was performed using [employer].    The following targets are tested for: Enterococcus,    vancomycin resistant enterococci, Listeria monocytogenes,    coagulase negative staphylococci, S. aureus,    methicillin resistant S. aureus, Streptococcus agalactiae    (Group B), S. pneumoniae, S.pyogenes (Group A),    Acinetobacter baumannii, Enterobacter cloacae, E. coli,    Klebsiella oxytoca, K. pneumoniae, Proteus sp.,    Serratia marcescens, Haemophilus influenzae,    Neisseria meningitidis, Pseudomonas aeruginosa, Candida    albicans, C. glabrata, C krusei, C parapsilosis,    C. tropicalis and the KPC resistance gene.    Growth in anaerobic bottle: Gram Positive Cocci in Clusters  Organism: Blood Culture PCR (13 Apr 2019 00:30)  Organism: Blood Culture PCR (13 Apr 2019 00:30)    Culture - Blood (collected 12 Apr 2019 02:36)  Source: .Blood  Gram Stain (13 Apr 2019 01:42):    Growth in aerobic bottle: Gram Positive Cocci in Clusters    Growth in anaerobic bottle: Gram Positive Cocci in Clusters  Preliminary Report (13 Apr 2019 01:42):    Growth in aerobic bottle: Gram Positive Cocci in Clusters    Growth in anaerobic bottle: Gram Positive Cocci in Clusters    Iron Total, Serum: 71 ug/dL (04-11 @ 17:50)

## 2019-04-13 NOTE — PHYSICAL THERAPY INITIAL EVALUATION ADULT - ADDITIONAL COMMENTS
Pt lives downstairs in a private house (1 step to enter) and her daughter lives upstairs. Pt states she was  independent in ADLs prior to hospitalization. Pt has a rolling walker, commode and shower chair.

## 2019-04-13 NOTE — PHYSICAL THERAPY INITIAL EVALUATION ADULT - PERTINENT HX OF CURRENT PROBLEM, REHAB EVAL
78 F PMH CAD s/p CABG x 4 2015, T2DM with peripheral neuropathy, ESRD on HD T/Th/S via L AVF, hypotension on midodrine, HFrEF, HTN, HLD, b/l cataracts s/p surgery, p/w mechanical fall and R leg pain, f/w R tib/fib fracture. 78 F PMH CAD s/p CABG x 4 2015, T2DM with peripheral neuropathy, ESRD on HD T/Th/S via L AVF, hypotension on midodrine, HFrEF, HTN, HLD, b/l cataracts s/p surgery, p/w mechanical fall and R leg pain, f/w R tib/fib fracture. Pt s/p RRT on 4/11 for unresponsiveness, intubated following RRT. Pt s/p paracentesis on 4/12. X-ray R tib/fib (4/10): acute comminuted fx proximal tibial metaphysis, acute impacted fx proximal fibula. US abdomen (4/12): mod-large abdominal ascites, B pleural effusion.

## 2019-04-13 NOTE — PROGRESS NOTE ADULT - SUBJECTIVE AND OBJECTIVE BOX
VIRGILIO KAPLAN  78y Female  MRN:[id number]    Patient is a 78y old  Female who presents with a chief complaint of R tib/fib fx (10 Apr 2019 12:01)    HPI:  78 F PMH CAD s/p CABG x 4 2015, T2DM with peripheral neuropathy, ESRD on HD T/Th/S via L AVF, hypotension on midodrine, HFrEF, HTN, HLD, b/l cataracts s/p surgery, p/w mechanical fall and R leg pain. Pt lives in basement apartment and ordinarily ambulates with walker. Today was getting ready for HD, made it to the first step on her set of stairs, and reportedly felt her R leg "give out." Pt fell backwards but was caught by her family member who was assisting her up the stairs. She denies LOC/syncope or head strike. +Pain in R leg post fall, worse with movement and much improved with immobilization. Could not bear weight or walk after fall.  Denies cp, sob at rest, f/c, n/v/d, dysuria, cough. +chronic BENAVIDEZ, reportedly stable since her CABG. Family at bedside providing collateral and confirms above details.     Vs: 99.1, 76, 102/60, 16, 90% RA --> 100% 4LNC.  Labs: no leukocytosis, chronic stable anemia, rest cbc unrevealing, coags unrevealing, hypoNa 126, HAGMA with cmp c/w known ESRD, hyperglycemia, chronic stable alkp elevation mild AST elevation. XR tib/fib/femur/hip/knee reveals acute prox tib/fib fxs with possible intraarticular extension. (10 Apr 2019 00:56)      Patient seen and evaluated in micu.      Interval HPI: s/p paracentesis     PAST MEDICAL & SURGICAL HISTORY:  CHF (congestive heart failure): Oct 2015- still sob  Shortness of breath  Hypotension  Type 2 diabetes mellitus  Chronic kidney disease (CKD): ON DIALYSIS - Tue, Thur, Sat  Nephrolithiasis: 2001  Ulcer: 2001  Hydronephrosis: Right 1/2012  Charcot Foot: Right Foot  Herniated Disc  Diabetic Neuropathy  Anemia: CKD  Hypothyroidism  Hypertension: NOT ANY MORE  Hyperlipidemia  S/P mitral valve repair: with CABG X 4 MAY 2015  S/P CABG (coronary artery bypass graft): x 4, May 2015  History of extraction of renal calculus  S/P Foot Surgery: 2002, 2014  S/P Cholecystectomy: 2001  S/P Breast Lumpectomy: 1994, 2003 - left breast benign      REVIEW OF SYSTEMS:  as per hpi    VITALS:  Vital Signs Last 24 Hrs  T(C): 37.2 (13 Apr 2019 19:15), Max: 37.6 (13 Apr 2019 08:00)  T(F): 98.9 (13 Apr 2019 19:15), Max: 99.7 (13 Apr 2019 08:00)  HR: 65 (13 Apr 2019 22:00) (49 - 72)  BP: 108/55 (13 Apr 2019 22:00) (84/49 - 120/58)  BP(mean): 79 (13 Apr 2019 22:00) (62 - 83)  RR: 22 (13 Apr 2019 22:00) (13 - 32)  SpO2: 100% (13 Apr 2019 22:00) (96% - 100%)      PHYSICAL EXAM:  GENERAL: intubated, sedated  HEAD:  Atraumatic, Normocephalic  EYES: EOMI, PERRLA, conjunctiva and sclera clear  NECK: no jvd  CHEST/LUNG: coarse bs b/l  HEART: S1, S2;   ABDOMEN: Soft, Nontender, nondistended; Bowel sounds present  EXTREMITIES:  2+ Peripheral Pulses, No clubbing, cyanosis, or edema,  right knee cast  Neuro: sedated    Consultant(s) Notes Reviewed:  [x ] YES  [ ] NO  Care Discussed with Consultants/Other Providers [ x] YES  [ ] NO    MEDS:  MEDICATIONS  (STANDING):  aspirin  chewable 81 milliGRAM(s) Oral daily  chlorhexidine 2% Cloths 1 Application(s) Topical daily  chlorhexidine 4% Liquid 1 Application(s) Topical <User Schedule>  dexmedetomidine Infusion 0.2 MICROgram(s)/kG/Hr (3.415 mL/Hr) IV Continuous <Continuous>  dextrose 5%. 1000 milliLiter(s) (50 mL/Hr) IV Continuous <Continuous>  dextrose 50% Injectable 12.5 Gram(s) IV Push once  dextrose 50% Injectable 25 Gram(s) IV Push once  dextrose 50% Injectable 25 Gram(s) IV Push once  heparin  Injectable 5000 Unit(s) SubCutaneous every 8 hours  insulin lispro (HumaLOG) corrective regimen sliding scale   SubCutaneous every 6 hours  levothyroxine Injectable 75 MICROGram(s) IV Push at bedtime  meropenem  IVPB      midodrine 10 milliGRAM(s) Oral three times a day  pantoprazole  Injectable 40 milliGRAM(s) IV Push every 12 hours  phenylephrine    Infusion 1 MICROgram(s)/kG/Min (25.613 mL/Hr) IV Continuous <Continuous>  polyethylene glycol 3350 17 Gram(s) Oral daily  senna Syrup 10 milliLiter(s) Oral at bedtime    MEDICATIONS  (PRN):  dextrose 40% Gel 15 Gram(s) Oral once PRN Blood Glucose LESS THAN 70 milliGRAM(s)/deciliter  glucagon  Injectable 1 milliGRAM(s) IntraMuscular once PRN Glucose LESS THAN 70 milligrams/deciliter  lidocaine/prilocaine Cream 1 Application(s) Topical daily PRN hd days  ondansetron Injectable 4 milliGRAM(s) IV Push every 12 hours PRN Nausea and/or Vomiting        ALLERGIES:  allopurinol (Rash)  Augmentin (Rash)  Levaquin (Rash)      LABS:                                         10.2   11.4  )-----------( 221      ( 12 Apr 2019 23:54 )             30.2   04-12    135  |  93<L>  |  32<H>  ----------------------------<  135<H>  3.8   |  19<L>  |  3.52<H>    Ca    8.3<L>      12 Apr 2019 23:54  Phos  3.0     04-12  Mg     1.8     04-12    TPro  6.6  /  Alb  3.5  /  TBili  0.9  /  DBili  x   /  AST  335<H>  /  ALT  118<H>  /  AlkPhos  121<H>  04-12          < from: CT Knee No Cont, Right (04.10.19 @ 03:34) >  Impression:    Acute, impacted fractures of the right proximal tibia and fibula as   described.      < end of copied text >           < from: Xray Tibia + Fibula 2 Views, Right (04.09.19 @ 20:28) >  impression:    There is an acute, comminuted fracture of the proximal tibial metaphysis   with mild anterior displacement of the distal fracture fragment. There is   questionable intra-articular extension on the lateral view. There is a   large knee joint effusion. There is a proximal fibular fracture.    There is no fracture of the hip or femur. The ankle is poorly visualized   secondary to technique and may be externally rotated with respect to the   knee. There is no ankle joint effusion. Vascular calcifications are noted.     CT of the knee can be performed for further evaluation.    < end of copied text >

## 2019-04-13 NOTE — PROGRESS NOTE ADULT - ATTENDING COMMENTS
Patient seen and examined, data and imaging reviewed, history as stated above.  IN brief 78 year old woman with ESRD on HD, CHF with poor LV function and cardiac cirrhosis, admitted post fall, transferred to MICU last night with respiratory failure, now intubated on pressors.  She has two sets of blood cultures growing gram+ cocci in clusters, rule out staph aureus and is on Vancomycin/meropenam.  She is status post large volume paracentesis.  IMpression: septic cardiogenic shock.  Continue antibioitcs pending culture results.  May need LISA if Staph aureus- would consult ID.  LOGAN dopplers given recent fall, fracture, and acute respiratory falure.  she is critically ill.

## 2019-04-13 NOTE — PROGRESS NOTE ADULT - ASSESSMENT
Acute hyopercapneic repisratory failure rquiring mechanical intubataion  Tib-Fib fracture s/p mechanical fall  ESRD  ACute on chronic sytolic and diastolic CHF  CAD  Bilateral R>L pleural effusions, ascites due to fluid overload in setting of CHF and ESRD    REC    Plans for therapeutic paracentesis per MICU  Negative balance with HD as tolerated  CMV today: deferr weaning   4/14   1) Acute respiratory failure.  s/p RRT for unresponsiveness. Mildly improved w/ narcan. Intubated for airway protection. S/p synchronized cardioversion + ketamine for SVT on 4/12/19  Today ABG noted. Pt is alert and follows simple command. I&O 3.8 liters negative. 4.8 liter removed from paracentesis yesterday. Pt is currently on CPAP trial.  Continue wean off ventilator support per MICU team.  2) fever  24 T-max 38.3, yesterday, elevated inflammatory markers and liver enzyme. Chest x-ray reviewed Pt is already on Cefepime. Management defer to MICU, GI  3) Pleural effusions.   Chronic left and large right side. s/p paracentesis, removed more than 6 liters yesterday. Repeat chest x-ray.   4) Hypotension  Small dose of Bhaskar, continue wean off as possible   5)  Prophylaxis   Pt is on heparin SQ and PPI

## 2019-04-13 NOTE — PHYSICAL THERAPY INITIAL EVALUATION ADULT - STRENGTHENING, PT EVAL
GOAL: Pt will improve R LE strength by 1/2 grade to improve level of independence with mobility skills and ADLs in 2 weeks.

## 2019-04-13 NOTE — PHYSICAL THERAPY INITIAL EVALUATION ADULT - GENERAL OBSERVATIONS, REHAB EVAL
Pt received semi-supine in bed (+) ETT, R LE cast, IVs, NG drain, MICU monitoring. Pt lethargic, VC to maintain eyes open, agreeable to PT eval.

## 2019-04-13 NOTE — PHYSICAL THERAPY INITIAL EVALUATION ADULT - ACTIVE RANGE OF MOTION EXAMINATION, REHAB EVAL
Left LE Active ROM was WFL (within functional limits)/bilateral upper extremity Active ROM was WFL (within functional limits)/deficits as listed below/R knee ext NA due to cast; pt with decr. R hip AROM due to weight of cast

## 2019-04-13 NOTE — PROGRESS NOTE ADULT - SUBJECTIVE AND OBJECTIVE BOX
[facility]   [phone]  DIALYSIS NOTE  Chief Complaint: ESRD/Ongoing hemodialysis requirement.     24 hour events/subjective:    intubated on phyenylephrine drip         ALLERGIES & MEDICATIONS  --------------------------------------------------------------------------------  Allergies    allopurinol (Rash)  Augmentin (Rash)  Levaquin (Rash)    Intolerances      Standing Inpatient Medications  aspirin  chewable 81 milliGRAM(s) Oral daily  chlorhexidine 2% Cloths 1 Application(s) Topical daily  chlorhexidine 4% Liquid 1 Application(s) Topical <User Schedule>  dexmedetomidine Infusion 0.2 MICROgram(s)/kG/Hr IV Continuous <Continuous>  dextrose 5%. 1000 milliLiter(s) IV Continuous <Continuous>  dextrose 50% Injectable 12.5 Gram(s) IV Push once  dextrose 50% Injectable 25 Gram(s) IV Push once  dextrose 50% Injectable 25 Gram(s) IV Push once  heparin  Injectable 5000 Unit(s) SubCutaneous every 8 hours  insulin lispro (HumaLOG) corrective regimen sliding scale   SubCutaneous every 6 hours  levothyroxine Injectable 75 MICROGram(s) IV Push at bedtime  meropenem  IVPB      midodrine 10 milliGRAM(s) Oral three times a day  pantoprazole  Injectable 40 milliGRAM(s) IV Push every 12 hours  phenylephrine    Infusion 1 MICROgram(s)/kG/Min IV Continuous <Continuous>    PRN Inpatient Medications  dextrose 40% Gel 15 Gram(s) Oral once PRN  glucagon  Injectable 1 milliGRAM(s) IntraMuscular once PRN  lidocaine/prilocaine Cream 1 Application(s) Topical daily PRN  ondansetron Injectable 4 milliGRAM(s) IV Push every 12 hours PRN      REVIEW OF SYSTEMS  --------------------------------------------------------------------------------  intubated       VITALS/PHYSICAL EXAM  --------------------------------------------------------------------------------  T(C): 36.8 (04-13-19 @ 15:00), Max: 38.3 (04-12-19 @ 20:00)  HR: 65 (04-13-19 @ 15:45) (49 - 72)  BP: 108/53 (04-13-19 @ 15:45) (81/42 - 120/58)  RR: 23 (04-13-19 @ 15:45) (12 - 36)  SpO2: 100% (04-13-19 @ 15:45) (79% - 100%)  Wt(kg): --        04-12-19 @ 07:01  -  04-13-19 @ 07:00  --------------------------------------------------------  IN: 817 mL / OUT: 4700 mL / NET: -3883 mL    04-13-19 @ 07:01  -  04-13-19 @ 15:57  --------------------------------------------------------  IN: 182 mL / OUT: 500 mL / NET: -318 mL      Physical Exam:  		  Gen: intubated    	no jvd   	Pulm: decrease bs  no rales or ronchi or wheezing  	CV: RRR, S1S2; no rub  	Abd: +BS, soft,  + distended ascites softer   	: No suprapubic tenderness  	UE: Warm, no cyanosis  no clubbing,  no edema;   	LE: Warm, no cyanosis  no clubbing, no edema RLE cast   	avf +  bruit and thrill 	  	  	  LABS/STUDIES  --------------------------------------------------------------------------------              10.2   11.4  >-----------<  221      [04-12-19 @ 23:54]              30.2     135  |  93  |  32  ----------------------------<  135      [04-12-19 @ 23:54]  3.8   |  19  |  3.52        Ca     8.3     [04-12-19 @ 23:54]      Mg     1.8     [04-12-19 @ 23:54]      Phos  3.0     [04-12-19 @ 23:54]    TPro  6.6  /  Alb  3.5  /  TBili  0.9  /  DBili  x   /  AST  335  /  ALT  118  /  AlkPhos  121  [04-12-19 @ 23:54]    PT/INR: PT 16.7 , INR 1.44       [04-12-19 @ 23:54]  PTT: 33.3       [04-12-19 @ 23:54]          [04-12-19 @ 11:43]            imp/suggest: ESRD      Hemodialysis Prescription:  	Access:  	Dialyzer: revaclear   	Blood Flow (mL/Min): 400  	Dialysate Flow (mL/Min): 600  	Target UF (Liters):  	Treatment Time:  	Potassium:   	Calcium: 2.5  	  KYLEIGH    Vitamin D     continue with hd   see hd flow sheet

## 2019-04-13 NOTE — PROGRESS NOTE ADULT - PROBLEM SELECTOR PLAN 1
iv proton pump inhibitor bid  doubt gi bleed  patient had stated that emesis was her chocolate milk  trend hgb/hct daily  abdomen U/S with moderate to large volume ascites, for IR paracentesis when stable  MICU care

## 2019-04-13 NOTE — PROGRESS NOTE ADULT - SUBJECTIVE AND OBJECTIVE BOX
Patient is a 78y old  Female who presents with a chief complaint of Right tib/fib fracture (13 Apr 2019 07:12)    Any change in ROS: no overnight event, pt is on CPAP trial, awake, alert, slightly agitated. on pressor     MEDICATIONS  (STANDING):  aspirin  chewable 81 milliGRAM(s) Oral daily  chlorhexidine 2% Cloths 1 Application(s) Topical daily  chlorhexidine 4% Liquid 1 Application(s) Topical <User Schedule>  dexmedetomidine Infusion 0.2 MICROgram(s)/kG/Hr (3.415 mL/Hr) IV Continuous <Continuous>  dextrose 5%. 1000 milliLiter(s) (50 mL/Hr) IV Continuous <Continuous>  dextrose 50% Injectable 12.5 Gram(s) IV Push once  dextrose 50% Injectable 25 Gram(s) IV Push once  dextrose 50% Injectable 25 Gram(s) IV Push once  heparin  Injectable 5000 Unit(s) SubCutaneous every 8 hours  insulin lispro (HumaLOG) corrective regimen sliding scale   SubCutaneous every 6 hours  levothyroxine Injectable 75 MICROGram(s) IV Push at bedtime  meropenem  IVPB      midodrine 10 milliGRAM(s) Oral three times a day  pantoprazole  Injectable 40 milliGRAM(s) IV Push every 12 hours  phenylephrine    Infusion 1 MICROgram(s)/kG/Min (25.613 mL/Hr) IV Continuous <Continuous>    MEDICATIONS  (PRN):  dextrose 40% Gel 15 Gram(s) Oral once PRN Blood Glucose LESS THAN 70 milliGRAM(s)/deciliter  glucagon  Injectable 1 milliGRAM(s) IntraMuscular once PRN Glucose LESS THAN 70 milligrams/deciliter  lidocaine/prilocaine Cream 1 Application(s) Topical daily PRN hd days  ondansetron Injectable 4 milliGRAM(s) IV Push every 12 hours PRN Nausea and/or Vomiting    Vital Signs Last 24 Hrs  T(C): 37.1 (13 Apr 2019 04:00), Max: 38.3 (12 Apr 2019 20:00)  T(F): 98.8 (13 Apr 2019 04:00), Max: 100.9 (12 Apr 2019 20:00)  HR: 65 (13 Apr 2019 07:00) (49 - 68)  BP: 102/53 (13 Apr 2019 07:00) (81/42 - 122/58)  BP(mean): 75 (13 Apr 2019 07:00) (57 - 83)  RR: 25 (13 Apr 2019 07:00) (12 - 39)  SpO2: 100% (13 Apr 2019 07:00) (79% - 100%)  Mode: AC/ CMV (Assist Control/ Continuous Mandatory Ventilation)  RR (machine): 12  TV (machine): 450  FiO2: 40  PEEP: 5  ITime: 0.9  MAP: 9  PIP: 30    I&O's Summary    12 Apr 2019 07:01  -  13 Apr 2019 07:00  --------------------------------------------------------  IN: 817 mL / OUT: 4700 mL / NET: -3883 mL          Physical Exam:   GENERAL: slightly agitated.   HEENT: Head is normocephalic and atraumatic. ETT (+), OJ (+)  NECK: Supple with no elevated JVP.  LUNGS: Clear to auscultation without wheeze and rhonchi.  HEART: S1 and S2 present without murmur.  ABDOMEN: Soft, nontender, and nondistended.   EXTREMITIES: No edema, R leg cast (+), R pedal edema (+)   NEUROLOGIC: awake, alert, follows commands    Labs:  ABG - ( 12 Apr 2019 23:49 )  pH, Arterial: 7.40  pH, Blood: x     /  pCO2: 34    /  pO2: 139   / HCO3: 21    / Base Excess: -3.1  /  SaO2: 99          CARDIAC MARKERS ( 12 Apr 2019 11:43 )  x     / x     / 313 U/L / x     / 5.1 ng/mL                          10.2   11.4  )-----------( 221      ( 12 Apr 2019 23:54 )             30.2                         10.0   13.0  )-----------( 224      ( 12 Apr 2019 01:15 )             30.6                         9.4    10.7  )-----------( 203      ( 11 Apr 2019 22:02 )             29.5                         8.8    11.07 )-----------( 200      ( 11 Apr 2019 08:09 )             26.7                         10.0   9.1   )-----------( 190      ( 10 Apr 2019 04:45 )             29.5                         11.4   8.3   )-----------( 205      ( 09 Apr 2019 18:30 )             34.3     04-12    135  |  93<L>  |  32<H>  ----------------------------<  135<H>  3.8   |  19<L>  |  3.52<H>  04-12    132<L>  |  93<L>  |  20  ----------------------------<  167<H>  3.6   |  18<L>  |  2.72<H>  04-11    135  |  95<L>  |  17  ----------------------------<  149<H>  3.4<L>   |  21<L>  |  2.45<H>  04-11    133<L>  |  91<L>  |  34<H>  ----------------------------<  128<H>  4.0   |  25  |  4.43<H>  04-10    135  |  91<L>  |  21  ----------------------------<  164<H>  3.9   |  26  |  3.07<H>  04-09    126<L>  |  85<L>  |  42<H>  ----------------------------<  288<H>  4.9   |  21<L>  |  5.23<H>    Ca    8.3<L>      12 Apr 2019 23:54  Ca    8.5      12 Apr 2019 01:15  Ca    7.8<L>      11 Apr 2019 22:02  Phos  3.0     04-12  Phos  2.8     04-12  Phos  4.5     04-11  Mg     1.8     04-12  Mg     1.7     04-12  Mg     1.7     04-11    TPro  6.6  /  Alb  3.5  /  TBili  0.9  /  DBili  x   /  AST  335<H>  /  ALT  118<H>  /  AlkPhos  121<H>  04-12  TPro  7.6  /  Alb  3.3  /  TBili  0.6  /  DBili  x   /  AST  217<H>  /  ALT  88<H>  /  AlkPhos  175<H>  04-12  TPro  6.8  /  Alb  2.9<L>  /  TBili  0.5  /  DBili  x   /  AST  147<H>  /  ALT  59<H>  /  AlkPhos  147<H>  04-11  TPro  7.6  /  Alb  3.4  /  TBili  0.5  /  DBili  x   /  AST  62<H>  /  ALT  30  /  AlkPhos  126<H>  04-09    CAPILLARY BLOOD GLUCOSE      POCT Blood Glucose.: 142 mg/dL (13 Apr 2019 05:33)  POCT Blood Glucose.: 136 mg/dL (12 Apr 2019 23:20)  POCT Blood Glucose.: 111 mg/dL (12 Apr 2019 18:40)  POCT Blood Glucose.: 98 mg/dL (12 Apr 2019 12:50)      LIVER FUNCTIONS - ( 12 Apr 2019 23:54 )  Alb: 3.5 g/dL / Pro: 6.6 g/dL / ALK PHOS: 121 U/L / ALT: 118 U/L / AST: 335 U/L / GGT: x           PT/INR - ( 12 Apr 2019 23:54 )   PT: 16.7 sec;   INR: 1.44 ratio         PTT - ( 12 Apr 2019 23:54 )  PTT:33.3 sec    Procalcitonin, Serum: 10.80 ng/mL (04-13 @ 01:59)  Fluid Source --  Albumin, Fluid2.9 g/dL  Glucose, [id number] mg/dL  Protein total, Fluid5.5 g/dL  Lacatate Dehydrogenase, Fluid91 U/L  pH, Fluid--  Cytopathology-Non Gyn Report--      Studies  Chest X-RAY  < from: Xray Chest 1 View- PORTABLE-Urgent (04.12.19 @ 01:55) >    EXAM:  XR CHEST PORTABLE URGENT 1V                            PROCEDURE DATE:  04/12/2019            INTERPRETATION:  A single chest x-ray was obtained on April 12, 2019.    Indication: And NG tube and endotracheal tube placement.    Impression:    The heart is normal in size. Bilateral pleural effusion. Endotracheal   tube is in good position. NG tube is in the stomach however its tip is   not seen on the current study. A central line seen on the right and the   tip is in superior vena cava. No pneumothorax. Status post mitral annulus   repair.      < end of copied text >    CT SCAN Chest   < from: CT Chest No Cont (04.10.19 @ 21:59) >  EXAM:  CT CHEST                            PROCEDURE DATE:  04/10/2019            INTERPRETATION:  CLINICAL INFORMATION: Shortness of breath.    COMPARISON: Chest x-ray 4/9/2019. CT chest 8/7/2015.    PROCEDURE:     CT of the Chest was performed without intravenous contrast.  Sagittal and coronal reformats were performed. Maximum intensity   projection images were generated.      FINDINGS:    Evaluation of the lungs and pleura demonstrates patent central airways.   There is a left pleural effusion which is unchanged when compared to   prior CT 8/7/2015 and a large right pleural effusion. No pneumothorax.    Patient status post median sternotomy and CABG and mitral valve   annuloplasty. The heart is enlarged. No pericardial effusion. Coronary   artery and aortic calcifications. The thoracic aorta is normal in contour   and course. The pulmonary artery is mildly enlarged measuring 3.4 cm,   unchanged since 2015.    No enlarged cervical, mediastinal, hilar or axillary lymph nodes. Dilated   fluid filled esophagus.    Evaluation of the upper abdomen demonstrates large volume ascites.    Age-indeterminate left first rib fracture. Multilevel degenerative   changes of the spine.    IMPRESSION:     New large right pleural effusion with large volume abdominal ascites and   unchanged left pleural effusion since 2015.    < end of copied text >    Venous Dopplers: LE: < from: [facility] Duplex Lower Ext Vein Scan, Left (01.09.17 @ 21:17) >  EXAM:  DUPLEX EXT VEINS LOWER LT                            PROCEDURE DATE:  01/09/2017        INTERPRETATION:  CLINICAL INFORMATION: Left lower extremity pain and   swelling.    COMPARISON: None available.    TECHNIQUE: Duplex sonography of the left lower extremity with color and   spectral Doppler, with and without compression.      FINDINGS:    There is normal compressibility of the left common femoral, femoral and   popliteal veins. No calf vein thrombosis is detected.    Doppler examination shows normal spontaneous and phasic flow.    IMPRESSION:     No evidence of left lower extremity deep venous thrombosis.    < end of copied text >    CT Abdomen   Others  < from: TTE with Doppler (w/Cont) (04.12.19 @ 07:59) >  Patient name: VIRGILIO KAPLAN  YOB: 1940   Age: 78 (F)   MR#: [id number]  Study Date: 4/12/2019  [id number] Marietta Mendez RDCS  Study quality: Technically fair  Referring Physician: Naty Menjivar MD  Blood Pressure: 97/51 mmHg  Height: 178 cm  Weight: 68 kg  BSA: 1.9 m2  ------------------------------------------------------------------------  PROCEDURE: Transthoracic echocardiogram with 2-D, M-Mode  and complete spectral and color flow Doppler. Verbal  consent was obtained for injection of  Ultrasonic Enhancing  Agent following a discussion of risks and benefits.  Following intravenous injection of Ultrasonic Enhancing  Agent , harmonic imaging was performed.  INDICATION: Dyspnea, unspecified (R06.00)  ------------------------------------------------------------------------  Dimensions:    Normal Values:  LA:     4.2    2.0 - 4.0 cm  Ao:     3.1    2.0 - 3.8 cm  SEPTUM: 1.0    0.6 - 1.2 cm  PWT:    1.0    0.6 - 1.1 cm  LVIDd:  4.2    3.0 - 5.6 cm  LVIDs:  3.7    1.8 - 4.0 cm  Derived variables:  LVMI: 74 g/m2  RWT: 0.47  Fractional short: 12 %  EF (Teicholtz): 26 %  Doppler Peak Velocity (m/sec): AoV=1.0  ------------------------------------------------------------------------  Observations:  Mitral Valve: Annuloplsty ring seen in the mitral position  Mild mitral regurgitation.  Peak mitral valve gradient  equals 11 mm Hg, mean transmitral valve gradient equals 5  mm Hg, consistent with moderate mitral stenosis.  Aortic Valve/Aorta: Normal trileaflet aortic valve. Peak  transaortic valve gradient equals 4 mm Hg, mean transaortic  valve gradient equals 1 mm Hg, aortic valve velocity time  integral equals 15 cm. Peak left ventricular outflow tract  gradient equals 3 mm Hg, mean gradient is equal to 1 mm Hg,  LVOT velocity time integral equals 11 cm.  Aortic Root: 3.1 cm.  Left Atrium: Normal left atrium.  LA volume index = 34  cc/m2.  Left Ventricle: Severe global left ventricular systolic  dysfunction.  Right Heart: Moderate right atrial enlargement. Right  ventricular enlargement with decreased right ventricular  systolic function. Normal tricuspid valve. Mild tricuspid  regurgitation. Normal pulmonic valve. Minimal pulmonic  regurgitation.  Pericardium/Pleura: Normal pericardium with trace  pericardial effusion.  Right pleural effusion.  Hemodynamic: Estimated right atrial pressure is 8 mm Hg.  Estimated right ventricular systolic pressure equals 29 mm  Hg, assuming right atrial pressure equals 8 mm Hg,  consistent with normal pulmonary pressures.  ------------------------------------------------------------------------  Conclusions:  1. Annuloplsty ring seen in the mitral position Mild mitral  regurgitation.  Peak mitral valve gradient equals 11 mm Hg,  mean transmitral valve gradient equals 5 mm Hg, consistent  with moderate mitral stenosis.  2. Normal trileaflet aortic valve.  3. Aortic Root: 3.1 cm.  4. Normal left atrium.  LA volume index = 34 cc/m2.  5. Severe global left ventricular systolic dysfunction.  6. Right ventricular enlargement with decreased right  ventricular systolic function.  7. Estimated right ventricular systolic pressure equals 29  mm Hg, assuming right atrial pressure equals 8 mm Hg,  consistent with normal pulmonary pressures.  8. Normaltricuspid valve. Mild tricuspid regurgitation.  *** Compared with echocardiogram report of 10/27/2015, no  significant changes noted.  -------------------------------------------    < end of copied text >

## 2019-04-13 NOTE — PROGRESS NOTE ADULT - ASSESSMENT
79 yo F with hx of cad, cabg, DM, esrd, on HD, chronic hypotension on midodrine 10 mg tid with sob, pleural effusions, chf, ascites, and new tib/fib fx    1- esrd  2- hypotension   3- chf  4- ascites   5- tib/fib fx   6- respiratory failure     hd revaclear 300 3.25 hr 350 cc bfr 500 cc fluid removal  midodrine and eric support

## 2019-04-13 NOTE — PROGRESS NOTE ADULT - SUBJECTIVE AND OBJECTIVE BOX
INTERVAL HISTORY:      REVIEW OF SYSTEMS:   Unable to assess as patient is intubated and sedated.     MEDICATIONS  (STANDING):  aspirin  chewable 81 milliGRAM(s) Oral daily  chlorhexidine 2% Cloths 1 Application(s) Topical daily  chlorhexidine 4% Liquid 1 Application(s) Topical <User Schedule>  dexmedetomidine Infusion 0.2 MICROgram(s)/kG/Hr (3.415 mL/Hr) IV Continuous <Continuous>  dextrose 5%. 1000 milliLiter(s) (50 mL/Hr) IV Continuous <Continuous>  dextrose 50% Injectable 12.5 Gram(s) IV Push once  dextrose 50% Injectable 25 Gram(s) IV Push once  dextrose 50% Injectable 25 Gram(s) IV Push once  heparin  Injectable 5000 Unit(s) SubCutaneous every 8 hours  insulin lispro (HumaLOG) corrective regimen sliding scale   SubCutaneous every 6 hours  levothyroxine Injectable 75 MICROGram(s) IV Push at bedtime  meropenem  IVPB      midodrine 10 milliGRAM(s) Oral three times a day  pantoprazole  Injectable 40 milliGRAM(s) IV Push every 12 hours  phenylephrine    Infusion 1 MICROgram(s)/kG/Min (25.613 mL/Hr) IV Continuous <Continuous>    MEDICATIONS  (PRN):  dextrose 40% Gel 15 Gram(s) Oral once PRN Blood Glucose LESS THAN 70 milliGRAM(s)/deciliter  glucagon  Injectable 1 milliGRAM(s) IntraMuscular once PRN Glucose LESS THAN 70 milligrams/deciliter  lidocaine/prilocaine Cream 1 Application(s) Topical daily PRN hd days  ondansetron Injectable 4 milliGRAM(s) IV Push every 12 hours PRN Nausea and/or Vomiting    OBJECTIVE:  ICU Vital Signs Last 24 Hrs  T(C): 37.1 (13 Apr 2019 04:00), Max: 38.3 (12 Apr 2019 20:00)  T(F): 98.8 (13 Apr 2019 04:00), Max: 100.9 (12 Apr 2019 20:00)  HR: 58 (13 Apr 2019 06:40) (49 - 68)  BP: 105/54 (13 Apr 2019 04:30) (81/42 - 122/58)  BP(mean): 78 (13 Apr 2019 04:30) (57 - 83)  RR: 19 (13 Apr 2019 04:30) (12 - 39)  SpO2: 100% (13 Apr 2019 06:40) (79% - 100%)    Mode: AC/ CMV (Assist Control/ Continuous Mandatory Ventilation)  RR (machine): 12  TV (machine): 450  FiO2: 40  PEEP: 5  ITime: 0.9  MAP: 9  PIP: 30    I&O's Summary    12 Apr 2019 07:01  -  13 Apr 2019 07:00  --------------------------------------------------------  IN: 736.4 mL / OUT: 4700 mL / NET: -3963.6 mL    PHYSICAL EXAM:       General: Intubated       HEENT: Head atraumatic; EOMI, PERRL; normal oropharynx, no oral lesions       Neck: Supple, no JVD, no LAD, thyroid normal       Chest/Lungs: Clear to ausculation bilaterally; no wheezes, rales or rhonchi       Heart: RRR, normal S1, S2, no murmurs       Abdomen: Soft, ND, NTTP       : No CVA tenderness       Extremities: Peripheral pulses 2+ bilaterally; no LE edema or cyanosis       Skin: Warm, well-perfused; no rashes or lesions       Neuro: Sedated    LABS:  ABG - ( 12 Apr 2019 23:49 )  pH, Arterial: 7.40  pH, Blood: x     /  pCO2: 34    /  pO2: 139   / HCO3: 21    / Base Excess: -3.1  /  SaO2: 99                   10.2   11.4  )-----------( 221      ( 12 Apr 2019 23:54 )             30.2     04-12    135  |  93<L>  |  32<H>  ----------------------------<  135<H>  3.8   |  19<L>  |  3.52<H>    Ca    8.3<L>      12 Apr 2019 23:54  Phos  3.0     04-12  Mg     1.8     04-12    TPro  6.6  /  Alb  3.5  /  TBili  0.9  /  DBili  x   /  AST  335<H>  /  ALT  118<H>  /  AlkPhos  121<H>  04-12    LIVER FUNCTIONS - ( 12 Apr 2019 23:54 )  Alb: 3.5 g/dL / Pro: 6.6 g/dL / ALK PHOS: 121 U/L / ALT: 118 U/L / AST: 335 U/L / GGT: x           PT/INR - ( 12 Apr 2019 23:54 )   PT: 16.7 sec;   INR: 1.44 ratio    PTT - ( 12 Apr 2019 23:54 )  PTT:33.3 sec    Creatine Kinase, Serum: 313 U/L (04-12 @ 11:43)  CKMB Units: 5.1 ng/mL (04-12 @ 11:43)        IMAGING: INTERVAL HISTORY:  Overnight, patient febrile and started on meropenem. On spontaneous breathing trial this morning, became tachypneic and started back on Precedex. Awake, follows commands.     REVIEW OF SYSTEMS:   Unable to assess as patient is intubated.     MEDICATIONS  (STANDING):  aspirin  chewable 81 milliGRAM(s) Oral daily  chlorhexidine 2% Cloths 1 Application(s) Topical daily  chlorhexidine 4% Liquid 1 Application(s) Topical <User Schedule>  dexmedetomidine Infusion 0.2 MICROgram(s)/kG/Hr (3.415 mL/Hr) IV Continuous <Continuous>  dextrose 5%. 1000 milliLiter(s) (50 mL/Hr) IV Continuous <Continuous>  dextrose 50% Injectable 12.5 Gram(s) IV Push once  dextrose 50% Injectable 25 Gram(s) IV Push once  dextrose 50% Injectable 25 Gram(s) IV Push once  heparin  Injectable 5000 Unit(s) SubCutaneous every 8 hours  insulin lispro (HumaLOG) corrective regimen sliding scale   SubCutaneous every 6 hours  levothyroxine Injectable 75 MICROGram(s) IV Push at bedtime  meropenem  IVPB      midodrine 10 milliGRAM(s) Oral three times a day  pantoprazole  Injectable 40 milliGRAM(s) IV Push every 12 hours  phenylephrine    Infusion 1 MICROgram(s)/kG/Min (25.613 mL/Hr) IV Continuous <Continuous>    MEDICATIONS  (PRN):  dextrose 40% Gel 15 Gram(s) Oral once PRN Blood Glucose LESS THAN 70 milliGRAM(s)/deciliter  glucagon  Injectable 1 milliGRAM(s) IntraMuscular once PRN Glucose LESS THAN 70 milligrams/deciliter  lidocaine/prilocaine Cream 1 Application(s) Topical daily PRN hd days  ondansetron Injectable 4 milliGRAM(s) IV Push every 12 hours PRN Nausea and/or Vomiting    OBJECTIVE:  ICU Vital Signs Last 24 Hrs  T(C): 37.1 (13 Apr 2019 04:00), Max: 38.3 (12 Apr 2019 20:00)  T(F): 98.8 (13 Apr 2019 04:00), Max: 100.9 (12 Apr 2019 20:00)  HR: 58 (13 Apr 2019 06:40) (49 - 68)  BP: 105/54 (13 Apr 2019 04:30) (81/42 - 122/58)  BP(mean): 78 (13 Apr 2019 04:30) (57 - 83)  RR: 19 (13 Apr 2019 04:30) (12 - 39)  SpO2: 100% (13 Apr 2019 06:40) (79% - 100%)    Mode: AC/ CMV (Assist Control/ Continuous Mandatory Ventilation)  RR (machine): 12  TV (machine): 450  FiO2: 40  PEEP: 5  ITime: 0.9  MAP: 9  PIP: 30    I&O's Summary    12 Apr 2019 07:01  -  13 Apr 2019 07:00  --------------------------------------------------------  IN: 736.4 mL / OUT: 4700 mL / NET: -3963.6 mL    PHYSICAL EXAM:       General: Intubated, NAD       HEENT: EOMI, PERRL; normal oropharynx, no oral lesions       Neck: Supple, no JVD, no LAD, thyroid normal       Chest/Lungs: Clear to ausculation bilaterally; no wheezes, rales or rhonchi       Heart: RRR, normal S1, S2, no murmurs       Abdomen: Soft, ND, NTTP       : No CVA tenderness       Extremities: Peripheral pulses 2+ bilaterally; no LE edema or cyanosis       Skin: Warm, well-perfused; no rashes or lesions       Neuro: Sedated    LABS:  ABG - ( 12 Apr 2019 23:49 )  pH, Arterial: 7.40  pH, Blood: x     /  pCO2: 34    /  pO2: 139   / HCO3: 21    / Base Excess: -3.1  /  SaO2: 99                   10.2   11.4  )-----------( 221      ( 12 Apr 2019 23:54 )             30.2     04-12    135  |  93<L>  |  32<H>  ----------------------------<  135<H>  3.8   |  19<L>  |  3.52<H>    Ca    8.3<L>      12 Apr 2019 23:54  Phos  3.0     04-12  Mg     1.8     04-12    TPro  6.6  /  Alb  3.5  /  TBili  0.9  /  DBili  x   /  AST  335<H>  /  ALT  118<H>  /  AlkPhos  121<H>  04-12    LIVER FUNCTIONS - ( 12 Apr 2019 23:54 )  Alb: 3.5 g/dL / Pro: 6.6 g/dL / ALK PHOS: 121 U/L / ALT: 118 U/L / AST: 335 U/L / GGT: x           PT/INR - ( 12 Apr 2019 23:54 )   PT: 16.7 sec;   INR: 1.44 ratio    PTT - ( 12 Apr 2019 23:54 )  PTT:33.3 sec    Creatine Kinase, Serum: 313 U/L (04-12 @ 11:43)  CKMB Units: 5.1 ng/mL (04-12 @ 11:43)        IMAGING: Medical ICU Progress Note    INTERVAL HISTORY:  Overnight, patient febrile and started on meropenem. On spontaneous breathing trial this morning, became tachypneic and started back on Precedex. Awake, follows commands.     REVIEW OF SYSTEMS:   Unable to assess as patient is intubated.     MEDICATIONS  (STANDING):  aspirin  chewable 81 milliGRAM(s) Oral daily  chlorhexidine 2% Cloths 1 Application(s) Topical daily  chlorhexidine 4% Liquid 1 Application(s) Topical <User Schedule>  dexmedetomidine Infusion 0.2 MICROgram(s)/kG/Hr (3.415 mL/Hr) IV Continuous <Continuous>  dextrose 5%. 1000 milliLiter(s) (50 mL/Hr) IV Continuous <Continuous>  dextrose 50% Injectable 12.5 Gram(s) IV Push once  dextrose 50% Injectable 25 Gram(s) IV Push once  dextrose 50% Injectable 25 Gram(s) IV Push once  heparin  Injectable 5000 Unit(s) SubCutaneous every 8 hours  insulin lispro (HumaLOG) corrective regimen sliding scale   SubCutaneous every 6 hours  levothyroxine Injectable 75 MICROGram(s) IV Push at bedtime  meropenem  IVPB      midodrine 10 milliGRAM(s) Oral three times a day  pantoprazole  Injectable 40 milliGRAM(s) IV Push every 12 hours  phenylephrine    Infusion 1 MICROgram(s)/kG/Min (25.613 mL/Hr) IV Continuous <Continuous>    MEDICATIONS  (PRN):  dextrose 40% Gel 15 Gram(s) Oral once PRN Blood Glucose LESS THAN 70 milliGRAM(s)/deciliter  glucagon  Injectable 1 milliGRAM(s) IntraMuscular once PRN Glucose LESS THAN 70 milligrams/deciliter  lidocaine/prilocaine Cream 1 Application(s) Topical daily PRN hd days  ondansetron Injectable 4 milliGRAM(s) IV Push every 12 hours PRN Nausea and/or Vomiting    OBJECTIVE:  ICU Vital Signs Last 24 Hrs  T(C): 37.1 (13 Apr 2019 04:00), Max: 38.3 (12 Apr 2019 20:00)  T(F): 98.8 (13 Apr 2019 04:00), Max: 100.9 (12 Apr 2019 20:00)  HR: 58 (13 Apr 2019 06:40) (49 - 68)  BP: 105/54 (13 Apr 2019 04:30) (81/42 - 122/58)  BP(mean): 78 (13 Apr 2019 04:30) (57 - 83)  RR: 19 (13 Apr 2019 04:30) (12 - 39)  SpO2: 100% (13 Apr 2019 06:40) (79% - 100%)    Mode: AC/ CMV (Assist Control/ Continuous Mandatory Ventilation)  RR (machine): 12  TV (machine): 450  FiO2: 40  PEEP: 5  ITime: 0.9  MAP: 9  PIP: 30    I&O's Summary    12 Apr 2019 07:01  -  13 Apr 2019 07:00  --------------------------------------------------------  IN: 736.4 mL / OUT: 4700 mL / NET: -3963.6 mL    PHYSICAL EXAM:       General: Awake, NAD, intubated       HEENT: EOMI, PERRL, ETT in place       Neck: Supple, no JVD, no LAD       Chest/Lungs: Clear to ausculation bilaterally; no wheezes, rales or rhonchi       Heart: RRR, normal S1, S2, no murmurs       Abdomen: Soft, ND, normoactive bowel sounds       Extremities: Peripheral pulses 2+ bilaterally; +R leg cast; no edema       Skin: Warm, well-perfused; no rashes or lesions       Neuro: Awake, follows commands    LABS:  CBC Full  -  ( 12 Apr 2019 23:54 )  WBC Count : 11.4 K/uL  RBC Count : 3.22 M/uL  Hemoglobin : 10.2 g/dL  Hematocrit : 30.2 %  Platelet Count - Automated : 221 K/uL  Mean Cell Volume : 93.9 fl  Mean Cell Hemoglobin : 31.7 pg  Mean Cell Hemoglobin Concentration : 33.7 gm/dL  Auto Neutrophil # : x  Auto Lymphocyte # : x  Auto Monocyte # : x  Auto Eosinophil # : x  Auto Basophil # : x  Auto Neutrophil % : x  Auto Lymphocyte % : x  Auto Monocyte % : x  Auto Eosinophil % : x  Auto Basophil % : x    04-12    135  |  93<L>  |  32<H>  ----------------------------<  135<H>  3.8   |  19<L>  |  3.52<H>    Ca    8.3<L>      12 Apr 2019 23:54  Phos  3.0     04-12  Mg     1.8     04-12    TPro  6.6  /  Alb  3.5  /  TBili  0.9  /  DBili  x   /  AST  335<H>  /  ALT  118<H>  /  AlkPhos  121<H>  04-12    PT/INR - ( 12 Apr 2019 23:54 )   PT: 16.7 sec;   INR: 1.44 ratio   PTT - ( 12 Apr 2019 23:54 )  PTT:33.3 sec    ABG - ( 12 Apr 2019 23:49 )  pH, Arterial: 7.40  pH, Blood: x     /  pCO2: 34    /  pO2: 139   / HCO3: 21    / Base Excess: -3.1  /  SaO2: 99        Culture - Blood (collected 12 Apr 2019 02:36)  Source: .Blood  Gram Stain (13 Apr 2019 01:39):    Growth in aerobic bottle: Gram Positive Cocci in Clusters    Growth in anaerobic bottle: Gram Positive Cocci in Clusters  Preliminary Report (13 Apr 2019 01:41):    Growth in aerobic bottle: Gram Positive Cocci in Clusters    Culture - Blood (collected 12 Apr 2019 02:36)  Source: .Blood  Gram Stain (13 Apr 2019 01:42):    Growth in aerobic bottle: Gram Positive Cocci in Clusters    Growth in anaerobic bottle: Gram Positive Cocci in Clusters  Preliminary Report (13 Apr 2019 01:42):    Growth in aerobic bottle: Gram Positive Cocci in Clusters    Growth in anaerobic bottle: Gram Positive Cocci in Clusters      RUQ Abdominal US (4.12.19)  IMPRESSION:   Moderate to large volume of abdominal ascites.  Bilateral pleural effusions.  Mild surface nodularity of the liver suggesting early cirrhotic morphology.

## 2019-04-13 NOTE — PROGRESS NOTE ADULT - ASSESSMENT
78 F PMH CAD s/p CABG x 4 2015, T2DM with peripheral neuropathy, ESRD on HD T/Th/S via L AVF, hypotension on midodrine, HFrEF, HTN, HLD, b/l cataracts s/p surgery, p/w mechanical fall and R leg pain, found to have R tib/fib fracture.   pt with resp failure, now intubated in micu    resp failure  likely multifactorial  vent support  pressure support  mngt as per MICU    open fracture of proximal end of right tibia,  confirmed on XR and CT  -ortho recs appreciated:  s/p cast. no surgical intervention at this time  outpt f/u  NWB RLE     Coronary artery disease without angina pectoris  -c/w aspirin, statin  -f/u cards .        Type 2 diabetes mellitus with hyperglycemia, with long-term current use of insulin.  start HISS  endo consult dr simons  -james, CC diet,    ESRD on hemodialysis.  HD as per renal      ascites.   s/p paracentesis   f/u fluid analysis     emesis  gi consulted  f/u guaiac  ppi  monitor cbc     fevers/sepsis  f/u cult   iv abx     mngt as per MICU

## 2019-04-13 NOTE — PROGRESS NOTE ADULT - ASSESSMENT
78F with CAD s/p CABG x 4 2015, CHF, T2DM with peripheral neuropathy, ESRD on HD T/Th/S via L AVF, hypotension on midodrine, HFrEF, HTN, HLD, b/l cataract replacement initially admitted 4/9 after mechanical fall and R proximal tib/fib fracture that does not require urgent surgical eval per ortho now with hard cast, course complicated by melena and coffee ground emesis later determined to be chocolate milk without any intervention by GI a/w acute hypercapnic respiratory failure possibly secondary to opoid use given history of improvement with narcan during RRT vs secondary to worsening ascites causing restrictive lung dz. In MICU patient noted to be in SVT and received synchronized cardioversion with conversion to sinus rhythm after second shock. Patient later intubated for respiratory failure.     #NEURO:   -AMS secondary to hypercarbic respiratory failure - opioid induced VS restrictive from worsening ascites   -intubated in MICU with improvement in blood gas  -Ammonia 34, Head CT negative for acute changes, patient responsive post intubation     #CV:   -CAD with history of CABG - c/w asa and hold statin given elevated liver enzymes  -EF in 2015 30-25% pending repeat TTE  -patient with evidence of demand ischemia, trend cardiac enzymes  -SVT s/p cardioversion, continue with telemetry monitoring  -patient requiring phenylephrine and is on no sedation with differential for shock including hypovolemia 2/2 HD, cardiogenic given HFrEF    #PULM  -Hypercarbic Respiratory Failure multifactorial: opioid induced vs worsening ascites s/p intubation   -c/w mechanical ventilation, pressure support trials    #GI:   -Nutrition- maintain NPO accept meds at this time, bile coming from OGT  -patient with HFrEF with concern for congestive hepatopathy  -coffee ground emesis apparently chocolate milk. C/w PPI for now and monitor for GIB.   -s/p paracentesis 4/12    #Renal   -ESRD; d/w nephrology plans for fluid removal   -monitor electrolytes    #Endo:   -f/u TSH, A1C and lipid profile if not completed   -Fingersticks q 4 while NPO     #ID:   -patient on cefazolin for fracture  -BCx positive for Staph; given vancomycin and started on meropenem overnight    DVT PPx:   -HSQ 78F with CAD s/p CABG x4, HFrEF (EF 26% 4/2019), T2DM with peripheral neuropathy, ESRD on HD T/Th/S via L AVF, hypotension on midodrine, HTN, HLD initially admitted 4/9 after mechanical fall c/b R proximal tib/fib fracture not requiring emergent surgical intervention s/p cast placement with hospital course c/b possible melena now resolved, and RRT on 4/11 for acute hypercapnic respiratory failure in setting of opioid use requiring intubation. MICU course c/b SVT s/p synchronized cardioversion x2 now in sinus rhythm and Staph aureus bacteremia.     #Neuro:   - Patient transferred to MICU for AMS 2/2 hypercarbia. Unclear etiology, possibly opioid-induced as patient receiving pain medications for leg fracture. Ammonia wnl. CTH unremarkable.   - Now more awake, following commands off sedation. Placed back on low dose of Precedex for agitation/tachypnea during spontaneous breathing trial.     #CV:   - CAD s/p multiple CABG: continue ASA, holding statin in setting of transaminitis  - HFrEF: repeat TTE 4/2019 with EF 26% severe global RV systolic dysfunction, moderate MS, mild TR, normal pulm pressures  - Troponin elevation likely in setting of underlying ESRD and demand ischemia  - SVT s/p cardioversion: now in sinus, continue to monitor on tele  - Orthostatic hypotension: continue midodrine 10mg q8h via OGT  - Shock: likely septic given +BCx, requiring low dose phenylephrine, continue to wean as tolerated  - Obtain LISA given valvular disease and Staph bacteremia    #Pulmonary:   - Initially transferred for acute hypercapnic respiratory failure of unclear etiology but suspect 2/2 opioid use  - Continue mechanical ventilation, daily spontaneous breathing trials  - Keep on ventilator for now pending LISA    #GI:   - Continue Nepro tube feeds via OGT, monitor residuals  - Transaminitis with abdominal sonogram showing early signs of cirrhosis likely 2/2 heart failure. Hepatitis studies negative.   - Melena and questionable coffee-ground emesis (apparently chocolate milk). Continue PPI. No plans for GI intervention at this time.   - S/p paracentesis on 4/12 with removal of 4.9L, fluid studies c/w portal hypertension likely 2/2 RV failure    #Renal:   - Hx of ESRD on HD T/Th/S; continue HD as per Nephrology; scheduled for session today  - Continue to monitor electrolytes    #Endo:   - TSH 34 but free T4 within normal range  - HbA1c 8.3%, continue insulin sliding scale  - Fingersticks q6h while on tube feeds    #ID:   - Found to have Staph aureus bacteremia  - Continue vancomycin (by level) and meropenem  - Repeat cultures in AM  - Obtain LISA to assess for endocarditis    DVT PPx:   - HSQ

## 2019-04-14 LAB
-  AMPICILLIN/SULBACTAM: SIGNIFICANT CHANGE UP
-  CEFAZOLIN: SIGNIFICANT CHANGE UP
-  CLINDAMYCIN: SIGNIFICANT CHANGE UP
-  ERYTHROMYCIN: SIGNIFICANT CHANGE UP
-  GENTAMICIN: SIGNIFICANT CHANGE UP
-  OXACILLIN: SIGNIFICANT CHANGE UP
-  RIFAMPIN: SIGNIFICANT CHANGE UP
-  TETRACYCLINE: SIGNIFICANT CHANGE UP
-  TRIMETHOPRIM/SULFAMETHOXAZOLE: SIGNIFICANT CHANGE UP
-  VANCOMYCIN: SIGNIFICANT CHANGE UP
ALBUMIN SERPL ELPH-MCNC: 3.1 G/DL — LOW (ref 3.3–5)
ALP SERPL-CCNC: 147 U/L — HIGH (ref 40–120)
ALT FLD-CCNC: 63 U/L — HIGH (ref 10–45)
AMMONIA BLD-MCNC: 39 UMOL/L — SIGNIFICANT CHANGE UP (ref 11–55)
ANION GAP SERPL CALC-SCNC: 21 MMOL/L — HIGH (ref 5–17)
APTT BLD: 36.5 SEC — HIGH (ref 27.5–36.3)
AST SERPL-CCNC: 201 U/L — HIGH (ref 10–40)
BILIRUB SERPL-MCNC: 0.8 MG/DL — SIGNIFICANT CHANGE UP (ref 0.2–1.2)
BUN SERPL-MCNC: 25 MG/DL — HIGH (ref 7–23)
CALCIUM SERPL-MCNC: 8.3 MG/DL — LOW (ref 8.4–10.5)
CHLORIDE SERPL-SCNC: 96 MMOL/L — SIGNIFICANT CHANGE UP (ref 96–108)
CO2 SERPL-SCNC: 21 MMOL/L — LOW (ref 22–31)
CREAT SERPL-MCNC: 2.93 MG/DL — HIGH (ref 0.5–1.3)
CULTURE RESULTS: SIGNIFICANT CHANGE UP
CULTURE RESULTS: SIGNIFICANT CHANGE UP
GLUCOSE BLDC GLUCOMTR-MCNC: 121 MG/DL — HIGH (ref 70–99)
GLUCOSE BLDC GLUCOMTR-MCNC: 162 MG/DL — HIGH (ref 70–99)
GLUCOSE BLDC GLUCOMTR-MCNC: 219 MG/DL — HIGH (ref 70–99)
GLUCOSE BLDC GLUCOMTR-MCNC: 220 MG/DL — HIGH (ref 70–99)
GLUCOSE SERPL-MCNC: 141 MG/DL — HIGH (ref 70–99)
GRAM STN FLD: SIGNIFICANT CHANGE UP
HCT VFR BLD CALC: 33.5 % — LOW (ref 34.5–45)
HGB BLD-MCNC: 10.9 G/DL — LOW (ref 11.5–15.5)
INR BLD: 1.31 RATIO — HIGH (ref 0.88–1.16)
MAGNESIUM SERPL-MCNC: 1.8 MG/DL — SIGNIFICANT CHANGE UP (ref 1.6–2.6)
MCHC RBC-ENTMCNC: 31 PG — SIGNIFICANT CHANGE UP (ref 27–34)
MCHC RBC-ENTMCNC: 32.5 GM/DL — SIGNIFICANT CHANGE UP (ref 32–36)
MCV RBC AUTO: 95.1 FL — SIGNIFICANT CHANGE UP (ref 80–100)
METHOD TYPE: SIGNIFICANT CHANGE UP
ORGANISM # SPEC MICROSCOPIC CNT: SIGNIFICANT CHANGE UP
PHOSPHATE SERPL-MCNC: 2.7 MG/DL — SIGNIFICANT CHANGE UP (ref 2.5–4.5)
PLATELET # BLD AUTO: 261 K/UL — SIGNIFICANT CHANGE UP (ref 150–400)
POTASSIUM SERPL-MCNC: 3.9 MMOL/L — SIGNIFICANT CHANGE UP (ref 3.5–5.3)
POTASSIUM SERPL-SCNC: 3.9 MMOL/L — SIGNIFICANT CHANGE UP (ref 3.5–5.3)
PROT SERPL-MCNC: 6.7 G/DL — SIGNIFICANT CHANGE UP (ref 6–8.3)
PROTHROM AB SERPL-ACNC: 15.1 SEC — HIGH (ref 10–12.9)
RBC # BLD: 3.52 M/UL — LOW (ref 3.8–5.2)
RBC # FLD: 15.1 % — HIGH (ref 10.3–14.5)
SODIUM SERPL-SCNC: 138 MMOL/L — SIGNIFICANT CHANGE UP (ref 135–145)
SPECIMEN SOURCE: SIGNIFICANT CHANGE UP
VANCOMYCIN TROUGH SERPL-MCNC: 11 UG/ML — SIGNIFICANT CHANGE UP (ref 10–20)
WBC # BLD: 14.7 K/UL — HIGH (ref 3.8–10.5)
WBC # FLD AUTO: 14.7 K/UL — HIGH (ref 3.8–10.5)

## 2019-04-14 PROCEDURE — 99223 1ST HOSP IP/OBS HIGH 75: CPT

## 2019-04-14 PROCEDURE — 99291 CRITICAL CARE FIRST HOUR: CPT

## 2019-04-14 RX ORDER — ACETAMINOPHEN 500 MG
650 TABLET ORAL ONCE
Qty: 0 | Refills: 0 | Status: COMPLETED | OUTPATIENT
Start: 2019-04-14 | End: 2019-04-14

## 2019-04-14 RX ORDER — VANCOMYCIN HCL 1 G
1000 VIAL (EA) INTRAVENOUS ONCE
Qty: 0 | Refills: 0 | Status: COMPLETED | OUTPATIENT
Start: 2019-04-14 | End: 2019-04-14

## 2019-04-14 RX ORDER — CEFAZOLIN SODIUM 1 G
1000 VIAL (EA) INJECTION EVERY 24 HOURS
Qty: 0 | Refills: 0 | Status: DISCONTINUED | OUTPATIENT
Start: 2019-04-14 | End: 2019-04-23

## 2019-04-14 RX ADMIN — HEPARIN SODIUM 5000 UNIT(S): 5000 INJECTION INTRAVENOUS; SUBCUTANEOUS at 13:10

## 2019-04-14 RX ADMIN — DEXMEDETOMIDINE HYDROCHLORIDE IN 0.9% SODIUM CHLORIDE 3.42 MICROGRAM(S)/KG/HR: 4 INJECTION INTRAVENOUS at 02:00

## 2019-04-14 RX ADMIN — POLYETHYLENE GLYCOL 3350 17 GRAM(S): 17 POWDER, FOR SOLUTION ORAL at 12:57

## 2019-04-14 RX ADMIN — MIDODRINE HYDROCHLORIDE 10 MILLIGRAM(S): 2.5 TABLET ORAL at 21:59

## 2019-04-14 RX ADMIN — Medication 81 MILLIGRAM(S): at 12:57

## 2019-04-14 RX ADMIN — PHENYLEPHRINE HYDROCHLORIDE 25.61 MICROGRAM(S)/KG/MIN: 10 INJECTION INTRAVENOUS at 05:15

## 2019-04-14 RX ADMIN — PANTOPRAZOLE SODIUM 40 MILLIGRAM(S): 20 TABLET, DELAYED RELEASE ORAL at 17:14

## 2019-04-14 RX ADMIN — CHLORHEXIDINE GLUCONATE 1 APPLICATION(S): 213 SOLUTION TOPICAL at 05:00

## 2019-04-14 RX ADMIN — DEXMEDETOMIDINE HYDROCHLORIDE IN 0.9% SODIUM CHLORIDE 3.42 MICROGRAM(S)/KG/HR: 4 INJECTION INTRAVENOUS at 00:55

## 2019-04-14 RX ADMIN — Medication 2: at 12:57

## 2019-04-14 RX ADMIN — Medication 1: at 05:55

## 2019-04-14 RX ADMIN — CHLORHEXIDINE GLUCONATE 1 APPLICATION(S): 213 SOLUTION TOPICAL at 12:57

## 2019-04-14 RX ADMIN — MIDODRINE HYDROCHLORIDE 10 MILLIGRAM(S): 2.5 TABLET ORAL at 13:10

## 2019-04-14 RX ADMIN — HEPARIN SODIUM 5000 UNIT(S): 5000 INJECTION INTRAVENOUS; SUBCUTANEOUS at 21:59

## 2019-04-14 RX ADMIN — SENNA PLUS 10 MILLILITER(S): 8.6 TABLET ORAL at 22:00

## 2019-04-14 RX ADMIN — Medication 2: at 17:41

## 2019-04-14 RX ADMIN — MIDODRINE HYDROCHLORIDE 10 MILLIGRAM(S): 2.5 TABLET ORAL at 05:22

## 2019-04-14 RX ADMIN — Medication 650 MILLIGRAM(S): at 13:22

## 2019-04-14 RX ADMIN — Medication 75 MICROGRAM(S): at 22:00

## 2019-04-14 RX ADMIN — Medication 1 DROP(S): at 17:14

## 2019-04-14 RX ADMIN — Medication 250 MILLIGRAM(S): at 02:00

## 2019-04-14 RX ADMIN — Medication 100 MILLIGRAM(S): at 16:28

## 2019-04-14 RX ADMIN — Medication 650 MILLIGRAM(S): at 14:22

## 2019-04-14 RX ADMIN — HEPARIN SODIUM 5000 UNIT(S): 5000 INJECTION INTRAVENOUS; SUBCUTANEOUS at 06:00

## 2019-04-14 RX ADMIN — PANTOPRAZOLE SODIUM 40 MILLIGRAM(S): 20 TABLET, DELAYED RELEASE ORAL at 06:00

## 2019-04-14 RX ADMIN — MEROPENEM 100 MILLIGRAM(S): 1 INJECTION INTRAVENOUS at 05:00

## 2019-04-14 NOTE — PROGRESS NOTE ADULT - SUBJECTIVE AND OBJECTIVE BOX
Patient is a 78y old  Female who presents with a chief complaint of R tib/fib fx (13 Apr 2019 22:20)    Any change in ROS: intubated and sedated with dexmedetomidine. on norsynephrine. Bacteremia (+), pt remains critical     MEDICATIONS  (STANDING):  aspirin  chewable 81 milliGRAM(s) Oral daily  chlorhexidine 2% Cloths 1 Application(s) Topical daily  chlorhexidine 4% Liquid 1 Application(s) Topical <User Schedule>  dexmedetomidine Infusion 0.2 MICROgram(s)/kG/Hr (3.415 mL/Hr) IV Continuous <Continuous>  dextrose 5%. 1000 milliLiter(s) (50 mL/Hr) IV Continuous <Continuous>  dextrose 50% Injectable 12.5 Gram(s) IV Push once  dextrose 50% Injectable 25 Gram(s) IV Push once  dextrose 50% Injectable 25 Gram(s) IV Push once  heparin  Injectable 5000 Unit(s) SubCutaneous every 8 hours  insulin lispro (HumaLOG) corrective regimen sliding scale   SubCutaneous every 6 hours  levothyroxine Injectable 75 MICROGram(s) IV Push at bedtime  meropenem  IVPB 1000 milliGRAM(s) IV Intermittent every 24 hours  meropenem  IVPB      midodrine 10 milliGRAM(s) Oral three times a day  pantoprazole  Injectable 40 milliGRAM(s) IV Push every 12 hours  phenylephrine    Infusion 1 MICROgram(s)/kG/Min (25.613 mL/Hr) IV Continuous <Continuous>  polyethylene glycol 3350 17 Gram(s) Oral daily  senna Syrup 10 milliLiter(s) Oral at bedtime    MEDICATIONS  (PRN):  dextrose 40% Gel 15 Gram(s) Oral once PRN Blood Glucose LESS THAN 70 milliGRAM(s)/deciliter  glucagon  Injectable 1 milliGRAM(s) IntraMuscular once PRN Glucose LESS THAN 70 milligrams/deciliter  lidocaine/prilocaine Cream 1 Application(s) Topical daily PRN hd days  ondansetron Injectable 4 milliGRAM(s) IV Push every 12 hours PRN Nausea and/or Vomiting    Vital Signs Last 24 Hrs  T(C): 38.2 (14 Apr 2019 08:00), Max: 38.2 (14 Apr 2019 08:00)  T(F): 100.7 (14 Apr 2019 08:00), Max: 100.7 (14 Apr 2019 08:00)  HR: 61 (14 Apr 2019 08:00) (59 - 75)  BP: 113/56 (14 Apr 2019 08:00) (89/44 - 132/57)  BP(mean): 80 (14 Apr 2019 08:00) (63 - 85)  RR: 12 (14 Apr 2019 08:00) (5 - 32)  SpO2: 100% (14 Apr 2019 08:00) (96% - 100%)  Mode: AC/ CMV (Assist Control/ Continuous Mandatory Ventilation)  RR (machine): 12  TV (machine): 450  FiO2: 40  PEEP: 5  ITime: 0.9  MAP: 10  PIP: 26    I&O's Summary    13 Apr 2019 07:01  -  14 Apr 2019 07:00  --------------------------------------------------------  IN: 1686 mL / OUT: 500 mL / NET: 1186 mL    14 Apr 2019 07:01  -  14 Apr 2019 08:29  --------------------------------------------------------  IN: 31.5 mL / OUT: 0 mL / NET: 31.5 mL      Physical Exam:   GENERAL: The patient comfortable with no apparent distress. sedated   HEENT: Head is normocephalic and atraumatic. ETT, OJ  NECK: Supple with no elevated JVP.  LUNGS: Clear to auscultation without wheeze and rhonchi.   HEART: S1 and S2 present without murmur.  ABDOMEN: Soft, nontender, and nondistended.   EXTREMITIES: No edema or calf tenderness.  NEUROLOGIC: Grossly intact.    Labs:  ABG - ( 12 Apr 2019 23:49 )  pH, Arterial: 7.40  pH, Blood: x     /  pCO2: 34    /  pO2: 139   / HCO3: 21    / Base Excess: -3.1  /  SaO2: 99                CARDIAC MARKERS ( 12 Apr 2019 11:43 )  x     / x     / 313 U/L / x     / 5.1 ng/mL                            10.9   14.7  )-----------( 261      ( 13 Apr 2019 23:35 )             33.5                         10.2   11.4  )-----------( 221      ( 12 Apr 2019 23:54 )             30.2                         10.0   13.0  )-----------( 224      ( 12 Apr 2019 01:15 )             30.6                         9.4    10.7  )-----------( 203      ( 11 Apr 2019 22:02 )             29.5                         8.8    11.07 )-----------( 200      ( 11 Apr 2019 08:09 )             26.7     04-14    138  |  96  |  25<H>  ----------------------------<  141<H>  3.9   |  21<L>  |  2.93<H>  04-12    135  |  93<L>  |  32<H>  ----------------------------<  135<H>  3.8   |  19<L>  |  3.52<H>  04-12    132<L>  |  93<L>  |  20  ----------------------------<  167<H>  3.6   |  18<L>  |  2.72<H>  04-11    135  |  95<L>  |  17  ----------------------------<  149<H>  3.4<L>   |  21<L>  |  2.45<H>  04-11    133<L>  |  91<L>  |  34<H>  ----------------------------<  128<H>  4.0   |  25  |  4.43<H>    Ca    8.3<L>      14 Apr 2019 01:20  Ca    8.3<L>      12 Apr 2019 23:54  Phos  2.7     04-14  Phos  3.0     04-12  Mg     1.8     04-14  Mg     1.8     04-12    TPro  6.7  /  Alb  3.1<L>  /  TBili  0.8  /  DBili  x   /  AST  201<H>  /  ALT  63<H>  /  AlkPhos  147<H>  04-14  TPro  6.6  /  Alb  3.5  /  TBili  0.9  /  DBili  x   /  AST  335<H>  /  ALT  118<H>  /  AlkPhos  121<H>  04-12  TPro  7.6  /  Alb  3.3  /  TBili  0.6  /  DBili  x   /  AST  217<H>  /  ALT  88<H>  /  AlkPhos  175<H>  04-12  TPro  6.8  /  Alb  2.9<L>  /  TBili  0.5  /  DBili  x   /  AST  147<H>  /  ALT  59<H>  /  AlkPhos  147<H>  04-11    CAPILLARY BLOOD GLUCOSE  162 (14 Apr 2019 06:00)      POCT Blood Glucose.: 162 mg/dL (14 Apr 2019 05:50)  POCT Blood Glucose.: 121 mg/dL (14 Apr 2019 01:14)  POCT Blood Glucose.: 105 mg/dL (13 Apr 2019 17:30)  POCT Blood Glucose.: 112 mg/dL (13 Apr 2019 12:40)      LIVER FUNCTIONS - ( 14 Apr 2019 01:20 )  Alb: 3.1 g/dL / Pro: 6.7 g/dL / ALK PHOS: 147 U/L / ALT: 63 U/L / AST: 201 U/L / GGT: x           PT/INR - ( 13 Apr 2019 23:35 )   PT: 15.1 sec;   INR: 1.31 ratio         PTT - ( 13 Apr 2019 23:35 )  PTT:36.5 sec    Procalcitonin, Serum: 10.80 ng/mL (04-13 @ 01:59)  Fluid Source --  Albumin, Fluid2.9 g/dL  Glucose, [id number] mg/dL  Protein total, Fluid5.5 g/dL  Lacatate Dehydrogenase, Fluid91 U/L  pH, Fluid--  Cytopathology-Non Gyn Report--      Studies  Chest X-RAY  < from: Xray Chest 1 View- PORTABLE-Urgent (04.12.19 @ 01:55) >  EXAM:  XR CHEST PORTABLE URGENT 1V                            PROCEDURE DATE:  04/12/2019            INTERPRETATION:  A single chest x-ray was obtained on April 12, 2019.    Indication: And NG tube and endotracheal tube placement.    Impression:    The heart is normal in size. Bilateral pleural effusion. Endotracheal   tube is in good position. NG tube is in the stomach however its tip is   not seen on the current study. A central line seen on the right and the   tip is in superior vena cava. No pneumothorax. Status post mitral annulus   repair.      < end of copied text >    CT SCAN Chest   < from: CT Chest No Cont (04.10.19 @ 21:59) >    EXAM:  CT CHEST                            PROCEDURE DATE:  04/10/2019            INTERPRETATION:  CLINICAL INFORMATION: Shortness of breath.    COMPARISON: Chest x-ray 4/9/2019. CT chest 8/7/2015.    PROCEDURE:     CT of the Chest was performed without intravenous contrast.  Sagittal and coronal reformats were performed. Maximum intensity   projection images were generated.      FINDINGS:    Evaluation of the lungs and pleura demonstrates patent central airways.   There is a left pleural effusion which is unchanged when compared to   prior CT 8/7/2015 and a large right pleural effusion. No pneumothorax.    Patient status post median sternotomy and CABG and mitral valve   annuloplasty. The heart is enlarged. No pericardial effusion. Coronary   artery and aortic calcifications. The thoracic aorta is normal in contour   and course. The pulmonary artery is mildly enlarged measuring 3.4 cm,   unchanged since 2015.    No enlarged cervical, mediastinal, hilar or axillary lymph nodes. Dilated   fluid filled esophagus.    Evaluation of the upper abdomen demonstrates large volume ascites.    Age-indeterminate left first rib fracture. Multilevel degenerative   changes of the spine.    IMPRESSION:     New large right pleural effusion with large volume abdominal ascites and   unchanged left pleural effusion since 2015.    < end of copied text >    Venous Dopplers: LE:   < from: [facility] Duplex Lower Ext Vein Scan, Left (01.09.17 @ 21:17) >  EXAM:  DUPLEX EXT VEINS LOWER LT                            PROCEDURE DATE:  01/09/2017        INTERPRETATION:  CLINICAL INFORMATION: Left lower extremity pain and   swelling.    COMPARISON: None available.    TECHNIQUE: Duplex sonography of the left lower extremity with color and   spectral Doppler, with and without compression.      FINDINGS:    There is normal compressibility of the left common femoral, femoral and   popliteal veins. No calf vein thrombosis is detected.    Doppler examination shows normal spontaneous and phasic flow.    IMPRESSION:     No evidence of left lower extremity deep venous thrombosis.    < end of copied text >    CT Abdomen    Others  < from: TTE with Doppler (w/Cont) (04.12.19 @ 07:59) >    Patient name: VRIGILIO KAPLAN  YOB: 1940   Age: 78 (F)   MR#: [id number]  Study Date: 4/12/2019  [id number]: Marietta Mendez RDCS  Study quality: Technically fair  Referring Physician: Naty Menjivar MD  Blood Pressure: 97/51 mmHg  Height: 178 cm  Weight: 68 kg  BSA: 1.9 m2  ------------------------------------------------------------------------  PROCEDURE: Transthoracic echocardiogram with 2-D, M-Mode  and complete spectral and color flow Doppler. Verbal  consent was obtained for injection of  Ultrasonic Enhancing  Agent following a discussion of risks and benefits.  Following intravenous injection of Ultrasonic Enhancing  Agent , harmonic imaging was performed.  INDICATION: Dyspnea, unspecified (R06.00)  ------------------------------------------------------------------------  Dimensions:    Normal Values:  LA:     4.2    2.0 - 4.0 cm  Ao:     3.1    2.0 - 3.8 cm  SEPTUM: 1.0    0.6 - 1.2 cm  PWT:    1.0    0.6 - 1.1 cm  LVIDd:  4.2    3.0 - 5.6 cm  LVIDs:  3.7    1.8 - 4.0 cm  Derived variables:  LVMI: 74 g/m2  RWT: 0.47  Fractional short: 12 %  EF (Teicholtz): 26 %  Doppler Peak Velocity (m/sec): AoV=1.0  ------------------------------------------------------------------------  Observations:  Mitral Valve: Annuloplsty ring seen in the mitral position  Mild mitral regurgitation.  Peak mitral valve gradient  equals 11 mm Hg, mean transmitral valve gradient equals 5  mm Hg, consistent with moderate mitral stenosis.  Aortic Valve/Aorta: Normal trileaflet aortic valve. Peak  transaortic valve gradient equals 4 mm Hg, mean transaortic  valve gradient equals 1 mm Hg, aortic valve velocity time  integral equals 15 cm. Peak left ventricular outflow tract  gradient equals 3 mm Hg, mean gradient is equal to 1 mm Hg,  LVOT velocity time integral equals 11 cm.  Aortic Root: 3.1 cm.  Left Atrium: Normal left atrium.  LA volume index = 34  cc/m2.  Left Ventricle: Severe global left ventricular systolic  dysfunction.  Right Heart: Moderate right atrial enlargement. Right  ventricular enlargement with decreased right ventricular  systolic function. Normal tricuspid valve. Mild tricuspid  regurgitation. Normal pulmonic valve. Minimal pulmonic  regurgitation.  Pericardium/Pleura: Normal pericardium with trace  pericardial effusion.  Right pleural effusion.  Hemodynamic: Estimated right atrial pressure is 8 mm Hg.  Estimated right ventricular systolic pressure equals 29 mm  Hg, assuming right atrial pressure equals 8 mm Hg,  consistent with normal pulmonary pressures.  ------------------------------------------------------------------------  Conclusions:  1. Annuloplsty ring seen in the mitral position Mild mitral  regurgitation.  Peak mitral valve gradient equals 11 mm Hg,  mean transmitral valve gradient equals 5 mm Hg, consistent  with moderate mitral stenosis.  2. Normal trileaflet aortic valve.  3. Aortic Root: 3.1 cm.  4. Normal left atrium.  LA volume index = 34 cc/m2.  5. Severe global left ventricular systolic dysfunction.  6. Right ventricular enlargement with decreased right  ventricular systolic function.  7. Estimated right ventricular systolic pressure equals 29  mm Hg, assuming right atrial pressure equals 8 mm Hg,  consistent with normal pulmonary pressures.  8. Normaltricuspid valve. Mild tricuspid regurgitation.  *** Compared with echocardiogram report of 10/27/2015, no  significant changes noted.    < end of copied text >

## 2019-04-14 NOTE — PROGRESS NOTE ADULT - SUBJECTIVE AND OBJECTIVE BOX
[facility]   [phone]  RENAL FOLLOW UP NOTE  --------------------------------------------------------------------------------  Chief Complaint:    24 hour events/subjective:    seen earlier. intubated       PAST HISTORY  --------------------------------------------------------------------------------  No significant changes to PMH, PSH, FHx, SHx, unless otherwise noted    ALLERGIES & MEDICATIONS  --------------------------------------------------------------------------------  Allergies    allopurinol (Rash)  Augmentin (Rash)  Levaquin (Rash)    Intolerances      Standing Inpatient Medications  aspirin  chewable 81 milliGRAM(s) Oral daily  chlorhexidine 2% Cloths 1 Application(s) Topical daily  chlorhexidine 4% Liquid 1 Application(s) Topical <User Schedule>  dexmedetomidine Infusion 0.2 MICROgram(s)/kG/Hr IV Continuous <Continuous>  dextrose 5%. 1000 milliLiter(s) IV Continuous <Continuous>  dextrose 50% Injectable 12.5 Gram(s) IV Push once  dextrose 50% Injectable 25 Gram(s) IV Push once  dextrose 50% Injectable 25 Gram(s) IV Push once  heparin  Injectable 5000 Unit(s) SubCutaneous every 8 hours  insulin lispro (HumaLOG) corrective regimen sliding scale   SubCutaneous every 6 hours  levothyroxine Injectable 75 MICROGram(s) IV Push at bedtime  midodrine 10 milliGRAM(s) Oral three times a day  pantoprazole  Injectable 40 milliGRAM(s) IV Push every 12 hours  phenylephrine    Infusion 1 MICROgram(s)/kG/Min IV Continuous <Continuous>  polyethylene glycol 3350 17 Gram(s) Oral daily  senna Syrup 10 milliLiter(s) Oral at bedtime    PRN Inpatient Medications  dextrose 40% Gel 15 Gram(s) Oral once PRN  glucagon  Injectable 1 milliGRAM(s) IntraMuscular once PRN  lidocaine/prilocaine Cream 1 Application(s) Topical daily PRN  ondansetron Injectable 4 milliGRAM(s) IV Push every 12 hours PRN      REVIEW OF SYSTEMS  --------------------------------------------------------------------------------    intubated     VITALS/PHYSICAL EXAM  --------------------------------------------------------------------------------  T(C): 38.2 (04-14-19 @ 08:00), Max: 38.2 (04-14-19 @ 08:00)  HR: 75 (04-14-19 @ 12:23) (54 - 75)  BP: 97/49 (04-14-19 @ 12:22) (89/44 - 132/57)  RR: 12 (04-14-19 @ 12:22) (5 - 32)  SpO2: 100% (04-14-19 @ 12:23) (96% - 100%)  Wt(kg): --        04-13-19 @ 07:01  -  04-14-19 @ 07:00  --------------------------------------------------------  IN: 1686 mL / OUT: 500 mL / NET: 1186 mL    04-14-19 @ 07:01  -  04-14-19 @ 12:27  --------------------------------------------------------  IN: 128.2 mL / OUT: 0 mL / NET: 128.2 mL      Physical Exam:  	  Gen: intubated    	no jvd   	Pulm: decrease bs  no rales or ronchi or wheezing  	CV: RRR, S1S2; no rub  	Abd: +BS, soft,  + distended ascites softer   	: No suprapubic tenderness  	UE: Warm, no cyanosis  no clubbing,  no edema;   	LE: Warm, no cyanosis  no clubbing, no edema RLE cast   	avf +  bruit and thrill 	  		        LABS/STUDIES  --------------------------------------------------------------------------------              10.9   14.7  >-----------<  261      [04-13-19 @ 23:35]              33.5     138  |  96  |  25  ----------------------------<  141      [04-14-19 @ 01:20]  3.9   |  21  |  2.93        Ca     8.3     [04-14-19 @ 01:20]      Mg     1.8     [04-14-19 @ 01:20]      Phos  2.7     [04-14-19 @ 01:20]    TPro  6.7  /  Alb  3.1  /  TBili  0.8  /  DBili  x   /  AST  201  /  ALT  63  /  AlkPhos  147  [04-14-19 @ 01:20]    PT/INR: PT 15.1 , INR 1.31       [04-13-19 @ 23:35]  PTT: 36.5       [04-13-19 @ 23:35]      Creatinine Trend:  SCr 2.93 [04-14 @ 01:20]  SCr 3.52 [04-12 @ 23:54]  SCr 2.72 [04-12 @ 01:15]  SCr 2.45 [04-11 @ 22:02]  SCr 4.43 [04-11 @ 07:08]                  Iron 71, TIBC 135, %sat 53      [04-11-19 @ 17:50]  Ferritin 6834      [04-11-19 @ 17:57]  HbA1c 8.3      [04-12-19 @ 09:00]  TSH 34.70      [04-12-19 @ 07:41]  Lipid: chol 63, , HDL 12, LDL 23      [04-12-19 @ 09:06]

## 2019-04-14 NOTE — CONSULT NOTE ADULT - SUBJECTIVE AND OBJECTIVE BOX
HPI:  78F with DM, CAD s/p CABG, HFrEF on midodrine, ESRD on HD via Left AVF, admitted 4/9/19 after a fall   Pt lives in basement apartment and ordinarily ambulates with walker. Today was getting ready for HD, made it to the first step on her set of stairs, and reportedly felt her R leg "give out." Pt fell backwards but was caught by her family member who was assisting her up the stairs.   She denies LOC/syncope or head strike. +Pain in R leg post fall, worse with movement and much improved with immobilization. Could not bear weight or walk after fall.  Denies cp, sob at rest, f/c, n/v/d, dysuria, cough. +chronic BENAVIDEZ, reportedly stable since her CABG. Family at bedside providing collateral and confirms above details.       PAST MEDICAL & SURGICAL HISTORY:  CHF (congestive heart failure): Oct 2015- still sob  Shortness of breath  Hypotension  Type 2 diabetes mellitus  Chronic kidney disease (CKD): ON DIALYSIS - Tue, Thur, Sat  Nephrolithiasis: 2001  Ulcer: 2001  Hydronephrosis: Right 1/2012  Charcot Foot: Right Foot  Herniated Disc  Diabetic Neuropathy  Anemia: CKD  Hypothyroidism  Hypertension: NOT ANY MORE  Hyperlipidemia  S/P mitral valve repair: with CABG X 4 MAY 2015  S/P CABG (coronary artery bypass graft): x 4, May 2015  History of extraction of renal calculus  S/P Foot Surgery: 2002, 2014  S/P Cholecystectomy: 2001  S/P Breast Lumpectomy: 1994, 2003 - left breast benign      Allergies    allopurinol (Rash)  Augmentin (Rash)  Levaquin (Rash)    Intolerances        ANTIMICROBIALS:      OTHER MEDS:  acetaminophen    Suspension .. 650 milliGRAM(s) Oral once  aspirin  chewable 81 milliGRAM(s) Oral daily  chlorhexidine 2% Cloths 1 Application(s) Topical daily  chlorhexidine 4% Liquid 1 Application(s) Topical <User Schedule>  dexmedetomidine Infusion 0.2 MICROgram(s)/kG/Hr IV Continuous <Continuous>  dextrose 40% Gel 15 Gram(s) Oral once PRN  dextrose 5%. 1000 milliLiter(s) IV Continuous <Continuous>  dextrose 50% Injectable 12.5 Gram(s) IV Push once  dextrose 50% Injectable 25 Gram(s) IV Push once  dextrose 50% Injectable 25 Gram(s) IV Push once  glucagon  Injectable 1 milliGRAM(s) IntraMuscular once PRN  heparin  Injectable 5000 Unit(s) SubCutaneous every 8 hours  insulin lispro (HumaLOG) corrective regimen sliding scale   SubCutaneous every 6 hours  levothyroxine Injectable 75 MICROGram(s) IV Push at bedtime  lidocaine/prilocaine Cream 1 Application(s) Topical daily PRN  midodrine 10 milliGRAM(s) Oral three times a day  ondansetron Injectable 4 milliGRAM(s) IV Push every 12 hours PRN  pantoprazole  Injectable 40 milliGRAM(s) IV Push every 12 hours  phenylephrine    Infusion 1 MICROgram(s)/kG/Min IV Continuous <Continuous>  polyethylene glycol 3350 17 Gram(s) Oral daily  senna Syrup 10 milliLiter(s) Oral at bedtime      SOCIAL HISTORY:    no smoking, alcohol or drug abuse  no recent travel    FAMILY HISTORY:  FH: breast cancer: mother  FH: CHF (congestive heart failure): father      ROS:  Unobtainable because:   All other systems negative     Constitutional: no fever, no chills, no weight loss, no night sweats  Eye: no eye pain, no redness, no vision changes  ENT:  no sore throat, no rhinorrhea  Cardiovascular:  no chest pain, no palpitation  Respiratory:  no SOB, no cough  GI:  no abd pain, no vomiting, no diarrhea  urinary: no dysuria, no hematuria, no flank pain  : no  discharge or bleeding  musculoskeletal:  no joint pain, no joint swelling  skin:  no rash  neurology:  no headache, no seizure, no change in mental status  psych: no anxiety, no depression     Physical Exam:    General:    NAD, non toxic  Head: atraumatic, normocephalic  Eyes: normal sclera and conjunctiva  ENT:   no oropharyngeal lesions, no LAD, neck supple  Cardio:    regular S1,S2, no murmur  Respiratory:   clear b/l, no wheezing  abd:   soft, BS +, not tender, no hepatosplenomegaly  :     no CVAT, no suprapubic tenderness, no bagley  Musculoskeletal : no joint swelling, no edema  Skin:    no rash  vascular: no phlebitis, normal pulses  Neurologic:     no focal deficits  psych: normal affect, no suicidal ideation      Drug Dosing Weight  Height (cm): 177.8 (10 Apr 2019 02:12)  Weight (kg): 68.3 (10 Apr 2019 02:12)  BMI (kg/m2): 21.6 (10 Apr 2019 02:12)  BSA (m2): 1.85 (10 Apr 2019 02:12)    Vital Signs Last 24 Hrs  T(F): 101.5 (04-14-19 @ 12:00), Max: 101.5 (04-14-19 @ 12:00)    Vital Signs Last 24 Hrs  HR: 59 (04-14-19 @ 13:00) (54 - 75)  BP: 81/45 (04-14-19 @ 13:00) (81/45 - 132/57)  RR: 26 (04-14-19 @ 13:00)  SpO2: 98% (04-14-19 @ 13:00) (96% - 100%)  Wt(kg): --                          10.9   14.7  )-----------( 261      ( 13 Apr 2019 23:35 )             33.5       04-14    138  |  96  |  25<H>  ----------------------------<  141<H>  3.9   |  21<L>  |  2.93<H>    Ca    8.3<L>      14 Apr 2019 01:20  Phos  2.7     04-14  Mg     1.8     04-14    TPro  6.7  /  Alb  3.1<L>  /  TBili  0.8  /  DBili  x   /  AST  201<H>  /  ALT  63<H>  /  AlkPhos  147<H>  04-14          MICROBIOLOGY:  Vancomycin Level, Trough: 11.0 ug/mL (04-13-19 @ 23:35)  v  .Blood  04-13-19   Growth in aerobic bottle: Gram Positive Cocci in Clusters  --    Growth in aerobic bottle: Gram Positive Cocci in Clusters      .Body Fluid  04-13-19   No growth to date.  --    No polymorphonuclear leukocytes seen  No organisms seen  by cytocentrifuge      .Blood  04-12-19   Growth in aerobic and anaerobic bottles: Staphylococcus aureus  See previous culture [id number]  --  Blood Culture PCR                  RADIOLOGY:    Images below reviewed personally    TTE with Doppler (w/Cont) (04.12.19 @ 07:59)   1. Annuloplsty ring seen in the mitral position Mild mitral regurgitation.  Peak mitral valve gradient equals 11 mm Hg, mean transmitral valve gradient equals 5 mm Hg, consistent with moderate mitral stenosis.  2. Normal trileaflet aortic valve.  3. Aortic Root: 3.1 cm.  4. Normal left atrium.  LA volume index = 34 cc/m2.  5. Severe global left ventricular systolic dysfunction.  6. Right ventricular enlargement with decreased right ventricular systolic function.  7. Estimated right ventricular systolic pressure equals 29mm Hg, assuming right atrial pressure equals 8 mm Hg,consistent with normal pulmonary pressures.  8. Normaltricuspid valve. Mild tricuspid regurgitation.  *** Compared with echocardiogram report of 10/27/2015, no significant changes noted. HPI:  78F with DM, CAD s/p CABG, HFrEF on midodrine, ESRD on HD via Left AVF, admitted 4/9/19 after a fall   Pt lives in basement apartment and ordinarily ambulates with walker. Today was getting ready for HD, made it to the first step on her set of stairs, and reportedly felt her R leg "give out." Pt fell backwards but was caught by her family member who was assisting her up the stairs.   She denies LOC/syncope or head strike. +Pain in R leg post fall, worse with movement and much improved with immobilization. Could not bear weight or walk after fall.  Denies cp, sob at rest, f/c, n/v/d, dysuria, cough. +chronic BENAVIDEZ, reportedly stable since her CABG. Family at bedside providing collateral and confirms above details.       PAST MEDICAL & SURGICAL HISTORY:  CHF (congestive heart failure): Oct 2015- still sob  Shortness of breath  Hypotension  Type 2 diabetes mellitus  Chronic kidney disease (CKD): ON DIALYSIS - Tue, Thur, Sat  Nephrolithiasis: 2001  Ulcer: 2001  Hydronephrosis: Right 1/2012  Charcot Foot: Right Foot  Herniated Disc  Diabetic Neuropathy  Anemia: CKD  Hypothyroidism  Hypertension: NOT ANY MORE  Hyperlipidemia  S/P mitral valve repair: with CABG X 4 MAY 2015  S/P CABG (coronary artery bypass graft): x 4, May 2015  History of extraction of renal calculus  S/P Foot Surgery: 2002, 2014  S/P Cholecystectomy: 2001  S/P Breast Lumpectomy: 1994, 2003 - left breast benign      Allergies    allopurinol (Rash)  Augmentin (Rash)  Levaquin (Rash)    Intolerances        ANTIMICROBIALS:      OTHER MEDS:  acetaminophen    Suspension .. 650 milliGRAM(s) Oral once  aspirin  chewable 81 milliGRAM(s) Oral daily  chlorhexidine 2% Cloths 1 Application(s) Topical daily  chlorhexidine 4% Liquid 1 Application(s) Topical <User Schedule>  dexmedetomidine Infusion 0.2 MICROgram(s)/kG/Hr IV Continuous <Continuous>  dextrose 40% Gel 15 Gram(s) Oral once PRN  dextrose 5%. 1000 milliLiter(s) IV Continuous <Continuous>  dextrose 50% Injectable 12.5 Gram(s) IV Push once  dextrose 50% Injectable 25 Gram(s) IV Push once  dextrose 50% Injectable 25 Gram(s) IV Push once  glucagon  Injectable 1 milliGRAM(s) IntraMuscular once PRN  heparin  Injectable 5000 Unit(s) SubCutaneous every 8 hours  insulin lispro (HumaLOG) corrective regimen sliding scale   SubCutaneous every 6 hours  levothyroxine Injectable 75 MICROGram(s) IV Push at bedtime  lidocaine/prilocaine Cream 1 Application(s) Topical daily PRN  midodrine 10 milliGRAM(s) Oral three times a day  ondansetron Injectable 4 milliGRAM(s) IV Push every 12 hours PRN  pantoprazole  Injectable 40 milliGRAM(s) IV Push every 12 hours  phenylephrine    Infusion 1 MICROgram(s)/kG/Min IV Continuous <Continuous>  polyethylene glycol 3350 17 Gram(s) Oral daily  senna Syrup 10 milliLiter(s) Oral at bedtime      SOCIAL HISTORY:    no smoking, alcohol or drug abuse  no recent travel    FAMILY HISTORY:  FH: breast cancer: mother  FH: CHF (congestive heart failure): father      ROS:  Unobtainable because: pt intubated     Physical Exam:    General:    NAD on vent  Head: atraumatic, normocephalic  Eyes: normal sclera and conjunctiva  ENT:   ET tube, no LAD, neck supple  Cardio:    regular S1,S2  Respiratory:   clear b/l, no wheezing  abd:   soft, BS +, not tender, no hepatosplenomegaly  :     no CVAT, no suprapubic tenderness,  bagley  Musculoskeletal : no joint swelling  Skin:    no rash  vascular: LUE AVF, no phlebitis normal pulses  Neurologic:    awake, answered questions with nodding    Drug Dosing Weight  Height (cm): 177.8 (10 Apr 2019 02:12)  Weight (kg): 68.3 (10 Apr 2019 02:12)  BMI (kg/m2): 21.6 (10 Apr 2019 02:12)  BSA (m2): 1.85 (10 Apr 2019 02:12)    Vital Signs Last 24 Hrs  T(F): 101.5 (04-14-19 @ 12:00), Max: 101.5 (04-14-19 @ 12:00)    Vital Signs Last 24 Hrs  HR: 59 (04-14-19 @ 13:00) (54 - 75)  BP: 81/45 (04-14-19 @ 13:00) (81/45 - 132/57)  RR: 26 (04-14-19 @ 13:00)  SpO2: 98% (04-14-19 @ 13:00) (96% - 100%)  Wt(kg): --                          10.9   14.7  )-----------( 261      ( 13 Apr 2019 23:35 )             33.5       04-14    138  |  96  |  25<H>  ----------------------------<  141<H>  3.9   |  21<L>  |  2.93<H>    Ca    8.3<L>      14 Apr 2019 01:20  Phos  2.7     04-14  Mg     1.8     04-14    TPro  6.7  /  Alb  3.1<L>  /  TBili  0.8  /  DBili  x   /  AST  201<H>  /  ALT  63<H>  /  AlkPhos  147<H>  04-14          MICROBIOLOGY:  Vancomycin Level, Trough: 11.0 ug/mL (04-13-19 @ 23:35)  v  .Blood  04-13-19   Growth in aerobic bottle: Gram Positive Cocci in Clusters  --    Growth in aerobic bottle: Gram Positive Cocci in Clusters      .Body Fluid  04-13-19   No growth to date.  --    No polymorphonuclear leukocytes seen  No organisms seen  by cytocentrifuge      .Blood  04-12-19   Growth in aerobic and anaerobic bottles: Staphylococcus aureus  See previous culture [id number]  --  Blood Culture PCR                  RADIOLOGY:    Images below reviewed personally  < from: Xray Chest 1 View- PORTABLE-Urgent (04.12.19 @ 01:55) >  Impression:    The heart is normal in size. Bilateral pleural effusion. Endotracheal   tube is in good position. NG tube is in the stomach however its tip is   not seen on the current study. A central line seen on the right and the   tip is in superior vena cava. No pneumothorax. Status post mitral annulus   repair.        TTE with Doppler (w/Cont) (04.12.19 @ 07:59)   1. Annuloplsty ring seen in the mitral position Mild mitral regurgitation.  Peak mitral valve gradient equals 11 mm Hg, mean transmitral valve gradient equals 5 mm Hg, consistent with moderate mitral stenosis.  2. Normal trileaflet aortic valve.  3. Aortic Root: 3.1 cm.  4. Normal left atrium.  LA volume index = 34 cc/m2.  5. Severe global left ventricular systolic dysfunction.  6. Right ventricular enlargement with decreased right ventricular systolic function.  7. Estimated right ventricular systolic pressure equals 29mm Hg, assuming right atrial pressure equals 8 mm Hg,consistent with normal pulmonary pressures.  8. Normaltricuspid valve. Mild tricuspid regurgitation.  *** Compared with echocardiogram report of 10/27/2015, no significant changes noted.

## 2019-04-14 NOTE — PROGRESS NOTE ADULT - ASSESSMENT
78 F PMH CAD s/p CABG x 4 2015, T2DM with peripheral neuropathy, ESRD on HD T/Th/S via L AVF, hypotension on midodrine, HFrEF, HTN, HLD, b/l cataracts s/p surgery, p/w mechanical fall and R leg pain, found to have R tib/fib fracture.   pt with resp failure, now intubated in micu    resp failure  likely multifactorial  vent support  pressure support  mngt as per MICU    bacteremia/sepsis   id f/u  iv abx  f/u LISA  f/u repeat cult    open fracture of proximal end of right tibia,  confirmed on XR and CT  -ortho recs appreciated:  s/p cast. no surgical intervention at this time  outpt f/u  NWB RLE     Coronary artery disease without angina pectoris  -c/w aspirin, statin  -f/u cards .        Type 2 diabetes mellitus with hyperglycemia, with long-term current use of insulin.  start HISS  endo consult dr ronak maciel, CC diet,    ESRD on hemodialysis.  HD as per renal      ascites.   s/p paracentesis   f/u fluid analysis     emesis  gi consulted  f/u guaiac  ppi  monitor cbc       mngt as per MICU

## 2019-04-14 NOTE — CONSULT NOTE ADULT - ASSESSMENT
78F with DM, CAD s/p CABG, HFrEF on midodrine, ESRD on HD via Left AVF, admitted 4/9/19 after a fall at home and sustaining a Right tib/fib fracture now casted, developed acute hypercapnic respiratory failure attributed to opioids, intubated, developed fevers 4/12 and MSSA bacteremia   Susceptibility report??  Augmentin allergy?   TTE annuloplasty ring mitral valve. no vegetations 78 F with DM, CAD s/p CABG, mitral annuloplasty ring, HFrEF on midodrine, ESRD on HD via Left AVF, admitted 4/9/19 after a fall at home and sustaining a Right tib/fib fracture now casted, developed acute hypercapnic respiratory failure attributed to opioids, intubated, developed fevers 4/12 and high grade MSSA bacteremia   pt was started on vanco   repeat blood cx 4/13 still positive  TTE no vegetation      high grade MSSA bacteremia, unclear source now, ?skin related after fall, r/o endocarditis  ?penicillin allergy    * DC vanco and start cefazolin 1 g qd  * monitor for allergic reaction  * LISA  * repeat blood cxs q 48 until clear

## 2019-04-14 NOTE — PROGRESS NOTE ADULT - ASSESSMENT
77 yo F with hx of cad, cabg, DM, esrd, on HD, chronic hypotension on midodrine 10 mg tid with sob, pleural effusions, chf, ascites, and new tib/fib fx    1- esrd  2- hypotension   3- chf  4- ascites   5- tib/fib fx   6- respiratory failure       no hd today  s/p paracentesis. still with ascites but overall softer ascites  cont neosynephrine drip   cont midodrine  vent support

## 2019-04-14 NOTE — PROGRESS NOTE ADULT - ASSESSMENT
Acute hyopercapneic repisratory failure rquiring mechanical intubataion  Tib-Fib fracture s/p mechanical fall  ESRD  ACute on chronic sytolic and diastolic CHF  CAD  Bilateral R>L pleural effusions, ascites due to fluid overload in setting of CHF and ESRD    REC    Plans for therapeutic paracentesis per MICU  Negative balance with HD as tolerated  CMV today: deferr weaning   4/14   1) Acute respiratory failure.  s/p RRT for unresponsiveness. Mildly improved w/ narcan. Intubated for airway protection. S/p synchronized cardioversion + ketamine for SVT on 4/12/19  Today ABG noted. Pt is alert and follows simple command. I&O 3.8 liters negative. 4.8 liter removed from paracentesis yesterday. Pt is currently on CPAP trial.  Continue wean off ventilator support per MICU team.  4/14 intubated and sedated. Per night MICU attending pt tolerated CPAP well yesterday. Pt remains intubated for LISA. Vent management, weaning per MICU  2) fever  24 T-max 38.3, yesterday, elevated inflammatory markers and liver enzyme. Chest x-ray reviewed Pt is already on Cefepime. Management defer to MICU, GI  4/14 24 hr Tmax 38.2 on vancomycin and Meropenum. Vanco level 11 yesterday. Bacteremia, gram positive cocci cluster (4/13). Pending LISA      3) Pleural effusions.   Chronic left and large right side. s/p paracentesis, removed more than 6 liters yesterday. Repeat chest x-ray.   4/14 no chest x-ray to review. s/p paracentesis. Clinically stable   4) Hypotension  Small dose of Bhaskar, continue wean off as possible   4/14 IVF at 50cc/hr. titrate bhaskar to keep map greater than 65.     5)  Prophylaxis   Pt is on heparin SQ and PPI   4/14 heparin SQ and PPI

## 2019-04-14 NOTE — PROGRESS NOTE ADULT - SUBJECTIVE AND OBJECTIVE BOX
VIRGILIO KAPLAN  78y Female  MRN:[id number]    Patient is a 78y old  Female who presents with a chief complaint of R tib/fib fx (10 Apr 2019 12:01)    HPI:  78 F PMH CAD s/p CABG x 4 2015, T2DM with peripheral neuropathy, ESRD on HD T/Th/S via L AVF, hypotension on midodrine, HFrEF, HTN, HLD, b/l cataracts s/p surgery, p/w mechanical fall and R leg pain. Pt lives in basement apartment and ordinarily ambulates with walker. Today was getting ready for HD, made it to the first step on her set of stairs, and reportedly felt her R leg "give out." Pt fell backwards but was caught by her family member who was assisting her up the stairs. She denies LOC/syncope or head strike. +Pain in R leg post fall, worse with movement and much improved with immobilization. Could not bear weight or walk after fall.  Denies cp, sob at rest, f/c, n/v/d, dysuria, cough. +chronic BENAVIDEZ, reportedly stable since her CABG. Family at bedside providing collateral and confirms above details.     Vs: 99.1, 76, 102/60, 16, 90% RA --> 100% 4LNC.  Labs: no leukocytosis, chronic stable anemia, rest cbc unrevealing, coags unrevealing, hypoNa 126, HAGMA with cmp c/w known ESRD, hyperglycemia, chronic stable alkp elevation mild AST elevation. XR tib/fib/femur/hip/knee reveals acute prox tib/fib fxs with possible intraarticular extension. (10 Apr 2019 00:56)      Patient seen and evaluated in micu.      Interval HPI: +bacteremia     PAST MEDICAL & SURGICAL HISTORY:  CHF (congestive heart failure): Oct 2015- still sob  Shortness of breath  Hypotension  Type 2 diabetes mellitus  Chronic kidney disease (CKD): ON DIALYSIS - [facility], Sat  Nephrolithiasis: 2001  Ulcer: 2001  Hydronephrosis: Right 1/2012  Charcot Foot: Right Foot  Herniated Disc  Diabetic Neuropathy  Anemia: CKD  Hypothyroidism  Hypertension: NOT ANY MORE  Hyperlipidemia  S/P mitral valve repair: with CABG X 4 MAY 2015  S/P CABG (coronary artery bypass graft): x 4, May 2015  History of extraction of renal calculus  S/P Foot Surgery: 2002, 2014  S/P Cholecystectomy: 2001  S/P Breast Lumpectomy: 1994, 2003 - left breast benign      REVIEW OF SYSTEMS:  as per hpi    VITALS:  Vital Signs Last 24 Hrs  T(C): 38.1 (14 Apr 2019 15:00), Max: 38.6 (14 Apr 2019 12:00)  T(F): 100.5 (14 Apr 2019 15:00), Max: 101.5 (14 Apr 2019 12:00)  HR: 58 (14 Apr 2019 15:15) (54 - 75)  BP: 107/54 (14 Apr 2019 15:15) (81/45 - 132/57)  BP(mean): 78 (14 Apr 2019 15:15) (59 - 85)  RR: 18 (14 Apr 2019 15:15) (5 - 33)  SpO2: 100% (14 Apr 2019 15:15) (96% - 100%)      PHYSICAL EXAM:  GENERAL: intubated, sedated  HEAD:  Atraumatic, Normocephalic  EYES: EOMI, PERRLA, conjunctiva and sclera clear  NECK: no jvd  CHEST/LUNG: coarse bs b/l  HEART: S1, S2;   ABDOMEN: Soft, Nontender, nondistended; Bowel sounds present  EXTREMITIES:  2+ Peripheral Pulses, No clubbing, cyanosis, or edema,  right knee cast  Neuro: sedated    Consultant(s) Notes Reviewed:  [x ] YES  [ ] NO  Care Discussed with Consultants/Other Providers [ x] YES  [ ] NO    MEDS:  MEDICATIONS  (STANDING):  aspirin  chewable 81 milliGRAM(s) Oral daily  ceFAZolin   IVPB 1000 milliGRAM(s) IV Intermittent every 24 hours  chlorhexidine 2% Cloths 1 Application(s) Topical daily  chlorhexidine 4% Liquid 1 Application(s) Topical <User Schedule>  dexmedetomidine Infusion 0.2 MICROgram(s)/kG/Hr (3.415 mL/Hr) IV Continuous <Continuous>  dextrose 5%. 1000 milliLiter(s) (50 mL/Hr) IV Continuous <Continuous>  dextrose 50% Injectable 12.5 Gram(s) IV Push once  dextrose 50% Injectable 25 Gram(s) IV Push once  dextrose 50% Injectable 25 Gram(s) IV Push once  heparin  Injectable 5000 Unit(s) SubCutaneous every 8 hours  insulin lispro (HumaLOG) corrective regimen sliding scale   SubCutaneous every 6 hours  levothyroxine Injectable 75 MICROGram(s) IV Push at bedtime  midodrine 10 milliGRAM(s) Oral three times a day  pantoprazole  Injectable 40 milliGRAM(s) IV Push every 12 hours  phenylephrine    Infusion 1 MICROgram(s)/kG/Min (25.613 mL/Hr) IV Continuous <Continuous>  polyethylene glycol 3350 17 Gram(s) Oral daily  senna Syrup 10 milliLiter(s) Oral at bedtime    MEDICATIONS  (PRN):  dextrose 40% Gel 15 Gram(s) Oral once PRN Blood Glucose LESS THAN 70 milliGRAM(s)/deciliter  glucagon  Injectable 1 milliGRAM(s) IntraMuscular once PRN Glucose LESS THAN 70 milligrams/deciliter  lidocaine/prilocaine Cream 1 Application(s) Topical daily PRN hd days  ondansetron Injectable 4 milliGRAM(s) IV Push every 12 hours PRN Nausea and/or Vomiting        ALLERGIES:  allopurinol (Rash)  Augmentin (Rash)  Levaquin (Rash)      LABS:                                        10.9   14.7  )-----------( 261      ( 13 Apr 2019 23:35 )             33.5   04-14    138  |  96  |  25<H>  ----------------------------<  141<H>  3.9   |  21<L>  |  2.93<H>    Ca    8.3<L>      14 Apr 2019 01:20  Phos  2.7     04-14  Mg     1.8     04-14    TPro  6.7  /  Alb  3.1<L>  /  TBili  0.8  /  DBili  x   /  AST  201<H>  /  ALT  63<H>  /  AlkPhos  147<H>  04-14    Culture - Blood (04.13.19 @ 07:27)    Gram Stain:   Growth in aerobic bottle: Gram Positive Cocci in Clusters    Specimen Source: .Blood    Culture Results:   Growth in aerobic bottle: Gram Positive Cocci in Clusters        < from: CT Knee No Cont, Right (04.10.19 @ 03:34) >  Impression:    Acute, impacted fractures of the right proximal tibia and fibula as   described.      < end of copied text >           < from: Xray Tibia + Fibula 2 Views, Right (04.09.19 @ 20:28) >  impression:    There is an acute, comminuted fracture of the proximal tibial metaphysis   with mild anterior displacement of the distal fracture fragment. There is   questionable intra-articular extension on the lateral view. There is a   large knee joint effusion. There is a proximal fibular fracture.    There is no fracture of the hip or femur. The ankle is poorly visualized   secondary to technique and may be externally rotated with respect to the   knee. There is no ankle joint effusion. Vascular calcifications are noted.     CT of the knee can be performed for further evaluation.    < end of copied text >

## 2019-04-14 NOTE — PROGRESS NOTE ADULT - ATTENDING COMMENTS
Remains intubated, with ongoing signs of sepsis and persistent bacteremia.  Following commands and breathing spontaneously well although still requiring pressors.  LE Dopplers requested given recent tibial fracture and immovility.  Requires LISA for persistent bactereia to rule out endocarditis.  Agree with current management.

## 2019-04-14 NOTE — PROGRESS NOTE ADULT - SUBJECTIVE AND OBJECTIVE BOX
GASTROENTEROLOGY    Patient seen and examined at bedside. Remains intubated on Precedex and pressor support  No overt signs of GI bleeding      MEDICATIONS  (STANDING):  aspirin  chewable 81 milliGRAM(s) Oral daily  chlorhexidine 2% Cloths 1 Application(s) Topical daily  chlorhexidine 4% Liquid 1 Application(s) Topical <User Schedule>  dexmedetomidine Infusion 0.2 MICROgram(s)/kG/Hr (3.415 mL/Hr) IV Continuous <Continuous>  dextrose 5%. 1000 milliLiter(s) (50 mL/Hr) IV Continuous <Continuous>  dextrose 50% Injectable 12.5 Gram(s) IV Push once  dextrose 50% Injectable 25 Gram(s) IV Push once  dextrose 50% Injectable 25 Gram(s) IV Push once  heparin  Injectable 5000 Unit(s) SubCutaneous every 8 hours  insulin lispro (HumaLOG) corrective regimen sliding scale   SubCutaneous every 6 hours  levothyroxine Injectable 75 MICROGram(s) IV Push at bedtime  midodrine 10 milliGRAM(s) Oral three times a day  pantoprazole  Injectable 40 milliGRAM(s) IV Push every 12 hours  phenylephrine    Infusion 1 MICROgram(s)/kG/Min (25.613 mL/Hr) IV Continuous <Continuous>  polyethylene glycol 3350 17 Gram(s) Oral daily  senna Syrup 10 milliLiter(s) Oral at bedtime        T(F): 100.7 (04-14-19 @ 08:00), Max: 100.7 (04-14-19 @ 08:00)  HR: 71 (04-14-19 @ 10:00) (59 - 75)  BP: 109/53 (04-14-19 @ 10:00) (89/44 - 132/57)  RR: 15 (04-14-19 @ 10:00) (5 - 32)  SpO2: 100% (04-14-19 @ 10:00) (96% - 100%)  Wt(kg): --    PHYSICAL EXAM  GENERAL:   NAD  HEENT:  NC/AT,  no JVD, sclera-anicteric, + ETT  CHEST:  coarse breath sounds b/l  HEART:  +S1+S2   ABDOMEN:  Soft, distended  EXTREMITIES:  no cyanosis, clubbing or edema  NEURO:  on precedex, comfortable, no tremor  SKIN:  warm/dry      LABS:                        10.9<L>  14.7<H> )-----------( 261      ( 13 Apr 2019 23:35 )             33.5<L>  13 Apr 2019 23:35    04-14    138  |  96  |  25<H>  ----------------------------<  141<H>  3.9   |  21<L>  |  2.93<H>    Ca    8.3<L>      14 Apr 2019 01:20  Phos  2.7     04-14  Mg     1.8     04-14    TPro  6.7  /  Alb  3.1<L>  /  TBili  0.8  /  DBili  x   /  AST  201<H>  /  ALT  63<H>  /  AlkPhos  147<H>  04-14    LIVER FUNCTIONS - ( 14 Apr 2019 01:20 )  Alb: 3.1 g/dL / Pro: 6.7 g/dL / ALK PHOS: 147 U/L / ALT: 63 U/L / AST: 201 U/L / GGT: x           PT/INR - ( 13 Apr 2019 23:35 )   PT: 15.1 sec;   INR: 1.31 ratio         PTT - ( 13 Apr 2019 23:35 )  PTT:36.5 sec  I&O's Detail    13 Apr 2019 07:01  -  14 Apr 2019 07:00  --------------------------------------------------------  IN:    dexmedetomidine Infusion: 144 mL    Enteral Tube Flush: 340 mL    IV PiggyBack: 250 mL    ns in tub fed vital1: 350 mL    phenylephrine   Infusion: 542 mL    Solution: 60 mL  Total IN: 1686 mL    OUT:    Other: 500 mL  Total OUT: 500 mL    Total NET: 1186 mL      14 Apr 2019 07:01  -  14 Apr 2019 10:32  --------------------------------------------------------  IN:    dexmedetomidine Infusion: 18 mL    phenylephrine   Infusion: 72.7 mL  Total IN: 90.7 mL    OUT:  Total OUT: 0 mL    Total NET: 90.7 mL          Culture - Blood (collected 13 Apr 2019 07:27)  Source: .Blood  Preliminary Report (14 Apr 2019 08:01):    No growth to date.    Culture - Blood (collected 13 Apr 2019 07:27)  Source: .Blood  Gram Stain (14 Apr 2019 07:00):    Growth in aerobic bottle: Gram Positive Cocci in Clusters  Preliminary Report (14 Apr 2019 07:00):    Growth in aerobic bottle: Gram Positive Cocci in Clusters    Culture - Body Fluid with Gram Stain (collected 13 Apr 2019 00:45)  Source: .Body Fluid  Gram Stain (13 Apr 2019 02:05):    No polymorphonuclear leukocytes seen    No organisms seen    by cytocentrifuge  Preliminary Report (13 Apr 2019 21:33):    No growth to date.    Culture - Acid Fast - Body Fluid w/Smear (collected 13 Apr 2019 00:45)  Source: .Body Fluid    Culture - Blood (collected 12 Apr 2019 02:36)  Source: .Blood  Gram Stain (13 Apr 2019 01:39):    Growth in aerobic bottle: Gram Positive Cocci in Clusters    Growth in anaerobic bottle: Gram Positive Cocci in Clusters  Preliminary Report (13 Apr 2019 15:46):    Growth in aerobic and anaerobic bottles: Staphylococcus aureus    "Due to technical problems, Proteus sp. will Not be reported as part of    the BCID panel until further notice"    ***Blood Panel PCR results on this specimen are available    approximately 3 hours after the Gram stain result.***    Gram stain, PCR, and/or culture results may not always    correspond due to difference in methodologies.    ************************************************************    This PCR assay was performed using [employer].    The following targets are tested for: Enterococcus,    vancomycin resistant enterococci, Listeria monocytogenes,    coagulase negative staphylococci, S. aureus,    methicillin resistant S. aureus, Streptococcus agalactiae    (Group B), S. pneumoniae, S. pyogenes (Group A),    Acinetobacter baumannii, Enterobacter cloacae, E. coli,    Klebsiella oxytoca, K. pneumoniae, Proteus sp.,    Serratia marcescens, Haemophilus influenzae,    Neisseria meningitidis, Pseudomonas aeruginosa, Candida    albicans, C. glabrata, C krusei, C parapsilosis,    C. tropicalis and the KPC resistance gene.  Organism: Blood Culture PCR (13 Apr 2019 00:30)  Organism: Blood Culture PCR (13 Apr 2019 00:30)    Culture - Blood (collected 12 Apr 2019 02:36)  Source: .Blood  Gram Stain (13 Apr 2019 01:42):    Growth in aerobic bottle: Gram Positive Cocci in Clusters    Growth in anaerobic bottle: Gram Positive Cocci in Clusters  Preliminary Report (13 Apr 2019 18:13):    Growth in aerobic and anaerobic bottles: Staphylococcus aureus    See previous culture [id number]

## 2019-04-14 NOTE — PROGRESS NOTE ADULT - PROBLEM SELECTOR PLAN 1
iv proton pump inhibitor bid  doubt GI bleed  patient had stated that emesis was her chocolate milk  trend hgb/hct daily  s/p  bedside paracentesis 4/12 with 4.9L removed  LISA pending  MICU care

## 2019-04-14 NOTE — PROGRESS NOTE ADULT - ASSESSMENT
78F with CAD s/p CABG x4, HFrEF (EF 26% 4/2019), T2DM with peripheral neuropathy, ESRD on HD T/Th/S via L AVF, hypotension on midodrine, HTN, HLD initially admitted 4/9 after mechanical fall c/b R proximal tib/fib fracture not requiring emergent surgical intervention s/p cast placement with hospital course c/b possible melena now resolved, and RRT on 4/11 for acute hypercapnic respiratory failure in setting of opioid use requiring intubation. MICU course c/b SVT s/p synchronized cardioversion x2 now in sinus rhythm and Staph aureus bacteremia.     #Neuro:   - Patient transferred to MICU for AMS 2/2 hypercarbia. Unclear etiology, possibly opioid-induced as patient receiving pain medications for leg fracture. Ammonia wnl. CTH unremarkable.   - Now more awake, following commands off sedation. On low dose precedex for comfort as extubation pending LISA     #CV:   - CAD s/p multiple CABG: continue ASA, holding statin in setting of transaminitis  - HFrEF: repeat TTE 4/2019 with EF 26% severe global RV systolic dysfunction, moderate MS, mild TR, normal pulm pressures  - Troponin elevation likely in setting of underlying ESRD and demand ischemia  - SVT s/p cardioversion: now in sinus, continue to monitor on tele  - Orthostatic hypotension: continue midodrine 10mg q8h  - Shock: likely septic given +BCx, requiring low dose phenylephrine, continue to wean as tolerated  -     #Pulmonary:   - Initially transferred for acute hypercapnic respiratory failure of unclear etiology but suspect 2/2 opioid use  - Continue mechanical ventilation, daily spontaneous breathing trials  - Keep on ventilator for now pending LISA    #GI:   - Continue Nepro tube feeds via OGT, monitor residuals  - Transaminitis with abdominal sonogram showing early signs of cirrhosis likely 2/2 heart failure. Hepatitis studies negative.   - Melena and questionable coffee-ground emesis (apparently chocolate milk). Continue PPI. No plans for GI intervention at this time.   - S/p paracentesis on 4/12 with removal of 4.9L, fluid studies c/w portal hypertension likely 2/2 RV failure    #Renal:   - Hx of ESRD on HD T/Th/S; continue HD as per Nephrology; scheduled for session today  - Continue to monitor electrolytes    #Endo:   - TSH 34 but free T4 within normal range  - HbA1c 8.3%, continue insulin sliding scale  - Fingersticks q6h while on tube feeds    #ID:   - Found to have Staph aureus bacteremia  - Continue vancomycin (by level) and meropenem  - Repeat cultures in AM  - Obtain LISA to assess for endocarditis    DVT PPx:   - HSQ

## 2019-04-14 NOTE — PROGRESS NOTE ADULT - SUBJECTIVE AND OBJECTIVE BOX
CHIEF COMPLAINT:    Interval Events:    No acute events overnight. Patient seen and examined, intubated, sedated    REVIEW OF SYSTEMS:  Constitutional: [ ] negative [ ] fevers [ ] chills [ ] weight loss [ ] weight gain  HEENT: [ ] negative [ ] dry eyes [ ] eye irritation [ ] postnasal drip [ ] nasal congestion  CV: [ ] negative  [ ] chest pain [ ] orthopnea [ ] palpitations [ ] murmur  Resp: [ ] negative [ ] cough [ ] shortness of breath [ ] dyspnea [ ] wheezing [ ] sputum [ ] hemoptysis  GI: [ ] negative [ ] nausea [ ] vomiting [ ] diarrhea [ ] constipation [ ] abd pain [ ] dysphagia   : [ ] negative [ ] dysuria [ ] nocturia [ ] hematuria [ ] increased urinary frequency  Musculoskeletal: [ ] negative [ ] back pain [ ] myalgias [ ] arthralgias [ ] fracture  Skin: [ ] negative [ ] rash [ ] itch  Neurological: [ ] negative [ ] headache [ ] dizziness [ ] syncope [ ] weakness [ ] numbness  Psychiatric: [ ] negative [ ] anxiety [ ] depression  Endocrine: [ ] negative [ ] diabetes [ ] thyroid problem  Hematologic/Lymphatic: [ ] negative [ ] anemia [ ] bleeding problem  Allergic/Immunologic: [ ] negative [ ] itchy eyes [ ] nasal discharge [ ] hives [ ] angioedema  [ ] All other systems negative  [x ] Unable to assess ROS because Intubated/sedated    OBJECTIVE:  ICU Vital Signs Last 24 Hrs  T(C): 38.2 (14 Apr 2019 08:00), Max: 38.2 (14 Apr 2019 08:00)  T(F): 100.7 (14 Apr 2019 08:00), Max: 100.7 (14 Apr 2019 08:00)  HR: 61 (14 Apr 2019 08:00) (59 - 75)  BP: 113/56 (14 Apr 2019 08:00) (89/44 - 132/57)  BP(mean): 80 (14 Apr 2019 08:00) (63 - 85)  ABP: --  ABP(mean): --  RR: 12 (14 Apr 2019 08:00) (5 - 32)  SpO2: 100% (14 Apr 2019 08:00) (96% - 100%)    Mode: AC/ CMV (Assist Control/ Continuous Mandatory Ventilation), RR (machine): 12, TV (machine): 450, FiO2: 40, PEEP: 5, ITime: 0.9, MAP: 10, PIP: 26    04-13 @ 07:01 - 04-14 @ 07:00  --------------------------------------------------------  IN: 1686 mL / OUT: 500 mL / NET: 1186 mL    04-14 @ 07:01 - 04-14 @ 08:23  --------------------------------------------------------  IN: 31.5 mL / OUT: 0 mL / NET: 31.5 mL      CAPILLARY BLOOD GLUCOSE  162 (14 Apr 2019 06:00)      POCT Blood Glucose.: 162 mg/dL (14 Apr 2019 05:50)      PHYSICAL EXAM:  General: NAD  HEENT: EOMI/PERRLA  Lymph Nodes: no ant/post C LAD  Neck: supple,. no JVD  Respiratory: CTA b/l No WRR  Cardiovascular: RRR S1/S2 No MRG  Abdomen: s/nt/nd BS+  Extremities: AROM. Distal LE pulses doppler  Skin: Distal LE chronic changes  Neurological: Awakens to verbal, tactile stimuli, follows commands  Psychiatry:    LINES:    HOSPITAL MEDICATIONS:  aspirin  chewable 81 milliGRAM(s) Oral daily  heparin  Injectable 5000 Unit(s) SubCutaneous every 8 hours    meropenem  IVPB 1000 milliGRAM(s) IV Intermittent every 24 hours  meropenem  IVPB        midodrine 10 milliGRAM(s) Oral three times a day  phenylephrine    Infusion 1 MICROgram(s)/kG/Min IV Continuous <Continuous>    dextrose 40% Gel 15 Gram(s) Oral once PRN  dextrose 50% Injectable 12.5 Gram(s) IV Push once  dextrose 50% Injectable 25 Gram(s) IV Push once  dextrose 50% Injectable 25 Gram(s) IV Push once  glucagon  Injectable 1 milliGRAM(s) IntraMuscular once PRN  insulin lispro (HumaLOG) corrective regimen sliding scale   SubCutaneous every 6 hours  levothyroxine Injectable 75 MICROGram(s) IV Push at bedtime      dexmedetomidine Infusion 0.2 MICROgram(s)/kG/Hr IV Continuous <Continuous>  ondansetron Injectable 4 milliGRAM(s) IV Push every 12 hours PRN    pantoprazole  Injectable 40 milliGRAM(s) IV Push every 12 hours  polyethylene glycol 3350 17 Gram(s) Oral daily  senna Syrup 10 milliLiter(s) Oral at bedtime        dextrose 5%. 1000 milliLiter(s) IV Continuous <Continuous>      chlorhexidine 2% Cloths 1 Application(s) Topical daily  chlorhexidine 4% Liquid 1 Application(s) Topical <User Schedule>  lidocaine/prilocaine Cream 1 Application(s) Topical daily PRN        LABS:                        10.9   14.7  )-----------( 261      ( 13 Apr 2019 23:35 )             33.5     Hgb Trend: 10.9<--, 10.2<--, 10.0<--, 9.4<--, 8.8<--  04-14    138  |  96  |  25<H>  ----------------------------<  141<H>  3.9   |  21<L>  |  2.93<H>    Ca    8.3<L>      14 Apr 2019 01:20  Phos  2.7     04-14  Mg     1.8     04-14    TPro  6.7  /  Alb  3.1<L>  /  TBili  0.8  /  DBili  x   /  AST  201<H>  /  ALT  63<H>  /  AlkPhos  147<H>  04-14    Creatinine Trend: 2.93<--, 3.52<--, 2.72<--, 2.45<--, 4.43<--, 3.07<--  PT/INR - ( 13 Apr 2019 23:35 )   PT: 15.1 sec;   INR: 1.31 ratio         PTT - ( 13 Apr 2019 23:35 )  PTT:36.5 sec    Arterial Blood Gas:  04-12 @ 23:49  7.40/34/139/21/99/-3.1  ABG lactate: --        MICROBIOLOGY:     Gram Stain:   Growth in aerobic bottle: Gram Positive Cocci in Clusters (04.13.19 @ 07:27)      RADIOLOGY:  [ ] Reviewed and interpreted by me    EKG:

## 2019-04-15 DIAGNOSIS — R78.81 BACTEREMIA: ICD-10-CM

## 2019-04-15 LAB
ALBUMIN SERPL ELPH-MCNC: 2.8 G/DL — LOW (ref 3.3–5)
ALP SERPL-CCNC: 146 U/L — HIGH (ref 40–120)
ALT FLD-CCNC: 23 U/L — SIGNIFICANT CHANGE UP (ref 10–45)
ANION GAP SERPL CALC-SCNC: 17 MMOL/L — SIGNIFICANT CHANGE UP (ref 5–17)
APTT BLD: 34.9 SEC — SIGNIFICANT CHANGE UP (ref 27.5–36.3)
AST SERPL-CCNC: 96 U/L — HIGH (ref 10–40)
BILIRUB SERPL-MCNC: 0.6 MG/DL — SIGNIFICANT CHANGE UP (ref 0.2–1.2)
BUN SERPL-MCNC: 36 MG/DL — HIGH (ref 7–23)
CALCIUM SERPL-MCNC: 7.9 MG/DL — LOW (ref 8.4–10.5)
CHLORIDE SERPL-SCNC: 97 MMOL/L — SIGNIFICANT CHANGE UP (ref 96–108)
CO2 SERPL-SCNC: 23 MMOL/L — SIGNIFICANT CHANGE UP (ref 22–31)
CREAT SERPL-MCNC: 3.6 MG/DL — HIGH (ref 0.5–1.3)
CULTURE RESULTS: SIGNIFICANT CHANGE UP
CULTURE RESULTS: SIGNIFICANT CHANGE UP
GLUCOSE BLDC GLUCOMTR-MCNC: 169 MG/DL — HIGH (ref 70–99)
GLUCOSE BLDC GLUCOMTR-MCNC: 198 MG/DL — HIGH (ref 70–99)
GLUCOSE BLDC GLUCOMTR-MCNC: 236 MG/DL — HIGH (ref 70–99)
GLUCOSE BLDC GLUCOMTR-MCNC: 248 MG/DL — HIGH (ref 70–99)
GLUCOSE BLDC GLUCOMTR-MCNC: 291 MG/DL — HIGH (ref 70–99)
GLUCOSE SERPL-MCNC: 237 MG/DL — HIGH (ref 70–99)
HCT VFR BLD CALC: 31.2 % — LOW (ref 34.5–45)
HGB BLD-MCNC: 9.9 G/DL — LOW (ref 11.5–15.5)
INR BLD: 1.21 RATIO — HIGH (ref 0.88–1.16)
MAGNESIUM SERPL-MCNC: 1.8 MG/DL — SIGNIFICANT CHANGE UP (ref 1.6–2.6)
MCHC RBC-ENTMCNC: 29.7 PG — SIGNIFICANT CHANGE UP (ref 27–34)
MCHC RBC-ENTMCNC: 31.7 GM/DL — LOW (ref 32–36)
MCV RBC AUTO: 93.7 FL — SIGNIFICANT CHANGE UP (ref 80–100)
NON-GYNECOLOGICAL CYTOLOGY STUDY: SIGNIFICANT CHANGE UP
PHOSPHATE SERPL-MCNC: 2.4 MG/DL — LOW (ref 2.5–4.5)
PLATELET # BLD AUTO: 293 K/UL — SIGNIFICANT CHANGE UP (ref 150–400)
POTASSIUM SERPL-MCNC: 4 MMOL/L — SIGNIFICANT CHANGE UP (ref 3.5–5.3)
POTASSIUM SERPL-SCNC: 4 MMOL/L — SIGNIFICANT CHANGE UP (ref 3.5–5.3)
PROT SERPL-MCNC: 6.4 G/DL — SIGNIFICANT CHANGE UP (ref 6–8.3)
PROTHROM AB SERPL-ACNC: 14 SEC — HIGH (ref 10–12.9)
RBC # BLD: 3.33 M/UL — LOW (ref 3.8–5.2)
RBC # FLD: 14.8 % — HIGH (ref 10.3–14.5)
SODIUM SERPL-SCNC: 137 MMOL/L — SIGNIFICANT CHANGE UP (ref 135–145)
SPECIMEN SOURCE: SIGNIFICANT CHANGE UP
SPECIMEN SOURCE: SIGNIFICANT CHANGE UP
WBC # BLD: 12 K/UL — HIGH (ref 3.8–10.5)
WBC # FLD AUTO: 12 K/UL — HIGH (ref 3.8–10.5)

## 2019-04-15 PROCEDURE — 99233 SBSQ HOSP IP/OBS HIGH 50: CPT

## 2019-04-15 PROCEDURE — 99291 CRITICAL CARE FIRST HOUR: CPT

## 2019-04-15 RX ORDER — INSULIN GLARGINE 100 [IU]/ML
2 INJECTION, SOLUTION SUBCUTANEOUS AT BEDTIME
Qty: 0 | Refills: 0 | Status: DISCONTINUED | OUTPATIENT
Start: 2019-04-15 | End: 2019-04-18

## 2019-04-15 RX ORDER — INSULIN GLARGINE 100 [IU]/ML
4 INJECTION, SOLUTION SUBCUTANEOUS AT BEDTIME
Qty: 0 | Refills: 0 | Status: DISCONTINUED | OUTPATIENT
Start: 2019-04-15 | End: 2019-04-15

## 2019-04-15 RX ORDER — ACETAMINOPHEN 500 MG
975 TABLET ORAL ONCE
Qty: 0 | Refills: 0 | Status: DISCONTINUED | OUTPATIENT
Start: 2019-04-15 | End: 2019-04-15

## 2019-04-15 RX ORDER — KETOTIFEN FUMARATE 0.34 MG/ML
SOLUTION OPHTHALMIC
Qty: 0 | Refills: 0 | Status: DISCONTINUED | OUTPATIENT
Start: 2019-04-15 | End: 2019-04-19

## 2019-04-15 RX ORDER — KETOTIFEN FUMARATE 0.34 MG/ML
1 SOLUTION OPHTHALMIC EVERY 8 HOURS
Qty: 0 | Refills: 0 | Status: DISCONTINUED | OUTPATIENT
Start: 2019-04-15 | End: 2019-04-19

## 2019-04-15 RX ORDER — INSULIN GLARGINE 100 [IU]/ML
3 INJECTION, SOLUTION SUBCUTANEOUS AT BEDTIME
Qty: 0 | Refills: 0 | Status: DISCONTINUED | OUTPATIENT
Start: 2019-04-15 | End: 2019-04-15

## 2019-04-15 RX ORDER — ACETAMINOPHEN 500 MG
650 TABLET ORAL EVERY 6 HOURS
Qty: 0 | Refills: 0 | Status: DISCONTINUED | OUTPATIENT
Start: 2019-04-15 | End: 2019-04-23

## 2019-04-15 RX ORDER — KETOTIFEN FUMARATE 0.34 MG/ML
1 SOLUTION OPHTHALMIC ONCE
Qty: 0 | Refills: 0 | Status: COMPLETED | OUTPATIENT
Start: 2019-04-15 | End: 2019-04-15

## 2019-04-15 RX ADMIN — Medication 1 DROP(S): at 05:14

## 2019-04-15 RX ADMIN — Medication 650 MILLIGRAM(S): at 20:18

## 2019-04-15 RX ADMIN — POLYETHYLENE GLYCOL 3350 17 GRAM(S): 17 POWDER, FOR SOLUTION ORAL at 11:22

## 2019-04-15 RX ADMIN — PANTOPRAZOLE SODIUM 40 MILLIGRAM(S): 20 TABLET, DELAYED RELEASE ORAL at 17:35

## 2019-04-15 RX ADMIN — Medication 650 MILLIGRAM(S): at 20:50

## 2019-04-15 RX ADMIN — HEPARIN SODIUM 5000 UNIT(S): 5000 INJECTION INTRAVENOUS; SUBCUTANEOUS at 13:31

## 2019-04-15 RX ADMIN — Medication 1: at 17:45

## 2019-04-15 RX ADMIN — HEPARIN SODIUM 5000 UNIT(S): 5000 INJECTION INTRAVENOUS; SUBCUTANEOUS at 05:13

## 2019-04-15 RX ADMIN — HEPARIN SODIUM 5000 UNIT(S): 5000 INJECTION INTRAVENOUS; SUBCUTANEOUS at 21:20

## 2019-04-15 RX ADMIN — DEXMEDETOMIDINE HYDROCHLORIDE IN 0.9% SODIUM CHLORIDE 3.42 MICROGRAM(S)/KG/HR: 4 INJECTION INTRAVENOUS at 21:20

## 2019-04-15 RX ADMIN — CHLORHEXIDINE GLUCONATE 1 APPLICATION(S): 213 SOLUTION TOPICAL at 05:14

## 2019-04-15 RX ADMIN — MIDODRINE HYDROCHLORIDE 10 MILLIGRAM(S): 2.5 TABLET ORAL at 21:21

## 2019-04-15 RX ADMIN — MIDODRINE HYDROCHLORIDE 10 MILLIGRAM(S): 2.5 TABLET ORAL at 13:31

## 2019-04-15 RX ADMIN — INSULIN GLARGINE 2 UNIT(S): 100 INJECTION, SOLUTION SUBCUTANEOUS at 22:57

## 2019-04-15 RX ADMIN — SENNA PLUS 10 MILLILITER(S): 8.6 TABLET ORAL at 21:21

## 2019-04-15 RX ADMIN — PANTOPRAZOLE SODIUM 40 MILLIGRAM(S): 20 TABLET, DELAYED RELEASE ORAL at 05:13

## 2019-04-15 RX ADMIN — PHENYLEPHRINE HYDROCHLORIDE 25.61 MICROGRAM(S)/KG/MIN: 10 INJECTION INTRAVENOUS at 21:20

## 2019-04-15 RX ADMIN — Medication 100 MILLIGRAM(S): at 13:31

## 2019-04-15 RX ADMIN — Medication 75 MICROGRAM(S): at 21:20

## 2019-04-15 RX ADMIN — Medication 1 DROP(S): at 17:35

## 2019-04-15 RX ADMIN — Medication 1: at 05:13

## 2019-04-15 RX ADMIN — Medication 2: at 00:10

## 2019-04-15 RX ADMIN — MIDODRINE HYDROCHLORIDE 10 MILLIGRAM(S): 2.5 TABLET ORAL at 05:13

## 2019-04-15 RX ADMIN — CHLORHEXIDINE GLUCONATE 1 APPLICATION(S): 213 SOLUTION TOPICAL at 17:36

## 2019-04-15 RX ADMIN — KETOTIFEN FUMARATE 1 DROP(S): 0.34 SOLUTION OPHTHALMIC at 21:22

## 2019-04-15 RX ADMIN — Medication 81 MILLIGRAM(S): at 11:22

## 2019-04-15 RX ADMIN — Medication 3: at 11:25

## 2019-04-15 NOTE — PROGRESS NOTE ADULT - SUBJECTIVE AND OBJECTIVE BOX
INTERVAL HPI/OVERNIGHT EVENTS:    Patient seen and examined at bedside. Remains intubated  following commands  No overt signs of GI bleeding    MEDICATIONS  (STANDING):  artificial  tears Solution 1 Drop(s) Both EYES two times a day  aspirin  chewable 81 milliGRAM(s) Oral daily  ceFAZolin   IVPB 1000 milliGRAM(s) IV Intermittent every 24 hours  chlorhexidine 2% Cloths 1 Application(s) Topical daily  chlorhexidine 4% Liquid 1 Application(s) Topical <User Schedule>  dexmedetomidine Infusion 0.2 MICROgram(s)/kG/Hr (3.415 mL/Hr) IV Continuous <Continuous>  dextrose 5%. 1000 milliLiter(s) (50 mL/Hr) IV Continuous <Continuous>  dextrose 50% Injectable 12.5 Gram(s) IV Push once  dextrose 50% Injectable 25 Gram(s) IV Push once  dextrose 50% Injectable 25 Gram(s) IV Push once  heparin  Injectable 5000 Unit(s) SubCutaneous every 8 hours  insulin lispro (HumaLOG) corrective regimen sliding scale   SubCutaneous every 6 hours  levothyroxine Injectable 75 MICROGram(s) IV Push at bedtime  midodrine 10 milliGRAM(s) Oral three times a day  pantoprazole  Injectable 40 milliGRAM(s) IV Push every 12 hours  phenylephrine    Infusion 1 MICROgram(s)/kG/Min (25.613 mL/Hr) IV Continuous <Continuous>  polyethylene glycol 3350 17 Gram(s) Oral daily  senna Syrup 10 milliLiter(s) Oral at bedtime    MEDICATIONS  (PRN):  dextrose 40% Gel 15 Gram(s) Oral once PRN Blood Glucose LESS THAN 70 milliGRAM(s)/deciliter  glucagon  Injectable 1 milliGRAM(s) IntraMuscular once PRN Glucose LESS THAN 70 milligrams/deciliter  lidocaine/prilocaine Cream 1 Application(s) Topical daily PRN hd days  ondansetron Injectable 4 milliGRAM(s) IV Push every 12 hours PRN Nausea and/or Vomiting      Allergies    allopurinol (Rash)  Augmentin (Rash)  Levaquin (Rash)    Intolerances        Review of Systems: unable to obtain    Vital Signs Last 24 Hrs  T(C): 37.4 (15 Apr 2019 07:45), Max: 38.6 (14 Apr 2019 12:00)  T(F): 99.3 (15 Apr 2019 07:45), Max: 101.5 (14 Apr 2019 12:00)  HR: 56 (15 Apr 2019 10:15) (52 - 77)  BP: 107/53 (15 Apr 2019 10:15) (81/45 - 124/56)  BP(mean): 77 (15 Apr 2019 10:15) (59 - 82)  RR: 14 (15 Apr 2019 10:15) (12 - 33)  SpO2: 100% (15 Apr 2019 10:15) (98% - 100%)    PHYSICAL EXAM:    GENERAL:   NAD  HEENT:  NC/AT,  no JVD, sclera-anicteric, + ETT  CHEST:  coarse breath sounds b/l  HEART:  +S1+S2   ABDOMEN:  Soft, nt, nd  EXTREMITIES:  no cyanosis, clubbing or edema  NEURO:  on precedex, comfortable, no tremor  SKIN:  warm/dry      LABS:                        9.9    12.0  )-----------( 293      ( 15 Apr 2019 00:16 )             31.2     04-15    137  |  97  |  36<H>  ----------------------------<  237<H>  4.0   |  23  |  3.60<H>    Ca    7.9<L>      15 Apr 2019 00:16  Phos  2.4     04-15  Mg     1.8     04-15    TPro  6.4  /  Alb  2.8<L>  /  TBili  0.6  /  DBili  x   /  AST  96<H>  /  ALT  23  /  AlkPhos  146<H>  04-15    PT/INR - ( 15 Apr 2019 00:16 )   PT: 14.0 sec;   INR: 1.21 ratio         PTT - ( 15 Apr 2019 00:16 )  PTT:34.9 sec      RADIOLOGY & ADDITIONAL TESTS:

## 2019-04-15 NOTE — PROGRESS NOTE ADULT - ATTENDING COMMENTS
78F PMH CAD s/p CABG, HFrEF, DM2, ESRD on HD, hypotension on midodrine, HTN, HLD, initially admitted with R tib/fib fx after a mechanical fall. Hospital course complicated by acute hypercapnic respiratory failure requiring mechanical ventilation. Course further complicated by severe sepsis due to high grade MSSA bacteremia. She is also found to have cirrhosis (likely cardiac) with ascites and underwent 4.9L paracentesis on .     BP is stable off iv vasoactive meds    Bedside lung US with b-line predominance, large R pleural effusion and moderate L pleural effusion with atelectasis   Bedside ECHO with severe LV systolic dysfunction, systolic and diastolic septal flattening, MR, TR, IVC 2cm    Recs:  --Neuro: awake, follows commands, moves all extremities and interacts by writing on a clipboard, denies RLE pain  --Cardiac: off iv pressor at this time however will consider placing on Levo or  to assist with HD/UF, continue aspirin & midodrine   --Pulm: not ready for extubation due to acute pulmonary edema/pleural effusion, continue AC vol, requested renal to perform 2-2.5L UF, may need R thoracentesis prior to extubation  --GI: tolerating TF, has moderate ascites - monitor for now  --ID: continue cefazolin, repeat BCx from  neg so far, LISA scheduled for tomorrow morning   --Renal: HD with UF today  --MSK: RLE casted, outpatient ortho f/u, no urgent surgical intervention per ortho  --Endo: Humalog sliding scale + Lantus, goal glu 140-200, continue Synthroid  --PPx: sc heparin, Protonix  --Prognosis poor  --Patient critically ill, 40mins

## 2019-04-15 NOTE — PROGRESS NOTE ADULT - PROBLEM SELECTOR PLAN 1
iv proton pump inhibitor bid  doubt GI bleed  patient had stated that emesis was her chocolate milk  trend hgb/hct daily  s/p  bedside paracentesis 4/12 with 4.9L removed. fluid analysis cw CHF  MICU care

## 2019-04-15 NOTE — PROGRESS NOTE ADULT - SUBJECTIVE AND OBJECTIVE BOX
Follow Up:  MSSA bacteremia    Interval History: pt still in MICU pending LISA, tolerated cefazolin     ROS:    Unobtainable because: intubated        Allergies  allopurinol (Rash)  Augmentin (Rash)  Levaquin (Rash)        ANTIMICROBIALS:  ceFAZolin   IVPB 1000 every 24 hours      OTHER MEDS:  artificial  tears Solution 1 Drop(s) Both EYES two times a day  aspirin  chewable 81 milliGRAM(s) Oral daily  chlorhexidine 2% Cloths 1 Application(s) Topical daily  chlorhexidine 4% Liquid 1 Application(s) Topical <User Schedule>  dexmedetomidine Infusion 0.2 MICROgram(s)/kG/Hr IV Continuous <Continuous>  dextrose 40% Gel 15 Gram(s) Oral once PRN  dextrose 5%. 1000 milliLiter(s) IV Continuous <Continuous>  dextrose 50% Injectable 12.5 Gram(s) IV Push once  dextrose 50% Injectable 25 Gram(s) IV Push once  dextrose 50% Injectable 25 Gram(s) IV Push once  glucagon  Injectable 1 milliGRAM(s) IntraMuscular once PRN  heparin  Injectable 5000 Unit(s) SubCutaneous every 8 hours  insulin lispro (HumaLOG) corrective regimen sliding scale   SubCutaneous every 6 hours  levothyroxine Injectable 75 MICROGram(s) IV Push at bedtime  lidocaine/prilocaine Cream 1 Application(s) Topical daily PRN  midodrine 10 milliGRAM(s) Oral three times a day  ondansetron Injectable 4 milliGRAM(s) IV Push every 12 hours PRN  pantoprazole  Injectable 40 milliGRAM(s) IV Push every 12 hours  phenylephrine    Infusion 1 MICROgram(s)/kG/Min IV Continuous <Continuous>  polyethylene glycol 3350 17 Gram(s) Oral daily  senna Syrup 10 milliLiter(s) Oral at bedtime      Vital Signs Last 24 Hrs  T(C): 37.4 (15 Apr 2019 07:45), Max: 38.6 (14 Apr 2019 12:00)  T(F): 99.3 (15 Apr 2019 07:45), Max: 101.5 (14 Apr 2019 12:00)  HR: 56 (15 Apr 2019 09:51) (52 - 77)  BP: 109/55 (15 Apr 2019 09:30) (81/45 - 124/56)  BP(mean): 79 (15 Apr 2019 09:30) (59 - 82)  RR: 19 (15 Apr 2019 09:30) (12 - 33)  SpO2: 100% (15 Apr 2019 09:51) (98% - 100%)    Physical Exam:  General:    NAD on vent  Head: atraumatic, normocephalic  Eyes: normal sclera and conjunctiva  ENT:   ET tube, no LAD, neck supple  Cardio:    regular S1,S2  Respiratory:   clear b/l, no wheezing  abd:   soft, BS +, not tender, no hepatosplenomegaly  :     no CVAT, no suprapubic tenderness,  bagley  Musculoskeletal : no joint swelling, R leg in dressing and cast  Skin:    no rash  vascular: LUE AVF, no phlebitis normal pulses  Neurologic:    awake, answered questions with nodding                          9.9    12.0  )-----------( 293      ( 15 Apr 2019 00:16 )             31.2       04-15    137  |  97  |  36<H>  ----------------------------<  237<H>  4.0   |  23  |  3.60<H>    Ca    7.9<L>      15 Apr 2019 00:16  Phos  2.4     04-15  Mg     1.8     04-15    TPro  6.4  /  Alb  2.8<L>  /  TBili  0.6  /  DBili  x   /  AST  96<H>  /  ALT  23  /  AlkPhos  146<H>  04-15          MICROBIOLOGY:  v  .Blood  04-13-19   Growth in aerobic bottle: Staphylococcus aureus  See previous culture [id number]  --    Growth in aerobic bottle: Gram Positive Cocci in Clusters      .Body Fluid  04-13-19   No growth to date.  --    No polymorphonuclear leukocytes seen  No organisms seen  by cytocentrifuge      .Blood  04-12-19   Growth in aerobic and anaerobic bottles: Staphylococcus aureus  See previous culture [id number]  --  Blood Culture PCR  Staphylococcus aureus                RADIOLOGY:  Images below reviewed personally  < from: CT Chest No Cont (04.10.19 @ 21:59) >  IMPRESSION:     New large right pleural effusion with large volume abdominal ascites and   unchanged left pleural effusion since 2015.

## 2019-04-15 NOTE — PROGRESS NOTE ADULT - ASSESSMENT
78 F with DM, CAD s/p CABG, mitral annuloplasty ring, HFrEF on midodrine, ESRD on HD via Left AVF, admitted 4/9/19 after a fall at home and sustaining a Right tib/fib fracture now casted, developed acute hypercapnic respiratory failure attributed to opioids, intubated, developed fevers 4/12 and high grade MSSA bacteremia   pt was started on vanco   repeat blood cx 4/13 still positive  TTE no vegetation      high grade MSSA bacteremia, unclear source now, ?skin related after fall, r/o endocarditis  ?penicillin allergy    * c/w  cefazolin 1 g qd  * monitor for allergic reaction  * LISA  * f/u the repeat blood cultures  * repeat blood cxs q 48 until clear

## 2019-04-15 NOTE — PROGRESS NOTE ADULT - ASSESSMENT
Acute hyopercapneic repisratory failure rquiring mechanical intubataion  Tib-Fib fracture s/p mechanical fall  ESRD  ACute on chronic sytolic and diastolic CHF  CAD  Bilateral R>L pleural effusions, ascites due to fluid overload in setting of CHF and ESRD    REC    Plans for therapeutic paracentesis per MICU  Negative balance with HD as tolerated  CMV today: deferr weaning   4/14   1) Acute respiratory failure.  s/p RRT for unresponsiveness. Mildly improved w/ narcan. Intubated for airway protection. S/p synchronized cardioversion + ketamine for SVT on 4/12/19  Today ABG noted. Pt is alert and follows simple command. I&O 3.8 liters negative. 4.8 liter removed from paracentesis yesterday. Pt is currently on CPAP trial.  Continue wean off ventilator support per MICU team.  4/14 intubated and sedated. Per night MICU attending pt tolerated CPAP well yesterday. Pt remains intubated for TEDDY. Vent management, weaning per MICU  4/15: remains intubated:: for teddy? today : Cont full ventilator support:   2) fever  24 T-max 38.3, yesterday, elevated inflammatory markers and liver enzyme. Chest x-ray reviewed Pt is already on Cefepime. Management defer to MICU, GI  4/14 24 hr Tmax 38.2 on vancomycin and Meropenum. Vanco level 11 yesterday. Bacteremia, gram positive cocci cluster (4/13). Pending TEDDY    4/15: she is still febrile: on antibtiocs    3) Pleural effusions.   Chronic left and large right side. s/p paracentesis, removed more than 6 liters yesterday. Repeat chest x-ray.   4/14 no chest x-ray to review. s/p paracentesis. Clinically stable   4/15: has pl effusion on rt side: she had paracentesis done and not thoracentesis:   4) Hypotension  Small dose of Bhaskar, continue wean off as possible   4/14 IVF at 50cc/hr. titrate bhaskar to keep map greater than 65.   4/15: cont to suport blood pressure to MAP of more then 65 mm HG    5)  Prophylaxis   Pt is on heparin SQ and PPI   4/14 heparin SQ and PPI

## 2019-04-15 NOTE — PROGRESS NOTE ADULT - ASSESSMENT
79 yo F with hx of cad, cabg, DM, esrd, on HD, chronic hypotension on midodrine 10 mg tid with sob, pleural effusions, chf, ascites, and new tib/fib fx    1- esrd  2- hypotension   3- chf  4- ascites   5- tib/fib fx   6- respiratory failure     hd revaclear 300 3.5 hr 350 cc bfr 1500 cc fluid removal   eric support

## 2019-04-15 NOTE — PROGRESS NOTE ADULT - SUBJECTIVE AND OBJECTIVE BOX
CHIEF COMPLAINT:    Interval Events:    REVIEW OF SYSTEMS:  Constitutional: [ ] negative [ ] fevers [ ] chills [ ] weight loss [ ] weight gain  HEENT: [ ] negative [ ] dry eyes [ ] eye irritation [ ] postnasal drip [ ] nasal congestion  CV: [ ] negative  [ ] chest pain [ ] orthopnea [ ] palpitations [ ] murmur  Resp: [ ] negative [ ] cough [ ] shortness of breath [ ] dyspnea [ ] wheezing [ ] sputum [ ] hemoptysis  GI: [ ] negative [ ] nausea [ ] vomiting [ ] diarrhea [ ] constipation [ ] abd pain [ ] dysphagia   : [ ] negative [ ] dysuria [ ] nocturia [ ] hematuria [ ] increased urinary frequency  Musculoskeletal: [ ] negative [ ] back pain [ ] myalgias [ ] arthralgias [ ] fracture  Skin: [ ] negative [ ] rash [ ] itch  Neurological: [ ] negative [ ] headache [ ] dizziness [ ] syncope [ ] weakness [ ] numbness  Psychiatric: [ ] negative [ ] anxiety [ ] depression  Endocrine: [ ] negative [ ] diabetes [ ] thyroid problem  Hematologic/Lymphatic: [ ] negative [ ] anemia [ ] bleeding problem  Allergic/Immunologic: [ ] negative [ ] itchy eyes [ ] nasal discharge [ ] hives [ ] angioedema  [ ] All other systems negative  [ ] Unable to assess ROS because ________    OBJECTIVE:  ICU Vital Signs Last 24 Hrs  T(C): 37.8 (15 Apr 2019 04:00), Max: 38.6 (14 Apr 2019 12:00)  T(F): 100 (15 Apr 2019 04:00), Max: 101.5 (14 Apr 2019 12:00)  HR: 62 (15 Apr 2019 07:00) (52 - 77)  BP: 103/53 (15 Apr 2019 06:45) (81/45 - 124/56)  BP(mean): 77 (15 Apr 2019 06:45) (59 - 82)  ABP: --  ABP(mean): --  RR: 18 (15 Apr 2019 07:00) (12 - 33)  SpO2: 100% (15 Apr 2019 07:00) (98% - 100%)    Mode: AC/ CMV (Assist Control/ Continuous Mandatory Ventilation), RR (machine): 12, TV (machine): 450, FiO2: 40, PEEP: 5, ITime: 0.9, MAP: 9, PIP: 10    04-14 @ 07:01  -  04-15 @ 07:00  --------------------------------------------------------  IN: 1649.8 mL / OUT: 440 mL / NET: 1209.8 mL      CAPILLARY BLOOD GLUCOSE  162 (14 Apr 2019 06:00)      POCT Blood Glucose.: 169 mg/dL (15 Apr 2019 05:04)      PHYSICAL EXAM:  General:   HEENT:   Lymph Nodes:  Neck:   Respiratory:   Cardiovascular:   Abdomen:   Extremities:   Skin:   Neurological:  Psychiatry:    LINES:    HOSPITAL MEDICATIONS:  aspirin  chewable 81 milliGRAM(s) Oral daily  heparin  Injectable 5000 Unit(s) SubCutaneous every 8 hours    ceFAZolin   IVPB 1000 milliGRAM(s) IV Intermittent every 24 hours    midodrine 10 milliGRAM(s) Oral three times a day  phenylephrine    Infusion 1 MICROgram(s)/kG/Min IV Continuous <Continuous>    dextrose 40% Gel 15 Gram(s) Oral once PRN  dextrose 50% Injectable 12.5 Gram(s) IV Push once  dextrose 50% Injectable 25 Gram(s) IV Push once  dextrose 50% Injectable 25 Gram(s) IV Push once  glucagon  Injectable 1 milliGRAM(s) IntraMuscular once PRN  insulin lispro (HumaLOG) corrective regimen sliding scale   SubCutaneous every 6 hours  levothyroxine Injectable 75 MICROGram(s) IV Push at bedtime      dexmedetomidine Infusion 0.2 MICROgram(s)/kG/Hr IV Continuous <Continuous>  ondansetron Injectable 4 milliGRAM(s) IV Push every 12 hours PRN    pantoprazole  Injectable 40 milliGRAM(s) IV Push every 12 hours  polyethylene glycol 3350 17 Gram(s) Oral daily  senna Syrup 10 milliLiter(s) Oral at bedtime        dextrose 5%. 1000 milliLiter(s) IV Continuous <Continuous>      artificial  tears Solution 1 Drop(s) Both EYES two times a day  chlorhexidine 2% Cloths 1 Application(s) Topical daily  chlorhexidine 4% Liquid 1 Application(s) Topical <User Schedule>  lidocaine/prilocaine Cream 1 Application(s) Topical daily PRN        LABS:                        9.9    12.0  )-----------( 293      ( 15 Apr 2019 00:16 )             31.2     Hgb Trend: 9.9<--, 10.9<--, 10.2<--, 10.0<--, 9.4<--  04-15    137  |  97  |  36<H>  ----------------------------<  237<H>  4.0   |  23  |  3.60<H>    Ca    7.9<L>      15 Apr 2019 00:16  Phos  2.4     04-15  Mg     1.8     04-15    TPro  6.4  /  Alb  2.8<L>  /  TBili  0.6  /  DBili  x   /  AST  96<H>  /  ALT  23  /  AlkPhos  146<H>  04-15    Creatinine Trend: 3.60<--, 2.93<--, 3.52<--, 2.72<--, 2.45<--, 4.43<--  PT/INR - ( 15 Apr 2019 00:16 )   PT: 14.0 sec;   INR: 1.21 ratio         PTT - ( 15 Apr 2019 00:16 )  PTT:34.9 sec          MICROBIOLOGY:     RADIOLOGY:  [ ] Reviewed and interpreted by me    EKG: CHIEF COMPLAINT:    Interval Events:    No acute events overnight. Pt is alert and responsive this AM, able to follow commands and communicate via nodding/gesturing. Denies any pain but reports having white discharge from her L eye at times. She reports mild discomfort due to the ETT.     REVIEW OF SYSTEMS:  Constitutional: [ ] negative [ ] fevers [ ] chills [ ] weight loss [ ] weight gain  HEENT: [ ] negative [ ] dry eyes [ ] eye irritation [ ] postnasal drip [ ] nasal congestion  CV: [ ] negative  [ ] chest pain [ ] orthopnea [ ] palpitations [ ] murmur  Resp: [ ] negative [ ] cough [ ] shortness of breath [ ] dyspnea [ ] wheezing [ ] sputum [ ] hemoptysis  GI: [ ] negative [ ] nausea [ ] vomiting [ ] diarrhea [ ] constipation [ ] abd pain [ ] dysphagia   : [ ] negative [ ] dysuria [ ] nocturia [ ] hematuria [ ] increased urinary frequency  Musculoskeletal: [ ] negative [ ] back pain [ ] myalgias [ ] arthralgias [ ] fracture  Skin: [ ] negative [ ] rash [ ] itch  Neurological: [ ] negative [ ] headache [ ] dizziness [ ] syncope [ ] weakness [ ] numbness  Psychiatric: [ ] negative [ ] anxiety [ ] depression  Endocrine: [ ] negative [ ] diabetes [ ] thyroid problem  Hematologic/Lymphatic: [ ] negative [ ] anemia [ ] bleeding problem  Allergic/Immunologic: [ ] negative [ ] itchy eyes [ ] nasal discharge [ ] hives [ ] angioedema  [X] All other systems negative  [ ] Unable to assess ROS because ________    OBJECTIVE:  ICU Vital Signs Last 24 Hrs  T(C): 37.8 (15 Apr 2019 04:00), Max: 38.6 (14 Apr 2019 12:00)  T(F): 100 (15 Apr 2019 04:00), Max: 101.5 (14 Apr 2019 12:00)  HR: 62 (15 Apr 2019 07:00) (52 - 77)  BP: 103/53 (15 Apr 2019 06:45) (81/45 - 124/56)  BP(mean): 77 (15 Apr 2019 06:45) (59 - 82)  ABP: --  ABP(mean): --  RR: 18 (15 Apr 2019 07:00) (12 - 33)  SpO2: 100% (15 Apr 2019 07:00) (98% - 100%)    Mode: AC/ CMV (Assist Control/ Continuous Mandatory Ventilation), RR (machine): 12, TV (machine): 450, FiO2: 40, PEEP: 5, ITime: 0.9, MAP: 9, PIP: 10    04-14 @ 07:01  -  04-15 @ 07:00  --------------------------------------------------------  IN: 1649.8 mL / OUT: 440 mL / NET: 1209.8 mL      CAPILLARY BLOOD GLUCOSE  162 (14 Apr 2019 06:00)      POCT Blood Glucose.: 169 mg/dL (15 Apr 2019 05:04)      PHYSICAL EXAM:  General:   HEENT:   Lymph Nodes:  Neck:   Respiratory: Coarse breath sounds heard in bilateral lung fields.   Cardiovascular:   Abdomen:   Extremities: RLE wrapped in ACE bandage.   Skin:   Neurological:  Psychiatry:    LINES:  [facility]     HOSPITAL MEDICATIONS:  aspirin  chewable 81 milliGRAM(s) Oral daily  heparin  Injectable 5000 Unit(s) SubCutaneous every 8 hours    ceFAZolin   IVPB 1000 milliGRAM(s) IV Intermittent every 24 hours    midodrine 10 milliGRAM(s) Oral three times a day  phenylephrine    Infusion 1 MICROgram(s)/kG/Min IV Continuous <Continuous>    dextrose 40% Gel 15 Gram(s) Oral once PRN  dextrose 50% Injectable 12.5 Gram(s) IV Push once  dextrose 50% Injectable 25 Gram(s) IV Push once  dextrose 50% Injectable 25 Gram(s) IV Push once  glucagon  Injectable 1 milliGRAM(s) IntraMuscular once PRN  insulin lispro (HumaLOG) corrective regimen sliding scale   SubCutaneous every 6 hours  levothyroxine Injectable 75 MICROGram(s) IV Push at bedtime      dexmedetomidine Infusion 0.2 MICROgram(s)/kG/Hr IV Continuous <Continuous>  ondansetron Injectable 4 milliGRAM(s) IV Push every 12 hours PRN    pantoprazole  Injectable 40 milliGRAM(s) IV Push every 12 hours  polyethylene glycol 3350 17 Gram(s) Oral daily  senna Syrup 10 milliLiter(s) Oral at bedtime        dextrose 5%. 1000 milliLiter(s) IV Continuous <Continuous>      artificial  tears Solution 1 Drop(s) Both EYES two times a day  chlorhexidine 2% Cloths 1 Application(s) Topical daily  chlorhexidine 4% Liquid 1 Application(s) Topical <User Schedule>  lidocaine/prilocaine Cream 1 Application(s) Topical daily PRN        LABS:                        9.9    12.0  )-----------( 293      ( 15 Apr 2019 00:16 )             31.2     Hgb Trend: 9.9<--, 10.9<--, 10.2<--, 10.0<--, 9.4<--  04-15    137  |  97  |  36<H>  ----------------------------<  237<H>  4.0   |  23  |  3.60<H>    Ca    7.9<L>      15 Apr 2019 00:16  Phos  2.4     04-15  Mg     1.8     04-15    TPro  6.4  /  Alb  2.8<L>  /  TBili  0.6  /  DBili  x   /  AST  96<H>  /  ALT  23  /  AlkPhos  146<H>  04-15    Creatinine Trend: 3.60<--, 2.93<--, 3.52<--, 2.72<--, 2.45<--, 4.43<--  PT/INR - ( 15 Apr 2019 00:16 )   PT: 14.0 sec;   INR: 1.21 ratio         PTT - ( 15 Apr 2019 00:16 )  PTT:34.9 sec          MICROBIOLOGY:     RADIOLOGY:  [ ] Reviewed and interpreted by me    EKG: CHIEF COMPLAINT:    Interval Events:    No acute events overnight. Pt is alert and responsive this AM, able to follow commands and communicate via nodding/gesturing. Denies any pain but reports having white discharge from her L eye at times. She reports mild discomfort due to the ETT.     REVIEW OF SYSTEMS:  Constitutional: [ ] negative [ ] fevers [ ] chills [ ] weight loss [ ] weight gain  HEENT: [ ] negative [ ] dry eyes [ ] eye irritation [ ] postnasal drip [ ] nasal congestion  CV: [ ] negative  [ ] chest pain [ ] orthopnea [ ] palpitations [ ] murmur  Resp: [ ] negative [ ] cough [ ] shortness of breath [ ] dyspnea [ ] wheezing [ ] sputum [ ] hemoptysis  GI: [ ] negative [ ] nausea [ ] vomiting [ ] diarrhea [ ] constipation [ ] abd pain [ ] dysphagia   : [ ] negative [ ] dysuria [ ] nocturia [ ] hematuria [ ] increased urinary frequency  Musculoskeletal: [ ] negative [ ] back pain [ ] myalgias [ ] arthralgias [ ] fracture  Skin: [ ] negative [ ] rash [ ] itch  Neurological: [ ] negative [ ] headache [ ] dizziness [ ] syncope [ ] weakness [ ] numbness  Psychiatric: [ ] negative [ ] anxiety [ ] depression  Endocrine: [ ] negative [ ] diabetes [ ] thyroid problem  Hematologic/Lymphatic: [ ] negative [ ] anemia [ ] bleeding problem  Allergic/Immunologic: [ ] negative [ ] itchy eyes [ ] nasal discharge [ ] hives [ ] angioedema  [X] All other systems negative  [ ] Unable to assess ROS because ________    OBJECTIVE:  ICU Vital Signs Last 24 Hrs  T(C): 37.8 (15 Apr 2019 04:00), Max: 38.6 (14 Apr 2019 12:00)  T(F): 100 (15 Apr 2019 04:00), Max: 101.5 (14 Apr 2019 12:00)  HR: 62 (15 Apr 2019 07:00) (52 - 77)  BP: 103/53 (15 Apr 2019 06:45) (81/45 - 124/56)  BP(mean): 77 (15 Apr 2019 06:45) (59 - 82)  ABP: --  ABP(mean): --  RR: 18 (15 Apr 2019 07:00) (12 - 33)  SpO2: 100% (15 Apr 2019 07:00) (98% - 100%)    Mode: AC/ CMV (Assist Control/ Continuous Mandatory Ventilation), RR (machine): 12, TV (machine): 450, FiO2: 40, PEEP: 5, ITime: 0.9, MAP: 9, PIP: 10    04-14 @ 07:01  -  04-15 @ 07:00  --------------------------------------------------------  IN: 1649.8 mL / OUT: 440 mL / NET: 1209.8 mL      CAPILLARY BLOOD GLUCOSE  162 (14 Apr 2019 06:00)      POCT Blood Glucose.: 169 mg/dL (15 Apr 2019 05:04)      PHYSICAL EXAM:  General: Intubated, awake and responsive   HEENT: EOMI. Mild erythema of sclera noted, no discharge/pus noted.   Respiratory: Coarse breath sounds heard in bilateral lung fields.   Cardiovascular: S1, S2 noted.   Abdomen: Soft, nontender, nondistended.   Extremities: RLE in cast and wrapped in ACE bandage. LLE AVF.   Skin: No lesions noted.    Neurological: Intubated, on Precedex but awake and able to communicate via gesturing/writing.       LINES:  [facility]     HOSPITAL MEDICATIONS:  aspirin  chewable 81 milliGRAM(s) Oral daily  heparin  Injectable 5000 Unit(s) SubCutaneous every 8 hours    ceFAZolin   IVPB 1000 milliGRAM(s) IV Intermittent every 24 hours    midodrine 10 milliGRAM(s) Oral three times a day  phenylephrine    Infusion 1 MICROgram(s)/kG/Min IV Continuous <Continuous>    dextrose 40% Gel 15 Gram(s) Oral once PRN  dextrose 50% Injectable 12.5 Gram(s) IV Push once  dextrose 50% Injectable 25 Gram(s) IV Push once  dextrose 50% Injectable 25 Gram(s) IV Push once  glucagon  Injectable 1 milliGRAM(s) IntraMuscular once PRN  insulin lispro (HumaLOG) corrective regimen sliding scale   SubCutaneous every 6 hours  levothyroxine Injectable 75 MICROGram(s) IV Push at bedtime      dexmedetomidine Infusion 0.2 MICROgram(s)/kG/Hr IV Continuous <Continuous>  ondansetron Injectable 4 milliGRAM(s) IV Push every 12 hours PRN    pantoprazole  Injectable 40 milliGRAM(s) IV Push every 12 hours  polyethylene glycol 3350 17 Gram(s) Oral daily  senna Syrup 10 milliLiter(s) Oral at bedtime        dextrose 5%. 1000 milliLiter(s) IV Continuous <Continuous>      artificial  tears Solution 1 Drop(s) Both EYES two times a day  chlorhexidine 2% Cloths 1 Application(s) Topical daily  chlorhexidine 4% Liquid 1 Application(s) Topical <User Schedule>  lidocaine/prilocaine Cream 1 Application(s) Topical daily PRN        LABS:                        9.9    12.0  )-----------( 293      ( 15 Apr 2019 00:16 )             31.2     Hgb Trend: 9.9<--, 10.9<--, 10.2<--, 10.0<--, 9.4<--  04-15    137  |  97  |  36<H>  ----------------------------<  237<H>  4.0   |  23  |  3.60<H>    Ca    7.9<L>      15 Apr 2019 00:16  Phos  2.4     04-15  Mg     1.8     04-15    TPro  6.4  /  Alb  2.8<L>  /  TBili  0.6  /  DBili  x   /  AST  96<H>  /  ALT  23  /  AlkPhos  146<H>  04-15    Creatinine Trend: 3.60<--, 2.93<--, 3.52<--, 2.72<--, 2.45<--, 4.43<--  PT/INR - ( 15 Apr 2019 00:16 )   PT: 14.0 sec;   INR: 1.21 ratio         PTT - ( 15 Apr 2019 00:16 )  PTT:34.9 sec          MICROBIOLOGY:     RADIOLOGY:  [ ] Reviewed and interpreted by me    EKG:

## 2019-04-15 NOTE — PROGRESS NOTE ADULT - SUBJECTIVE AND OBJECTIVE BOX
[facility]   [phone]  DIALYSIS NOTE  Chief Complaint: ESRD/Ongoing hemodialysis requirement.     24 hour events/subjective:      intubated on neosynephrine drip       ALLERGIES & MEDICATIONS  --------------------------------------------------------------------------------  Allergies    allopurinol (Rash)  Augmentin (Rash)  Levaquin (Rash)    Intolerances      Standing Inpatient Medications  artificial  tears Solution 1 Drop(s) Both EYES two times a day  aspirin  chewable 81 milliGRAM(s) Oral daily  ceFAZolin   IVPB 1000 milliGRAM(s) IV Intermittent every 24 hours  chlorhexidine 2% Cloths 1 Application(s) Topical daily  chlorhexidine 4% Liquid 1 Application(s) Topical <User Schedule>  dexmedetomidine Infusion 0.2 MICROgram(s)/kG/Hr IV Continuous <Continuous>  dextrose 5%. 1000 milliLiter(s) IV Continuous <Continuous>  dextrose 50% Injectable 12.5 Gram(s) IV Push once  dextrose 50% Injectable 25 Gram(s) IV Push once  dextrose 50% Injectable 25 Gram(s) IV Push once  heparin  Injectable 5000 Unit(s) SubCutaneous every 8 hours  insulin glargine Injectable (LANTUS) 4 Unit(s) SubCutaneous at bedtime  insulin lispro (HumaLOG) corrective regimen sliding scale   SubCutaneous every 6 hours  ketotifen 0.025% Ophthalmic Solution 1 Drop(s) Both EYES every 8 hours  ketotifen 0.025% Ophthalmic Solution      levothyroxine Injectable 75 MICROGram(s) IV Push at bedtime  midodrine 10 milliGRAM(s) Oral three times a day  pantoprazole  Injectable 40 milliGRAM(s) IV Push every 12 hours  phenylephrine    Infusion 1 MICROgram(s)/kG/Min IV Continuous <Continuous>  polyethylene glycol 3350 17 Gram(s) Oral daily  senna Syrup 10 milliLiter(s) Oral at bedtime    PRN Inpatient Medications  dextrose 40% Gel 15 Gram(s) Oral once PRN  glucagon  Injectable 1 milliGRAM(s) IntraMuscular once PRN  lidocaine/prilocaine Cream 1 Application(s) Topical daily PRN  ondansetron Injectable 4 milliGRAM(s) IV Push every 12 hours PRN      REVIEW OF SYSTEMS  --------------------------------------------------------------------------------  intubated         VITALS/PHYSICAL EXAM  --------------------------------------------------------------------------------  T(C): 37.4 (04-15-19 @ 12:00), Max: 37.8 (04-15-19 @ 04:00)  HR: 60 (04-15-19 @ 15:36) (52 - 77)  BP: 87/50 (04-15-19 @ 13:45) (82/45 - 124/56)  RR: 25 (04-15-19 @ 13:45) (12 - 41)  SpO2: 100% (04-15-19 @ 15:36) (98% - 100%)  Wt(kg): --        04-14-19 @ 07:01  -  04-15-19 @ 07:00  --------------------------------------------------------  IN: 1649.8 mL / OUT: 440 mL / NET: 1209.8 mL    04-15-19 @ 07:01  -  04-15-19 @ 15:51  --------------------------------------------------------  IN: 165.6 mL / OUT: 0 mL / NET: 165.6 mL      Physical Exam:  		  Gen: intubated   responsive   	no jvd   	Pulm: decrease bs  no rales or ronchi or wheezing  	CV: RRR, S1S2; no rub  	Abd: +BS, soft,  + distended ascites softer   	: No suprapubic tenderness  	UE: Warm, no cyanosis  no clubbing,  no edema;   	LE: Warm, no cyanosis  no clubbing, no edema RLE cast   	avf +  bruit and thrill 		  		    	    LABS/STUDIES  --------------------------------------------------------------------------------              9.9    12.0  >-----------<  293      [04-15-19 @ 00:16]              31.2     137  |  97  |  36  ----------------------------<  237      [04-15-19 @ 00:16]  4.0   |  23  |  3.60        Ca     7.9     [04-15-19 @ 00:16]      Mg     1.8     [04-15-19 @ 00:16]      Phos  2.4     [04-15-19 @ 00:16]    TPro  6.4  /  Alb  2.8  /  TBili  0.6  /  DBili  x   /  AST  96  /  ALT  23  /  AlkPhos  146  [04-15-19 @ 00:16]    PT/INR: PT 14.0 , INR 1.21       [04-15-19 @ 00:16]  PTT: 34.9       [04-15-19 @ 00:16]              imp/suggest: ESRD      Hemodialysis Prescription:  	Access:  	Dialyzer: revaclear   	Blood Flow (mL/Min): 400  	Dialysate Flow (mL/Min): 600  	Target UF (Liters):  	Treatment Time:  	Potassium:   	Calcium: 2.5  	  KYLEIGH    Vitamin D     continue with hd   see hd flow sheet

## 2019-04-15 NOTE — PROGRESS NOTE ADULT - SUBJECTIVE AND OBJECTIVE BOX
VIRGILIO KAPLAN  78y Female  MRN:[id number]    Patient is a 78y old  Female who presents with a chief complaint of R tib/fib fx (10 Apr 2019 12:01)    HPI:  78 F PMH CAD s/p CABG x 4 2015, T2DM with peripheral neuropathy, ESRD on HD T/Th/S via L AVF, hypotension on midodrine, HFrEF, HTN, HLD, b/l cataracts s/p surgery, p/w mechanical fall and R leg pain. Pt lives in basement apartment and ordinarily ambulates with walker. Today was getting ready for HD, made it to the first step on her set of stairs, and reportedly felt her R leg "give out." Pt fell backwards but was caught by her family member who was assisting her up the stairs. She denies LOC/syncope or head strike. +Pain in R leg post fall, worse with movement and much improved with immobilization. Could not bear weight or walk after fall.  Denies cp, sob at rest, f/c, n/v/d, dysuria, cough. +chronic BENAVIDEZ, reportedly stable since her CABG. Family at bedside providing collateral and confirms above details.     Vs: 99.1, 76, 102/60, 16, 90% RA --> 100% 4LNC.  Labs: no leukocytosis, chronic stable anemia, rest cbc unrevealing, coags unrevealing, hypoNa 126, HAGMA with cmp c/w known ESRD, hyperglycemia, chronic stable alkp elevation mild AST elevation. XR tib/fib/femur/hip/knee reveals acute prox tib/fib fxs with possible intraarticular extension. (10 Apr 2019 00:56)      Patient seen and evaluated in micu.  family at bedside     Interval HPI: +bacteremia     PAST MEDICAL & SURGICAL HISTORY:  CHF (congestive heart failure): Oct 2015- still sob  Shortness of breath  Hypotension  Type 2 diabetes mellitus  Chronic kidney disease (CKD): ON DIALYSIS - [facility], Sat  Nephrolithiasis: 2001  Ulcer: 2001  Hydronephrosis: Right 1/2012  Charcot Foot: Right Foot  Herniated Disc  Diabetic Neuropathy  Anemia: CKD  Hypothyroidism  Hypertension: NOT ANY MORE  Hyperlipidemia  S/P mitral valve repair: with CABG X 4 MAY 2015  S/P CABG (coronary artery bypass graft): x 4, May 2015  History of extraction of renal calculus  S/P Foot Surgery: 2002, 2014  S/P Cholecystectomy: 2001  S/P Breast Lumpectomy: 1994, 2003 - left breast benign      REVIEW OF SYSTEMS:  as per hpi    VITALS:  Vital Signs Last 24 Hrs  T(C): 37.4 (15 Apr 2019 12:00), Max: 38.1 (14 Apr 2019 15:00)  T(F): 99.3 (15 Apr 2019 12:00), Max: 100.5 (14 Apr 2019 15:00)  HR: 58 (15 Apr 2019 13:45) (52 - 77)  BP: 87/50 (15 Apr 2019 13:45) (82/45 - 124/56)  BP(mean): 66 (15 Apr 2019 13:45) (61 - 85)  RR: 25 (15 Apr 2019 13:45) (12 - 41)  SpO2: 100% (15 Apr 2019 13:45) (98% - 100%)    PHYSICAL EXAM:  GENERAL: intubated,    HEAD:  Atraumatic, Normocephalic  EYES: EOMI, PERRLA, conjunctiva and sclera clear  NECK: no jvd  CHEST/LUNG: coarse bs b/l  HEART: S1, S2;   ABDOMEN: Soft, Nontender, nondistended; Bowel sounds present  EXTREMITIES:  2+ Peripheral Pulses, No clubbing, cyanosis, or edema,  right knee cast      Consultant(s) Notes Reviewed:  [x ] YES  [ ] NO  Care Discussed with Consultants/Other Providers [ x] YES  [ ] NO    MEDS:  MEDICATIONS  (STANDING):  artificial  tears Solution 1 Drop(s) Both EYES two times a day  aspirin  chewable 81 milliGRAM(s) Oral daily  ceFAZolin   IVPB 1000 milliGRAM(s) IV Intermittent every 24 hours  chlorhexidine 2% Cloths 1 Application(s) Topical daily  chlorhexidine 4% Liquid 1 Application(s) Topical <User Schedule>  dexmedetomidine Infusion 0.2 MICROgram(s)/kG/Hr (3.415 mL/Hr) IV Continuous <Continuous>  dextrose 5%. 1000 milliLiter(s) (50 mL/Hr) IV Continuous <Continuous>  dextrose 50% Injectable 12.5 Gram(s) IV Push once  dextrose 50% Injectable 25 Gram(s) IV Push once  dextrose 50% Injectable 25 Gram(s) IV Push once  heparin  Injectable 5000 Unit(s) SubCutaneous every 8 hours  insulin glargine Injectable (LANTUS) 4 Unit(s) SubCutaneous at bedtime  insulin lispro (HumaLOG) corrective regimen sliding scale   SubCutaneous every 6 hours  ketotifen 0.025% Ophthalmic Solution 1 Drop(s) Both EYES every 8 hours  ketotifen 0.025% Ophthalmic Solution      levothyroxine Injectable 75 MICROGram(s) IV Push at bedtime  midodrine 10 milliGRAM(s) Oral three times a day  pantoprazole  Injectable 40 milliGRAM(s) IV Push every 12 hours  phenylephrine    Infusion 1 MICROgram(s)/kG/Min (25.613 mL/Hr) IV Continuous <Continuous>  polyethylene glycol 3350 17 Gram(s) Oral daily  senna Syrup 10 milliLiter(s) Oral at bedtime    MEDICATIONS  (PRN):  dextrose 40% Gel 15 Gram(s) Oral once PRN Blood Glucose LESS THAN 70 milliGRAM(s)/deciliter  glucagon  Injectable 1 milliGRAM(s) IntraMuscular once PRN Glucose LESS THAN 70 milligrams/deciliter  lidocaine/prilocaine Cream 1 Application(s) Topical daily PRN hd days  ondansetron Injectable 4 milliGRAM(s) IV Push every 12 hours PRN Nausea and/or Vomiting          ALLERGIES:  allopurinol (Rash)  Augmentin (Rash)  Levaquin (Rash)      LABS:                                      9.9    12.0  )-----------( 293      ( 15 Apr 2019 00:16 )             31.2   04-15    137  |  97  |  36<H>  ----------------------------<  237<H>  4.0   |  23  |  3.60<H>    Ca    7.9<L>      15 Apr 2019 00:16  Phos  2.4     04-15  Mg     1.8     04-15    TPro  6.4  /  Alb  2.8<L>  /  TBili  0.6  /  DBili  x   /  AST  96<H>  /  ALT  23  /  AlkPhos  146<H>  04-15        Culture - Blood (04.13.19 @ 07:27)    Gram Stain:   Growth in aerobic bottle: Gram Positive Cocci in Clusters    Specimen Source: .Blood    Culture Results:   Growth in aerobic bottle: Gram Positive Cocci in Clusters        < from: CT Knee No Cont, Right (04.10.19 @ 03:34) >  Impression:    Acute, impacted fractures of the right proximal tibia and fibula as   described.      < end of copied text >           < from: Xray Tibia + Fibula 2 Views, Right (04.09.19 @ 20:28) >  impression:    There is an acute, comminuted fracture of the proximal tibial metaphysis   with mild anterior displacement of the distal fracture fragment. There is   questionable intra-articular extension on the lateral view. There is a   large knee joint effusion. There is a proximal fibular fracture.    There is no fracture of the hip or femur. The ankle is poorly visualized   secondary to technique and may be externally rotated with respect to the   knee. There is no ankle joint effusion. Vascular calcifications are noted.     CT of the knee can be performed for further evaluation.    < end of copied text >

## 2019-04-15 NOTE — PROGRESS NOTE ADULT - ASSESSMENT
78F with CAD s/p CABG x4, HFrEF (EF 26% 4/2019), T2DM with peripheral neuropathy, ESRD on HD T/Th/S via L AVF, hypotension on midodrine, HTN, HLD initially admitted 4/9 after mechanical fall c/b R proximal tib/fib fracture not requiring emergent surgical intervention s/p cast placement with hospital course c/b possible melena now resolved, and RRT on 4/11 for acute hypercapnic respiratory failure in setting of opioid use requiring intubation. MICU course c/b SVT s/p synchronized cardioversion x2 now in sinus rhythm and Staph aureus bacteremia.     #Neuro:   - Patient transferred to MICU for AMS 2/2 hypercarbia. Unclear etiology, possibly opioid-induced as patient receiving pain medications for leg fracture. Ammonia wnl. CTH unremarkable.   - Now more awake, following commands off sedation. On low dose precedex for comfort as extubation pending LISA     #CV:   - CAD s/p multiple CABG: continue ASA, holding statin in setting of transaminitis  - HFrEF: repeat TTE 4/2019 with EF 26% severe global RV systolic dysfunction, moderate MS, mild TR, normal pulm pressures  - Troponin elevation likely in setting of underlying ESRD and demand ischemia  - SVT s/p cardioversion: now in sinus, continue to monitor on tele  - Orthostatic hypotension: continue midodrine 10mg q8h  - Shock: likely septic given +BCx, requiring low dose phenylephrine, continue to wean as tolerated  -     #Pulmonary:   - Initially transferred for acute hypercapnic respiratory failure of unclear etiology but suspect 2/2 opioid use  - Continue mechanical ventilation, daily spontaneous breathing trials  - Keep on ventilator for now pending LISA    #GI:   - Continue Nepro tube feeds via OGT, monitor residuals  - Transaminitis with abdominal sonogram showing early signs of cirrhosis likely 2/2 heart failure. Hepatitis studies negative.   - Melena and questionable coffee-ground emesis (apparently chocolate milk). Continue PPI. No plans for GI intervention at this time.   - S/p paracentesis on 4/12 with removal of 4.9L, fluid studies c/w portal hypertension likely 2/2 RV failure    #Renal:   - Hx of ESRD on HD T/Th/S; continue HD as per Nephrology; scheduled for session today  - Continue to monitor electrolytes    #Endo:   - TSH 34 but free T4 within normal range  - HbA1c 8.3%, continue insulin sliding scale  - Fingersticks q6h while on tube feeds    #ID:   - Found to have Staph aureus bacteremia  - Continue vancomycin (by level) and meropenem  - Repeat cultures in AM  - Obtain LISA to assess for endocarditis    DVT PPx:   - HSQ 78F with CAD s/p CABG x4, HFrEF (EF 26% 4/2019), T2DM with peripheral neuropathy, ESRD on HD T/Th/S via L AVF, hypotension on midodrine, HTN, HLD initially admitted 4/9 after mechanical fall c/b R proximal tib/fib fracture not requiring emergent surgical intervention s/p cast placement with hospital course c/b possible melena now resolved, and RRT on 4/11 for acute hypercapnic respiratory failure in setting of opioid use requiring intubation. MICU course c/b SVT s/p synchronized cardioversion x2 now in sinus rhythm and Staph aureus bacteremia.     #Neuro:   - Patient transferred to MICU for AMS 2/2 hypercarbia. Unclear etiology, possibly opioid-induced as patient receiving pain medications for leg fracture. Ammonia wnl. CTH unremarkable.   - Pt awake and responsive, communicative and following commands off sedation. On low dose precedex for comfort as extubation pending LISA     #CV:   - CAD s/p multiple CABG: continue ASA, holding statin in setting of transaminitis  - HFrEF: repeat TTE 4/2019 with EF 26% severe global RV systolic dysfunction, moderate MS, mild TR, normal pulm pressures  - Troponin elevation likely in setting of underlying ESRD and demand ischemia  - SVT s/p cardioversion: now in sinus, continue to monitor on tele  - Orthostatic hypotension: continue midodrine 10mg q8h  - Shock: likely septic given +BCx, requiring low dose phenylephrine, continue to wean as tolerated    #Pulmonary:   - Initially transferred for acute hypercapnic respiratory failure of unclear etiology but suspect 2/2 opioid use  - Continue mechanical ventilation, daily spontaneous breathing trials  - Keep on ventilator for now pending LISA - planned for tomorrow morning     #GI:   - Continue Nepro tube feeds via OGT, monitor residuals  - Transaminitis with abdominal sonogram showing early signs of cirrhosis likely 2/2 heart failure. Hepatitis studies negative.   - Melena and questionable coffee-ground emesis (apparently chocolate milk). Continue PPI. No plans for GI intervention at this time.   - S/p paracentesis on 4/12 with removal of 4.9L, fluid studies with SAAG 0.6, likely 2/2 heart failure    #Renal:   - Hx of ESRD on HD T/Th/S; continue HD as per Nephrology  - Pt appears volume overloaded on bedside sono with bilateral pleural effusions, IVC 2 cm does not change with respirations   - Will undergo HD with fluid removal today   - Continue to monitor electrolytes    #Endo:   - TSH 34 but free T4 within normal range  - HbA1c 8.3%, continue insulin sliding scale  - Will start Lantus 4 units at bedtime   - Fingersticks q6h while on tube feeds    #ID:   - Found to have Staph aureus bacteremia on cultures from 4/12 and 4/13, MSSA from 4/12  - ID on board, Vanc d/c and Ancef started on 4/14   - Repeat cultures from 4/14 pending   - Obtain LISA to assess for endocarditis - planned for tomorrow AM     DVT PPx:   - HSQ

## 2019-04-15 NOTE — PROGRESS NOTE ADULT - ASSESSMENT
78 F PMH CAD s/p CABG x 4 2015, T2DM with peripheral neuropathy, ESRD on HD T/Th/S via L AVF, hypotension on midodrine, HFrEF, HTN, HLD, b/l cataracts s/p surgery, p/w mechanical fall and R leg pain, found to have R tib/fib fracture.   pt with resp failure, now intubated in micu    resp failure  likely multifactorial  vent support  pressure support prn  mngt as per MICU    bacteremia/sepsis   id f/u  iv abx  f/u LISA  f/u repeat cult    open fracture of proximal end of right tibia,  confirmed on XR and CT  -ortho recs appreciated:  s/p cast. no surgical intervention at this time  outpt f/u  NWB RLE     Coronary artery disease without angina pectoris  -c/w aspirin, statin  -f/u cards .        Type 2 diabetes mellitus with hyperglycemia, with long-term current use of insulin.  start HISS  endo consult dr ronak maciel, CC diet,    ESRD on hemodialysis.  HD as per renal      ascites.   s/p paracentesis   f/u fluid analysis     emesis  gi consulted  f/u guaiac  ppi  monitor cbc       mngt as per MICU  d/w family at bedside

## 2019-04-15 NOTE — PROGRESS NOTE ADULT - SUBJECTIVE AND OBJECTIVE BOX
Patient is a 78y old  Female who presents with a chief complaint of R tib/fib fx (15 Apr 2019 06:59)      Any change in ROS:   Pt is intubated:     MEDICATIONS  (STANDING):  artificial  tears Solution 1 Drop(s) Both EYES two times a day  aspirin  chewable 81 milliGRAM(s) Oral daily  ceFAZolin   IVPB 1000 milliGRAM(s) IV Intermittent every 24 hours  chlorhexidine 2% Cloths 1 Application(s) Topical daily  chlorhexidine 4% Liquid 1 Application(s) Topical <User Schedule>  dexmedetomidine Infusion 0.2 MICROgram(s)/kG/Hr (3.415 mL/Hr) IV Continuous <Continuous>  dextrose 5%. 1000 milliLiter(s) (50 mL/Hr) IV Continuous <Continuous>  dextrose 50% Injectable 12.5 Gram(s) IV Push once  dextrose 50% Injectable 25 Gram(s) IV Push once  dextrose 50% Injectable 25 Gram(s) IV Push once  heparin  Injectable 5000 Unit(s) SubCutaneous every 8 hours  insulin lispro (HumaLOG) corrective regimen sliding scale   SubCutaneous every 6 hours  levothyroxine Injectable 75 MICROGram(s) IV Push at bedtime  midodrine 10 milliGRAM(s) Oral three times a day  pantoprazole  Injectable 40 milliGRAM(s) IV Push every 12 hours  phenylephrine    Infusion 1 MICROgram(s)/kG/Min (25.613 mL/Hr) IV Continuous <Continuous>  polyethylene glycol 3350 17 Gram(s) Oral daily  senna Syrup 10 milliLiter(s) Oral at bedtime    MEDICATIONS  (PRN):  dextrose 40% Gel 15 Gram(s) Oral once PRN Blood Glucose LESS THAN 70 milliGRAM(s)/deciliter  glucagon  Injectable 1 milliGRAM(s) IntraMuscular once PRN Glucose LESS THAN 70 milligrams/deciliter  lidocaine/prilocaine Cream 1 Application(s) Topical daily PRN hd days  ondansetron Injectable 4 milliGRAM(s) IV Push every 12 hours PRN Nausea and/or Vomiting    Vital Signs Last 24 Hrs  T(C): 37.4 (15 Apr 2019 07:45), Max: 38.6 (14 Apr 2019 12:00)  T(F): 99.3 (15 Apr 2019 07:45), Max: 101.5 (14 Apr 2019 12:00)  HR: 57 (15 Apr 2019 09:09) (52 - 77)  BP: 106/55 (15 Apr 2019 09:09) (81/45 - 124/56)  BP(mean): 76 (15 Apr 2019 08:30) (59 - 82)  RR: 22 (15 Apr 2019 09:09) (12 - 33)  SpO2: 100% (15 Apr 2019 09:09) (98% - 100%)  Mode: AC/ CMV (Assist Control/ Continuous Mandatory Ventilation)  RR (machine): 12  TV (machine): 450  FiO2: 40  PEEP: 5  ITime: 0.9  MAP: 9  PIP: 10    I&O's Summary    14 Apr 2019 07:01  -  15 Apr 2019 07:00  --------------------------------------------------------  IN: 1649.8 mL / OUT: 440 mL / NET: 1209.8 mL    15 Apr 2019 07:01  -  15 Apr 2019 09:38  --------------------------------------------------------  IN: 21 mL / OUT: 0 mL / NET: 21 mL          Physical Exam:   GENERAL: NAD, well-groomed, well-developed  HEENT: FLOWER/   Atraumatic, Normocephalic  ENMT: No tonsillar erythema, exudates, or enlargement; Moist mucous membranes, Good dentition, No lesions  NECK: Supple, No JVD, Normal thyroid  CHEST/LUNG: Clear to auscultaion  CVS: Regular rate and rhythm; No murmurs, rubs, or gallops  GI: : Soft, Nontender, Nondistended; Bowel sounds present  NERVOUS SYSTEM:  AIntubated and sedated:   EXTREMITIES: - edema  LYMPH: No lymphadenopathy noted  SKIN: No rashes or lesions  ENDOCRINOLOGY: No Thyromegaly  PSYCH: Intubated and sedated:     Labs:                              9.9    12.0  )-----------( 293      ( 15 Apr 2019 00:16 )             31.2                         10.9   14.7  )-----------( 261      ( 13 Apr 2019 23:35 )             33.5                         10.2   11.4  )-----------( 221      ( 12 Apr 2019 23:54 )             30.2                         10.0   13.0  )-----------( 224      ( 12 Apr 2019 01:15 )             30.6                         9.4    10.7  )-----------( 203      ( 11 Apr 2019 22:02 )             29.5     04-15    137  |  97  |  36<H>  ----------------------------<  237<H>  4.0   |  23  |  3.60<H>  04-14    138  |  96  |  25<H>  ----------------------------<  141<H>  3.9   |  21<L>  |  2.93<H>  04-12    135  |  93<L>  |  32<H>  ----------------------------<  135<H>  3.8   |  19<L>  |  3.52<H>  04-12    132<L>  |  93<L>  |  20  ----------------------------<  167<H>  3.6   |  18<L>  |  2.72<H>  04-11    135  |  95<L>  |  17  ----------------------------<  149<H>  3.4<L>   |  21<L>  |  2.45<H>    Ca    7.9<L>      15 Apr 2019 00:16  Ca    8.3<L>      14 Apr 2019 01:20  Phos  2.4     04-15  Phos  2.7     04-14  Mg     1.8     04-15  Mg     1.8     04-14    TPro  6.4  /  Alb  2.8<L>  /  TBili  0.6  /  DBili  x   /  AST  96<H>  /  ALT  23  /  AlkPhos  146<H>  04-15  TPro  6.7  /  Alb  3.1<L>  /  TBili  0.8  /  DBili  x   /  AST  201<H>  /  ALT  63<H>  /  AlkPhos  147<H>  04-14  TPro  6.6  /  Alb  3.5  /  TBili  0.9  /  DBili  x   /  AST  335<H>  /  ALT  118<H>  /  AlkPhos  121<H>  04-12  TPro  7.6  /  Alb  3.3  /  TBili  0.6  /  DBili  x   /  AST  217<H>  /  ALT  88<H>  /  AlkPhos  175<H>  04-12  TPro  6.8  /  Alb  2.9<L>  /  TBili  0.5  /  DBili  x   /  AST  147<H>  /  ALT  59<H>  /  AlkPhos  147<H>  04-11    CAPILLARY BLOOD GLUCOSE      POCT Blood Glucose.: 169 mg/dL (15 Apr 2019 05:04)  POCT Blood Glucose.: 248 mg/dL (15 Apr 2019 00:09)  POCT Blood Glucose.: 220 mg/dL (14 Apr 2019 17:18)  POCT Blood Glucose.: 219 mg/dL (14 Apr 2019 12:39)      LIVER FUNCTIONS - ( 15 Apr 2019 00:16 )  Alb: 2.8 g/dL / Pro: 6.4 g/dL / ALK PHOS: 146 U/L / ALT: 23 U/L / AST: 96 U/L / GGT: x           PT/INR - ( 15 Apr 2019 00:16 )   PT: 14.0 sec;   INR: 1.21 ratio         PTT - ( 15 Apr 2019 00:16 )  PTT:34.9 sec    Procalcitonin, Serum: 10.80 ng/mL (04-13 @ 01:59)  Fluid Source --  Albumin, Fluid2.9 g/dL  Glucose, [id number] mg/dL  Protein total, Fluid5.5 g/dL  Lacatate Dehydrogenase, Fluid91 U/L  pH, Fluid--  Cytopathology-Non Gyn Report--        RECENT CULTURES:  04-13 @ 07:27 .Blood       Growth in aerobic bottle: Gram Positive Cocci in Clusters           Growth in aerobic bottle: Staphylococcus aureus  See previous culture [id number]    04-13 @ 00:45 .Body Fluid       No polymorphonuclear leukocytes seen  No organisms seen  by cytocentrifuge           No growth to date.    04-12 @ 02:36 .Blood   PCR    Growth in aerobic bottle: Gram Positive Cocci in Clusters  Growth in anaerobic bottle: Gram Positive Cocci in Clusters    Blood Culture PCR  Staphylococcus aureus  Blood Culture PCR     Growth in aerobic and anaerobic bottles: Staphylococcus aureus  See previous culture [id number]    < from: US Abdomen Upper Quadrant Right (04.12.19 @ 11:24) >  quadrants to assess for the presence of ascites.    FINDINGS:    Liver: Mild surface nodularity is appreciated with a linear probe.    Bile ducts: Normal caliber. Common bile duct measures 3mm.     Gallbladder: Cholecystectomy.        Pancreas: Visualized portions are within normal limits.    Right kidney: Atrophic and echogenic measuring 8.1 cm. No hydronephrosis.     Ascites: Moderate to large volume of abdominal ascites, the largest   pocket in the left lower quadrant.    Aorta and IVC: Visualized portions are within normal limits.    Bilateral pleural effusions.    IMPRESSION:     Moderate to large volume of abdominal ascites.    Bilateral pleural effusions.    Mild surface nodularity of the liver suggesting early cirrhotic   morphology.          < from: Xray Chest 1 View- PORTABLE-Urgent (04.12.19 @ 01:55) >    EXAM:  XR CHEST PORTABLE URGENT 1V                            PROCEDURE DATE:  04/12/2019            INTERPRETATION:  A single chest x-ray was obtained on April 12, 2019.    Indication: And NG tube and endotracheal tube placement.    Impression:    The heart is normal in size. Bilateral pleural effusion. Endotracheal   tube is in good position. NG tube is in the stomach however its tip is   not seen on the current study. A central line seen on the right and the   tip is in superior vena cava. No pneumothorax. Status post mitral annulus   repair.                    ISREAL ROSENBAUM M.D., ATTENDING RADIOLOGIST  This document has been electronically signed. Apr 12 2019  7:34AM        < end of copied text >            KOFI SHERWOOD M.D., ATTENDING RADIOLOGIST  This document has been electronically signed. Apr 12 2019 11:29AM    < end of copied text >        RESPIRATORY CULTURES:          Studies  Chest X-RAY  CT SCAN Chest   Venous Dopplers: LE:   CT Abdomen  Others

## 2019-04-15 NOTE — PROGRESS NOTE ADULT - ATTENDING COMMENTS
remain critically ill: for LISA: Cont antibiotics and full resp support!  4/15: Still febrile with respiratory failure: Being considered for LISA today :

## 2019-04-15 NOTE — CHART NOTE - NSCHARTNOTEFT_GEN_A_CORE
Nutrition Follow Up Note  Patient seen for: malnutrition f/u    Chart reviewed, events noted. Adm dx: trib/fib fex. MICU adm following RRT on  for acute hypercapnic respiratory failure in setting of opioid use requiring intubation. MICU course c/b SVT s/p synchronized cardioversion x2 now in sinus rhythm and Staph aureus bacteremia. Last HD . S/P paracentesis  4.7 L removed. remains intubated. BG elevated, lantus ordered today.    Source: chart    Diet :  Vital 1.2 40cc/hr x 18 hrs via OGT     Enteral /Parenteral Nutrition: 24 hr EN intake 4/15 680cc,  350cc    GI: no vomiting, no abd distention, last BM  (senna, miralax rx)      Daily Weight in k.1 (), Weight in k.7 (), Weight in k.2 (), Weight in k.3 (), Weight in k.9 (), Weight in k.6 (), Weight in k.2 ()  wt range 131-149 lb since adm, has 1+ generalized edema    Pertinent Medications: MEDICATIONS  (STANDING):  artificial  tears Solution 1 Drop(s) Both EYES two times a day  aspirin  chewable 81 milliGRAM(s) Oral daily  ceFAZolin   IVPB 1000 milliGRAM(s) IV Intermittent every 24 hours  chlorhexidine 2% Cloths 1 Application(s) Topical daily  chlorhexidine 4% Liquid 1 Application(s) Topical <User Schedule>  dexmedetomidine Infusion 0.2 MICROgram(s)/kG/Hr (3.415 mL/Hr) IV Continuous <Continuous>  dextrose 5%. 1000 milliLiter(s) (50 mL/Hr) IV Continuous <Continuous>  dextrose 50% Injectable 12.5 Gram(s) IV Push once  dextrose 50% Injectable 25 Gram(s) IV Push once  dextrose 50% Injectable 25 Gram(s) IV Push once  heparin  Injectable 5000 Unit(s) SubCutaneous every 8 hours  insulin glargine Injectable (LANTUS) 3 Unit(s) SubCutaneous at bedtime  insulin lispro (HumaLOG) corrective regimen sliding scale   SubCutaneous every 6 hours  levothyroxine Injectable 75 MICROGram(s) IV Push at bedtime  midodrine 10 milliGRAM(s) Oral three times a day  pantoprazole  Injectable 40 milliGRAM(s) IV Push every 12 hours  phenylephrine    Infusion 1 MICROgram(s)/kG/Min (25.613 mL/Hr) IV Continuous <Continuous>  polyethylene glycol 3350 17 Gram(s) Oral daily  senna Syrup 10 milliLiter(s) Oral at bedtime    MEDICATIONS  (PRN):  dextrose 40% Gel 15 Gram(s) Oral once PRN Blood Glucose LESS THAN 70 milliGRAM(s)/deciliter  glucagon  Injectable 1 milliGRAM(s) IntraMuscular once PRN Glucose LESS THAN 70 milligrams/deciliter  lidocaine/prilocaine Cream 1 Application(s) Topical daily PRN hd days  ondansetron Injectable 4 milliGRAM(s) IV Push every 12 hours PRN Nausea and/or Vomiting    04-15 @ 00:16: Na 137, BUN 36<H>, Cr 3.60<H>, <H>, K+ 4.0, Phos 2.4<L>, Mg 1.8, Alk Phos 146<H>, ALT/SGPT 23, AST/SGOT 96<H>, HbA1c --    Finger Sticks:  POCT Blood Glucose.: 169 mg/dL (04-15 @ 05:04)  POCT Blood Glucose.: 248 mg/dL (04-15 @ 00:09)  POCT Blood Glucose.: 220 mg/dL ( @ 17:18)  POCT Blood Glucose.: 219 mg/dL ( @ 12:39)      Skin per nursing documentation: stage 2 coccyx, left heel DTI      Estimated Needs:   [ ] no change since previous assessment  [x ] recalculated: using dry wt 63 kg   energy needs for intubation 1450 kcals (23 kcals/kg), protein needs 88-101gm (1.4-1.6gm/kg)    Previous Nutrition Diagnosis: moderate malnutrition  Nutrition Diagnosis is: ongoing, addressed w/ EN    New Nutrition Diagnosis: none  Related to:    As evidenced by:      Interventions:     Recommend  1) Vital 1.2 at 65 cc/hr x 18 hrs provides 1404 kcals, 88 gm protein, 949 cc free water  meets 22 Kcal/Kg, 1.4Gm/kg dry wt 63 kg  2) reassess BG control for BG > 180    Monitoring and Evaluation:     Continue to monitor Nutritional intake, Tolerance to diet prescription, weights, labs, skin integrity    RD remains available upon request and will follow up per protocol

## 2019-04-16 DIAGNOSIS — I33.0 ACUTE AND SUBACUTE INFECTIVE ENDOCARDITIS: ICD-10-CM

## 2019-04-16 LAB
ALBUMIN SERPL ELPH-MCNC: 2.6 G/DL — LOW (ref 3.3–5)
ALP SERPL-CCNC: 129 U/L — HIGH (ref 40–120)
ALT FLD-CCNC: 10 U/L — SIGNIFICANT CHANGE UP (ref 10–45)
ANION GAP SERPL CALC-SCNC: 15 MMOL/L — SIGNIFICANT CHANGE UP (ref 5–17)
APTT BLD: 43.3 SEC — HIGH (ref 27.5–36.3)
AST SERPL-CCNC: 69 U/L — HIGH (ref 10–40)
BILIRUB SERPL-MCNC: 0.6 MG/DL — SIGNIFICANT CHANGE UP (ref 0.2–1.2)
BUN SERPL-MCNC: 29 MG/DL — HIGH (ref 7–23)
CALCIUM SERPL-MCNC: 7.9 MG/DL — LOW (ref 8.4–10.5)
CHLORIDE SERPL-SCNC: 94 MMOL/L — LOW (ref 96–108)
CO2 SERPL-SCNC: 25 MMOL/L — SIGNIFICANT CHANGE UP (ref 22–31)
COMMENT - FLUIDS: SIGNIFICANT CHANGE UP
CREAT SERPL-MCNC: 2.66 MG/DL — HIGH (ref 0.5–1.3)
CULTURE RESULTS: SIGNIFICANT CHANGE UP
GAS PNL BLDA: SIGNIFICANT CHANGE UP
GLUCOSE BLDC GLUCOMTR-MCNC: 146 MG/DL — HIGH (ref 70–99)
GLUCOSE BLDC GLUCOMTR-MCNC: 176 MG/DL — HIGH (ref 70–99)
GLUCOSE BLDC GLUCOMTR-MCNC: 232 MG/DL — HIGH (ref 70–99)
GLUCOSE BLDC GLUCOMTR-MCNC: 244 MG/DL — HIGH (ref 70–99)
GLUCOSE SERPL-MCNC: 291 MG/DL — HIGH (ref 70–99)
GRAM STN FLD: SIGNIFICANT CHANGE UP
HCT VFR BLD CALC: 28.4 % — LOW (ref 34.5–45)
HGB BLD-MCNC: 9.6 G/DL — LOW (ref 11.5–15.5)
INR BLD: 1.31 RATIO — HIGH (ref 0.88–1.16)
MAGNESIUM SERPL-MCNC: 1.8 MG/DL — SIGNIFICANT CHANGE UP (ref 1.6–2.6)
MCHC RBC-ENTMCNC: 32.1 PG — SIGNIFICANT CHANGE UP (ref 27–34)
MCHC RBC-ENTMCNC: 33.9 GM/DL — SIGNIFICANT CHANGE UP (ref 32–36)
MCV RBC AUTO: 94.9 FL — SIGNIFICANT CHANGE UP (ref 80–100)
PHOSPHATE SERPL-MCNC: 1.9 MG/DL — LOW (ref 2.5–4.5)
PLATELET # BLD AUTO: 239 K/UL — SIGNIFICANT CHANGE UP (ref 150–400)
POTASSIUM SERPL-MCNC: 4 MMOL/L — SIGNIFICANT CHANGE UP (ref 3.5–5.3)
POTASSIUM SERPL-SCNC: 4 MMOL/L — SIGNIFICANT CHANGE UP (ref 3.5–5.3)
PROT SERPL-MCNC: 6.2 G/DL — SIGNIFICANT CHANGE UP (ref 6–8.3)
PROTHROM AB SERPL-ACNC: 15.1 SEC — HIGH (ref 10–12.9)
RBC # BLD: 2.99 M/UL — LOW (ref 3.8–5.2)
RBC # FLD: 15.2 % — HIGH (ref 10.3–14.5)
SODIUM SERPL-SCNC: 134 MMOL/L — LOW (ref 135–145)
WBC # BLD: 10 K/UL — SIGNIFICANT CHANGE UP (ref 3.8–10.5)
WBC # FLD AUTO: 10 K/UL — SIGNIFICANT CHANGE UP (ref 3.8–10.5)

## 2019-04-16 PROCEDURE — 93312 ECHO TRANSESOPHAGEAL: CPT | Mod: 26,GC

## 2019-04-16 PROCEDURE — 99291 CRITICAL CARE FIRST HOUR: CPT

## 2019-04-16 PROCEDURE — 99221 1ST HOSP IP/OBS SF/LOW 40: CPT

## 2019-04-16 PROCEDURE — 99233 SBSQ HOSP IP/OBS HIGH 50: CPT

## 2019-04-16 RX ORDER — GENTAMICIN SULFATE 40 MG/ML
130 VIAL (ML) INJECTION
Qty: 0 | Refills: 0 | Status: DISCONTINUED | OUTPATIENT
Start: 2019-04-16 | End: 2019-04-23

## 2019-04-16 RX ORDER — NOREPINEPHRINE BITARTRATE/D5W 8 MG/250ML
0.05 PLASTIC BAG, INJECTION (ML) INTRAVENOUS
Qty: 8 | Refills: 0 | Status: DISCONTINUED | OUTPATIENT
Start: 2019-04-16 | End: 2019-04-18

## 2019-04-16 RX ORDER — ACETAMINOPHEN 500 MG
650 TABLET ORAL ONCE
Qty: 0 | Refills: 0 | Status: COMPLETED | OUTPATIENT
Start: 2019-04-16 | End: 2019-04-16

## 2019-04-16 RX ORDER — PROPOFOL 10 MG/ML
15 INJECTION, EMULSION INTRAVENOUS
Qty: 1000 | Refills: 0 | Status: DISCONTINUED | OUTPATIENT
Start: 2019-04-16 | End: 2019-04-18

## 2019-04-16 RX ORDER — MIDAZOLAM HYDROCHLORIDE 1 MG/ML
4 INJECTION, SOLUTION INTRAMUSCULAR; INTRAVENOUS ONCE
Qty: 0 | Refills: 0 | Status: DISCONTINUED | OUTPATIENT
Start: 2019-04-16 | End: 2019-04-16

## 2019-04-16 RX ORDER — POTASSIUM PHOSPHATE, MONOBASIC POTASSIUM PHOSPHATE, DIBASIC 236; 224 MG/ML; MG/ML
15 INJECTION, SOLUTION INTRAVENOUS ONCE
Qty: 0 | Refills: 0 | Status: COMPLETED | OUTPATIENT
Start: 2019-04-16 | End: 2019-04-16

## 2019-04-16 RX ORDER — CISATRACURIUM BESYLATE 2 MG/ML
20 INJECTION INTRAVENOUS ONCE
Qty: 0 | Refills: 0 | Status: COMPLETED | OUTPATIENT
Start: 2019-04-16 | End: 2019-04-16

## 2019-04-16 RX ADMIN — MIDAZOLAM HYDROCHLORIDE 4 MILLIGRAM(S): 1 INJECTION, SOLUTION INTRAMUSCULAR; INTRAVENOUS at 09:30

## 2019-04-16 RX ADMIN — Medication 2: at 17:35

## 2019-04-16 RX ADMIN — PROPOFOL 6.15 MICROGRAM(S)/KG/MIN: 10 INJECTION, EMULSION INTRAVENOUS at 13:32

## 2019-04-16 RX ADMIN — Medication 650 MILLIGRAM(S): at 22:15

## 2019-04-16 RX ADMIN — Medication 100 MILLIGRAM(S): at 14:18

## 2019-04-16 RX ADMIN — Medication 4: at 00:43

## 2019-04-16 RX ADMIN — Medication 1 DROP(S): at 05:21

## 2019-04-16 RX ADMIN — HEPARIN SODIUM 5000 UNIT(S): 5000 INJECTION INTRAVENOUS; SUBCUTANEOUS at 05:21

## 2019-04-16 RX ADMIN — Medication 650 MILLIGRAM(S): at 16:34

## 2019-04-16 RX ADMIN — SENNA PLUS 10 MILLILITER(S): 8.6 TABLET ORAL at 21:48

## 2019-04-16 RX ADMIN — POTASSIUM PHOSPHATE, MONOBASIC POTASSIUM PHOSPHATE, DIBASIC 62.5 MILLIMOLE(S): 236; 224 INJECTION, SOLUTION INTRAVENOUS at 05:21

## 2019-04-16 RX ADMIN — CHLORHEXIDINE GLUCONATE 1 APPLICATION(S): 213 SOLUTION TOPICAL at 05:22

## 2019-04-16 RX ADMIN — KETOTIFEN FUMARATE 1 DROP(S): 0.34 SOLUTION OPHTHALMIC at 21:51

## 2019-04-16 RX ADMIN — Medication 6.4 MICROGRAM(S)/KG/MIN: at 13:13

## 2019-04-16 RX ADMIN — PANTOPRAZOLE SODIUM 40 MILLIGRAM(S): 20 TABLET, DELAYED RELEASE ORAL at 05:21

## 2019-04-16 RX ADMIN — MIDODRINE HYDROCHLORIDE 10 MILLIGRAM(S): 2.5 TABLET ORAL at 21:49

## 2019-04-16 RX ADMIN — CISATRACURIUM BESYLATE 20 MILLIGRAM(S): 2 INJECTION INTRAVENOUS at 09:45

## 2019-04-16 RX ADMIN — MIDODRINE HYDROCHLORIDE 10 MILLIGRAM(S): 2.5 TABLET ORAL at 05:21

## 2019-04-16 RX ADMIN — Medication 2: at 05:22

## 2019-04-16 RX ADMIN — KETOTIFEN FUMARATE 1 DROP(S): 0.34 SOLUTION OPHTHALMIC at 05:22

## 2019-04-16 RX ADMIN — Medication 75 MICROGRAM(S): at 21:50

## 2019-04-16 RX ADMIN — Medication 1 DROP(S): at 17:35

## 2019-04-16 RX ADMIN — KETOTIFEN FUMARATE 1 DROP(S): 0.34 SOLUTION OPHTHALMIC at 14:19

## 2019-04-16 RX ADMIN — PANTOPRAZOLE SODIUM 40 MILLIGRAM(S): 20 TABLET, DELAYED RELEASE ORAL at 17:35

## 2019-04-16 RX ADMIN — Medication 650 MILLIGRAM(S): at 21:48

## 2019-04-16 RX ADMIN — INSULIN GLARGINE 2 UNIT(S): 100 INJECTION, SOLUTION SUBCUTANEOUS at 21:48

## 2019-04-16 RX ADMIN — MIDODRINE HYDROCHLORIDE 10 MILLIGRAM(S): 2.5 TABLET ORAL at 14:18

## 2019-04-16 RX ADMIN — Medication 103.25 MILLIGRAM(S): at 17:37

## 2019-04-16 RX ADMIN — Medication 81 MILLIGRAM(S): at 12:25

## 2019-04-16 RX ADMIN — DEXMEDETOMIDINE HYDROCHLORIDE IN 0.9% SODIUM CHLORIDE 3.42 MICROGRAM(S)/KG/HR: 4 INJECTION INTRAVENOUS at 18:05

## 2019-04-16 RX ADMIN — HEPARIN SODIUM 5000 UNIT(S): 5000 INJECTION INTRAVENOUS; SUBCUTANEOUS at 14:18

## 2019-04-16 RX ADMIN — Medication 650 MILLIGRAM(S): at 14:53

## 2019-04-16 RX ADMIN — HEPARIN SODIUM 5000 UNIT(S): 5000 INJECTION INTRAVENOUS; SUBCUTANEOUS at 21:49

## 2019-04-16 NOTE — PROGRESS NOTE ADULT - ASSESSMENT
78F with CAD s/p CABG x4, HFrEF (EF 26% 4/2019), T2DM with peripheral neuropathy, ESRD on HD T/Th/S via L AVF, hypotension on midodrine, HTN, HLD initially admitted 4/9 after mechanical fall c/b R proximal tib/fib fracture not requiring emergent surgical intervention s/p cast placement with hospital course c/b possible melena now resolved, and RRT on 4/11 for acute hypercapnic respiratory failure in setting of opioid use requiring intubation. MICU course c/b SVT s/p synchronized cardioversion x2 now in sinus rhythm and Staph aureus bacteremia.     #Neuro:   - Patient transferred to MICU for AMS 2/2 hypercarbia. Unclear etiology, possibly opioid-induced as patient receiving pain medications for leg fracture. Ammonia wnl. CTH unremarkable.   - Pt awake and responsive, communicative and following commands off sedation   - c/w off sedation today     #CV:   - CAD s/p multiple CABG: continue ASA, holding statin in setting of transaminitis  - HFrEF: repeat TTE 4/2019 with EF 26% severe global RV systolic dysfunction, moderate MS, mild TR, normal pulm pressures  - Troponin elevation likely in setting of underlying ESRD and demand ischemia  - SVT s/p cardioversion: now in sinus, continue to monitor on tele  - Orthostatic hypotension: continue midodrine 10mg q8h  - Shock: likely septic given +BCx, requiring low dose phenylephrine, will switch to levo given significantly reduced EF and component of cardiogenic shock      #Pulmonary:   - Initially transferred for acute hypercapnic respiratory failure of unclear etiology but suspect 2/2 opioid use  - Continue mechanical ventilation, daily spontaneous breathing trials  - Keep on ventilator for now pending official LISA - planned for tomorrow morning     #GI:   - Continue Nepro tube feeds via OGT, monitor residuals  - Transaminitis with abdominal sonogram showing early signs of cirrhosis likely 2/2 heart failure. Hepatitis studies negative.   - Melena and questionable coffee-ground emesis (apparently chocolate milk). Continue PPI. No plans for GI intervention at this time.   - S/p paracentesis on 4/12 with removal of 4.9L, fluid studies with SAAG 0.6, likely 2/2 heart failure    #Renal:   - Hx of ESRD on HD T/Th/S; continue HD as per Nephrology  - Pt appears volume overloaded on bedside sono with bilateral pleural effusions, IVC 2 cm does not change with respirations   - Will undergo HD with fluid removal today   - Continue to monitor electrolytes    #Endo:   - TSH 34 but free T4 within normal range  - HbA1c 8.3%, continue insulin sliding scale  - Will start Lantus 4 units at bedtime   - Fingersticks q6h while on tube feeds    #ID:   - Found to have Staph aureus bacteremia on cultures from 4/12 and 4/13, MSSA from 4/12  - ID on board, Vanc d/c and Ancef started on 4/14   - Repeat cultures from 4/14 pending   - Obtain LISA to assess for endocarditis - planned for tomorrow AM     DVT PPx:   - HSQ 78F with CAD s/p CABG x4, HFrEF (EF 26% 4/2019), T2DM with peripheral neuropathy, ESRD on HD T/Th/S via L AVF, hypotension on midodrine, HTN, HLD initially admitted 4/9 after mechanical fall c/b R proximal tib/fib fracture not requiring emergent surgical intervention s/p cast placement with hospital course c/b possible melena now resolved, and RRT on 4/11 for acute hypercapnic respiratory failure in setting of opioid use requiring intubation. MICU course c/b SVT s/p synchronized cardioversion x2 now in sinus rhythm and Staph aureus bacteremia.     #Neuro:   - Patient transferred to MICU for AMS 2/2 hypercarbia. Unclear etiology, possibly opioid-induced as patient receiving pain medications for leg fracture. Ammonia wnl. CTH unremarkable.   - Pt awake and responsive, communicative and following commands off sedation   - c/w off sedation today     #CV:   - CAD s/p multiple CABG: continue ASA, holding statin in setting of transaminitis  - HFrEF: repeat TTE 4/2019 with EF 26% severe global RV systolic dysfunction, moderate MS, mild TR, normal pulm pressures  - Troponin elevation likely in setting of underlying ESRD and demand ischemia  - SVT s/p cardioversion: now rate controlled, sinus rhythm, continue to monitor on tele  - Shock: likely septic given +BCx, requiring low dose phenylephrine, will switch to levo given significantly reduced EF and component of cardiogenic shock, continue midodrine 10mg q8h  - Bedside LISA with suspected vegetations on  Aortic and Mitral Valve, awaiting CT surgery reccs       #Pulmonary:   - Initially transferred for acute hypercapnic respiratory failure of unclear etiology but suspect 2/2 opioid use  - Continue mechanical ventilation, daily spontaneous breathing trials  - still w/ b/l pleural effusion but improved from yesterday   - Keep on ventilator for now pending official LISA , and further dialysis today     #GI:   - Continue Nepro tube feeds via OGT, monitor residuals  - Transaminitis with abdominal sonogram showing early signs of cirrhosis likely 2/2 heart failure. Hepatitis studies negative.   - Melena and questionable coffee-ground emesis (apparently chocolate milk). Continue PPI. No plans for GI intervention at this time.   - S/p paracentesis on 4/12 with removal of 4.9L, fluid studies with SAAG 0.6, likely 2/2 heart failure  - POCUS still with mild ascites but overall improved     #Renal:   - Hx of ESRD on HD T/Th/S; continue HD as per Nephrology  - Pt appears volume overloaded on bedside sono with bilateral pleural effusions, IVC 2 cm does not change with respirations   - HD with goal 2.5L fluid removal today   - Continue to monitor electrolytes    #Endo:   - TSH 34 but free T4 within normal range  - HbA1c 8.3%, continue insulin sliding scale  - c/w  Lantus 2 units at bedtime while NPO   - Fingersticks q6h while on tube feeds    #ID:   - Found to have MSSA bacteremia on cultures from 4/12 and 4/13,   - Repeat cultures from 4/14 pending   - Bedside LISA with vegetations on aortic and mitral valve   - will consult CT surgery   - appreciate ID reccs, c/w ancef 4/14-, start gentamycin for dual coverage given hx of mitral ring     DVT PPx:   - HSQ 78F with CAD s/p CABG x4, HFrEF (EF 26% 4/2019), T2DM with peripheral neuropathy, ESRD on HD T/Th/S via L AVF, hypotension on midodrine, HTN, HLD initially admitted 4/9 after mechanical fall c/b R proximal tib/fib fracture not requiring emergent surgical intervention s/p cast placement with hospital course c/b possible melena now resolved, and RRT on 4/11 for acute hypercapnic respiratory failure in setting of opioid use requiring intubation. MICU course c/b SVT s/p synchronized cardioversion x2 now in sinus rhythm and Staph aureus bacteremia.     #Neuro:   - Patient transferred to MICU for AMS 2/2 hypercarbia. Unclear etiology, possibly opioid-induced as patient receiving pain medications for leg fracture. Ammonia wnl. CTH unremarkable.   - Pt awake and responsive, communicative and following commands off sedation   - c/w off sedation today     #CV:   - CAD s/p multiple CABG: continue ASA, holding statin in setting of transaminitis  - HFrEF: repeat TTE 4/2019 with EF 26% severe global RV systolic dysfunction, moderate MS, mild TR, normal pulm pressures  - Troponin elevation likely in setting of underlying ESRD and demand ischemia  - SVT s/p cardioversion: now rate controlled, sinus rhythm, continue to monitor on tele  - Shock: likely septic given +BCx, requiring low dose phenylephrine, will switch to levo given significantly reduced EF and component of cardiogenic shock, continue midodrine 10mg q8h  - Bedside LISA with suspected vegetations on  Aortic and Mitral Valve, awaiting CT surgery reccs       #Pulmonary:   - Initially transferred for acute hypercapnic respiratory failure of unclear etiology but suspect 2/2 opioid use  - Continue mechanical ventilation, daily spontaneous breathing trials  - still w/ b/l pleural effusion but improved from yesterday   - Keep on ventilator for now until further dialysis today and decision regarding need for cardiology LISA     #GI:   - Continue Nepro tube feeds via OGT, monitor residuals  - Transaminitis with abdominal sonogram showing early signs of cirrhosis likely 2/2 heart failure. Hepatitis studies negative.   - Melena and questionable coffee-ground emesis (apparently chocolate milk). Continue PPI. No plans for GI intervention at this time.   - S/p paracentesis on 4/12 with removal of 4.9L, fluid studies with SAAG 0.6, likely 2/2 heart failure  - POCUS still with mild ascites but overall improved     #Renal:   - Hx of ESRD on HD T/Th/S; continue HD as per Nephrology  - Pt appears volume overloaded on bedside sono with bilateral pleural effusions, IVC 2 cm does not change with respirations   - HD with goal 2.5L fluid removal today   - Continue to monitor electrolytes    #Endo:   - TSH 34 but free T4 within normal range  - HbA1c 8.3%, continue insulin sliding scale  - c/w  Lantus 2 units at bedtime while NPO   - Fingersticks q6h while on tube feeds    #ID:   - Found to have MSSA bacteremia on cultures from 4/12 and 4/13,   - Repeat cultures from 4/14 pending   - Bedside LISA with vegetations on aortic and mitral valve   - will consult CT surgery   - appreciate ID reccs, c/w ancef 4/14-, start gentamycin for dual coverage given hx of mitral ring     DVT PPx:   - HSQ

## 2019-04-16 NOTE — CHART NOTE - NSCHARTNOTEFT_GEN_A_CORE
Procedure: Transesophageal Echocardiogram  Indication: r/o endocarditis  Consent: Sheridan Lee (daughter)  Operators: Deon Mclean and Saravanan Duran  Anesthesia:  propofol gtt, versed 4 mg IVP x 1, nimbex 20 mg IVP x 1    Findings:    AV:  vegetation noted on non-coronary cusp and likely tethered to aortic valve, no significant aortic insufficiency noted    MV: vegetation tethered to anterior mitral valve leaflet that appears to move independently of mitral valve; mitral annular calcification, moderate mitral stenosis; mitral regurgitation noted, mitral annular ring noted    PV:  no vegetation seen on pulmonic valve    TV:  no vegetation noted on tricuspid valve; mild tricuspid regurgitation     LV: severely decreased global LV systolic function    RV:  decreased RV systolic function    LA: grossly normal in appearance    RA: grossly normal in appearance; catheter noted in RA    SVC:  no respiratory variation noted     Aorta: grossly normal    Doppler:  Note:   - E/A ratio approximately 1.2  - pulmonary artery acceleration time of 75 ms    Assessment and Plan:   - vegetations noted on non-coronary cusp of aortic valve and on anterior leaflet of mitral valve; no significant aortic insufficiency noted, but there is mild mitral regurgitation  - severely decreased LV systolic function  - decreased RV systolic function  - acceleration time suggestive of pulmonary hypertension  - mitral regurgitation and mitral stenosis noted Procedure: Transesophageal Echocardiogram  Indication: r/o endocarditis  Consent: Sheridan Lee (daughter)  Operators: Deon Mclean and Saravanan Duran  Anesthesia:  propofol gtt, versed 4 mg IVP x 1, nimbex 20 mg IVP x 1    Findings:    AV:  vegetation noted on non-coronary cusp and likely tethered to aortic valve, no significant aortic insufficiency noted    MV: vegetation tethered to anterior mitral valve leaflet that appears to move independently of mitral valve; mitral annular calcification, moderate mitral stenosis; mitral regurgitation noted, mitral annular ring noted    PV:  no vegetation seen on pulmonic valve    TV:  no vegetation noted on tricuspid valve; mild tricuspid regurgitation     LV: severely decreased global LV systolic function    RV:  decreased RV systolic function    LA: grossly normal in appearance    RA: grossly normal in appearance; catheter noted in RA    SVC:  no respiratory variation noted     Aorta: grossly normal    Doppler:  Note:   - E/A ratio approximately 1.2  - pulmonary artery acceleration time of 75 ms    Assessment and Plan:   - vegetations noted on non-coronary cusp of aortic valve and on anterior leaflet of mitral valve; no significant aortic insufficiency noted, but there is mild mitral regurgitation  - severely decreased LV systolic function  - decreased RV systolic function  - acceleration time suggestive of pulmonary hypertension  - mitral regurgitation and mitral stenosis noted    Attending note:  Agree with above. At bedside for procedure. No immediate complications noted.  HUGO Duran DO, [facility]

## 2019-04-16 NOTE — CONSULT NOTE ADULT - SUBJECTIVE AND OBJECTIVE BOX
History of Present Illness:  78y Female 78 F PMH CAD s/p CABG x 4 2015, T2DM with peripheral neuropathy, ESRD on HD T/Th/S via L AVF, hypotension on midodrine, HFrEF, HTN, HLD, b/l cataracts s/p surgery, p/w mechanical fall and R leg pain. Pt lives in basement apartment and ordinarily ambulates with walker. Today was getting ready for HD, made it to the first step on her set of stairs, and reportedly felt her R leg "give out." Pt fell backwards but was caught by her family member who was assisting her up the stairs. She denies LOC/syncope or head strike. +Pain in R leg post fall, worse with movement and much improved with immobilization. Could not bear weight or walk after fall.  Denies cp, sob at rest, f/c, n/v/d, dysuria, cough. +chronic BENAVIDEZ, reportedly stable since her MVR/CABG in 5/2015 with Dr Dean Vasquez at bedside providing collateral and confirms above details.       Past Medical History  CHF (congestive heart failure): Oct 2015- still sob  Shortness of breath  Hypotension  Type 2 diabetes mellitus  Chronic kidney disease (CKD): ON DIALYSIS - [facility], Sat  Diabetes mellitus  Nephrolithiasis: 2001  Ulcer: 2001  Hydronephrosis: Right 1/2012  Charcot Foot: Right Foot  Herniated Disc  Diabetic Neuropathy  Anemia: CKD  Bladder Cancer  Hypothyroidism  Renal Colic  Hypertension: NOT ANY MORE  Hyperlipidemia      Past Surgical History  S/P mitral valve repair: with CABG X 4 MAY 2015  S/P CABG (coronary artery bypass graft): x 4, May 2015  History of extraction of renal calculus  S/P Foot Surgery: 2002, 2014  S/P Cholecystectomy: 2001  S/P Breast Lumpectomy: 1994, 2003 - left breast benign      MEDICATIONS  (STANDING):  artificial  tears Solution 1 Drop(s) Both EYES two times a day  aspirin  chewable 81 milliGRAM(s) Oral daily  ceFAZolin   IVPB 1000 milliGRAM(s) IV Intermittent every 24 hours  chlorhexidine 2% Cloths 1 Application(s) Topical daily  chlorhexidine 4% Liquid 1 Application(s) Topical <User Schedule>  dexmedetomidine Infusion 0.2 MICROgram(s)/kG/Hr (3.415 mL/Hr) IV Continuous <Continuous>  dextrose 5%. 1000 milliLiter(s) (50 mL/Hr) IV Continuous <Continuous>  dextrose 50% Injectable 12.5 Gram(s) IV Push once  dextrose 50% Injectable 25 Gram(s) IV Push once  dextrose 50% Injectable 25 Gram(s) IV Push once  gentamicin   IVPB 130 milliGRAM(s) IV Intermittent <User Schedule>  heparin  Injectable 5000 Unit(s) SubCutaneous every 8 hours  insulin glargine Injectable (LANTUS) 2 Unit(s) SubCutaneous at bedtime  insulin lispro (HumaLOG) corrective regimen sliding scale   SubCutaneous every 6 hours  ketotifen 0.025% Ophthalmic Solution 1 Drop(s) Both EYES every 8 hours  ketotifen 0.025% Ophthalmic Solution      levothyroxine Injectable 75 MICROGram(s) IV Push at bedtime  midodrine 10 milliGRAM(s) Oral three times a day  norepinephrine Infusion 0.05 MICROgram(s)/kG/Min (6.403 mL/Hr) IV Continuous <Continuous>  pantoprazole  Injectable 40 milliGRAM(s) IV Push every 12 hours  polyethylene glycol 3350 17 Gram(s) Oral daily  propofol Infusion 15 MICROgram(s)/kG/Min (6.147 mL/Hr) IV Continuous <Continuous>  senna Syrup 10 milliLiter(s) Oral at bedtime    MEDICATIONS  (PRN):  acetaminophen    Suspension .. 650 milliGRAM(s) Oral every 6 hours PRN Temp greater or equal to 38C (100.4F)  dextrose 40% Gel 15 Gram(s) Oral once PRN Blood Glucose LESS THAN 70 milliGRAM(s)/deciliter  glucagon  Injectable 1 milliGRAM(s) IntraMuscular once PRN Glucose LESS THAN 70 milligrams/deciliter  lidocaine/prilocaine Cream 1 Application(s) Topical daily PRN hd days  ondansetron Injectable 4 milliGRAM(s) IV Push every 12 hours PRN Nausea and/or Vomiting    Antiplatelet therapy:       asa 81mg po daily                     Last dose/amt:    Allergies: allopurinol (Rash)  Augmentin (Rash)  Levaquin (Rash)      SOCIAL HISTORY:  Smoker: [ ] Yes  [x ] No        PACK YEARS:                         WHEN QUIT?  ETOH use: [ ] Yes  [ x] No              FREQUENCY / QUANTITY:  Ilicit Drug use:  [ ] Yes  [ x] No  Occupation: disabled, tetired  Live with: daughter   Assist device use: walker     Relevant Family History  FAMILY HISTORY:  FH: breast cancer: mother  FH: CHF (congestive heart failure): father      Review of Systems  GENERAL:  Fevers[] chills[] sweats[] fatigue[] weight loss[] weight gain []                                        NEURO:  parathesias[] seizures []  syncope []  confusion []                                                                                  EYES: glasses[]  blurry vision[]  discharge[] pain[] glaucoma []                                                                            ENMT:  difficulty hearing []  vertigo[]  dysphagia[] epistaxis[] recent dental work []                                      CV:  chest pain[] palpitations[] BENAVIDEZ [] diaphoresis [] edema[]                                                                                             RESPIRATORY:  wheezing[] SOB[] cough [] sputum[] hemoptysis[]                                                                    GI:  nausea[]  vomiting []  diarrhea[] constipation [] melena []                                                                        : hematuria[ ]  dysuria[ ] urgency[] incontinence[]                                                                                              MUSKULOSKELETAL:  arthritis[ ]  joint swelling [ ] muscle weakness [ ]                                                                  SKIN/BREAST:  rash[ ] itching [ ]  hair loss[ ] masses[ ]                                                                                                PSYCH:  dementia [ ] depresion [ ] anxiety[ ]                                                                                                                  HEME/LYMPH:  bruises easily[ ] enlarged lymph nodes[ ] tender lymph nodes[ ]                                                 ENDOCRINE:  cold intolerance[ ] heat intolerance[ ] polydipsia[ ]                                                                              PHYSICAL EXAM  Vital Signs Last 24 Hrs  T(C): 37.1 (16 Apr 2019 12:00), Max: 37.2 (16 Apr 2019 04:00)  T(F): 98.8 (16 Apr 2019 12:00), Max: 99 (16 Apr 2019 04:00)  HR: 84 (16 Apr 2019 14:15) (54 - 87)  BP: 118/58 (16 Apr 2019 14:15) (74/44 - 148/67)  BP(mean): 84 (16 Apr 2019 14:15) (55 - 96)  RR: 0 (16 Apr 2019 13:30) (0 - 25)  SpO2: 100% (16 Apr 2019 14:15) (96% - 100%)    General:restless on vent, cachectic                                                     Neuro: intubated and sedated               Eyes: Normal exam of conjunctiva & lids, pupils equally reactive.   ENT: Normal exam of nasal/oral mucosa with absence of cyanosis.   Neck: Normal exam of jugular veins, trachea & thyroid.   Chest: Normal lung exam with good air movement absence of wheezes, rales, or rhonchi:                                                                          CV:  Auscultation: normal [ x] S3[ ] S4[ ] Irregular [ ] Rub[ ] Clicks[ ]  Murmurs none:[ ]systolic [2/6 ]  diastolic [ ] holosystolic [ ]  Carotids: No Bruits[x ] Other____________ Abdominal Aorta: normal [x ] nonpalpable[x ]                                                                         GI: Normal exam of abdomen, liver & spleen with no noted masses or tenderness.                                                                                              Extremities:  LUE AVF + thrill/bruit  Lower Extremity Pulses: Right[+1 ] Left[+! ]Varicosities[none ] RLE entire leg cast due fx  SKIN :  pale, frail                                                           LABS:                        9.6    10.0  )-----------( 239      ( 16 Apr 2019 00:46 )             28.4     04-16    134<L>  |  94<L>  |  29<H>  ----------------------------<  291<H>  4.0   |  25  |  2.66<H>    Ca    7.9<L>      16 Apr 2019 00:46  Phos  1.9     04-16  Mg     1.8     04-16    TPro  6.2  /  Alb  2.6<L>  /  TBili  0.6  /  DBili  x   /  AST  69<H>  /  ALT  10  /  AlkPhos  129<H>  04-16    PT/INR - ( 16 Apr 2019 00:46 )   PT: 15.1 sec;   INR: 1.31 ratio         PTT - ( 16 Apr 2019 00:46 )  PTT:43.3 sec      TTE / LISA: 4/16/19 Bedside LISA with suspected vegetations on  Aortic and Mitral Valve,

## 2019-04-16 NOTE — PROGRESS NOTE ADULT - ATTENDING COMMENTS
remain critically ill: for TEDDY: Cont antibiotics and full resp support!  4/15: Still febrile with respiratory failure: Being considered for TEDDY today :  4/16: for teddy today : remains intubated and sedated: Cont full vent support for now:

## 2019-04-16 NOTE — CONSULT NOTE ADULT - PROBLEM SELECTOR RECOMMENDATION 9
- LISA on bedside with suspected vegetations on  Aortic and Mitral Valve, will need official LISA to further evaluate presence if even of endocarditis pt had negative 2 echo  on 4/12/19 with + Bc , cultures negative since 4/14/19  -investigate alternate cause of  bacteremia such RLE fx/ LUE AVF etc  - Keep on ventilator for now  for LISA in am   -Unlikely to be surgical candidate comorbidities with risk factors in the setting of ongoing sepsis  recommending medical management     - appreciate ID reccs, c/w ancef 4/14-,  on gentamycin for dual coverage given hx of mitral ring

## 2019-04-16 NOTE — PROGRESS NOTE ADULT - SUBJECTIVE AND OBJECTIVE BOX
INTERVAL HPI/OVERNIGHT EVENTS:    pt remains intubated  s/p LISA with vegetations noted on non-coronary cusp of aortic valve and on anterior leaflet of mitral valve    MEDICATIONS  (STANDING):  artificial  tears Solution 1 Drop(s) Both EYES two times a day  aspirin  chewable 81 milliGRAM(s) Oral daily  ceFAZolin   IVPB 1000 milliGRAM(s) IV Intermittent every 24 hours  chlorhexidine 2% Cloths 1 Application(s) Topical daily  chlorhexidine 4% Liquid 1 Application(s) Topical <User Schedule>  dexmedetomidine Infusion 0.2 MICROgram(s)/kG/Hr (3.415 mL/Hr) IV Continuous <Continuous>  dextrose 5%. 1000 milliLiter(s) (50 mL/Hr) IV Continuous <Continuous>  dextrose 50% Injectable 12.5 Gram(s) IV Push once  dextrose 50% Injectable 25 Gram(s) IV Push once  dextrose 50% Injectable 25 Gram(s) IV Push once  heparin  Injectable 5000 Unit(s) SubCutaneous every 8 hours  insulin glargine Injectable (LANTUS) 2 Unit(s) SubCutaneous at bedtime  insulin lispro (HumaLOG) corrective regimen sliding scale   SubCutaneous every 6 hours  ketotifen 0.025% Ophthalmic Solution 1 Drop(s) Both EYES every 8 hours  ketotifen 0.025% Ophthalmic Solution      levothyroxine Injectable 75 MICROGram(s) IV Push at bedtime  midodrine 10 milliGRAM(s) Oral three times a day  norepinephrine Infusion 0.05 MICROgram(s)/kG/Min (6.403 mL/Hr) IV Continuous <Continuous>  pantoprazole  Injectable 40 milliGRAM(s) IV Push every 12 hours  polyethylene glycol 3350 17 Gram(s) Oral daily  propofol Infusion 15 MICROgram(s)/kG/Min (6.147 mL/Hr) IV Continuous <Continuous>  senna Syrup 10 milliLiter(s) Oral at bedtime    MEDICATIONS  (PRN):  acetaminophen    Suspension .. 650 milliGRAM(s) Oral every 6 hours PRN Temp greater or equal to 38C (100.4F)  dextrose 40% Gel 15 Gram(s) Oral once PRN Blood Glucose LESS THAN 70 milliGRAM(s)/deciliter  glucagon  Injectable 1 milliGRAM(s) IntraMuscular once PRN Glucose LESS THAN 70 milligrams/deciliter  lidocaine/prilocaine Cream 1 Application(s) Topical daily PRN hd days  ondansetron Injectable 4 milliGRAM(s) IV Push every 12 hours PRN Nausea and/or Vomiting      Allergies    allopurinol (Rash)  Augmentin (Rash)  Levaquin (Rash)    Intolerances        Review of Systems: unable to obtain.         Vital Signs Last 24 Hrs  T(C): 36.5 (16 Apr 2019 08:00), Max: 37.2 (16 Apr 2019 04:00)  T(F): 97.7 (16 Apr 2019 08:00), Max: 99 (16 Apr 2019 04:00)  HR: 83 (16 Apr 2019 12:37) (54 - 87)  BP: 116/56 (16 Apr 2019 12:00) (71/35 - 148/67)  BP(mean): 81 (16 Apr 2019 12:00) (47 - 96)  RR: 0 (16 Apr 2019 12:00) (0 - 29)  SpO2: 100% (16 Apr 2019 12:37) (96% - 100%)    PHYSICAL EXAM:    GENERAL:   NAD, intubated   HEENT:  NC/AT,  no JVD, sclera-anicteric, + ETT  CHEST:  coarse breath sounds b/l  HEART:  +S1+S2   ABDOMEN:  Soft, nt, nd  EXTREMITIES:  no cyanosis, clubbing or edema  NEURO:  on precedex, comfortable, no tremor  SKIN:  warm/dry        LABS:                        9.6    10.0  )-----------( 239      ( 16 Apr 2019 00:46 )             28.4     04-16    134<L>  |  94<L>  |  29<H>  ----------------------------<  291<H>  4.0   |  25  |  2.66<H>    Ca    7.9<L>      16 Apr 2019 00:46  Phos  1.9     04-16  Mg     1.8     04-16    TPro  6.2  /  Alb  2.6<L>  /  TBili  0.6  /  DBili  x   /  AST  69<H>  /  ALT  10  /  AlkPhos  129<H>  04-16    PT/INR - ( 16 Apr 2019 00:46 )   PT: 15.1 sec;   INR: 1.31 ratio         PTT - ( 16 Apr 2019 00:46 )  PTT:43.3 sec      RADIOLOGY & ADDITIONAL TESTS:

## 2019-04-16 NOTE — CONSULT NOTE ADULT - ASSESSMENT
78F with CAD s/p CABG x4, HFrEF (EF 26% 4/2019), T2DM with peripheral neuropathy, ESRD on HD T/Th/S via L AVF, hypotension on midodrine, HTN, HLD initially admitted 4/9 after mechanical fall c/b R proximal tib/fib fracture not requiring emergent surgical intervention s/p cast placement with hospital course c/b possible melena now resolved, and RRT on 4/11 for acute hypercapnic respiratory failure in setting of opioid use requiring intubation. MICU course c/b SVT s/p synchronized cardioversion x2 now in sinus rhythm and Staph aureus bacteremia.

## 2019-04-16 NOTE — PROGRESS NOTE ADULT - ATTENDING COMMENTS
78F PMH CAD s/p CABG, HFrEF, DM2, ESRD on HD, hypotension on midodrine, HTN, HLD, initially admitted with R tib/fib fx after a mechanical fall. Hospital course complicated by acute hypercapnic respiratory failure requiring mechanical ventilation. Course further complicated by severe sepsis due to high grade MSSA bacteremia. 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     Bedside LISA this morning with aortic and mitral valve vegetations   Placed on Bhaskar overnight for hypotension    Recs:  --Neuro: wean off sedation   --Cardiac: switch Bhaskar to Levo, continue aspirin & midodrine   --Pulm: improving b/l pleural effusion however will benefit from another session of UF prior to extubation, hold off on thoracentesis  --GI: tolerating TF, has moderate ascites - monitor for now  --ID: has endocarditis - continue cefazolin, add gentamicin per ID, BCx from 4/14 neg so far, consult cardiac surgery   --Renal: HD with UF (2-2.5L as tolerated) today  --MSK: GIOVANI rodriguez, outpatient ortho f/u, no urgent surgical intervention per ortho  --Endo: Humalog sliding scale + Lantus, goal glu 140-200, continue Synthroid  --PPx: sc heparin, Protonix  --Prognosis poor  --Patient critically ill, 37mins

## 2019-04-16 NOTE — PROGRESS NOTE ADULT - ASSESSMENT
77 yo F with hx of cad, cabg, DM, esrd, on HD, chronic hypotension on midodrine 10 mg tid with sob, pleural effusions, chf, ascites, and new tib/fib fx    1- esrd  2- hypotension   3- chf  4- ascites   5- tib/fib fx   6- respiratory failure   cont pressor support  hd revaclear 300 3.5 hr 350 cc bfr 2- 2.5liter  fluid removal as tolerated  replete phos after hd since plan is to extubate after hd .   d/w family at bedside  d/w icu team

## 2019-04-16 NOTE — PROGRESS NOTE ADULT - PROBLEM SELECTOR PLAN 2
- MSSA bacteremia  - s/p LISA with vegetations noted on non-coronary cusp of aortic valve and on anterior leaflet of mitral valve; no significant aortic insufficiency noted, but there is mild mitral regurgitation  - severely decreased LV systolic function  - abx/care per ID appreciated

## 2019-04-16 NOTE — PROGRESS NOTE ADULT - SUBJECTIVE AND OBJECTIVE BOX
Patient is a 78y old  Female who presents with a chief complaint of R tib/fib fx (16 Apr 2019 08:10)      Any change in ROS: She is intubated and on pressors and sedated: for LISA today     MEDICATIONS  (STANDING):  artificial  tears Solution 1 Drop(s) Both EYES two times a day  aspirin  chewable 81 milliGRAM(s) Oral daily  ceFAZolin   IVPB 1000 milliGRAM(s) IV Intermittent every 24 hours  chlorhexidine 2% Cloths 1 Application(s) Topical daily  chlorhexidine 4% Liquid 1 Application(s) Topical <User Schedule>  dexmedetomidine Infusion 0.2 MICROgram(s)/kG/Hr (3.415 mL/Hr) IV Continuous <Continuous>  dextrose 5%. 1000 milliLiter(s) (50 mL/Hr) IV Continuous <Continuous>  dextrose 50% Injectable 12.5 Gram(s) IV Push once  dextrose 50% Injectable 25 Gram(s) IV Push once  dextrose 50% Injectable 25 Gram(s) IV Push once  heparin  Injectable 5000 Unit(s) SubCutaneous every 8 hours  insulin glargine Injectable (LANTUS) 2 Unit(s) SubCutaneous at bedtime  insulin lispro (HumaLOG) corrective regimen sliding scale   SubCutaneous every 6 hours  ketotifen 0.025% Ophthalmic Solution 1 Drop(s) Both EYES every 8 hours  ketotifen 0.025% Ophthalmic Solution      levothyroxine Injectable 75 MICROGram(s) IV Push at bedtime  midodrine 10 milliGRAM(s) Oral three times a day  pantoprazole  Injectable 40 milliGRAM(s) IV Push every 12 hours  phenylephrine    Infusion 1 MICROgram(s)/kG/Min (25.613 mL/Hr) IV Continuous <Continuous>  polyethylene glycol 3350 17 Gram(s) Oral daily  senna Syrup 10 milliLiter(s) Oral at bedtime    MEDICATIONS  (PRN):  acetaminophen    Suspension .. 650 milliGRAM(s) Oral every 6 hours PRN Temp greater or equal to 38C (100.4F)  dextrose 40% Gel 15 Gram(s) Oral once PRN Blood Glucose LESS THAN 70 milliGRAM(s)/deciliter  glucagon  Injectable 1 milliGRAM(s) IntraMuscular once PRN Glucose LESS THAN 70 milligrams/deciliter  lidocaine/prilocaine Cream 1 Application(s) Topical daily PRN hd days  ondansetron Injectable 4 milliGRAM(s) IV Push every 12 hours PRN Nausea and/or Vomiting    Vital Signs Last 24 Hrs  T(C): 36.5 (16 Apr 2019 08:00), Max: 37.4 (15 Apr 2019 12:00)  T(F): 97.7 (16 Apr 2019 08:00), Max: 99.3 (15 Apr 2019 12:00)  HR: 69 (16 Apr 2019 08:51) (54 - 73)  BP: 116/59 (16 Apr 2019 08:30) (71/35 - 148/67)  BP(mean): 82 (16 Apr 2019 08:30) (47 - 96)  RR: 22 (16 Apr 2019 08:30) (3 - 41)  SpO2: 100% (16 Apr 2019 08:51) (96% - 100%)  Mode: AC/ CMV (Assist Control/ Continuous Mandatory Ventilation)  RR (machine): 12  TV (machine): 450  FiO2: 30  PEEP: 5  ITime: 1  MAP: 8  PIP: 37    I&O's Summary    15 Apr 2019 07:01  -  16 Apr 2019 07:00  --------------------------------------------------------  IN: 1097.8 mL / OUT: 140 mL / NET: 957.8 mL    16 Apr 2019 07:01  -  16 Apr 2019 09:34  --------------------------------------------------------  IN: 306.1 mL / OUT: 0 mL / NET: 306.1 mL          Physical Exam:   GENERAL: NAD, well-groomed, well-developed  HEENT: FLOWER/   Atraumatic, Normocephalic  ENMT: No tonsillar erythema, exudates, or enlargement; Moist mucous membranes, Good dentition, No lesions  NECK: Supple, No JVD, Normal thyroid  CHEST/LUNG: Clear to auscultaion  CVS: Regular rate and rhythm; No murmurs, rubs, or gallops  GI: : Soft, Nontender, Nondistended; Bowel sounds present  NERVOUS SYSTEM:  Intubated and sedated   EXTREMITIES:  -edema  LYMPH: No lymphadenopathy noted  SKIN: No rashes or lesions  ENDOCsedated    Labs:  ABG - ( 16 Apr 2019 00:34 )  pH, Arterial: 7.50  pH, Blood: x     /  pCO2: 34    /  pO2: 193   / HCO3: 27    / Base Excess: 4.1   /  SaO2: 100                                         9.6    10.0  )-----------( 239      ( 16 Apr 2019 00:46 )             28.4                         9.9    12.0  )-----------( 293      ( 15 Apr 2019 00:16 )             31.2                         10.9   14.7  )-----------( 261      ( 13 Apr 2019 23:35 )             33.5                         10.2   11.4  )-----------( 221      ( 12 Apr 2019 23:54 )             30.2     04-16    134<L>  |  94<L>  |  29<H>  ----------------------------<  291<H>  4.0   |  25  |  2.66<H>  04-15    137  |  97  |  36<H>  ----------------------------<  237<H>  4.0   |  23  |  3.60<H>  04-14    138  |  96  |  25<H>  ----------------------------<  141<H>  3.9   |  21<L>  |  2.93<H>  04-12    135  |  93<L>  |  32<H>  ----------------------------<  135<H>  3.8   |  19<L>  |  3.52<H>    Ca    7.9<L>      16 Apr 2019 00:46  Ca    7.9<L>      15 Apr 2019 00:16  Phos  1.9     04-16  Phos  2.4     04-15  Mg     1.8     04-16  Mg     1.8     04-15    TPro  6.2  /  Alb  2.6<L>  /  TBili  0.6  /  DBili  x   /  AST  69<H>  /  ALT  10  /  AlkPhos  129<H>  04-16  TPro  6.4  /  Alb  2.8<L>  /  TBili  0.6  /  DBili  x   /  AST  96<H>  /  ALT  23  /  AlkPhos  146<H>  04-15  TPro  6.7  /  Alb  3.1<L>  /  TBili  0.8  /  DBili  x   /  AST  201<H>  /  ALT  63<H>  /  AlkPhos  147<H>  04-14  TPro  6.6  /  Alb  3.5  /  TBili  0.9  /  DBili  x   /  AST  335<H>  /  ALT  118<H>  /  AlkPhos  121<H>  04-12    CAPILLARY BLOOD GLUCOSE      POCT Blood Glucose.: 244 mg/dL (16 Apr 2019 05:02)  POCT Blood Glucose.: 236 mg/dL (15 Apr 2019 22:43)  POCT Blood Glucose.: 198 mg/dL (15 Apr 2019 17:44)  POCT Blood Glucose.: 291 mg/dL (15 Apr 2019 11:24)     (04-12 @ 20:42)    LIVER FUNCTIONS - ( 16 Apr 2019 00:46 )  Alb: 2.6 g/dL / Pro: 6.2 g/dL / ALK PHOS: 129 U/L / ALT: 10 U/L / AST: 69 U/L / GGT: x           PT/INR - ( 16 Apr 2019 00:46 )   PT: 15.1 sec;   INR: 1.31 ratio         PTT - ( 16 Apr 2019 00:46 )  PTT:43.3 sec    Procalcitonin, Serum: 10.80 ng/mL (04-13 @ 01:59)  Fluid Source --  Albumin, Fluid2.9 g/dL  Glucose, Xschy490 mg/dL  Protein total, Fluid5.5 g/dL  Lacatate Dehydrogenase, Fluid91 U/L  pH, Fluid--  Cytopathology-Non Gyn Report--  Fluid Source --  Albumin, Fluid--  Glucose, Fluid--  Protein total, Fluid--  Lacatate Dehydrogenase, Fluid--  pH, Fluid--  Cytopathology-Non Gyn Report  ACCESSION No:  [id number]    VIRGILIO KAPLAN                          1        Cytopathology Report            Specimen(s) Submitted  PERITONEAL FLUID      Clinical History  HFREF, hypercapnic respiratory failure 2/2 to pain meds versus  large ascities, ESRD.      Gross Description  Received: 30  ml of yellow fluid in CytoLyt  Prepared: 1 ThinPrep slide, 1 Cell block, 1 Smear      Final Diagnosis  PERITONEAL FLUID  NEGATIVE FOR MALIGNANT CELLS.  Cytology specimens and cell block are hypocellularconsisting of  rare reactive mesothelial cells with few  inflammatory cells and proteinaceous debris in background.    Screened by: Mer Murrieta CT(ASCP)  Verified by: Linda Tate MD  (Electronic Signature)  Reported on: 04/15/19 21:49 EDT, [address] [place]  [address]  Cytology technical processing performed at [address] [place] [id number]  _________________________________________________________________        RECENT CULTURES:  04-14 @ 12:25 .Blood                No growth to date.    04-13 @ 07:27 .Blood       Growth in aerobic bottle: Gram Positive Cocci in Clusters           Growth in aerobic bottle: Staphylococcus aureus  See previous culture [id number]    04-13 @ 00:45 .Body Fluid       No polymorphonuclear leukocytes seen  No organisms seen  by cytocentrifuge           No growth to date.    04-12 @ 02:36 .Blood   PCR    Growth in aerobic bottle: Gram Positive Cocci in Clusters  Growth in anaerobic bottle: Gram Positive Cocci in Clusters    Blood Culture PCR  Staphylococcus aureus  Blood Culture PCR     Growth in aerobic and anaerobic bottles: Staphylococcus aureus  See previous culture [id number]          RESPIRATORY CULTURES:    < from: US Abdomen Upper Quadrant Right (04.12.19 @ 11:24) >  FINDINGS:    Liver: Mild surface nodularity is appreciated with a linear probe.    Bile ducts: Normal caliber. Common bile duct measures 3mm.     Gallbladder: Cholecystectomy.        Pancreas: Visualized portions are within normal limits.    Right kidney: Atrophic and echogenic measuring 8.1 cm. No hydronephrosis.     Ascites: Moderate to large volume of abdominal ascites, the largest   pocket in the left lower quadrant.    Aorta and IVC: Visualized portions are within normal limits.    Bilateral pleural effusions.    IMPRESSION:     Moderate to large volume of abdominal ascites.    Bilateral pleural effusions.    Mild surface nodularity of the liver suggesting early cirrhotic   morphology.                    KOFI SHERWOOD M.D., ATTENDING RADIOLOGIST  This document has been electronically signed. Apr 12 2019 11:29AM        < end of copied text >        Studies  Chest X-RAY  CT SCAN Chest   Venous Dopplers: LE:   CT Abdomen  Others

## 2019-04-16 NOTE — PROGRESS NOTE ADULT - ASSESSMENT
78 F with DM, CAD s/p CABG, mitral annuloplasty ring, HFrEF on midodrine, ESRD on HD via Left AVF, admitted 4/9/19 after a fall at home and sustaining a Right tib/fib fracture now casted, developed acute hypercapnic respiratory failure attributed to opioids, intubated, developed fevers 4/12 and high grade MSSA bacteremia   pt was started on vanco   repeat blood cx 4/13 still positive, 4/14 negative  TTE no vegetation      high grade MSSA bacteremia, unclear source now, ?skin related after fall, r/o endocarditis  ?penicillin allergy    * c/w  cefazolin 1 g qd  * f/u the LISA today 78 F with DM, CAD s/p CABG, mitral annuloplasty ring, HFrEF on midodrine, ESRD on HD via Left AVF, admitted 4/9/19 after a fall at home and sustaining a Right tib/fib fracture now casted, developed acute hypercapnic respiratory failure attributed to opioids, intubated, developed fevers 4/12 and high grade MSSA bacteremia   pt was started on vanco   repeat blood cx 4/13 still positive, 4/14 negative for now  TTE no vegetation      high grade MSSA bacteremia, in the setting of mitral ring annuloplasty bedside LISA showed vegetation on mitral and aortic valve  ?penicillin allergy    * blood cx 4/14 negative for now  * c/w  cefazolin 1 g qd  * add gentamycin 2 mg/kg q 48 post HD  * cardiothoracic surgery eval

## 2019-04-16 NOTE — PROGRESS NOTE ADULT - SUBJECTIVE AND OBJECTIVE BOX
INTERVAL HPI/OVERNIGHT EVENTS:    SUBJECTIVE: Patient seen and examined at bedside.  No acute events overnight. REquired slightly increasing dose of     CONSTITUTIONAL: No weakness, fevers or chills  EYES/ENT: No visual changes;  No vertigo or throat pain   NECK: No pain or stiffness  RESPIRATORY: No cough, wheezing, hemoptysis; No shortness of breath  CARDIOVASCULAR: No chest pain or palpitations  GASTROINTESTINAL: No abdominal or epigastric pain. No nausea, vomiting, or hematemesis; No diarrhea or constipation. No melena or hematochezia.  GENITOURINARY: No dysuria, frequency or hematuria  NEUROLOGICAL: No numbness or weakness  SKIN: No itching, rashes    OBJECTIVE:    VITAL SIGNS:  ICU Vital Signs Last 24 Hrs  T(C): 36.5 (16 Apr 2019 08:00), Max: 37.2 (16 Apr 2019 04:00)  T(F): 97.7 (16 Apr 2019 08:00), Max: 99 (16 Apr 2019 04:00)  HR: 83 (16 Apr 2019 12:37) (54 - 87)  BP: 116/56 (16 Apr 2019 12:00) (71/35 - 148/67)  BP(mean): 81 (16 Apr 2019 12:00) (47 - 96)  ABP: --  ABP(mean): --  RR: 0 (16 Apr 2019 12:00) (0 - 29)  SpO2: 100% (16 Apr 2019 12:37) (96% - 100%)    Mode: AC/ CMV (Assist Control/ Continuous Mandatory Ventilation), RR (machine): 12, TV (machine): 450, FiO2: 30, PEEP: 5, ITime: 1, MAP: 9, PIP: 32    04-15 @ 07:01 - 04-16 @ 07:00  --------------------------------------------------------  IN: 1097.8 mL / OUT: 140 mL / NET: 957.8 mL    04-16 @ 07:01  - 04-16 @ 12:51  --------------------------------------------------------  IN: 368.1 mL / OUT: 0 mL / NET: 368.1 mL      CAPILLARY BLOOD GLUCOSE      POCT Blood Glucose.: 146 mg/dL (16 Apr 2019 12:31)      PHYSICAL EXAM:    General: NAD  HEENT: NC/AT; PERRL, clear conjunctiva  Neck: supple  Respiratory: CTA b/l  Cardiovascular: +S1/S2; RRR  Abdomen: soft, NT/ND; +BS x4  Extremities: WWP, 2+ peripheral pulses b/l; no LE edema  Skin: normal color and turgor; no rash  Neurological:    MEDICATIONS:  MEDICATIONS  (STANDING):  artificial  tears Solution 1 Drop(s) Both EYES two times a day  aspirin  chewable 81 milliGRAM(s) Oral daily  ceFAZolin   IVPB 1000 milliGRAM(s) IV Intermittent every 24 hours  chlorhexidine 2% Cloths 1 Application(s) Topical daily  chlorhexidine 4% Liquid 1 Application(s) Topical <User Schedule>  dexmedetomidine Infusion 0.2 MICROgram(s)/kG/Hr (3.415 mL/Hr) IV Continuous <Continuous>  dextrose 5%. 1000 milliLiter(s) (50 mL/Hr) IV Continuous <Continuous>  dextrose 50% Injectable 12.5 Gram(s) IV Push once  dextrose 50% Injectable 25 Gram(s) IV Push once  dextrose 50% Injectable 25 Gram(s) IV Push once  heparin  Injectable 5000 Unit(s) SubCutaneous every 8 hours  insulin glargine Injectable (LANTUS) 2 Unit(s) SubCutaneous at bedtime  insulin lispro (HumaLOG) corrective regimen sliding scale   SubCutaneous every 6 hours  ketotifen 0.025% Ophthalmic Solution 1 Drop(s) Both EYES every 8 hours  ketotifen 0.025% Ophthalmic Solution      levothyroxine Injectable 75 MICROGram(s) IV Push at bedtime  midodrine 10 milliGRAM(s) Oral three times a day  pantoprazole  Injectable 40 milliGRAM(s) IV Push every 12 hours  phenylephrine    Infusion 1 MICROgram(s)/kG/Min (25.613 mL/Hr) IV Continuous <Continuous>  polyethylene glycol 3350 17 Gram(s) Oral daily  senna Syrup 10 milliLiter(s) Oral at bedtime    MEDICATIONS  (PRN):  acetaminophen    Suspension .. 650 milliGRAM(s) Oral every 6 hours PRN Temp greater or equal to 38C (100.4F)  dextrose 40% Gel 15 Gram(s) Oral once PRN Blood Glucose LESS THAN 70 milliGRAM(s)/deciliter  glucagon  Injectable 1 milliGRAM(s) IntraMuscular once PRN Glucose LESS THAN 70 milligrams/deciliter  lidocaine/prilocaine Cream 1 Application(s) Topical daily PRN hd days  ondansetron Injectable 4 milliGRAM(s) IV Push every 12 hours PRN Nausea and/or Vomiting      ALLERGIES:  Allergies    allopurinol (Rash)  Augmentin (Rash)  Levaquin (Rash)    Intolerances        LABS:                        9.6    10.0  )-----------( 239      ( 16 Apr 2019 00:46 )             28.4     04-16    134<L>  |  94<L>  |  29<H>  ----------------------------<  291<H>  4.0   |  25  |  2.66<H>    Ca    7.9<L>      16 Apr 2019 00:46  Phos  1.9     04-16  Mg     1.8     04-16    TPro  6.2  /  Alb  2.6<L>  /  TBili  0.6  /  DBili  x   /  AST  69<H>  /  ALT  10  /  AlkPhos  129<H>  04-16    PT/INR - ( 16 Apr 2019 00:46 )   PT: 15.1 sec;   INR: 1.31 ratio         PTT - ( 16 Apr 2019 00:46 )  PTT:43.3 sec      RADIOLOGY & ADDITIONAL TESTS: Reviewed. INTERVAL HPI/OVERNIGHT EVENTS:    SUBJECTIVE: Patient seen and examined at bedside.  No acute events overnight. REquired slightly increasing dose of phenylephrine overnight on right side. DIalyzed 1.5L yesterday. NPO for teddy this AM. TEDDY with vegetations on aortic and mitral valve.     CONSTITUTIONAL: No weakness, fevers or chills  EYES/ENT: No visual changes;  No vertigo or throat pain   NECK: No pain or stiffness  RESPIRATORY: No cough, wheezing, hemoptysis; No shortness of breath  CARDIOVASCULAR: No chest pain or palpitations  GASTROINTESTINAL: No abdominal or epigastric pain. No nausea, vomiting, or hematemesis; No diarrhea or constipation. No melena or hematochezia.  GENITOURINARY: No dysuria, frequency or hematuria  NEUROLOGICAL: No numbness or weakness  SKIN: No itching, rashes    OBJECTIVE:    VITAL SIGNS:  ICU Vital Signs Last 24 Hrs  T(C): 36.5 (16 Apr 2019 08:00), Max: 37.2 (16 Apr 2019 04:00)  T(F): 97.7 (16 Apr 2019 08:00), Max: 99 (16 Apr 2019 04:00)  HR: 83 (16 Apr 2019 12:37) (54 - 87)  BP: 116/56 (16 Apr 2019 12:00) (71/35 - 148/67)  BP(mean): 81 (16 Apr 2019 12:00) (47 - 96)  ABP: --  ABP(mean): --  RR: 0 (16 Apr 2019 12:00) (0 - 29)  SpO2: 100% (16 Apr 2019 12:37) (96% - 100%)    Mode: AC/ CMV (Assist Control/ Continuous Mandatory Ventilation), RR (machine): 12, TV (machine): 450, FiO2: 30, PEEP: 5, ITime: 1, MAP: 9, PIP: 32    04-15 @ 07:01  -  04-16 @ 07:00  --------------------------------------------------------  IN: 1097.8 mL / OUT: 140 mL / NET: 957.8 mL    04-16 @ 07:01  -  04-16 @ 12:51  --------------------------------------------------------  IN: 368.1 mL / OUT: 0 mL / NET: 368.1 mL      CAPILLARY BLOOD GLUCOSE      POCT Blood Glucose.: 146 mg/dL (16 Apr 2019 12:31)      PHYSICAL EXAM:  General: Intubated, awake and responsive   HEENT: EOMI. Mild erythema of sclera noted, no discharge/pus noted.   Respiratory: Coarse breath sounds heard in bilateral lung fields.   Cardiovascular: S1, S2 noted.   Abdomen: Soft, nontender, nondistended.   Extremities: RLE in cast and wrapped in ACE bandage. LLE AVF.   Skin: No lesions noted.    Neurological: Intubated, on Precedex but awake and able to communicate via gesturing/writing      MEDICATIONS:  MEDICATIONS  (STANDING):  artificial  tears Solution 1 Drop(s) Both EYES two times a day  aspirin  chewable 81 milliGRAM(s) Oral daily  ceFAZolin   IVPB 1000 milliGRAM(s) IV Intermittent every 24 hours  chlorhexidine 2% Cloths 1 Application(s) Topical daily  chlorhexidine 4% Liquid 1 Application(s) Topical <User Schedule>  dexmedetomidine Infusion 0.2 MICROgram(s)/kG/Hr (3.415 mL/Hr) IV Continuous <Continuous>  dextrose 5%. 1000 milliLiter(s) (50 mL/Hr) IV Continuous <Continuous>  dextrose 50% Injectable 12.5 Gram(s) IV Push once  dextrose 50% Injectable 25 Gram(s) IV Push once  dextrose 50% Injectable 25 Gram(s) IV Push once  heparin  Injectable 5000 Unit(s) SubCutaneous every 8 hours  insulin glargine Injectable (LANTUS) 2 Unit(s) SubCutaneous at bedtime  insulin lispro (HumaLOG) corrective regimen sliding scale   SubCutaneous every 6 hours  ketotifen 0.025% Ophthalmic Solution 1 Drop(s) Both EYES every 8 hours  ketotifen 0.025% Ophthalmic Solution      levothyroxine Injectable 75 MICROGram(s) IV Push at bedtime  midodrine 10 milliGRAM(s) Oral three times a day  pantoprazole  Injectable 40 milliGRAM(s) IV Push every 12 hours  phenylephrine    Infusion 1 MICROgram(s)/kG/Min (25.613 mL/Hr) IV Continuous <Continuous>  polyethylene glycol 3350 17 Gram(s) Oral daily  senna Syrup 10 milliLiter(s) Oral at bedtime    MEDICATIONS  (PRN):  acetaminophen    Suspension .. 650 milliGRAM(s) Oral every 6 hours PRN Temp greater or equal to 38C (100.4F)  dextrose 40% Gel 15 Gram(s) Oral once PRN Blood Glucose LESS THAN 70 milliGRAM(s)/deciliter  glucagon  Injectable 1 milliGRAM(s) IntraMuscular once PRN Glucose LESS THAN 70 milligrams/deciliter  lidocaine/prilocaine Cream 1 Application(s) Topical daily PRN hd days  ondansetron Injectable 4 milliGRAM(s) IV Push every 12 hours PRN Nausea and/or Vomiting      ALLERGIES:  Allergies    allopurinol (Rash)  Augmentin (Rash)  Levaquin (Rash)    Intolerances        LABS:                        9.6    10.0  )-----------( 239      ( 16 Apr 2019 00:46 )             28.4     04-16    134<L>  |  94<L>  |  29<H>  ----------------------------<  291<H>  4.0   |  25  |  2.66<H>    Ca    7.9<L>      16 Apr 2019 00:46  Phos  1.9     04-16  Mg     1.8     04-16    TPro  6.2  /  Alb  2.6<L>  /  TBili  0.6  /  DBili  x   /  AST  69<H>  /  ALT  10  /  AlkPhos  129<H>  04-16    PT/INR - ( 16 Apr 2019 00:46 )   PT: 15.1 sec;   INR: 1.31 ratio         PTT - ( 16 Apr 2019 00:46 )  PTT:43.3 sec      RADIOLOGY & ADDITIONAL TESTS: Reviewed. INTERVAL HPI/OVERNIGHT EVENTS:    SUBJECTIVE: Patient seen and examined at bedside.  No acute events overnight. REquired slightly increasing dose of phenylephrine overnight on right side. DIalyzed 1.5L yesterday. NPO for teddy this AM. TEDDY with vegetations on aortic and mitral valve. When awake denies any pain or discomfort. Passing Bowel. Tolerating tube feeds but NPO since midnight.     OBJECTIVE:    VITAL SIGNS:  ICU Vital Signs Last 24 Hrs  T(C): 36.5 (16 Apr 2019 08:00), Max: 37.2 (16 Apr 2019 04:00)  T(F): 97.7 (16 Apr 2019 08:00), Max: 99 (16 Apr 2019 04:00)  HR: 83 (16 Apr 2019 12:37) (54 - 87)  BP: 116/56 (16 Apr 2019 12:00) (71/35 - 148/67)  BP(mean): 81 (16 Apr 2019 12:00) (47 - 96)  ABP: --  ABP(mean): --  RR: 0 (16 Apr 2019 12:00) (0 - 29)  SpO2: 100% (16 Apr 2019 12:37) (96% - 100%)    Mode: AC/ CMV (Assist Control/ Continuous Mandatory Ventilation), RR (machine): 12, TV (machine): 450, FiO2: 30, PEEP: 5, ITime: 1, MAP: 9, PIP: 32    04-15 @ 07:01  -  04-16 @ 07:00  --------------------------------------------------------  IN: 1097.8 mL / OUT: 140 mL / NET: 957.8 mL    04-16 @ 07:01  -  04-16 @ 12:51  --------------------------------------------------------  IN: 368.1 mL / OUT: 0 mL / NET: 368.1 mL      CAPILLARY BLOOD GLUCOSE      POCT Blood Glucose.: 146 mg/dL (16 Apr 2019 12:31)      PHYSICAL EXAM:  General: Intubated, awake and responsive   HEENT: EOMI. Mild erythema of sclera noted, no discharge/pus noted.   Respiratory: Coarse breath sounds heard in bilateral lung fields.   Cardiovascular: S1, S2 noted.   Abdomen: Soft, nontender, nondistended.   Extremities: RLE in cast and wrapped in ACE bandage. LLE AVF.   Skin: No lesions noted.    Neurological: Intubated, on Precedex but awake and able to communicate via gesturing/writing.         MEDICATIONS:  MEDICATIONS  (STANDING):  artificial  tears Solution 1 Drop(s) Both EYES two times a day  aspirin  chewable 81 milliGRAM(s) Oral daily  ceFAZolin   IVPB 1000 milliGRAM(s) IV Intermittent every 24 hours  chlorhexidine 2% Cloths 1 Application(s) Topical daily  chlorhexidine 4% Liquid 1 Application(s) Topical <User Schedule>  dexmedetomidine Infusion 0.2 MICROgram(s)/kG/Hr (3.415 mL/Hr) IV Continuous <Continuous>  dextrose 5%. 1000 milliLiter(s) (50 mL/Hr) IV Continuous <Continuous>  dextrose 50% Injectable 12.5 Gram(s) IV Push once  dextrose 50% Injectable 25 Gram(s) IV Push once  dextrose 50% Injectable 25 Gram(s) IV Push once  heparin  Injectable 5000 Unit(s) SubCutaneous every 8 hours  insulin glargine Injectable (LANTUS) 2 Unit(s) SubCutaneous at bedtime  insulin lispro (HumaLOG) corrective regimen sliding scale   SubCutaneous every 6 hours  ketotifen 0.025% Ophthalmic Solution 1 Drop(s) Both EYES every 8 hours  ketotifen 0.025% Ophthalmic Solution      levothyroxine Injectable 75 MICROGram(s) IV Push at bedtime  midodrine 10 milliGRAM(s) Oral three times a day  pantoprazole  Injectable 40 milliGRAM(s) IV Push every 12 hours  phenylephrine    Infusion 1 MICROgram(s)/kG/Min (25.613 mL/Hr) IV Continuous <Continuous>  polyethylene glycol 3350 17 Gram(s) Oral daily  senna Syrup 10 milliLiter(s) Oral at bedtime    MEDICATIONS  (PRN):  acetaminophen    Suspension .. 650 milliGRAM(s) Oral every 6 hours PRN Temp greater or equal to 38C (100.4F)  dextrose 40% Gel 15 Gram(s) Oral once PRN Blood Glucose LESS THAN 70 milliGRAM(s)/deciliter  glucagon  Injectable 1 milliGRAM(s) IntraMuscular once PRN Glucose LESS THAN 70 milligrams/deciliter  lidocaine/prilocaine Cream 1 Application(s) Topical daily PRN hd days  ondansetron Injectable 4 milliGRAM(s) IV Push every 12 hours PRN Nausea and/or Vomiting      ALLERGIES:  Allergies    allopurinol (Rash)  Augmentin (Rash)  Levaquin (Rash)    Intolerances        LABS:                        9.6    10.0  )-----------( 239      ( 16 Apr 2019 00:46 )             28.4     04-16    134<L>  |  94<L>  |  29<H>  ----------------------------<  291<H>  4.0   |  25  |  2.66<H>    Ca    7.9<L>      16 Apr 2019 00:46  Phos  1.9     04-16  Mg     1.8     04-16    TPro  6.2  /  Alb  2.6<L>  /  TBili  0.6  /  DBili  x   /  AST  69<H>  /  ALT  10  /  AlkPhos  129<H>  04-16    PT/INR - ( 16 Apr 2019 00:46 )   PT: 15.1 sec;   INR: 1.31 ratio         PTT - ( 16 Apr 2019 00:46 )  PTT:43.3 sec      RADIOLOGY & ADDITIONAL TESTS: Reviewed.

## 2019-04-16 NOTE — PROGRESS NOTE ADULT - SUBJECTIVE AND OBJECTIVE BOX
[facility]   [phone]  DIALYSIS NOTE  Chief Complaint: ESRD/Ongoing hemodialysis requirement.     24 hour events/subjective:      intubated. family at bedside       ALLERGIES & MEDICATIONS  --------------------------------------------------------------------------------  Allergies    allopurinol (Rash)  Augmentin (Rash)  Levaquin (Rash)    Intolerances      Standing Inpatient Medications  artificial  tears Solution 1 Drop(s) Both EYES two times a day  aspirin  chewable 81 milliGRAM(s) Oral daily  ceFAZolin   IVPB 1000 milliGRAM(s) IV Intermittent every 24 hours  chlorhexidine 2% Cloths 1 Application(s) Topical daily  chlorhexidine 4% Liquid 1 Application(s) Topical <User Schedule>  dexmedetomidine Infusion 0.2 MICROgram(s)/kG/Hr IV Continuous <Continuous>  dextrose 5%. 1000 milliLiter(s) IV Continuous <Continuous>  dextrose 50% Injectable 12.5 Gram(s) IV Push once  dextrose 50% Injectable 25 Gram(s) IV Push once  dextrose 50% Injectable 25 Gram(s) IV Push once  gentamicin   IVPB 130 milliGRAM(s) IV Intermittent <User Schedule>  heparin  Injectable 5000 Unit(s) SubCutaneous every 8 hours  insulin glargine Injectable (LANTUS) 2 Unit(s) SubCutaneous at bedtime  insulin lispro (HumaLOG) corrective regimen sliding scale   SubCutaneous every 6 hours  ketotifen 0.025% Ophthalmic Solution 1 Drop(s) Both EYES every 8 hours  ketotifen 0.025% Ophthalmic Solution      levothyroxine Injectable 75 MICROGram(s) IV Push at bedtime  midodrine 10 milliGRAM(s) Oral three times a day  norepinephrine Infusion 0.05 MICROgram(s)/kG/Min IV Continuous <Continuous>  pantoprazole  Injectable 40 milliGRAM(s) IV Push every 12 hours  polyethylene glycol 3350 17 Gram(s) Oral daily  propofol Infusion 15 MICROgram(s)/kG/Min IV Continuous <Continuous>  senna Syrup 10 milliLiter(s) Oral at bedtime    PRN Inpatient Medications  acetaminophen    Suspension .. 650 milliGRAM(s) Oral every 6 hours PRN  dextrose 40% Gel 15 Gram(s) Oral once PRN  glucagon  Injectable 1 milliGRAM(s) IntraMuscular once PRN  lidocaine/prilocaine Cream 1 Application(s) Topical daily PRN  ondansetron Injectable 4 milliGRAM(s) IV Push every 12 hours PRN      REVIEW OF SYSTEMS  --------------------------------------------------------------------------------  intubated         VITALS/PHYSICAL EXAM  --------------------------------------------------------------------------------  T(C): 36.4 (04-16-19 @ 18:50), Max: 37.2 (04-16-19 @ 04:00)  HR: 66 (04-16-19 @ 18:50) (54 - 87)  BP: 119/53 (04-16-19 @ 18:50) (74/44 - 148/67)  RR: 0 (04-16-19 @ 13:30) (0 - 25)  SpO2: 100% (04-16-19 @ 18:50) (96% - 100%)  Wt(kg): --        04-15-19 @ 07:01  -  04-16-19 @ 07:00  --------------------------------------------------------  IN: 1097.8 mL / OUT: 140 mL / NET: 957.8 mL    04-16-19 @ 07:01  -  04-16-19 @ 19:44  --------------------------------------------------------  IN: 823.7 mL / OUT: 10 mL / NET: 813.7 mL      Physical Exam:  		  Gen: intubated   responsive   	no jvd   	Pulm: decrease bs  no rales or ronchi or wheezing  	CV: RRR, S1S2; no rub  	Abd: +BS, soft,  + distended ascites softer   	: No suprapubic tenderness  	UE: Warm, no cyanosis  no clubbing,  no edema;   	LE: Warm, no cyanosis  no clubbing, no edema RLE cast   	avf +  bruit and thrill 	  		  	    LABS/STUDIES  --------------------------------------------------------------------------------              9.6    10.0  >-----------<  239      [04-16-19 @ 00:46]              28.4     134  |  94  |  29  ----------------------------<  291      [04-16-19 @ 00:46]  4.0   |  25  |  2.66        Ca     7.9     [04-16-19 @ 00:46]      Mg     1.8     [04-16-19 @ 00:46]      Phos  1.9     [04-16-19 @ 00:46]    TPro  6.2  /  Alb  2.6  /  TBili  0.6  /  DBili  x   /  AST  69  /  ALT  10  /  AlkPhos  129  [04-16-19 @ 00:46]    PT/INR: PT 15.1 , INR 1.31       [04-16-19 @ 00:46]  PTT: 43.3       [04-16-19 @ 00:46]              imp/suggest: ESRD      Hemodialysis Prescription:  	Access:  	Dialyzer: revaclear   	Blood Flow (mL/Min): 400  	Dialysate Flow (mL/Min): 600  	Target UF (Liters):  	Treatment Time:  	Potassium:   	Calcium: 2.5  	  KYLEIGH    Vitamin D     continue with hd   see hd flow sheet

## 2019-04-16 NOTE — PROGRESS NOTE ADULT - SUBJECTIVE AND OBJECTIVE BOX
VIRGILIO KAPLAN  78y Female  MRN:[id number]    Patient is a 78y old  Female who presents with a chief complaint of R tib/fib fx (10 Apr 2019 12:01)    HPI:  78 F PMH CAD s/p CABG x 4 2015, T2DM with peripheral neuropathy, ESRD on HD T/Th/S via L AVF, hypotension on midodrine, HFrEF, HTN, HLD, b/l cataracts s/p surgery, p/w mechanical fall and R leg pain. Pt lives in basement apartment and ordinarily ambulates with walker. Today was getting ready for HD, made it to the first step on her set of stairs, and reportedly felt her R leg "give out." Pt fell backwards but was caught by her family member who was assisting her up the stairs. She denies LOC/syncope or head strike. +Pain in R leg post fall, worse with movement and much improved with immobilization. Could not bear weight or walk after fall.  Denies cp, sob at rest, f/c, n/v/d, dysuria, cough. +chronic BENAVIDEZ, reportedly stable since her CABG. Family at bedside providing collateral and confirms above details.     Vs: 99.1, 76, 102/60, 16, 90% RA --> 100% 4LNC.  Labs: no leukocytosis, chronic stable anemia, rest cbc unrevealing, coags unrevealing, hypoNa 126, HAGMA with cmp c/w known ESRD, hyperglycemia, chronic stable alkp elevation mild AST elevation. XR tib/fib/femur/hip/knee reveals acute prox tib/fib fxs with possible intraarticular extension. (10 Apr 2019 00:56)      Patient seen and evaluated in micu.       Interval HPI: no acute events o/n    PAST MEDICAL & SURGICAL HISTORY:  CHF (congestive heart failure): Oct 2015- still sob  Shortness of breath  Hypotension  Type 2 diabetes mellitus  Chronic kidney disease (CKD): ON DIALYSIS - Tue, Thur, Sat  Nephrolithiasis: 2001  Ulcer: 2001  Hydronephrosis: Right 1/2012  Charcot Foot: Right Foot  Herniated Disc  Diabetic Neuropathy  Anemia: CKD  Hypothyroidism  Hypertension: NOT ANY MORE  Hyperlipidemia  S/P mitral valve repair: with CABG X 4 MAY 2015  S/P CABG (coronary artery bypass graft): x 4, May 2015  History of extraction of renal calculus  S/P Foot Surgery: 2002, 2014  S/P Cholecystectomy: 2001  S/P Breast Lumpectomy: 1994, 2003 - left breast benign      REVIEW OF SYSTEMS:  as per hpi    VITALS:  Vital Signs Last 24 Hrs  T(C): 36.5 (16 Apr 2019 08:00), Max: 37.4 (15 Apr 2019 12:00)  T(F): 97.7 (16 Apr 2019 08:00), Max: 99.3 (15 Apr 2019 12:00)  HR: 73 (16 Apr 2019 11:00) (54 - 73)  BP: 107/53 (16 Apr 2019 10:45) (71/35 - 148/67)  BP(mean): 77 (16 Apr 2019 10:45) (47 - 96)  RR: 0 (16 Apr 2019 11:00) (0 - 31)  SpO2: 99% (16 Apr 2019 11:00) (96% - 100%)    PHYSICAL EXAM:  GENERAL: intubated,  sedated  HEAD:  Atraumatic, Normocephalic  EYES: EOMI, PERRLA, conjunctiva and sclera clear  NECK: no jvd  CHEST/LUNG: coarse bs b/l  HEART: S1, S2;   ABDOMEN: Soft, Nontender, nondistended; Bowel sounds present  EXTREMITIES:  2+ Peripheral Pulses, No clubbing, cyanosis, or edema,  right knee cast      Consultant(s) Notes Reviewed:  [x ] YES  [ ] NO  Care Discussed with Consultants/Other Providers [ x] YES  [ ] NO    MEDS:  MEDICATIONS  (STANDING):  artificial  tears Solution 1 Drop(s) Both EYES two times a day  aspirin  chewable 81 milliGRAM(s) Oral daily  ceFAZolin   IVPB 1000 milliGRAM(s) IV Intermittent every 24 hours  chlorhexidine 2% Cloths 1 Application(s) Topical daily  chlorhexidine 4% Liquid 1 Application(s) Topical <User Schedule>  dexmedetomidine Infusion 0.2 MICROgram(s)/kG/Hr (3.415 mL/Hr) IV Continuous <Continuous>  dextrose 5%. 1000 milliLiter(s) (50 mL/Hr) IV Continuous <Continuous>  dextrose 50% Injectable 12.5 Gram(s) IV Push once  dextrose 50% Injectable 25 Gram(s) IV Push once  dextrose 50% Injectable 25 Gram(s) IV Push once  heparin  Injectable 5000 Unit(s) SubCutaneous every 8 hours  insulin glargine Injectable (LANTUS) 2 Unit(s) SubCutaneous at bedtime  insulin lispro (HumaLOG) corrective regimen sliding scale   SubCutaneous every 6 hours  ketotifen 0.025% Ophthalmic Solution 1 Drop(s) Both EYES every 8 hours  ketotifen 0.025% Ophthalmic Solution      levothyroxine Injectable 75 MICROGram(s) IV Push at bedtime  midodrine 10 milliGRAM(s) Oral three times a day  pantoprazole  Injectable 40 milliGRAM(s) IV Push every 12 hours  phenylephrine    Infusion 1 MICROgram(s)/kG/Min (25.613 mL/Hr) IV Continuous <Continuous>  polyethylene glycol 3350 17 Gram(s) Oral daily  senna Syrup 10 milliLiter(s) Oral at bedtime    MEDICATIONS  (PRN):  acetaminophen    Suspension .. 650 milliGRAM(s) Oral every 6 hours PRN Temp greater or equal to 38C (100.4F)  dextrose 40% Gel 15 Gram(s) Oral once PRN Blood Glucose LESS THAN 70 milliGRAM(s)/deciliter  glucagon  Injectable 1 milliGRAM(s) IntraMuscular once PRN Glucose LESS THAN 70 milligrams/deciliter  lidocaine/prilocaine Cream 1 Application(s) Topical daily PRN hd days  ondansetron Injectable 4 milliGRAM(s) IV Push every 12 hours PRN Nausea and/or Vomiting        ALLERGIES:  allopurinol (Rash)  Augmentin (Rash)  Levaquin (Rash)      LABS:                                         9.6    10.0  )-----------( 239      ( 16 Apr 2019 00:46 )             28.4   04-16    134<L>  |  94<L>  |  29<H>  ----------------------------<  291<H>  4.0   |  25  |  2.66<H>    Ca    7.9<L>      16 Apr 2019 00:46  Phos  1.9     04-16  Mg     1.8     04-16    TPro  6.2  /  Alb  2.6<L>  /  TBili  0.6  /  DBili  x   /  AST  69<H>  /  ALT  10  /  AlkPhos  129<H>  04-16        Culture - Blood (04.13.19 @ 07:27)    Gram Stain:   Growth in aerobic bottle: Gram Positive Cocci in Clusters    Specimen Source: .Blood    Culture Results:   Growth in aerobic bottle: Gram Positive Cocci in Clusters        < from: CT Knee No Cont, Right (04.10.19 @ 03:34) >  Impression:    Acute, impacted fractures of the right proximal tibia and fibula as   described.      < end of copied text >           < from: Xray Tibia + Fibula 2 Views, Right (04.09.19 @ 20:28) >  impression:    There is an acute, comminuted fracture of the proximal tibial metaphysis   with mild anterior displacement of the distal fracture fragment. There is   questionable intra-articular extension on the lateral view. There is a   large knee joint effusion. There is a proximal fibular fracture.    There is no fracture of the hip or femur. The ankle is poorly visualized   secondary to technique and may be externally rotated with respect to the   knee. There is no ankle joint effusion. Vascular calcifications are noted.     CT of the knee can be performed for further evaluation.    < end of copied text >

## 2019-04-16 NOTE — PROGRESS NOTE ADULT - ASSESSMENT
Acute hyopercapneic repisratory failure rquiring mechanical intubataion  Tib-Fib fracture s/p mechanical fall  ESRD  ACute on chronic sytolic and diastolic CHF  CAD  Bilateral R>L pleural effusions, ascites due to fluid overload in setting of CHF and ESRD    REC    Plans for therapeutic paracentesis per MICU  Negative balance with HD as tolerated  CMV today: deferr weaning   4/14  1) Acute respiratory failure.  s/p RRT for unresponsiveness. Mildly improved w/ narcan. Intubated for airway protection. S/p synchronized cardioversion + ketamine for SVT on 4/12/19  Today ABG noted. Pt is alert and follows simple command. I&O 3.8 liters negative. 4.8 liter removed from paracentesis yesterday. Pt is currently on CPAP trial.  Continue wean off ventilator support per MICU team.  4/14 intubated and sedated. Per night MICU attending pt tolerated CPAP well yesterday. Pt remains intubated for TEDDY. Vent management, weaning per MICU  4/15: remains intubated:: for teddy? today : Cont full ventilator support:   4/16: remains intubated on pressors: TEDDY today: yesterday could not be done: Has staph aureus bacteremia   2) fever  24 T-max 38.3, yesterday, elevated inflammatory markers and liver enzyme. Chest x-ray reviewed Pt is already on Cefepime. Management defer to MICU, GI  4/14 24 hr Tmax 38.2 on vancomycin and Meropenum. Vanco level 11 yesterday. Bacteremia, gram positive cocci cluster (4/13). Pending TEDDY    4/15: she is still febrile: on antibiotics  4/156: Cont antibiotics:     3) Pleural effusions.   Chronic left and large right side. s/p paracentesis, removed more than 6 liters yesterday. Repeat chest x-ray.   4/14 no chest x-ray to review. s/p paracentesis. Clinically stable   4/15: has pl effusion on rt side: she had paracentesis done and not thoracentesis:   4/16: doing ok: still with bilateral pleural effusions: s/p paracentesis: cytology is negative:     4) Hypotension  Small dose of Bhaskar, continue wean off as possible   4/14 IVF at 50cc/hr. titrate bhaskar to keep map greater than 65.   4/15: cont to suport blood pressure to MAP of more then 65 mm HG  4/16: ont vasopressors:     5)  Prophylaxis   Pt is on heparin SQ and PPI   4/14 heparin SQ and PPI

## 2019-04-16 NOTE — PROGRESS NOTE ADULT - SUBJECTIVE AND OBJECTIVE BOX
Follow Up:  MSSA bacteremia    Interval History: pt still in MICU pending LISA, tolerated cefazolin     ROS:    Unobtainable because: intubated          Allergies  allopurinol (Rash)  Augmentin (Rash)  Levaquin (Rash)        ANTIMICROBIALS:  ceFAZolin   IVPB 1000 every 24 hours      OTHER MEDS:  acetaminophen    Suspension .. 650 milliGRAM(s) Oral every 6 hours PRN  artificial  tears Solution 1 Drop(s) Both EYES two times a day  aspirin  chewable 81 milliGRAM(s) Oral daily  chlorhexidine 2% Cloths 1 Application(s) Topical daily  chlorhexidine 4% Liquid 1 Application(s) Topical <User Schedule>  dexmedetomidine Infusion 0.2 MICROgram(s)/kG/Hr IV Continuous <Continuous>  dextrose 40% Gel 15 Gram(s) Oral once PRN  dextrose 5%. 1000 milliLiter(s) IV Continuous <Continuous>  dextrose 50% Injectable 12.5 Gram(s) IV Push once  dextrose 50% Injectable 25 Gram(s) IV Push once  dextrose 50% Injectable 25 Gram(s) IV Push once  glucagon  Injectable 1 milliGRAM(s) IntraMuscular once PRN  heparin  Injectable 5000 Unit(s) SubCutaneous every 8 hours  insulin glargine Injectable (LANTUS) 2 Unit(s) SubCutaneous at bedtime  insulin lispro (HumaLOG) corrective regimen sliding scale   SubCutaneous every 6 hours  ketotifen 0.025% Ophthalmic Solution 1 Drop(s) Both EYES every 8 hours  ketotifen 0.025% Ophthalmic Solution      levothyroxine Injectable 75 MICROGram(s) IV Push at bedtime  lidocaine/prilocaine Cream 1 Application(s) Topical daily PRN  midodrine 10 milliGRAM(s) Oral three times a day  ondansetron Injectable 4 milliGRAM(s) IV Push every 12 hours PRN  pantoprazole  Injectable 40 milliGRAM(s) IV Push every 12 hours  phenylephrine    Infusion 1 MICROgram(s)/kG/Min IV Continuous <Continuous>  polyethylene glycol 3350 17 Gram(s) Oral daily  senna Syrup 10 milliLiter(s) Oral at bedtime      Vital Signs Last 24 Hrs  T(C): 36.5 (16 Apr 2019 08:00), Max: 37.4 (15 Apr 2019 12:00)  T(F): 97.7 (16 Apr 2019 08:00), Max: 99.3 (15 Apr 2019 12:00)  HR: 73 (16 Apr 2019 11:00) (54 - 73)  BP: 107/53 (16 Apr 2019 10:45) (71/35 - 148/67)  BP(mean): 77 (16 Apr 2019 10:45) (47 - 96)  RR: 0 (16 Apr 2019 11:00) (0 - 31)  SpO2: 99% (16 Apr 2019 11:00) (96% - 100%)    Physical Exam:  General:    NAD on vent  Head: atraumatic, normocephalic  Eyes: normal sclera and conjunctiva  ENT:   ET tube, no LAD, neck supple  Cardio:    regular S1,S2  Respiratory:   clear b/l, no wheezing  abd:   soft, BS +, not tender, no hepatosplenomegaly  :     no CVAT, no suprapubic tenderness,  bagley  Musculoskeletal : no joint swelling, R leg in dressing and cast  Skin:    no rash  vascular: LUE AVF, no phlebitis normal pulses  Neurologic:    awake, answered questions with nodding                          9.6    10.0  )-----------( 239      ( 16 Apr 2019 00:46 )             28.4       04-16    134<L>  |  94<L>  |  29<H>  ----------------------------<  291<H>  4.0   |  25  |  2.66<H>    Ca    7.9<L>      16 Apr 2019 00:46  Phos  1.9     04-16  Mg     1.8     04-16    TPro  6.2  /  Alb  2.6<L>  /  TBili  0.6  /  DBili  x   /  AST  69<H>  /  ALT  10  /  AlkPhos  129<H>  04-16          MICROBIOLOGY:  v  .Blood  04-14-19   No growth to date.  --  --      .Blood  04-13-19   Growth in aerobic bottle: Staphylococcus aureus  See previous culture [id number]  Growth in anaerobic bottle: Gram Positive Cocci in Clusters  --    Growth in aerobic bottle: Gram Positive Cocci in Clusters  Growth in anaerobic bottle: Gram Positive Cocci in Clusters      .Body Fluid  04-13-19   No growth to date.  --    No polymorphonuclear leukocytes seen  No organisms seen  by cytocentrifuge      .Blood  04-12-19   Growth in aerobic and anaerobic bottles: Staphylococcus aureus  See previous culture [id number]  --  Blood Culture PCR  Staphylococcus aureus                RADIOLOGY:  Images below reviewed personally  < from: US Abdomen Upper Quadrant Right (04.12.19 @ 11:24) >  IMPRESSION:     Moderate to large volume of abdominal ascites.    Bilateral pleural effusions.    Mild surface nodularity of the liver suggesting early cirrhotic   morphology.      < from: CT Chest No Cont (04.10.19 @ 21:59) >  IMPRESSION:     New large right pleural effusion with large volume abdominal ascites and   unchanged left pleural effusion since 2015. Follow Up:  MSSA bacteremia    Interval History: pt still in MICU, tolerated cefazolin, bedside LISA today showed vegetation on mitral and aortic valve    ROS:    Unobtainable because: intubated          Allergies  allopurinol (Rash)  Augmentin (Rash)  Levaquin (Rash)        ANTIMICROBIALS:  ceFAZolin   IVPB 1000 every 24 hours      OTHER MEDS:  acetaminophen    Suspension .. 650 milliGRAM(s) Oral every 6 hours PRN  artificial  tears Solution 1 Drop(s) Both EYES two times a day  aspirin  chewable 81 milliGRAM(s) Oral daily  chlorhexidine 2% Cloths 1 Application(s) Topical daily  chlorhexidine 4% Liquid 1 Application(s) Topical <User Schedule>  dexmedetomidine Infusion 0.2 MICROgram(s)/kG/Hr IV Continuous <Continuous>  dextrose 40% Gel 15 Gram(s) Oral once PRN  dextrose 5%. 1000 milliLiter(s) IV Continuous <Continuous>  dextrose 50% Injectable 12.5 Gram(s) IV Push once  dextrose 50% Injectable 25 Gram(s) IV Push once  dextrose 50% Injectable 25 Gram(s) IV Push once  glucagon  Injectable 1 milliGRAM(s) IntraMuscular once PRN  heparin  Injectable 5000 Unit(s) SubCutaneous every 8 hours  insulin glargine Injectable (LANTUS) 2 Unit(s) SubCutaneous at bedtime  insulin lispro (HumaLOG) corrective regimen sliding scale   SubCutaneous every 6 hours  ketotifen 0.025% Ophthalmic Solution 1 Drop(s) Both EYES every 8 hours  ketotifen 0.025% Ophthalmic Solution      levothyroxine Injectable 75 MICROGram(s) IV Push at bedtime  lidocaine/prilocaine Cream 1 Application(s) Topical daily PRN  midodrine 10 milliGRAM(s) Oral three times a day  ondansetron Injectable 4 milliGRAM(s) IV Push every 12 hours PRN  pantoprazole  Injectable 40 milliGRAM(s) IV Push every 12 hours  phenylephrine    Infusion 1 MICROgram(s)/kG/Min IV Continuous <Continuous>  polyethylene glycol 3350 17 Gram(s) Oral daily  senna Syrup 10 milliLiter(s) Oral at bedtime      Vital Signs Last 24 Hrs  T(C): 36.5 (16 Apr 2019 08:00), Max: 37.4 (15 Apr 2019 12:00)  T(F): 97.7 (16 Apr 2019 08:00), Max: 99.3 (15 Apr 2019 12:00)  HR: 73 (16 Apr 2019 11:00) (54 - 73)  BP: 107/53 (16 Apr 2019 10:45) (71/35 - 148/67)  BP(mean): 77 (16 Apr 2019 10:45) (47 - 96)  RR: 0 (16 Apr 2019 11:00) (0 - 31)  SpO2: 99% (16 Apr 2019 11:00) (96% - 100%)    Physical Exam:  General:    NAD on vent  Head: atraumatic, normocephalic  Eyes: normal sclera and conjunctiva  ENT:   ET tube, no LAD, neck supple  Cardio:    regular S1,S2  Respiratory:   clear b/l, no wheezing  abd:   soft, BS +, not tender, no hepatosplenomegaly  :     no CVAT, no suprapubic tenderness,  bagley  Musculoskeletal : no joint swelling, R leg in dressing and cast  Skin:    no rash  vascular: LUE AVF, no phlebitis normal pulses  Neurologic:    awake, answered questions with nodding                          9.6    10.0  )-----------( 239      ( 16 Apr 2019 00:46 )             28.4       04-16    134<L>  |  94<L>  |  29<H>  ----------------------------<  291<H>  4.0   |  25  |  2.66<H>    Ca    7.9<L>      16 Apr 2019 00:46  Phos  1.9     04-16  Mg     1.8     04-16    TPro  6.2  /  Alb  2.6<L>  /  TBili  0.6  /  DBili  x   /  AST  69<H>  /  ALT  10  /  AlkPhos  129<H>  04-16          MICROBIOLOGY:  v  .Blood  04-14-19   No growth to date.  --  --      .Blood  04-13-19   Growth in aerobic bottle: Staphylococcus aureus  See previous culture [id number]  Growth in anaerobic bottle: Gram Positive Cocci in Clusters  --    Growth in aerobic bottle: Gram Positive Cocci in Clusters  Growth in anaerobic bottle: Gram Positive Cocci in Clusters      .Body Fluid  04-13-19   No growth to date.  --    No polymorphonuclear leukocytes seen  No organisms seen  by cytocentrifuge      .Blood  04-12-19   Growth in aerobic and anaerobic bottles: Staphylococcus aureus  See previous culture [id number]  --  Blood Culture PCR  Staphylococcus aureus                RADIOLOGY:  Images below reviewed personally  < from: US Abdomen Upper Quadrant Right (04.12.19 @ 11:24) >  IMPRESSION:     Moderate to large volume of abdominal ascites.    Bilateral pleural effusions.    Mild surface nodularity of the liver suggesting early cirrhotic   morphology.      < from: CT Chest No Cont (04.10.19 @ 21:59) >  IMPRESSION:     New large right pleural effusion with large volume abdominal ascites and   unchanged left pleural effusion since 2015.

## 2019-04-17 ENCOUNTER — RESULT REVIEW (OUTPATIENT)
Age: 79
End: 2019-04-17

## 2019-04-17 LAB
ALBUMIN SERPL ELPH-MCNC: 2.9 G/DL — LOW (ref 3.3–5)
ALP SERPL-CCNC: 140 U/L — HIGH (ref 40–120)
ALT FLD-CCNC: <5 U/L — LOW (ref 10–45)
ANION GAP SERPL CALC-SCNC: 14 MMOL/L — SIGNIFICANT CHANGE UP (ref 5–17)
APTT BLD: 41.2 SEC — HIGH (ref 27.5–36.3)
AST SERPL-CCNC: 48 U/L — HIGH (ref 10–40)
B PERT IGG+IGM PNL SER: ABNORMAL
BILIRUB SERPL-MCNC: 0.8 MG/DL — SIGNIFICANT CHANGE UP (ref 0.2–1.2)
BUN SERPL-MCNC: 24 MG/DL — HIGH (ref 7–23)
CALCIUM SERPL-MCNC: 8.4 MG/DL — SIGNIFICANT CHANGE UP (ref 8.4–10.5)
CHLORIDE SERPL-SCNC: 98 MMOL/L — SIGNIFICANT CHANGE UP (ref 96–108)
CO2 SERPL-SCNC: 25 MMOL/L — SIGNIFICANT CHANGE UP (ref 22–31)
COLOR FLD: YELLOW — SIGNIFICANT CHANGE UP
CREAT FLD-MCNC: 2.64 MG/DL — SIGNIFICANT CHANGE UP
CREAT SERPL-MCNC: 2.27 MG/DL — HIGH (ref 0.5–1.3)
CULTURE RESULTS: SIGNIFICANT CHANGE UP
CULTURE RESULTS: SIGNIFICANT CHANGE UP
FLUID INTAKE SUBSTANCE CLASS: SIGNIFICANT CHANGE UP
FLUID SEGMENTED GRANULOCYTES: 19 % — SIGNIFICANT CHANGE UP
GLUCOSE BLDC GLUCOMTR-MCNC: 150 MG/DL — HIGH (ref 70–99)
GLUCOSE BLDC GLUCOMTR-MCNC: 160 MG/DL — HIGH (ref 70–99)
GLUCOSE BLDC GLUCOMTR-MCNC: 174 MG/DL — HIGH (ref 70–99)
GLUCOSE BLDC GLUCOMTR-MCNC: 197 MG/DL — HIGH (ref 70–99)
GLUCOSE FLD-MCNC: 187 MG/DL — SIGNIFICANT CHANGE UP
GLUCOSE SERPL-MCNC: 224 MG/DL — HIGH (ref 70–99)
GRAM STN FLD: SIGNIFICANT CHANGE UP
HCT VFR BLD CALC: 33.5 % — LOW (ref 34.5–45)
HGB BLD-MCNC: 10.8 G/DL — LOW (ref 11.5–15.5)
INR BLD: 1.14 RATIO — SIGNIFICANT CHANGE UP (ref 0.88–1.16)
LDH SERPL L TO P-CCNC: 151 U/L — SIGNIFICANT CHANGE UP
LYMPHOCYTES # FLD: 30 % — SIGNIFICANT CHANGE UP
MAGNESIUM SERPL-MCNC: 1.8 MG/DL — SIGNIFICANT CHANGE UP (ref 1.6–2.6)
MCHC RBC-ENTMCNC: 30.5 PG — SIGNIFICANT CHANGE UP (ref 27–34)
MCHC RBC-ENTMCNC: 32.4 GM/DL — SIGNIFICANT CHANGE UP (ref 32–36)
MCV RBC AUTO: 94.3 FL — SIGNIFICANT CHANGE UP (ref 80–100)
MESOTHL CELL # FLD: 1 % — SIGNIFICANT CHANGE UP
MONOS+MACROS # FLD: 48 % — SIGNIFICANT CHANGE UP
OTHER CELLS FLD MANUAL: 2 % — SIGNIFICANT CHANGE UP
PH FLD: 7.39 — SIGNIFICANT CHANGE UP
PHOSPHATE SERPL-MCNC: 2.1 MG/DL — LOW (ref 2.5–4.5)
PLATELET # BLD AUTO: 293 K/UL — SIGNIFICANT CHANGE UP (ref 150–400)
POTASSIUM SERPL-MCNC: 4.2 MMOL/L — SIGNIFICANT CHANGE UP (ref 3.5–5.3)
POTASSIUM SERPL-SCNC: 4.2 MMOL/L — SIGNIFICANT CHANGE UP (ref 3.5–5.3)
PROT FLD-MCNC: 4.2 G/DL — SIGNIFICANT CHANGE UP
PROT SERPL-MCNC: 7 G/DL — SIGNIFICANT CHANGE UP (ref 6–8.3)
PROTHROM AB SERPL-ACNC: 13.2 SEC — HIGH (ref 10–12.9)
RBC # BLD: 3.55 M/UL — LOW (ref 3.8–5.2)
RBC # FLD: 15.1 % — HIGH (ref 10.3–14.5)
RCV VOL RI: 2700 /UL — HIGH (ref 0–5)
SODIUM SERPL-SCNC: 137 MMOL/L — SIGNIFICANT CHANGE UP (ref 135–145)
SPECIMEN SOURCE: SIGNIFICANT CHANGE UP
SPECIMEN SOURCE: SIGNIFICANT CHANGE UP
TOTAL NUCLEATED CELL COUNT, BODY FLUID: 128 /UL — HIGH (ref 0–5)
TUBE TYPE: SIGNIFICANT CHANGE UP
WBC # BLD: 9.7 K/UL — SIGNIFICANT CHANGE UP (ref 3.8–10.5)
WBC # FLD AUTO: 9.7 K/UL — SIGNIFICANT CHANGE UP (ref 3.8–10.5)

## 2019-04-17 PROCEDURE — 99291 CRITICAL CARE FIRST HOUR: CPT | Mod: 25

## 2019-04-17 PROCEDURE — 93308 TTE F-UP OR LMTD: CPT | Mod: 26

## 2019-04-17 PROCEDURE — 32555 ASPIRATE PLEURA W/ IMAGING: CPT | Mod: GC

## 2019-04-17 PROCEDURE — 71045 X-RAY EXAM CHEST 1 VIEW: CPT | Mod: 26,76

## 2019-04-17 PROCEDURE — 88112 CYTOPATH CELL ENHANCE TECH: CPT | Mod: 26

## 2019-04-17 PROCEDURE — 99233 SBSQ HOSP IP/OBS HIGH 50: CPT

## 2019-04-17 PROCEDURE — 93321 DOPPLER ECHO F-UP/LMTD STD: CPT | Mod: 26

## 2019-04-17 PROCEDURE — 88108 CYTOPATH CONCENTRATE TECH: CPT | Mod: 26,59

## 2019-04-17 PROCEDURE — 88305 TISSUE EXAM BY PATHOLOGIST: CPT | Mod: 26

## 2019-04-17 RX ORDER — HYDROMORPHONE HYDROCHLORIDE 2 MG/ML
0.5 INJECTION INTRAMUSCULAR; INTRAVENOUS; SUBCUTANEOUS ONCE
Qty: 0 | Refills: 0 | Status: DISCONTINUED | OUTPATIENT
Start: 2019-04-17 | End: 2019-04-17

## 2019-04-17 RX ADMIN — Medication 1 APPLICATION(S): at 22:00

## 2019-04-17 RX ADMIN — CHLORHEXIDINE GLUCONATE 1 APPLICATION(S): 213 SOLUTION TOPICAL at 05:12

## 2019-04-17 RX ADMIN — HYDROMORPHONE HYDROCHLORIDE 0.5 MILLIGRAM(S): 2 INJECTION INTRAMUSCULAR; INTRAVENOUS; SUBCUTANEOUS at 12:10

## 2019-04-17 RX ADMIN — Medication 1 DROP(S): at 20:00

## 2019-04-17 RX ADMIN — Medication 1 DROP(S): at 14:00

## 2019-04-17 RX ADMIN — Medication 1 DROP(S): at 16:00

## 2019-04-17 RX ADMIN — Medication 81 MILLIGRAM(S): at 11:19

## 2019-04-17 RX ADMIN — Medication 1: at 00:28

## 2019-04-17 RX ADMIN — Medication 75 MICROGRAM(S): at 22:00

## 2019-04-17 RX ADMIN — PANTOPRAZOLE SODIUM 40 MILLIGRAM(S): 20 TABLET, DELAYED RELEASE ORAL at 05:11

## 2019-04-17 RX ADMIN — Medication 1 DROP(S): at 05:12

## 2019-04-17 RX ADMIN — HEPARIN SODIUM 5000 UNIT(S): 5000 INJECTION INTRAVENOUS; SUBCUTANEOUS at 13:34

## 2019-04-17 RX ADMIN — PANTOPRAZOLE SODIUM 40 MILLIGRAM(S): 20 TABLET, DELAYED RELEASE ORAL at 18:00

## 2019-04-17 RX ADMIN — Medication 1 DROP(S): at 18:00

## 2019-04-17 RX ADMIN — KETOTIFEN FUMARATE 1 DROP(S): 0.34 SOLUTION OPHTHALMIC at 22:00

## 2019-04-17 RX ADMIN — Medication 1 APPLICATION(S): at 13:33

## 2019-04-17 RX ADMIN — Medication 650 MILLIGRAM(S): at 07:59

## 2019-04-17 RX ADMIN — SENNA PLUS 10 MILLILITER(S): 8.6 TABLET ORAL at 22:13

## 2019-04-17 RX ADMIN — MIDODRINE HYDROCHLORIDE 10 MILLIGRAM(S): 2.5 TABLET ORAL at 13:30

## 2019-04-17 RX ADMIN — Medication 1 DROP(S): at 12:00

## 2019-04-17 RX ADMIN — MIDODRINE HYDROCHLORIDE 10 MILLIGRAM(S): 2.5 TABLET ORAL at 22:09

## 2019-04-17 RX ADMIN — HYDROMORPHONE HYDROCHLORIDE 0.5 MILLIGRAM(S): 2 INJECTION INTRAMUSCULAR; INTRAVENOUS; SUBCUTANEOUS at 11:52

## 2019-04-17 RX ADMIN — DEXMEDETOMIDINE HYDROCHLORIDE IN 0.9% SODIUM CHLORIDE 3.42 MICROGRAM(S)/KG/HR: 4 INJECTION INTRAVENOUS at 21:20

## 2019-04-17 RX ADMIN — Medication 1: at 05:11

## 2019-04-17 RX ADMIN — Medication 62.5 MILLIMOLE(S): at 01:50

## 2019-04-17 RX ADMIN — KETOTIFEN FUMARATE 1 DROP(S): 0.34 SOLUTION OPHTHALMIC at 13:34

## 2019-04-17 RX ADMIN — Medication 1 DROP(S): at 22:00

## 2019-04-17 RX ADMIN — INSULIN GLARGINE 2 UNIT(S): 100 INJECTION, SOLUTION SUBCUTANEOUS at 22:00

## 2019-04-17 RX ADMIN — MIDODRINE HYDROCHLORIDE 10 MILLIGRAM(S): 2.5 TABLET ORAL at 05:11

## 2019-04-17 RX ADMIN — Medication 100 MILLIGRAM(S): at 13:33

## 2019-04-17 RX ADMIN — HEPARIN SODIUM 5000 UNIT(S): 5000 INJECTION INTRAVENOUS; SUBCUTANEOUS at 05:11

## 2019-04-17 RX ADMIN — Medication 650 MILLIGRAM(S): at 20:09

## 2019-04-17 RX ADMIN — KETOTIFEN FUMARATE 1 DROP(S): 0.34 SOLUTION OPHTHALMIC at 05:12

## 2019-04-17 RX ADMIN — Medication 650 MILLIGRAM(S): at 20:39

## 2019-04-17 RX ADMIN — HEPARIN SODIUM 5000 UNIT(S): 5000 INJECTION INTRAVENOUS; SUBCUTANEOUS at 22:00

## 2019-04-17 RX ADMIN — Medication 1: at 11:16

## 2019-04-17 NOTE — PROGRESS NOTE ADULT - SUBJECTIVE AND OBJECTIVE BOX
Patient is a 78y old  Female who presents with a chief complaint of R tib/fib fx (17 Apr 2019 08:36)      Any change in ROS:   events noted: remains intubated: ? HAS IE:     MEDICATIONS  (STANDING):  artificial  tears Solution 1 Drop(s) Both EYES two times a day  aspirin  chewable 81 milliGRAM(s) Oral daily  ceFAZolin   IVPB 1000 milliGRAM(s) IV Intermittent every 24 hours  chlorhexidine 2% Cloths 1 Application(s) Topical daily  chlorhexidine 4% Liquid 1 Application(s) Topical <User Schedule>  dexmedetomidine Infusion 0.2 MICROgram(s)/kG/Hr (3.415 mL/Hr) IV Continuous <Continuous>  dextrose 5%. 1000 milliLiter(s) (50 mL/Hr) IV Continuous <Continuous>  gentamicin   IVPB 130 milliGRAM(s) IV Intermittent <User Schedule>  heparin  Injectable 5000 Unit(s) SubCutaneous every 8 hours  insulin glargine Injectable (LANTUS) 2 Unit(s) SubCutaneous at bedtime  insulin lispro (HumaLOG) corrective regimen sliding scale   SubCutaneous every 6 hours  ketotifen 0.025% Ophthalmic Solution 1 Drop(s) Both EYES every 8 hours  ketotifen 0.025% Ophthalmic Solution      levothyroxine Injectable 75 MICROGram(s) IV Push at bedtime  midodrine 10 milliGRAM(s) Oral three times a day  norepinephrine Infusion 0.05 MICROgram(s)/kG/Min (6.403 mL/Hr) IV Continuous <Continuous>  pantoprazole  Injectable 40 milliGRAM(s) IV Push every 12 hours  polyethylene glycol 3350 17 Gram(s) Oral daily  propofol Infusion 15 MICROgram(s)/kG/Min (6.147 mL/Hr) IV Continuous <Continuous>  senna Syrup 10 milliLiter(s) Oral at bedtime    MEDICATIONS  (PRN):  acetaminophen    Suspension .. 650 milliGRAM(s) Oral every 6 hours PRN Temp greater or equal to 38C (100.4F)  lidocaine/prilocaine Cream 1 Application(s) Topical daily PRN hd days  ondansetron Injectable 4 milliGRAM(s) IV Push every 12 hours PRN Nausea and/or Vomiting    Vital Signs Last 24 Hrs  T(C): 38.3 (17 Apr 2019 08:00), Max: 38.3 (17 Apr 2019 08:00)  T(F): 100.9 (17 Apr 2019 08:00), Max: 100.9 (17 Apr 2019 08:00)  HR: 64 (17 Apr 2019 09:45) (52 - 90)  BP: 101/49 (17 Apr 2019 09:45) (73/38 - 145/60)  BP(mean): 71 (17 Apr 2019 09:45) (53 - 93)  RR: 27 (17 Apr 2019 09:45) (0 - 27)  SpO2: 95% (17 Apr 2019 09:45) (93% - 100%)  Mode: AC/ CMV (Assist Control/ Continuous Mandatory Ventilation)  RR (machine): 12  TV (machine): 450  FiO2: 30  PEEP: 5  ITime: 0.9  MAP: 8  PIP: 32    I&O's Summary    16 Apr 2019 07:01  -  17 Apr 2019 07:00  --------------------------------------------------------  IN: 2430.8 mL / OUT: 3490 mL / NET: -1059.2 mL    17 Apr 2019 07:01  -  17 Apr 2019 10:03  --------------------------------------------------------  IN: 83.4 mL / OUT: 0 mL / NET: 83.4 mL          Physical Exam:   GENERAL: iNTUBATED   HEENT: FLOWER/   Atraumatic, Normocephalic  ENMT: No tonsillar erythema, exudates, or enlargement; Moist mucous membranes, Good dentition, No lesions  NECK: Supple, No JVD, Normal thyroid  CHEST/LUNG: Clear to auscultaion  CVS: Regular rate and rhythm; No murmurs, rubs, or gallops  GI: : Soft, Nontender, Nondistended; Bowel sounds present  NERVOUS SYSTEM:  Intubated and sedated   EXTREMITIES:  -edema  LYMPH: No lymphadenopathy noted  SKIN: No rashes or lesions  ENDOCRINOLOGY: No Thyromegaly  PSYCH: Appropriate    Labs:  ABG - ( 16 Apr 2019 00:34 )  pH, Arterial: 7.50  pH, Blood: x     /  pCO2: 34    /  pO2: 193   / HCO3: 27    / Base Excess: 4.1   /  SaO2: 100                                         10.8   9.7   )-----------( 293      ( 17 Apr 2019 00:45 )             33.5                         9.6    10.0  )-----------( 239      ( 16 Apr 2019 00:46 )             28.4                         9.9    12.0  )-----------( 293      ( 15 Apr 2019 00:16 )             31.2                         10.9   14.7  )-----------( 261      ( 13 Apr 2019 23:35 )             33.5     04-17    137  |  98  |  24<H>  ----------------------------<  224<H>  4.2   |  25  |  2.27<H>  04-16    134<L>  |  94<L>  |  29<H>  ----------------------------<  291<H>  4.0   |  25  |  2.66<H>  04-15    137  |  97  |  36<H>  ----------------------------<  237<H>  4.0   |  23  |  3.60<H>  04-14    138  |  96  |  25<H>  ----------------------------<  141<H>  3.9   |  21<L>  |  2.93<H>    Ca    8.4      17 Apr 2019 00:45  Ca    7.9<L>      16 Apr 2019 00:46  Phos  2.1     04-17  Phos  1.9     04-16  Mg     1.8     04-17  Mg     1.8     04-16    TPro  7.0  /  Alb  2.9<L>  /  TBili  0.8  /  DBili  x   /  AST  48<H>  /  ALT  <5<L>  /  AlkPhos  140<H>  04-17  TPro  6.2  /  Alb  2.6<L>  /  TBili  0.6  /  DBili  x   /  AST  69<H>  /  ALT  10  /  AlkPhos  129<H>  04-16  TPro  6.4  /  Alb  2.8<L>  /  TBili  0.6  /  DBili  x   /  AST  96<H>  /  ALT  23  /  AlkPhos  146<H>  04-15  TPro  6.7  /  Alb  3.1<L>  /  TBili  0.8  /  DBili  x   /  AST  201<H>  /  ALT  63<H>  /  AlkPhos  147<H>  04-14    CAPILLARY BLOOD GLUCOSE      POCT Blood Glucose.: 174 mg/dL (17 Apr 2019 05:07)  POCT Blood Glucose.: 197 mg/dL (17 Apr 2019 00:25)  POCT Blood Glucose.: 176 mg/dL (16 Apr 2019 21:40)  POCT Blood Glucose.: 232 mg/dL (16 Apr 2019 17:32)  POCT Blood Glucose.: 146 mg/dL (16 Apr 2019 12:31)     (04-12 @ 20:42)    LIVER FUNCTIONS - ( 17 Apr 2019 00:45 )  Alb: 2.9 g/dL / Pro: 7.0 g/dL / ALK PHOS: 140 U/L / ALT: <5 U/L / AST: 48 U/L / GGT: x           PT/INR - ( 17 Apr 2019 00:45 )   PT: 13.2 sec;   INR: 1.14 ratio         PTT - ( 17 Apr 2019 00:45 )  PTT:41.2 sec    Fluid Source --  Albumin, Fluid2.9 g/dL  Glucose, [id number] mg/dL  Protein total, Fluid5.5 g/dL  Lacatate Dehydrogenase, Fluid91 U/L  pH, Fluid--  Cytopathology-Non Gyn Report--  Fluid Source --  Albumin, Fluid--  Glucose, Fluid--  Protein total, Fluid--  Lacatate Dehydrogenase, Fluid--  pH, Fluid--  Cytopathology-Non Gyn Report  ACCESSION No:  [id number]    VIRGILIO KAPLAN                          1        Cytopathology Report            Specimen(s) Submitted  PERITONEAL FLUID      Clinical History  HFREF, hypercapnic respiratory failure 2/2 to pain meds versus  large ascities, ESRD.      Gross Description  Received: 30  ml of yellow fluid in CytoLyt  Prepared: 1 ThinPrep slide, 1 Cell block, 1 Smear      Final Diagnosis  PERITONEAL FLUID  NEGATIVE FOR MALIGNANT CELLS.  Cytology specimens and cell block are hypocellularconsisting of  rare reactive mesothelial cells with few  inflammatory cells and proteinaceous debris in background.    Screened by: Mer Murrieta CT(ASCP)  Verified by: Linda Tate MD  (Electronic Signature)  Reported on: 04/15/19 21:49 EDT, [address] [facility], [place]  [address]  Cytology technical processing performed at [address] [place] [id number]  _________________________________________________________________        RECENT CULTURES:  04-14 @ 12:25 .Blood                No growth to date.    04-13 @ 07:27 .Blood       Growth in aerobic bottle: Gram Positive Cocci in Clusters  Growth in anaerobic bottle: Gram Positive Cocci in Clusters           Growth in aerobic bottle: Staphylococcus aureus  See previous culture [id number]  Growth in anaerobic bottle: Gram Positive Cocci in Clusters    04-13 @ 00:45 .Body Fluid       No polymorphonuclear leukocytes seen  No organisms seen  by cytocentrifuge           No growth to date.    04-12 @ 02:36 .Blood   PCR    Growth in aerobic bottle: Gram Positive Cocci in Clusters  Growth in anaerobic bottle: Gram Positive Cocci in Clusters    Blood Culture PCR  Staphylococcus aureus  Blood Culture PCR     Growth in aerobic and anaerobic bottles: Staphylococcus aureus  See previous culture [id number]        < from: US Abdomen Upper Quadrant Right (04.12.19 @ 11:24) >  nts to assess for the presence of ascites.    FINDINGS:    Liver: Mild surface nodularity is appreciated with a linear probe.    Bile ducts: Normal caliber. Common bile duct measures 3mm.     Gallbladder: Cholecystectomy.        Pancreas: Visualized portions are within normal limits.    Right kidney: Atrophic and echogenic measuring 8.1 cm. No hydronephrosis.     Ascites: Moderate to large volume of abdominal ascites, the largest   pocket in the left lower quadrant.    Aorta and IVC: Visualized portions are within normal limits.    Bilateral pleural effusions.    IMPRESSION:     Moderate to large volume of abdominal ascites.    Bilateral pleural effusions.    Mild surface nodularity of the liver suggesting early cirrhotic   morphology.              < from: TTE with Doppler (w/Cont) (04.12.19 @ 07:59) >  Hg, assuming right atrial pressure equals 8 mm Hg,  consistent with normal pulmonary pressures.  ------------------------------------------------------------------------  Conclusions:  1. Annuloplsty ring seen in the mitral position Mild mitral  regurgitation.  Peak mitral valve gradient equals 11 mm Hg,  mean transmitral valve gradient equals 5 mm Hg, consistent  with moderate mitral stenosis.  2. Normal trileaflet aortic valve.  3. Aortic Root: 3.1 cm.  4. Normal left atrium.  LA volume index = 34 cc/m2.  5. Severe global left ventricular systolic dysfunction.  6. Right ventricular enlargement with decreased right  ventricular systolic function.  7. Estimated right ventricular systolic pressure equals 29  mm Hg, assuming right atrial pressure equals 8 mm Hg,  consistent with normal pulmonary pressures.  8. Normaltricuspid valve. Mild tricuspid regurgitation.  *** Compared with echocardiogram report of 10/27/2015, no  significant changes noted.  ------------------------------------------------------------------------  Confirmed on  4/12/2019 - 14:39:19 by Zeinab Carrillo M.D.    < end of copied text >        KOFI SHERWOOD M.D., ATTENDING RADIOLOGIST  This document has been electronically signed. Apr 12 2019 11:29AM        < end of copied text >    RESPIRATORY CULTURES:          Studies  Chest X-RAY  CT SCAN Chest   Venous Dopplers: LE:   CT Abdomen  Others

## 2019-04-17 NOTE — PROGRESS NOTE ADULT - SUBJECTIVE AND OBJECTIVE BOX
SUBJ:  Prolonged complicated hospitalization beginning with R proximal tib/fib fracture that does not require urgent surgical evaluated by per ortho now with hard cast. Hospitalization further complicated by acute hypercapnic respiratory failure possibly secondary to opoid use requiring intubation and pressor support, SVT requiring synchronized cardioversion x2 with conversion to sinus rhythm, MSSA bacteremia with endocarditis confirmed by LISA with vegetation on MV and possible TV/AV. CTS evalauted the patient adn she is too high risk of OHS therefore suppressive IV Abx will be the treatment course.     MEDICATIONS  (STANDING):  artificial tears (preservative free) Ophthalmic Solution 1 Drop(s) Both EYES every 2 hours  aspirin  chewable 81 milliGRAM(s) Oral daily  ceFAZolin   IVPB 1000 milliGRAM(s) IV Intermittent every 24 hours  chlorhexidine 2% Cloths 1 Application(s) Topical daily  chlorhexidine 4% Liquid 1 Application(s) Topical <User Schedule>  dexmedetomidine Infusion 0.2 MICROgram(s)/kG/Hr (3.415 mL/Hr) IV Continuous <Continuous>  dextrose 5%. 1000 milliLiter(s) (50 mL/Hr) IV Continuous <Continuous>  gentamicin   IVPB 130 milliGRAM(s) IV Intermittent <User Schedule>  heparin  Injectable 5000 Unit(s) SubCutaneous every 8 hours  insulin glargine Injectable (LANTUS) 2 Unit(s) SubCutaneous at bedtime  insulin lispro (HumaLOG) corrective regimen sliding scale   SubCutaneous every 6 hours  ketotifen 0.025% Ophthalmic Solution 1 Drop(s) Both EYES every 8 hours  ketotifen 0.025% Ophthalmic Solution      levothyroxine Injectable 75 MICROGram(s) IV Push at bedtime  midodrine 10 milliGRAM(s) Oral three times a day  norepinephrine Infusion 0.05 MICROgram(s)/kG/Min (6.403 mL/Hr) IV Continuous <Continuous>  pantoprazole  Injectable 40 milliGRAM(s) IV Push every 12 hours  petrolatum Ophthalmic Ointment 1 Application(s) Both EYES three times a day  polyethylene glycol 3350 17 Gram(s) Oral daily  propofol Infusion 15 MICROgram(s)/kG/Min (6.147 mL/Hr) IV Continuous <Continuous>  senna Syrup 10 milliLiter(s) Oral at bedtime    MEDICATIONS  (PRN):  acetaminophen    Suspension .. 650 milliGRAM(s) Oral every 6 hours PRN Temp greater or equal to 38C (100.4F)  lidocaine/prilocaine Cream 1 Application(s) Topical daily PRN hd days  ondansetron Injectable 4 milliGRAM(s) IV Push every 12 hours PRN Nausea and/or Vomiting    Vital Signs Last 24 Hrs  T(C): 37.1 (17 Apr 2019 17:00), Max: 38.3 (17 Apr 2019 08:00)  T(F): 98.8 (17 Apr 2019 17:00), Max: 100.9 (17 Apr 2019 08:00)  HR: 58 (17 Apr 2019 22:15) (52 - 90)  BP: 92/42 (17 Apr 2019 22:15) (73/38 - 143/65)  BP(mean): 60 (17 Apr 2019 22:15) (53 - 93)  RR: 15 (17 Apr 2019 22:15) (6 - 120)  SpO2: 97% (17 Apr 2019 22:15) (93% - 100%)    REVIEW OF SYSTEMS:  Unable to obtain; intubated/sedated.      PHYSICAL EXAM:  · CONSTITUTIONAL: Well-developed  · EYES:  no drainage or redness  · NECK: supple   ·RESPIRATORY:   airway patent; breath sounds equal; good air movement; reduced breath sounds; intubated.   · CARDIOVASCULAR: regular rate and rhythm   . GASTROINTESTINAL:  no distention  · EXTREMITIES: No cyanosis, clubbing or edema  · VASCULAR:  Equal and normal pulses (radial)  ·NEUROLOGICAL:  Alert and oriented x 0; sedated.  · SKIN: No lesions; no rash  . LYMPH NODES: No lymphadedenopathy    LABS:   CBC Full  -  ( 17 Apr 2019 00:45 )  WBC Count : 9.7 K/uL  RBC Count : 3.55 M/uL  Hemoglobin : 10.8 g/dL  Hematocrit : 33.5 %  Platelet Count - Automated : 293 K/uL  Mean Cell Volume : 94.3 fl  Mean Cell Hemoglobin : 30.5 pg  Mean Cell Hemoglobin Concentration : 32.4 gm/dL    04-17    137  |  98  |  24<H>  ----------------------------<  224<H>  4.2   |  25  |  2.27<H>    Ca    8.4      17 Apr 2019 00:45  Phos  2.1     04-17  Mg     1.8     04-17    TPro  7.0  /  Alb  2.9<L>  /  TBili  0.8  /  DBili  x   /  AST  48<H>  /  ALT  <5<L>  /  AlkPhos  140<H>  04-17    Limited TTE 4/17/2019  Conclusions:  1. An annuloplasty ring is seen in the mitral position. A  1cm x 0.9cm vegetation is see on the atrial portion of the  anterior leaflet.  Mild-moderate mitral regurgitation. Peak  mitral valve gradient equals 9 mm Hg, mean transmitral  valve gradient equals 4 mm Hg, which is probably normal in  the setting of a An annuloplasty ring is seen in the mitral  position..  2. Normal trileaflet aortic valve. Linear strands seen on  the aortic valve leaflet tips consistent with likely  Lambl's excrescences vs vegetation  3. Severe global left ventricular systolic dysfunction.  4. Right ventricular enlargement with decreased right  ventricular systolic function.  D/w MICU and CTS    IMPRESSION AND PLAN:  78y Female patient PMH CABG x4 with MV repair 6/2015, CM EF 35%, ESRD on HD presenting with tib/fib fracture s/p fall medically managed; hospitalization complicated by respiratory distress requiring intubation, SVT requiring DCCV, MSSA bacteremia with endocarditis; not a surgical candidate, being medically managed at this time with respect to all of her medical conditions.     1) CAD   CABG x4  with MV repair 6/2015  Non-thrombotic troponin elevation setting of multiple other medical conditions; supply demand mismatch.     ·	Continue medical management with aspirin 81 mg daily   ·	Hold atorvastatin due to elevated liver enzymes; re-introduce once stabilized     2) Endocarditis   TTE vegetation on MV and possibly others   Not a surgical candidate     ·	Appreciate ID consult and input; long term IV abx  ·	Supportive care as per primary team .     3) HFrEF   EF 30-35%     ·	Unable to tolerate guideline directed medical therapy due to hypotension requiring midodrine in the outpatient setting; particularly while doing hemodialysis.     Kendy Larry MD, MPH, DION  Cardiovascular Specialist Attending  [facility]  C: [phone]  E: [email or web address]  (Cardiology nocturnist available every night 7 pm - 7 am; available week days for follow-up; general cardiology consult coverage weekend days)

## 2019-04-17 NOTE — PROGRESS NOTE ADULT - SUBJECTIVE AND OBJECTIVE BOX
VIRGILIO KAPLAN  78y Female  MRN:[id number]    Patient is a 78y old  Female who presents with a chief complaint of R tib/fib fx (10 Apr 2019 12:01)    HPI:  78 F PMH CAD s/p CABG x 4 2015, T2DM with peripheral neuropathy, ESRD on HD T/Th/S via L AVF, hypotension on midodrine, HFrEF, HTN, HLD, b/l cataracts s/p surgery, p/w mechanical fall and R leg pain. Pt lives in basement apartment and ordinarily ambulates with walker. Today was getting ready for HD, made it to the first step on her set of stairs, and reportedly felt her R leg "give out." Pt fell backwards but was caught by her family member who was assisting her up the stairs. She denies LOC/syncope or head strike. +Pain in R leg post fall, worse with movement and much improved with immobilization. Could not bear weight or walk after fall.  Denies cp, sob at rest, f/c, n/v/d, dysuria, cough. +chronic BENAVIDEZ, reportedly stable since her CABG. Family at bedside providing collateral and confirms above details.     Vs: 99.1, 76, 102/60, 16, 90% RA --> 100% 4LNC.  Labs: no leukocytosis, chronic stable anemia, rest cbc unrevealing, coags unrevealing, hypoNa 126, HAGMA with cmp c/w known ESRD, hyperglycemia, chronic stable alkp elevation mild AST elevation. XR tib/fib/femur/hip/knee reveals acute prox tib/fib fxs with possible intraarticular extension. (10 Apr 2019 00:56)      Patient seen and evaluated in micu.       Interval HPI: no acute events o/n    PAST MEDICAL & SURGICAL HISTORY:  CHF (congestive heart failure): Oct 2015- still sob  Shortness of breath  Hypotension  Type 2 diabetes mellitus  Chronic kidney disease (CKD): ON DIALYSIS - Tue, Thur, Sat  Nephrolithiasis: 2001  Ulcer: 2001  Hydronephrosis: Right 1/2012  Charcot Foot: Right Foot  Herniated Disc  Diabetic Neuropathy  Anemia: CKD  Hypothyroidism  Hypertension: NOT ANY MORE  Hyperlipidemia  S/P mitral valve repair: with CABG X 4 MAY 2015  S/P CABG (coronary artery bypass graft): x 4, May 2015  History of extraction of renal calculus  S/P Foot Surgery: 2002, 2014  S/P Cholecystectomy: 2001  S/P Breast Lumpectomy: 1994, 2003 - left breast benign      REVIEW OF SYSTEMS:  as per hpi    VITALS:  Vital Signs Last 24 Hrs  T(C): 36.5 (17 Apr 2019 15:15), Max: 38.3 (17 Apr 2019 08:00)  T(F): 97.7 (17 Apr 2019 15:15), Max: 100.9 (17 Apr 2019 08:00)  HR: 56 (17 Apr 2019 16:45) (52 - 90)  BP: 110/53 (17 Apr 2019 16:45) (73/38 - 143/65)  BP(mean): 77 (17 Apr 2019 16:45) (53 - 93)  RR: 15 (17 Apr 2019 16:45) (6 - 30)  SpO2: 100% (17 Apr 2019 16:45) (93% - 100%)    PHYSICAL EXAM:  GENERAL: intubated,  sedated  HEAD:  Atraumatic, Normocephalic  EYES: EOMI, PERRLA, conjunctiva and sclera clear  NECK: no jvd  CHEST/LUNG: coarse bs b/l  HEART: S1, S2;   ABDOMEN: Soft, Nontender, nondistended; Bowel sounds present  EXTREMITIES:  2+ Peripheral Pulses, No clubbing, cyanosis, or edema,  right knee cast      Consultant(s) Notes Reviewed:  [x ] YES  [ ] NO  Care Discussed with Consultants/Other Providers [ x] YES  [ ] NO    MEDS:  MEDICATIONS  (STANDING):  artificial tears (preservative free) Ophthalmic Solution 1 Drop(s) Both EYES every 2 hours  aspirin  chewable 81 milliGRAM(s) Oral daily  ceFAZolin   IVPB 1000 milliGRAM(s) IV Intermittent every 24 hours  chlorhexidine 2% Cloths 1 Application(s) Topical daily  chlorhexidine 4% Liquid 1 Application(s) Topical <User Schedule>  dexmedetomidine Infusion 0.2 MICROgram(s)/kG/Hr (3.415 mL/Hr) IV Continuous <Continuous>  dextrose 5%. 1000 milliLiter(s) (50 mL/Hr) IV Continuous <Continuous>  gentamicin   IVPB 130 milliGRAM(s) IV Intermittent <User Schedule>  heparin  Injectable 5000 Unit(s) SubCutaneous every 8 hours  insulin glargine Injectable (LANTUS) 2 Unit(s) SubCutaneous at bedtime  insulin lispro (HumaLOG) corrective regimen sliding scale   SubCutaneous every 6 hours  ketotifen 0.025% Ophthalmic Solution 1 Drop(s) Both EYES every 8 hours  ketotifen 0.025% Ophthalmic Solution      levothyroxine Injectable 75 MICROGram(s) IV Push at bedtime  midodrine 10 milliGRAM(s) Oral three times a day  norepinephrine Infusion 0.05 MICROgram(s)/kG/Min (6.403 mL/Hr) IV Continuous <Continuous>  pantoprazole  Injectable 40 milliGRAM(s) IV Push every 12 hours  petrolatum Ophthalmic Ointment 1 Application(s) Both EYES three times a day  polyethylene glycol 3350 17 Gram(s) Oral daily  propofol Infusion 15 MICROgram(s)/kG/Min (6.147 mL/Hr) IV Continuous <Continuous>  senna Syrup 10 milliLiter(s) Oral at bedtime    MEDICATIONS  (PRN):  acetaminophen    Suspension .. 650 milliGRAM(s) Oral every 6 hours PRN Temp greater or equal to 38C (100.4F)  lidocaine/prilocaine Cream 1 Application(s) Topical daily PRN hd days  ondansetron Injectable 4 milliGRAM(s) IV Push every 12 hours PRN Nausea and/or Vomiting        ALLERGIES:  allopurinol (Rash)  Augmentin (Rash)  Levaquin (Rash)      LABS:                                          10.8   9.7   )-----------( 293      ( 17 Apr 2019 00:45 )             33.5   04-17    137  |  98  |  24<H>  ----------------------------<  224<H>  4.2   |  25  |  2.27<H>    Ca    8.4      17 Apr 2019 00:45  Phos  2.1     04-17  Mg     1.8     04-17    TPro  7.0  /  Alb  2.9<L>  /  TBili  0.8  /  DBili  x   /  AST  48<H>  /  ALT  <5<L>  /  AlkPhos  140<H>  04-17        Culture - Blood (04.13.19 @ 07:27)    Gram Stain:   Growth in aerobic bottle: Gram Positive Cocci in Clusters    Specimen Source: .Blood    Culture Results:   Growth in aerobic bottle: Gram Positive Cocci in Clusters        < from: CT Knee No Cont, Right (04.10.19 @ 03:34) >  Impression:    Acute, impacted fractures of the right proximal tibia and fibula as   described.      < end of copied text >           < from: Xray Tibia + Fibula 2 Views, Right (04.09.19 @ 20:28) >  impression:    There is an acute, comminuted fracture of the proximal tibial metaphysis   with mild anterior displacement of the distal fracture fragment. There is   questionable intra-articular extension on the lateral view. There is a   large knee joint effusion. There is a proximal fibular fracture.    There is no fracture of the hip or femur. The ankle is poorly visualized   secondary to technique and may be externally rotated with respect to the   knee. There is no ankle joint effusion. Vascular calcifications are noted.     CT of the knee can be performed for further evaluation.    < end of copied text >

## 2019-04-17 NOTE — PROGRESS NOTE ADULT - ASSESSMENT
78 F PMH CAD s/p CABG x 4 2015, T2DM with peripheral neuropathy, ESRD on HD T/Th/S via L AVF, hypotension on midodrine, HFrEF, HTN, HLD, b/l cataracts s/p surgery, p/w mechanical fall and R leg pain, found to have R tib/fib fracture.   pt with resp failure, now intubated in micu    resp failure  likely multifactorial  vent support  pressure support prn  mngt as per MICU    bacteremia/sepsis   id f/u  iv abx  f/u LISA - bedside positive for vegetation, f/u official LISA. CTSx consulted  f/u repeat cult    open fracture of proximal end of right tibia,  confirmed on XR and CT  -ortho recs appreciated:  s/p cast. no surgical intervention at this time  outpt f/u  NWB RLE     Coronary artery disease without angina pectoris  -c/w aspirin, statin  -f/u cards .        Type 2 diabetes mellitus with hyperglycemia, with long-term current use of insulin.  start HISS  endo consult dr ronak maciel, CC diet,    ESRD on hemodialysis.  HD as per renal      ascites.   s/p paracentesis   f/u fluid analysis     emesis  gi consulted  f/u guaiac  ppi  monitor cbc       mngt as per MICU

## 2019-04-17 NOTE — PROGRESS NOTE ADULT - PROBLEM SELECTOR PLAN 2
- MSSA bacteremia  - s/p bedside LISA with vegetations noted on non-coronary cusp of aortic valve and on anterior leaflet of mitral valve; no significant aortic insufficiency noted, but there is mild mitral regurgitation  - severely decreased LV systolic function  - abx/care per ID appreciated

## 2019-04-17 NOTE — PROGRESS NOTE ADULT - ASSESSMENT
78F with CAD s/p CABG x4, HFrEF (EF 26% 4/2019), T2DM with peripheral neuropathy, ESRD on HD T/Th/S via L AVF, hypotension on midodrine, HTN, HLD initially admitted 4/9 after mechanical fall c/b R proximal tib/fib fracture not requiring emergent surgical intervention s/p cast placement with hospital course c/b possible melena now resolved, and RRT on 4/11 for acute hypercapnic respiratory failure in setting of opioid use requiring intubation. MICU course c/b SVT s/p synchronized cardioversion x2 now in sinus rhythm and Staph aureus bacteremia.     #Neuro:   - Patient transferred to MICU for AMS 2/2 hypercarbia. Unclear etiology, possibly opioid-induced as patient receiving pain medications for leg fracture. Ammonia wnl. CTH unremarkable.   - Pt awake and responsive, communicative and following commands off sedation   - c/w off sedation today     #CV:   - CAD s/p multiple CABG: continue ASA, holding statin in setting of transaminitis  - HFrEF: repeat TTE 4/2019 with EF 26% severe global RV systolic dysfunction, moderate MS, mild TR, normal pulm pressures  - Troponin elevation likely in setting of underlying ESRD and demand ischemia  - SVT s/p cardioversion: now rate controlled, sinus rhythm, continue to monitor on tele  - Shock: likely septic given +BCx, requiring low dose phenylephrine, will switch to levo given significantly reduced EF and component of cardiogenic shock, continue midodrine 10mg q8h  - Bedside LISA with suspected vegetations on  Aortic and Mitral Valve, formal LISA pending today. CT surg on board, pt poor surgical candidate given comorbidities. Will FU recs     #Pulmonary:   - Initially transferred for acute hypercapnic respiratory failure of unclear etiology but suspect 2/2 opioid use  - Continue mechanical ventilation, daily spontaneous breathing trials  - Previous pleural effusion on bedside sono, improved. HD yesterday with 2.5L removed.   - Will consider extubation s/p LISA today     #GI:   - Continue Nepro tube feeds via OGT, monitor residuals  - Transaminitis with abdominal sonogram showing early signs of cirrhosis likely 2/2 heart failure. Hepatitis studies negative.   - Melena and questionable coffee-ground emesis (apparently chocolate milk). Continue PPI. No plans for GI intervention at this time.   - S/p paracentesis on 4/12 with removal of 4.9L, fluid studies with SAAG 0.6, likely 2/2 heart failure  - POCUS still with mild ascites but overall improved     #Renal:   - Hx of ESRD on HD T/Th/S; continue HD as per Nephrology  - Pt appears volume overloaded on bedside sono with bilateral pleural effusions, IVC 2 cm does not change with respirations   - HD with goal 2.5L fluid removal 4/16  - Continue to monitor electrolytes    #Endo:   - TSH 34 but free T4 within normal range  - HbA1c 8.3%, continue insulin sliding scale  - c/w  Lantus 2 units at bedtime while NPO   - Fingersticks q6h while on tube feeds    #ID:   - Found to have MSSA bacteremia on cultures from 4/12 and 4/13,   - Repeat cultures from 4/14 pending   - Bedside LISA with vegetations on aortic and mitral valve   - will consult CT surgery   - appreciate ID reccs, c/w ancef 4/14, gentamycin 4/16     DVT PPx:   - HSQ 78F with CAD s/p CABG x4, HFrEF (EF 26% 4/2019), T2DM with peripheral neuropathy, ESRD on HD T/Th/S via L AVF, hypotension on midodrine, HTN, HLD initially admitted 4/9 after mechanical fall c/b R proximal tib/fib fracture not requiring emergent surgical intervention s/p cast placement with hospital course c/b possible melena now resolved, and RRT on 4/11 for acute hypercapnic respiratory failure in setting of opioid use requiring intubation. MICU course c/b SVT s/p synchronized cardioversion x2 now in sinus rhythm and Staph aureus bacteremia.     #Neuro:   - Patient transferred to MICU for AMS 2/2 hypercarbia. Unclear etiology, possibly opioid-induced as patient receiving pain medications for leg fracture. Ammonia wnl. CTH unremarkable.   - Pt awake and responsive, communicative and following commands off sedation   - Precedex for comfort     #CV:   - CAD s/p multiple CABG: continue ASA, holding statin in setting of transaminitis  - HFrEF: repeat TTE 4/2019 with EF 26% severe global RV systolic dysfunction, moderate MS, mild TR, normal pulm pressures  - Troponin elevation likely in setting of underlying ESRD and demand ischemia  - SVT s/p cardioversion: now rate controlled, sinus rhythm, continue to monitor on tele  - Shock: likely septic given +BCx, requiring low dose phenylephrine, switched to levo yesterday given significantly reduced EF and component of cardiogenic shock, continue midodrine 10mg q8h. Continue to wean levo as tolerated   - Bedside LISA with suspected vegetations on  Aortic and Mitral Valve, formal LISA pending today or tomorrow. CT surg on board, pt poor surgical candidate given comorbidities. Will FU recs     #Pulmonary:   - Initially transferred for acute hypercapnic respiratory failure of unclear etiology but suspect 2/2 opioid use  - Continue mechanical ventilation, daily spontaneous breathing trials  - Bilateral pleural effusions noted on bedside sono, R>L likely 2/2 HF. HD yesterday with 2.5L removed.   - Will consider extubation s/p LISA. Will likely need thoracentesis for pleural effusions to optimize prior to extubation     #GI:   - Continue Nepro tube feeds via OGT, monitor residuals  - Transaminitis with abdominal sonogram showing early signs of cirrhosis likely 2/2 heart failure. Hepatitis studies negative.   - Melena and questionable coffee-ground emesis (apparently chocolate milk). Continue PPI. No plans for GI intervention at this time.   - S/p paracentesis on 4/12 with removal of 4.9L, fluid studies with SAAG 0.6, likely 2/2 heart failure  - POCUS still with mild ascites but overall improved     #Renal:   - Hx of ESRD on HD T/Th/S; continue HD as per Nephrology  - Pt appears volume overloaded on bedside sono with bilateral pleural effusions, IVC 2 cm does not change with respirations   - HD with goal 2.5L fluid removal 4/16  - Continue to monitor electrolytes    #Endo:   - TSH 34 but free T4 within normal range  - HbA1c 8.3%, continue insulin sliding scale  - c/w  Lantus 2 units at bedtime while NPO   - Fingersticks q6h while on tube feeds    #ID:   - Found to have MSSA bacteremia on cultures from 4/12 and 4/13,   - Repeat cultures from 4/14 pending   - Bedside LISA with vegetations on aortic and mitral valve, formal LISA pending   - CT surgery on board   - appreciate ID reccs, c/w ancef 4/14, gentamycin 4/16 - will give post HD and limit to 5 doses     DVT PPx:   - HSQ

## 2019-04-17 NOTE — PROGRESS NOTE ADULT - ASSESSMENT
79 yo F with hx of cad, cabg, DM, esrd, on HD, chronic hypotension on midodrine 10 mg tid with sob, pleural effusions, chf, ascites, and new tib/fib fx    1- esrd  2- hypotension   3- chf  4- ascites   5- tib/fib fx   6- respiratory failure   cont levophed drip for bp support  hd am   daily phos. phos is better after repletion   wean vent as tolerated

## 2019-04-17 NOTE — CHART NOTE - NSCHARTNOTEFT_GEN_A_CORE
Post-thoracentesis CXR reviewed - has R ptx  On exam, patient is very comfortable on PSV 10/5, 25%, SpO2 100%  Slightly diminished BS on right but not completely absent, good BS on left  She may have trapped lung as she has had chronic pleural effusion   Will hold off on placing a chest tube at this time, repeat a CXR later today   Will place a CT if her CXR or resp status gets worse  Called and updated daughter over the phone

## 2019-04-17 NOTE — PROGRESS NOTE ADULT - ASSESSMENT
78 F with DM, CAD s/p CABG, mitral annuloplasty ring, HFrEF on midodrine, ESRD on HD via Left AVF, admitted 4/9/19 after a fall at home and sustaining a Right tib/fib fracture now casted, developed acute hypercapnic respiratory failure attributed to opioids, intubated, developed fevers 4/12 and high grade MSSA bacteremia   pt was started on vanco   repeat blood cx 4/13 still positive, 4/14 negative for now  TTE no vegetation      high grade MSSA bacteremia, in the setting of mitral ring annuloplasty bedside LISA showed vegetation on mitral and aortic valve       * blood cx 4/14 negative for now    cefazolin 1 g qd    gentamycin 2 mg/kg q 48 post HD  for formal LISA today  poor surgical candidate but this infection has a greater than 60 % mortality.  Would limit genta course to 5 days to decrease risk of ototoxicity   pt also complains of eye discomfort, so we  need to watch for signs of metastatics endophthalmitis

## 2019-04-17 NOTE — PROGRESS NOTE ADULT - ATTENDING COMMENTS
remain critically ill: for TEDDY: Cont antibiotics and full resp support!  4/15: Still febrile with respiratory failure: Being considered for TEDDY today :  4/16: for teddy today : remains intubated and sedated: Cont full vent support for now:  4/17: Now supsected to have IE: For repeat TEDDY today :

## 2019-04-17 NOTE — PROGRESS NOTE ADULT - SUBJECTIVE AND OBJECTIVE BOX
INTERVAL HPI/OVERNIGHT EVENTS:    pt seen and examined earlier this morning  remains intubated  for formal LISA today    MEDICATIONS  (STANDING):  artificial tears (preservative free) Ophthalmic Solution 1 Drop(s) Both EYES every 2 hours  aspirin  chewable 81 milliGRAM(s) Oral daily  ceFAZolin   IVPB 1000 milliGRAM(s) IV Intermittent every 24 hours  chlorhexidine 2% Cloths 1 Application(s) Topical daily  chlorhexidine 4% Liquid 1 Application(s) Topical <User Schedule>  dexmedetomidine Infusion 0.2 MICROgram(s)/kG/Hr (3.415 mL/Hr) IV Continuous <Continuous>  dextrose 5%. 1000 milliLiter(s) (50 mL/Hr) IV Continuous <Continuous>  gentamicin   IVPB 130 milliGRAM(s) IV Intermittent <User Schedule>  heparin  Injectable 5000 Unit(s) SubCutaneous every 8 hours  insulin glargine Injectable (LANTUS) 2 Unit(s) SubCutaneous at bedtime  insulin lispro (HumaLOG) corrective regimen sliding scale   SubCutaneous every 6 hours  ketotifen 0.025% Ophthalmic Solution 1 Drop(s) Both EYES every 8 hours  ketotifen 0.025% Ophthalmic Solution      levothyroxine Injectable 75 MICROGram(s) IV Push at bedtime  midodrine 10 milliGRAM(s) Oral three times a day  norepinephrine Infusion 0.05 MICROgram(s)/kG/Min (6.403 mL/Hr) IV Continuous <Continuous>  pantoprazole  Injectable 40 milliGRAM(s) IV Push every 12 hours  petrolatum Ophthalmic Ointment 1 Application(s) Both EYES three times a day  polyethylene glycol 3350 17 Gram(s) Oral daily  propofol Infusion 15 MICROgram(s)/kG/Min (6.147 mL/Hr) IV Continuous <Continuous>  senna Syrup 10 milliLiter(s) Oral at bedtime    MEDICATIONS  (PRN):  acetaminophen    Suspension .. 650 milliGRAM(s) Oral every 6 hours PRN Temp greater or equal to 38C (100.4F)  lidocaine/prilocaine Cream 1 Application(s) Topical daily PRN hd days  ondansetron Injectable 4 milliGRAM(s) IV Push every 12 hours PRN Nausea and/or Vomiting      Allergies    allopurinol (Rash)  Augmentin (Rash)  Levaquin (Rash)    Intolerances        Review of Systems:    unable to obtain       Vital Signs Last 24 Hrs  T(C): 37.4 (17 Apr 2019 12:00), Max: 38.3 (17 Apr 2019 08:00)  T(F): 99.4 (17 Apr 2019 12:00), Max: 100.9 (17 Apr 2019 08:00)  HR: 63 (17 Apr 2019 15:15) (52 - 90)  BP: 92/48 (17 Apr 2019 15:15) (73/38 - 143/65)  BP(mean): 67 (17 Apr 2019 15:15) (53 - 93)  RR: 13 (17 Apr 2019 15:15) (6 - 30)  SpO2: 98% (17 Apr 2019 15:15) (93% - 100%)    PHYSICAL EXAM:    GENERAL:   NAD, intubated   HEENT:  NC/AT,  no JVD, sclera-anicteric, + ETT  CHEST:  coarse breath sounds b/l  HEART:  +S1+S2   ABDOMEN:  Soft, nt, nd  EXTREMITIES:  no cyanosis, clubbing or edema  NEURO:  on precedex, comfortable, no tremor  SKIN:  warm/dry        LABS:                        10.8   9.7   )-----------( 293      ( 17 Apr 2019 00:45 )             33.5     04-17    137  |  98  |  24<H>  ----------------------------<  224<H>  4.2   |  25  |  2.27<H>    Ca    8.4      17 Apr 2019 00:45  Phos  2.1     04-17  Mg     1.8     04-17    TPro  7.0  /  Alb  2.9<L>  /  TBili  0.8  /  DBili  x   /  AST  48<H>  /  ALT  <5<L>  /  AlkPhos  140<H>  04-17    PT/INR - ( 17 Apr 2019 00:45 )   PT: 13.2 sec;   INR: 1.14 ratio         PTT - ( 17 Apr 2019 00:45 )  PTT:41.2 sec      RADIOLOGY & ADDITIONAL TESTS:

## 2019-04-17 NOTE — PROGRESS NOTE ADULT - ASSESSMENT
Acute hyopercapneic repisratory failure rquiring mechanical intubataion  Tib-Fib fracture s/p mechanical fall  ESRD  ACute on chronic sytolic and diastolic CHF  CAD  Bilateral R>L pleural effusions, ascites due to fluid overload in setting of CHF and ESRD    4/14  1) Acute respiratory failure.  s/p RRT for unresponsiveness. Mildly improved w/ narcan. Intubated for airway protection. S/p synchronized cardioversion + ketamine for SVT on 4/12/19  Today ABG noted. Pt is alert and follows simple command. I&O 3.8 liters negative. 4.8 liter removed from paracentesis yesterday. Pt is currently on CPAP trial.  Continue wean off ventilator support per MICU team.  4/14 intubated and sedated. Per night MICU attending pt tolerated CPAP well yesterday. Pt remains intubated for TEDDY. Vent management, weaning per MICU  4/15: remains intubated:: for teddy? today : Cont full ventilator support:   4/16: remains intubated on pressors: TEDDY today: yesterday could not be done: Has staph aureus bacteremia   4/17: remains intubated : on antibiotics for official TEDDY today : micu teddy study revealed possible I.E    2) fever  24 T-max 38.3, yesterday, elevated inflammatory markers and liver enzyme. Chest x-ray reviewed Pt is already on Cefepime. Management defer to MICU, GI  4/14 24 hr Tmax 38.2 on vancomycin and Meropenum. Vanco level 11 yesterday. Bacteremia, gram positive cocci cluster (4/13). Pending TEDDY    4/15: she is still febrile: on antibiotics  4/156: Cont antibiotics:   4/17: for teddy today to for reevalution of IE: ? FOR SURGERY    3) Pleural effusions.   Chronic left and large right side. s/p paracentesis, removed more than 6 liters yesterday. Repeat chest x-ray.   4/14 no chest x-ray to review. s/p paracentesis. Clinically stable   4/15: has pl effusion on rt side: she had paracentesis done and not thoracentesis:   4/16: doing ok: still with bilateral pleural effusions: s/p paracentesis: cytology is negative:   4/17: no intervention for now: : Has pretty poor EF on teddy y esterday     4) Hypotension  Small dose of Bhaskar, continue wean off as possible   4/14 IVF at 50cc/hr. titrate bhaskar to keep map greater than 65.   4/15: cont to suport blood pressure to MAP of more then 65 mm HG  4/16: ont vasopressors:   4/17: Cont on vasopressors for hemodynamic support:     5)  Prophylaxis   Pt is on heparin SQ and PPI   4/14 heparin SQ and PPI

## 2019-04-17 NOTE — PROCEDURE NOTE - ULTRASOUND ASSESSMENT OF FLUID/LOCATION
Right sided pleural effusion
appropriate fluid pocket ascertained in LLQ, no vessels visualized in area of procedure

## 2019-04-17 NOTE — PROGRESS NOTE ADULT - SUBJECTIVE AND OBJECTIVE BOX
CHIEF COMPLAINT:    Interval Events:    REVIEW OF SYSTEMS:  Constitutional: [ ] negative [ ] fevers [ ] chills [ ] weight loss [ ] weight gain  HEENT: [ ] negative [ ] dry eyes [ ] eye irritation [ ] postnasal drip [ ] nasal congestion  CV: [ ] negative  [ ] chest pain [ ] orthopnea [ ] palpitations [ ] murmur  Resp: [ ] negative [ ] cough [ ] shortness of breath [ ] dyspnea [ ] wheezing [ ] sputum [ ] hemoptysis  GI: [ ] negative [ ] nausea [ ] vomiting [ ] diarrhea [ ] constipation [ ] abd pain [ ] dysphagia   : [ ] negative [ ] dysuria [ ] nocturia [ ] hematuria [ ] increased urinary frequency  Musculoskeletal: [ ] negative [ ] back pain [ ] myalgias [ ] arthralgias [ ] fracture  Skin: [ ] negative [ ] rash [ ] itch  Neurological: [ ] negative [ ] headache [ ] dizziness [ ] syncope [ ] weakness [ ] numbness  Psychiatric: [ ] negative [ ] anxiety [ ] depression  Endocrine: [ ] negative [ ] diabetes [ ] thyroid problem  Hematologic/Lymphatic: [ ] negative [ ] anemia [ ] bleeding problem  Allergic/Immunologic: [ ] negative [ ] itchy eyes [ ] nasal discharge [ ] hives [ ] angioedema  [ ] All other systems negative  [ ] Unable to assess ROS because ________    OBJECTIVE:  ICU Vital Signs Last 24 Hrs  T(C): 38.3 (17 Apr 2019 08:00), Max: 38.3 (17 Apr 2019 08:00)  T(F): 100.9 (17 Apr 2019 08:00), Max: 100.9 (17 Apr 2019 08:00)  HR: 52 (17 Apr 2019 08:00) (52 - 90)  BP: 73/38 (17 Apr 2019 08:00) (73/38 - 145/60)  BP(mean): 53 (17 Apr 2019 08:00) (53 - 93)  ABP: --  ABP(mean): --  RR: 13 (17 Apr 2019 08:00) (0 - 25)  SpO2: 97% (17 Apr 2019 08:00) (93% - 100%)    Mode: AC/ CMV (Assist Control/ Continuous Mandatory Ventilation), RR (machine): 12, TV (machine): 450, FiO2: 30, PEEP: 5, ITime: 0.9, MAP: 10, PIP: 31    04-16 @ 07:01 - 04-17 @ 07:00  --------------------------------------------------------  IN: 2430.8 mL / OUT: 3490 mL / NET: -1059.2 mL    04-17 @ 07:01 - 04-17 @ 08:36  --------------------------------------------------------  IN: 83.4 mL / OUT: 0 mL / NET: 83.4 mL      CAPILLARY BLOOD GLUCOSE      POCT Blood Glucose.: 174 mg/dL (17 Apr 2019 05:07)      PHYSICAL EXAM:  General:   HEENT:   Lymph Nodes:  Neck:   Respiratory:   Cardiovascular:   Abdomen:   Extremities:   Skin:   Neurological:  Psychiatry:    LINES:    HOSPITAL MEDICATIONS:  aspirin  chewable 81 milliGRAM(s) Oral daily  heparin  Injectable 5000 Unit(s) SubCutaneous every 8 hours    ceFAZolin   IVPB 1000 milliGRAM(s) IV Intermittent every 24 hours  gentamicin   IVPB 130 milliGRAM(s) IV Intermittent <User Schedule>    midodrine 10 milliGRAM(s) Oral three times a day  norepinephrine Infusion 0.05 MICROgram(s)/kG/Min IV Continuous <Continuous>    insulin glargine Injectable (LANTUS) 2 Unit(s) SubCutaneous at bedtime  insulin lispro (HumaLOG) corrective regimen sliding scale   SubCutaneous every 6 hours  levothyroxine Injectable 75 MICROGram(s) IV Push at bedtime      acetaminophen    Suspension .. 650 milliGRAM(s) Oral every 6 hours PRN  dexmedetomidine Infusion 0.2 MICROgram(s)/kG/Hr IV Continuous <Continuous>  ondansetron Injectable 4 milliGRAM(s) IV Push every 12 hours PRN  propofol Infusion 15 MICROgram(s)/kG/Min IV Continuous <Continuous>    pantoprazole  Injectable 40 milliGRAM(s) IV Push every 12 hours  polyethylene glycol 3350 17 Gram(s) Oral daily  senna Syrup 10 milliLiter(s) Oral at bedtime        dextrose 5%. 1000 milliLiter(s) IV Continuous <Continuous>      artificial  tears Solution 1 Drop(s) Both EYES two times a day  chlorhexidine 2% Cloths 1 Application(s) Topical daily  chlorhexidine 4% Liquid 1 Application(s) Topical <User Schedule>  ketotifen 0.025% Ophthalmic Solution 1 Drop(s) Both EYES every 8 hours  ketotifen 0.025% Ophthalmic Solution      lidocaine/prilocaine Cream 1 Application(s) Topical daily PRN        LABS:                        10.8   9.7   )-----------( 293      ( 17 Apr 2019 00:45 )             33.5     Hgb Trend: 10.8<--, 9.6<--, 9.9<--, 10.9<--, 10.2<--  04-17    137  |  98  |  24<H>  ----------------------------<  224<H>  4.2   |  25  |  2.27<H>    Ca    8.4      17 Apr 2019 00:45  Phos  2.1     04-17  Mg     1.8     04-17    TPro  7.0  /  Alb  2.9<L>  /  TBili  0.8  /  DBili  x   /  AST  48<H>  /  ALT  <5<L>  /  AlkPhos  140<H>  04-17    Creatinine Trend: 2.27<--, 2.66<--, 3.60<--, 2.93<--, 3.52<--, 2.72<--  PT/INR - ( 17 Apr 2019 00:45 )   PT: 13.2 sec;   INR: 1.14 ratio         PTT - ( 17 Apr 2019 00:45 )  PTT:41.2 sec    Arterial Blood Gas:  04-16 @ 00:34  7.50/34/193/27/100/4.1  ABG lactate: --        MICROBIOLOGY:     RADIOLOGY:  [ ] Reviewed and interpreted by me    EKG: CHIEF COMPLAINT:    Interval Events:    No acute events overnight. Pt alert and responsive this AM, communicates via writing that her eyes continue to bother her and feel "gritty." Artificial tears and a wetted towel over closed eyes give mild improvement.     OBJECTIVE:  ICU Vital Signs Last 24 Hrs  T(C): 38.3 (17 Apr 2019 08:00), Max: 38.3 (17 Apr 2019 08:00)  T(F): 100.9 (17 Apr 2019 08:00), Max: 100.9 (17 Apr 2019 08:00)  HR: 52 (17 Apr 2019 08:00) (52 - 90)  BP: 73/38 (17 Apr 2019 08:00) (73/38 - 145/60)  BP(mean): 53 (17 Apr 2019 08:00) (53 - 93)  ABP: --  ABP(mean): --  RR: 13 (17 Apr 2019 08:00) (0 - 25)  SpO2: 97% (17 Apr 2019 08:00) (93% - 100%)    Mode: AC/ CMV (Assist Control/ Continuous Mandatory Ventilation), RR (machine): 12, TV (machine): 450, FiO2: 30, PEEP: 5, ITime: 0.9, MAP: 10, PIP: 31    04-16 @ 07:01 - 04-17 @ 07:00  --------------------------------------------------------  IN: 2430.8 mL / OUT: 3490 mL / NET: -1059.2 mL    04-17 @ 07:01 - 04-17 @ 08:36  --------------------------------------------------------  IN: 83.4 mL / OUT: 0 mL / NET: 83.4 mL      CAPILLARY BLOOD GLUCOSE      POCT Blood Glucose.: 174 mg/dL (17 Apr 2019 05:07)      PHYSICAL EXAM:  General: Intubated, awake and responsive   HEENT: EOMI. Mild erythema of sclera noted, no discharge/pus noted.   Respiratory: Coarse breath sounds heard in bilateral lung fields.   Cardiovascular: S1, S2 noted.   Abdomen: Soft, nontender, nondistended.   Extremities: RLE in cast and wrapped in ACE bandage. LLE AVF.   Skin: No lesions noted.    Neurological: Intubated, on Precedex but awake and able to communicate via gesturing/writing.         LINES:    HOSPITAL MEDICATIONS:  aspirin  chewable 81 milliGRAM(s) Oral daily  heparin  Injectable 5000 Unit(s) SubCutaneous every 8 hours    ceFAZolin   IVPB 1000 milliGRAM(s) IV Intermittent every 24 hours  gentamicin   IVPB 130 milliGRAM(s) IV Intermittent <User Schedule>    midodrine 10 milliGRAM(s) Oral three times a day  norepinephrine Infusion 0.05 MICROgram(s)/kG/Min IV Continuous <Continuous>    insulin glargine Injectable (LANTUS) 2 Unit(s) SubCutaneous at bedtime  insulin lispro (HumaLOG) corrective regimen sliding scale   SubCutaneous every 6 hours  levothyroxine Injectable 75 MICROGram(s) IV Push at bedtime      acetaminophen    Suspension .. 650 milliGRAM(s) Oral every 6 hours PRN  dexmedetomidine Infusion 0.2 MICROgram(s)/kG/Hr IV Continuous <Continuous>  ondansetron Injectable 4 milliGRAM(s) IV Push every 12 hours PRN  propofol Infusion 15 MICROgram(s)/kG/Min IV Continuous <Continuous>    pantoprazole  Injectable 40 milliGRAM(s) IV Push every 12 hours  polyethylene glycol 3350 17 Gram(s) Oral daily  senna Syrup 10 milliLiter(s) Oral at bedtime        dextrose 5%. 1000 milliLiter(s) IV Continuous <Continuous>      artificial  tears Solution 1 Drop(s) Both EYES two times a day  chlorhexidine 2% Cloths 1 Application(s) Topical daily  chlorhexidine 4% Liquid 1 Application(s) Topical <User Schedule>  ketotifen 0.025% Ophthalmic Solution 1 Drop(s) Both EYES every 8 hours  ketotifen 0.025% Ophthalmic Solution      lidocaine/prilocaine Cream 1 Application(s) Topical daily PRN        LABS:                        10.8   9.7   )-----------( 293      ( 17 Apr 2019 00:45 )             33.5     Hgb Trend: 10.8<--, 9.6<--, 9.9<--, 10.9<--, 10.2<--  04-17    137  |  98  |  24<H>  ----------------------------<  224<H>  4.2   |  25  |  2.27<H>    Ca    8.4      17 Apr 2019 00:45  Phos  2.1     04-17  Mg     1.8     04-17    TPro  7.0  /  Alb  2.9<L>  /  TBili  0.8  /  DBili  x   /  AST  48<H>  /  ALT  <5<L>  /  AlkPhos  140<H>  04-17    Creatinine Trend: 2.27<--, 2.66<--, 3.60<--, 2.93<--, 3.52<--, 2.72<--  PT/INR - ( 17 Apr 2019 00:45 )   PT: 13.2 sec;   INR: 1.14 ratio         PTT - ( 17 Apr 2019 00:45 )  PTT:41.2 sec    Arterial Blood Gas:  04-16 @ 00:34  7.50/34/193/27/100/4.1  ABG lactate: --        MICROBIOLOGY:     RADIOLOGY:  [ ] Reviewed and interpreted by me    EKG:

## 2019-04-17 NOTE — PROGRESS NOTE ADULT - ATTENDING COMMENTS
78F PMH CAD s/p CABG, HFrEF, DM2, ESRD on HD, hypotension on midodrine, HTN, HLD, initially admitted with R tib/fib fx after a mechanical fall. Hospital course complicated by acute hypercapnic respiratory failure requiring mechanical ventilation. Course further complicated by severe sepsis due to high grade MSSA bacteremia. 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     Remains on low dose Levo  C/o gritty sensation in both eyes  Tmax 100.9    Recs:  --Neuro: on Precedex, follows simple commands   --Cardiac: wean Levo as tolerated, continue aspirin & midodrine   --Pulm: R pleural effusion unchanged, will perform thoracentesis prior to extubation (likely later today)   --GI: tolerating TF, continue bowel regimen, has moderate ascites - monitor for now  --ID: has endocarditis - continue cefazolin, added gentamicin per ID, BCx from 4/14 neg so far, consulted cardiac surgery - they are not thinking of any surgical intervention at this time however requesting an official LISA (ordered and d/w cardiology). Repeat BCx in view of fever   --Renal: s/p HD 4/16  --MSK: GIOVANI rodriguez, outpatient ortho f/u, no urgent surgical intervention per ortho  --Endo: glucose uncontrolled on Humalog sliding scale & low dose Lantus, will increase Lantus once she is back on diet. Continue Synthroid  --PPx: sc heparin, Protonix  --Consult opthalmology   --Prognosis poor  --Patient critically ill, 41mins

## 2019-04-17 NOTE — PROGRESS NOTE ADULT - SUBJECTIVE AND OBJECTIVE BOX
[facility]   [phone]  RENAL FOLLOW UP NOTE  --------------------------------------------------------------------------------  Chief Complaint:    24 hour events/subjective:      seen earlier.   intubated   PAST HISTORY  --------------------------------------------------------------------------------  No significant changes to PMH, PSH, FHx, SHx, unless otherwise noted    ALLERGIES & MEDICATIONS  --------------------------------------------------------------------------------  Allergies    allopurinol (Rash)  Augmentin (Rash)  Levaquin (Rash)    Intolerances      Standing Inpatient Medications  artificial tears (preservative free) Ophthalmic Solution 1 Drop(s) Both EYES every 2 hours  aspirin  chewable 81 milliGRAM(s) Oral daily  ceFAZolin   IVPB 1000 milliGRAM(s) IV Intermittent every 24 hours  chlorhexidine 2% Cloths 1 Application(s) Topical daily  chlorhexidine 4% Liquid 1 Application(s) Topical <User Schedule>  dexmedetomidine Infusion 0.2 MICROgram(s)/kG/Hr IV Continuous <Continuous>  dextrose 5%. 1000 milliLiter(s) IV Continuous <Continuous>  gentamicin   IVPB 130 milliGRAM(s) IV Intermittent <User Schedule>  heparin  Injectable 5000 Unit(s) SubCutaneous every 8 hours  insulin glargine Injectable (LANTUS) 2 Unit(s) SubCutaneous at bedtime  insulin lispro (HumaLOG) corrective regimen sliding scale   SubCutaneous every 6 hours  ketotifen 0.025% Ophthalmic Solution 1 Drop(s) Both EYES every 8 hours  ketotifen 0.025% Ophthalmic Solution      levothyroxine Injectable 75 MICROGram(s) IV Push at bedtime  midodrine 10 milliGRAM(s) Oral three times a day  norepinephrine Infusion 0.05 MICROgram(s)/kG/Min IV Continuous <Continuous>  pantoprazole  Injectable 40 milliGRAM(s) IV Push every 12 hours  petrolatum Ophthalmic Ointment 1 Application(s) Both EYES three times a day  polyethylene glycol 3350 17 Gram(s) Oral daily  propofol Infusion 15 MICROgram(s)/kG/Min IV Continuous <Continuous>  senna Syrup 10 milliLiter(s) Oral at bedtime    PRN Inpatient Medications  acetaminophen    Suspension .. 650 milliGRAM(s) Oral every 6 hours PRN  lidocaine/prilocaine Cream 1 Application(s) Topical daily PRN  ondansetron Injectable 4 milliGRAM(s) IV Push every 12 hours PRN      REVIEW OF SYSTEMS  --------------------------------------------------------------------------------    intubated     VITALS/PHYSICAL EXAM  --------------------------------------------------------------------------------  T(C): 37.4 (04-17-19 @ 12:00), Max: 38.3 (04-17-19 @ 08:00)  HR: 72 (04-17-19 @ 13:45) (52 - 90)  BP: 98/49 (04-17-19 @ 13:30) (73/38 - 145/60)  RR: 24 (04-17-19 @ 13:45) (12 - 30)  SpO2: 99% (04-17-19 @ 13:45) (93% - 100%)  Wt(kg): --        04-16-19 @ 07:01  -  04-17-19 @ 07:00  --------------------------------------------------------  IN: 2430.8 mL / OUT: 3490 mL / NET: -1059.2 mL    04-17-19 @ 07:01  -  04-17-19 @ 14:00  --------------------------------------------------------  IN: 221.2 mL / OUT: 0 mL / NET: 221.2 mL      Physical Exam:  	  Gen: intubated responsive   	no jvd   	Pulm: decrease bs  no rales or ronchi or wheezing  	CV: RRR, S1S2; no rub  	Abd: +BS, soft,  + distended ascites softer   	: No suprapubic tenderness  	UE: Warm, no cyanosis  no clubbing,  no edema  	LE: Warm, no cyanosis  no clubbing, no edema RLE cast   	avf +  bruit and thrill 	  		    LABS/STUDIES  --------------------------------------------------------------------------------              10.8   9.7   >-----------<  293      [04-17-19 @ 00:45]              33.5     137  |  98  |  24  ----------------------------<  224      [04-17-19 @ 00:45]  4.2   |  25  |  2.27        Ca     8.4     [04-17-19 @ 00:45]      Mg     1.8     [04-17-19 @ 00:45]      Phos  2.1     [04-17-19 @ 00:45]    TPro  7.0  /  Alb  2.9  /  TBili  0.8  /  DBili  x   /  AST  48  /  ALT  <5  /  AlkPhos  140  [04-17-19 @ 00:45]    PT/INR: PT 13.2 , INR 1.14       [04-17-19 @ 00:45]  PTT: 41.2       [04-17-19 @ 00:45]      Creatinine Trend:  SCr 2.27 [04-17 @ 00:45]  SCr 2.66 [04-16 @ 00:46]  SCr 3.60 [04-15 @ 00:16]  SCr 2.93 [04-14 @ 01:20]  SCr 3.52 [04-12 @ 23:54]      Iron 71, TIBC 135, %sat 53      [04-11-19 @ 17:50]  Ferritin 6834      [04-11-19 @ 17:57]  HbA1c 8.3      [04-12-19 @ 09:00]  TSH 34.70      [04-12-19 @ 07:41]  Lipid: chol 63, , HDL 12, LDL 23      [04-12-19 @ 09:06]

## 2019-04-17 NOTE — PROGRESS NOTE ADULT - SUBJECTIVE AND OBJECTIVE BOX
infectious diseases progress note:    Patient is a 78y old  Female who presents with a chief complaint of R tib/fib fx (16 Apr 2019 19:39)        Hypoxemia           Allergies    allopurinol (Rash)  Augmentin (Rash)  Levaquin (Rash)    Intolerances        ANTIBIOTICS/RELEVANT:  antimicrobials  ceFAZolin   IVPB 1000 milliGRAM(s) IV Intermittent every 24 hours  gentamicin   IVPB 130 milliGRAM(s) IV Intermittent <User Schedule>    immunologic:    OTHER:  acetaminophen    Suspension .. 650 milliGRAM(s) Oral every 6 hours PRN  artificial  tears Solution 1 Drop(s) Both EYES two times a day  aspirin  chewable 81 milliGRAM(s) Oral daily  chlorhexidine 2% Cloths 1 Application(s) Topical daily  chlorhexidine 4% Liquid 1 Application(s) Topical <User Schedule>  dexmedetomidine Infusion 0.2 MICROgram(s)/kG/Hr IV Continuous <Continuous>  dextrose 5%. 1000 milliLiter(s) IV Continuous <Continuous>  heparin  Injectable 5000 Unit(s) SubCutaneous every 8 hours  insulin glargine Injectable (LANTUS) 2 Unit(s) SubCutaneous at bedtime  insulin lispro (HumaLOG) corrective regimen sliding scale   SubCutaneous every 6 hours  ketotifen 0.025% Ophthalmic Solution 1 Drop(s) Both EYES every 8 hours  ketotifen 0.025% Ophthalmic Solution      levothyroxine Injectable 75 MICROGram(s) IV Push at bedtime  lidocaine/prilocaine Cream 1 Application(s) Topical daily PRN  midodrine 10 milliGRAM(s) Oral three times a day  norepinephrine Infusion 0.05 MICROgram(s)/kG/Min IV Continuous <Continuous>  ondansetron Injectable 4 milliGRAM(s) IV Push every 12 hours PRN  pantoprazole  Injectable 40 milliGRAM(s) IV Push every 12 hours  polyethylene glycol 3350 17 Gram(s) Oral daily  propofol Infusion 15 MICROgram(s)/kG/Min IV Continuous <Continuous>  senna Syrup 10 milliLiter(s) Oral at bedtime      Objective:  Vital Signs Last 24 Hrs  T(C): 38.3 (17 Apr 2019 08:00), Max: 38.3 (17 Apr 2019 08:00)  T(F): 100.9 (17 Apr 2019 08:00), Max: 100.9 (17 Apr 2019 08:00)  HR: 52 (17 Apr 2019 08:00) (52 - 90)  BP: 73/38 (17 Apr 2019 08:00) (73/38 - 145/60)  BP(mean): 53 (17 Apr 2019 08:00) (53 - 93)  RR: 13 (17 Apr 2019 08:00) (0 - 25)  SpO2: 97% (17 Apr 2019 08:00) (93% - 100%)    PHYSICAL EXAM:   no acute distress  Eyes:FLOWER, EOMI  Ear/Nose/Throat: no oral lesion, no sinus tenderness on percussion	  Neck:no JVD, no lymphadenopathy, supple  Respiratory: CTA darwin  Cardiovascular: S1S2 RRR, no murmurs  Gastrointestinal:soft, (+) BS, no HSM  Extremities:no e/e/c        LABS:                        10.8   9.7   )-----------( 293      ( 17 Apr 2019 00:45 )             33.5     04-17    137  |  98  |  24<H>  ----------------------------<  224<H>  4.2   |  25  |  2.27<H>    Ca    8.4      17 Apr 2019 00:45  Phos  2.1     04-17  Mg     1.8     04-17    TPro  7.0  /  Alb  2.9<L>  /  TBili  0.8  /  DBili  x   /  AST  48<H>  /  ALT  <5<L>  /  AlkPhos  140<H>  04-17    PT/INR - ( 17 Apr 2019 00:45 )   PT: 13.2 sec;   INR: 1.14 ratio         PTT - ( 17 Apr 2019 00:45 )  PTT:41.2 sec        MICROBIOLOGY:    RECENT CULTURES:  04-14 @ 12:25 .Blood                No growth to date.    04-13 @ 07:27 .Blood       Growth in aerobic bottle: Gram Positive Cocci in Clusters  Growth in anaerobic bottle: Gram Positive Cocci in Clusters           Growth in aerobic bottle: Staphylococcus aureus  See previous culture [id number]  Growth in anaerobic bottle: Gram Positive Cocci in Clusters    04-13 @ 00:45 .Body Fluid       No polymorphonuclear leukocytes seen  No organisms seen  by cytocentrifuge           No growth to date.    04-12 @ 02:36 .Blood   PCR    Growth in aerobic bottle: Gram Positive Cocci in Clusters  Growth in anaerobic bottle: Gram Positive Cocci in Clusters    Blood Culture PCR  Staphylococcus aureus  Blood Culture PCR     Growth in aerobic and anaerobic bottles: Staphylococcus aureus  See previous culture [id number]          RESPIRATORY CULTURES:              RADIOLOGY & ADDITIONAL STUDIES:        Pager [phone]  After 5 pm/weekends or if no response :[phone]

## 2019-04-17 NOTE — PROCEDURE NOTE - NSPROCDETAILS_GEN_ALL_CORE
location identified, draped/prepped, sterile technique used, needle inserted/introduced/ultrasound assessment of effusion (localization)/catheter inserted over needle/connection to syringe/connection to evacuated glass bottle

## 2019-04-18 LAB
ALBUMIN SERPL ELPH-MCNC: 2.8 G/DL — LOW (ref 3.3–5)
ALBUMIN SERPL ELPH-MCNC: 3.1 G/DL — LOW (ref 3.3–5)
ALP SERPL-CCNC: 119 U/L — SIGNIFICANT CHANGE UP (ref 40–120)
ALP SERPL-CCNC: 152 U/L — HIGH (ref 40–120)
ALT FLD-CCNC: <5 U/L — LOW (ref 10–45)
ALT FLD-CCNC: <5 U/L — LOW (ref 10–45)
ANION GAP SERPL CALC-SCNC: 15 MMOL/L — SIGNIFICANT CHANGE UP (ref 5–17)
ANION GAP SERPL CALC-SCNC: 18 MMOL/L — HIGH (ref 5–17)
APTT BLD: 43.4 SEC — HIGH (ref 27.5–36.3)
APTT BLD: 50.5 SEC — HIGH (ref 27.5–36.3)
AST SERPL-CCNC: 31 U/L — SIGNIFICANT CHANGE UP (ref 10–40)
AST SERPL-CCNC: 32 U/L — SIGNIFICANT CHANGE UP (ref 10–40)
BILIRUB SERPL-MCNC: 0.6 MG/DL — SIGNIFICANT CHANGE UP (ref 0.2–1.2)
BILIRUB SERPL-MCNC: 0.7 MG/DL — SIGNIFICANT CHANGE UP (ref 0.2–1.2)
BUN SERPL-MCNC: 23 MG/DL — SIGNIFICANT CHANGE UP (ref 7–23)
BUN SERPL-MCNC: 37 MG/DL — HIGH (ref 7–23)
CALCIUM SERPL-MCNC: 8.4 MG/DL — SIGNIFICANT CHANGE UP (ref 8.4–10.5)
CALCIUM SERPL-MCNC: 8.6 MG/DL — SIGNIFICANT CHANGE UP (ref 8.4–10.5)
CHLORIDE SERPL-SCNC: 95 MMOL/L — LOW (ref 96–108)
CHLORIDE SERPL-SCNC: 96 MMOL/L — SIGNIFICANT CHANGE UP (ref 96–108)
CO2 SERPL-SCNC: 22 MMOL/L — SIGNIFICANT CHANGE UP (ref 22–31)
CO2 SERPL-SCNC: 25 MMOL/L — SIGNIFICANT CHANGE UP (ref 22–31)
COMMENT - FLUIDS: SIGNIFICANT CHANGE UP
CREAT SERPL-MCNC: 2.25 MG/DL — HIGH (ref 0.5–1.3)
CREAT SERPL-MCNC: 3.31 MG/DL — HIGH (ref 0.5–1.3)
GAS PNL BLDA: SIGNIFICANT CHANGE UP
GLUCOSE BLDC GLUCOMTR-MCNC: 179 MG/DL — HIGH (ref 70–99)
GLUCOSE BLDC GLUCOMTR-MCNC: 181 MG/DL — HIGH (ref 70–99)
GLUCOSE BLDC GLUCOMTR-MCNC: 184 MG/DL — HIGH (ref 70–99)
GLUCOSE BLDC GLUCOMTR-MCNC: 198 MG/DL — HIGH (ref 70–99)
GLUCOSE SERPL-MCNC: 181 MG/DL — HIGH (ref 70–99)
GLUCOSE SERPL-MCNC: 206 MG/DL — HIGH (ref 70–99)
GRAM STN FLD: SIGNIFICANT CHANGE UP
HCT VFR BLD CALC: 30.7 % — LOW (ref 34.5–45)
HCT VFR BLD CALC: 33.6 % — LOW (ref 34.5–45)
HGB BLD-MCNC: 10.1 G/DL — LOW (ref 11.5–15.5)
HGB BLD-MCNC: 11.3 G/DL — LOW (ref 11.5–15.5)
INR BLD: 1.25 RATIO — HIGH (ref 0.88–1.16)
INR BLD: 1.35 RATIO — HIGH (ref 0.88–1.16)
MAGNESIUM SERPL-MCNC: 1.8 MG/DL — SIGNIFICANT CHANGE UP (ref 1.6–2.6)
MAGNESIUM SERPL-MCNC: 1.8 MG/DL — SIGNIFICANT CHANGE UP (ref 1.6–2.6)
MCHC RBC-ENTMCNC: 31.9 PG — SIGNIFICANT CHANGE UP (ref 27–34)
MCHC RBC-ENTMCNC: 32.3 PG — SIGNIFICANT CHANGE UP (ref 27–34)
MCHC RBC-ENTMCNC: 32.8 GM/DL — SIGNIFICANT CHANGE UP (ref 32–36)
MCHC RBC-ENTMCNC: 33.6 GM/DL — SIGNIFICANT CHANGE UP (ref 32–36)
MCV RBC AUTO: 96 FL — SIGNIFICANT CHANGE UP (ref 80–100)
MCV RBC AUTO: 97.2 FL — SIGNIFICANT CHANGE UP (ref 80–100)
PHOSPHATE SERPL-MCNC: 3.4 MG/DL — SIGNIFICANT CHANGE UP (ref 2.5–4.5)
PHOSPHATE SERPL-MCNC: 4.1 MG/DL — SIGNIFICANT CHANGE UP (ref 2.5–4.5)
PLATELET # BLD AUTO: 269 K/UL — SIGNIFICANT CHANGE UP (ref 150–400)
PLATELET # BLD AUTO: 303 K/UL — SIGNIFICANT CHANGE UP (ref 150–400)
POTASSIUM SERPL-MCNC: 4 MMOL/L — SIGNIFICANT CHANGE UP (ref 3.5–5.3)
POTASSIUM SERPL-MCNC: 4.5 MMOL/L — SIGNIFICANT CHANGE UP (ref 3.5–5.3)
POTASSIUM SERPL-SCNC: 4 MMOL/L — SIGNIFICANT CHANGE UP (ref 3.5–5.3)
POTASSIUM SERPL-SCNC: 4.5 MMOL/L — SIGNIFICANT CHANGE UP (ref 3.5–5.3)
PROT SERPL-MCNC: 6.6 G/DL — SIGNIFICANT CHANGE UP (ref 6–8.3)
PROT SERPL-MCNC: 7.8 G/DL — SIGNIFICANT CHANGE UP (ref 6–8.3)
PROTHROM AB SERPL-ACNC: 14.5 SEC — HIGH (ref 10–12.9)
PROTHROM AB SERPL-ACNC: 15.5 SEC — HIGH (ref 10–12.9)
RBC # BLD: 3.15 M/UL — LOW (ref 3.8–5.2)
RBC # BLD: 3.5 M/UL — LOW (ref 3.8–5.2)
RBC # FLD: 16.5 % — HIGH (ref 10.3–14.5)
RBC # FLD: 16.6 % — HIGH (ref 10.3–14.5)
SODIUM SERPL-SCNC: 135 MMOL/L — SIGNIFICANT CHANGE UP (ref 135–145)
SODIUM SERPL-SCNC: 136 MMOL/L — SIGNIFICANT CHANGE UP (ref 135–145)
SPECIMEN SOURCE: SIGNIFICANT CHANGE UP
TRIGL FLD-MCNC: 70 MG/DL — SIGNIFICANT CHANGE UP
TROPONIN T, HIGH SENSITIVITY RESULT: 151 NG/L — HIGH (ref 0–51)
TROPONIN T, HIGH SENSITIVITY RESULT: 157 NG/L — HIGH (ref 0–51)
WBC # BLD: 12.1 K/UL — HIGH (ref 3.8–10.5)
WBC # BLD: 29.1 K/UL — HIGH (ref 3.8–10.5)
WBC # FLD AUTO: 12.1 K/UL — HIGH (ref 3.8–10.5)
WBC # FLD AUTO: 29.1 K/UL — HIGH (ref 3.8–10.5)

## 2019-04-18 PROCEDURE — 31500 INSERT EMERGENCY AIRWAY: CPT

## 2019-04-18 PROCEDURE — 93010 ELECTROCARDIOGRAM REPORT: CPT

## 2019-04-18 PROCEDURE — 99233 SBSQ HOSP IP/OBS HIGH 50: CPT

## 2019-04-18 PROCEDURE — 99291 CRITICAL CARE FIRST HOUR: CPT | Mod: 25

## 2019-04-18 PROCEDURE — 71045 X-RAY EXAM CHEST 1 VIEW: CPT | Mod: 26

## 2019-04-18 RX ORDER — PHENYLEPHRINE HYDROCHLORIDE 10 MG/ML
2 INJECTION INTRAVENOUS
Qty: 40 | Refills: 0 | Status: DISCONTINUED | OUTPATIENT
Start: 2019-04-18 | End: 2019-04-23

## 2019-04-18 RX ORDER — CALCIUM GLUCONATE 100 MG/ML
1 VIAL (ML) INTRAVENOUS ONCE
Qty: 0 | Refills: 0 | Status: COMPLETED | OUTPATIENT
Start: 2019-04-18 | End: 2019-04-18

## 2019-04-18 RX ORDER — ACETAMINOPHEN 500 MG
1000 TABLET ORAL ONCE
Qty: 0 | Refills: 0 | Status: COMPLETED | OUTPATIENT
Start: 2019-04-18 | End: 2019-04-18

## 2019-04-18 RX ORDER — MIDAZOLAM HYDROCHLORIDE 1 MG/ML
2 INJECTION, SOLUTION INTRAMUSCULAR; INTRAVENOUS ONCE
Qty: 0 | Refills: 0 | Status: DISCONTINUED | OUTPATIENT
Start: 2019-04-18 | End: 2019-04-18

## 2019-04-18 RX ORDER — KETAMINE HYDROCHLORIDE 100 MG/ML
100 INJECTION INTRAMUSCULAR; INTRAVENOUS ONCE
Qty: 0 | Refills: 0 | Status: DISCONTINUED | OUTPATIENT
Start: 2019-04-18 | End: 2019-04-18

## 2019-04-18 RX ORDER — PROPOFOL 10 MG/ML
40 INJECTION, EMULSION INTRAVENOUS
Qty: 1000 | Refills: 0 | Status: DISCONTINUED | OUTPATIENT
Start: 2019-04-18 | End: 2019-04-19

## 2019-04-18 RX ORDER — AMIODARONE HYDROCHLORIDE 400 MG/1
150 TABLET ORAL ONCE
Qty: 0 | Refills: 0 | Status: COMPLETED | OUTPATIENT
Start: 2019-04-18 | End: 2019-04-18

## 2019-04-18 RX ORDER — IPRATROPIUM/ALBUTEROL SULFATE 18-103MCG
3 AEROSOL WITH ADAPTER (GRAM) INHALATION EVERY 6 HOURS
Qty: 0 | Refills: 0 | Status: DISCONTINUED | OUTPATIENT
Start: 2019-04-18 | End: 2019-04-22

## 2019-04-18 RX ORDER — FENTANYL CITRATE 50 UG/ML
50 INJECTION INTRAVENOUS ONCE
Qty: 0 | Refills: 0 | Status: DISCONTINUED | OUTPATIENT
Start: 2019-04-18 | End: 2019-04-18

## 2019-04-18 RX ORDER — INSULIN GLARGINE 100 [IU]/ML
2 INJECTION, SOLUTION SUBCUTANEOUS AT BEDTIME
Qty: 0 | Refills: 0 | Status: DISCONTINUED | OUTPATIENT
Start: 2019-04-18 | End: 2019-04-18

## 2019-04-18 RX ADMIN — Medication 1000 MILLIGRAM(S): at 16:00

## 2019-04-18 RX ADMIN — KETOTIFEN FUMARATE 1 DROP(S): 0.34 SOLUTION OPHTHALMIC at 21:08

## 2019-04-18 RX ADMIN — Medication 3 MILLILITER(S): at 12:20

## 2019-04-18 RX ADMIN — Medication 1 DROP(S): at 04:00

## 2019-04-18 RX ADMIN — Medication 1 DROP(S): at 20:15

## 2019-04-18 RX ADMIN — Medication 200 GRAM(S): at 21:03

## 2019-04-18 RX ADMIN — Medication 103.25 MILLIGRAM(S): at 18:10

## 2019-04-18 RX ADMIN — Medication 1 APPLICATION(S): at 14:11

## 2019-04-18 RX ADMIN — Medication 1 APPLICATION(S): at 21:08

## 2019-04-18 RX ADMIN — PANTOPRAZOLE SODIUM 40 MILLIGRAM(S): 20 TABLET, DELAYED RELEASE ORAL at 18:11

## 2019-04-18 RX ADMIN — Medication 1 DROP(S): at 10:52

## 2019-04-18 RX ADMIN — Medication 75 MICROGRAM(S): at 22:25

## 2019-04-18 RX ADMIN — CHLORHEXIDINE GLUCONATE 1 APPLICATION(S): 213 SOLUTION TOPICAL at 05:00

## 2019-04-18 RX ADMIN — PROPOFOL 16.39 MICROGRAM(S)/KG/MIN: 10 INJECTION, EMULSION INTRAVENOUS at 18:32

## 2019-04-18 RX ADMIN — FENTANYL CITRATE 50 MICROGRAM(S): 50 INJECTION INTRAVENOUS at 17:20

## 2019-04-18 RX ADMIN — Medication 1 DROP(S): at 06:00

## 2019-04-18 RX ADMIN — HEPARIN SODIUM 5000 UNIT(S): 5000 INJECTION INTRAVENOUS; SUBCUTANEOUS at 14:11

## 2019-04-18 RX ADMIN — MIDODRINE HYDROCHLORIDE 10 MILLIGRAM(S): 2.5 TABLET ORAL at 05:55

## 2019-04-18 RX ADMIN — Medication 1 DROP(S): at 16:03

## 2019-04-18 RX ADMIN — Medication 400 MILLIGRAM(S): at 15:45

## 2019-04-18 RX ADMIN — FENTANYL CITRATE 50 MICROGRAM(S): 50 INJECTION INTRAVENOUS at 17:00

## 2019-04-18 RX ADMIN — KETOTIFEN FUMARATE 1 DROP(S): 0.34 SOLUTION OPHTHALMIC at 06:00

## 2019-04-18 RX ADMIN — HEPARIN SODIUM 5000 UNIT(S): 5000 INJECTION INTRAVENOUS; SUBCUTANEOUS at 21:08

## 2019-04-18 RX ADMIN — HEPARIN SODIUM 5000 UNIT(S): 5000 INJECTION INTRAVENOUS; SUBCUTANEOUS at 06:00

## 2019-04-18 RX ADMIN — Medication 1 DROP(S): at 18:04

## 2019-04-18 RX ADMIN — KETAMINE HYDROCHLORIDE 100 MILLIGRAM(S): 100 INJECTION INTRAMUSCULAR; INTRAVENOUS at 17:00

## 2019-04-18 RX ADMIN — PANTOPRAZOLE SODIUM 40 MILLIGRAM(S): 20 TABLET, DELAYED RELEASE ORAL at 06:00

## 2019-04-18 RX ADMIN — Medication 650 MILLIGRAM(S): at 07:15

## 2019-04-18 RX ADMIN — Medication 1: at 18:00

## 2019-04-18 RX ADMIN — MIDAZOLAM HYDROCHLORIDE 2 MILLIGRAM(S): 1 INJECTION, SOLUTION INTRAMUSCULAR; INTRAVENOUS at 17:00

## 2019-04-18 RX ADMIN — CHLORHEXIDINE GLUCONATE 1 APPLICATION(S): 213 SOLUTION TOPICAL at 12:01

## 2019-04-18 RX ADMIN — Medication 6.4 MICROGRAM(S)/KG/MIN: at 18:32

## 2019-04-18 RX ADMIN — Medication 1: at 00:14

## 2019-04-18 RX ADMIN — Medication 1 DROP(S): at 02:00

## 2019-04-18 RX ADMIN — PHENYLEPHRINE HYDROCHLORIDE 51.23 MICROGRAM(S)/KG/MIN: 10 INJECTION INTRAVENOUS at 18:33

## 2019-04-18 RX ADMIN — Medication 1 DROP(S): at 00:00

## 2019-04-18 RX ADMIN — Medication 1 DROP(S): at 23:50

## 2019-04-18 RX ADMIN — Medication 1: at 12:20

## 2019-04-18 RX ADMIN — Medication 650 MILLIGRAM(S): at 06:15

## 2019-04-18 RX ADMIN — Medication 1 DROP(S): at 12:20

## 2019-04-18 RX ADMIN — AMIODARONE HYDROCHLORIDE 150 MILLIGRAM(S): 400 TABLET ORAL at 17:00

## 2019-04-18 RX ADMIN — Medication 1 DROP(S): at 22:12

## 2019-04-18 RX ADMIN — Medication 3 MILLILITER(S): at 17:57

## 2019-04-18 RX ADMIN — Medication 1 APPLICATION(S): at 06:00

## 2019-04-18 RX ADMIN — Medication 1 DROP(S): at 14:11

## 2019-04-18 RX ADMIN — Medication 1 DROP(S): at 08:52

## 2019-04-18 RX ADMIN — KETOTIFEN FUMARATE 1 DROP(S): 0.34 SOLUTION OPHTHALMIC at 14:11

## 2019-04-18 RX ADMIN — Medication 1: at 05:55

## 2019-04-18 RX ADMIN — MIDODRINE HYDROCHLORIDE 10 MILLIGRAM(S): 2.5 TABLET ORAL at 21:07

## 2019-04-18 RX ADMIN — Medication 100 MILLIGRAM(S): at 17:42

## 2019-04-18 NOTE — PROCEDURE NOTE - NSTRACHINTUBMED_RESP_A_CORE
midazolam injectable/ketamine injectable
midazolam injectable/fentaNYL injectable/ketamine injectable

## 2019-04-18 NOTE — PROGRESS NOTE ADULT - SUBJECTIVE AND OBJECTIVE BOX
CHIEF COMPLAINT:    Interval Events:    REVIEW OF SYSTEMS:  Constitutional: [ ] negative [ ] fevers [ ] chills [ ] weight loss [ ] weight gain  HEENT: [ ] negative [ ] dry eyes [ ] eye irritation [ ] postnasal drip [ ] nasal congestion  CV: [ ] negative  [ ] chest pain [ ] orthopnea [ ] palpitations [ ] murmur  Resp: [ ] negative [ ] cough [ ] shortness of breath [ ] dyspnea [ ] wheezing [ ] sputum [ ] hemoptysis  GI: [ ] negative [ ] nausea [ ] vomiting [ ] diarrhea [ ] constipation [ ] abd pain [ ] dysphagia   : [ ] negative [ ] dysuria [ ] nocturia [ ] hematuria [ ] increased urinary frequency  Musculoskeletal: [ ] negative [ ] back pain [ ] myalgias [ ] arthralgias [ ] fracture  Skin: [ ] negative [ ] rash [ ] itch  Neurological: [ ] negative [ ] headache [ ] dizziness [ ] syncope [ ] weakness [ ] numbness  Psychiatric: [ ] negative [ ] anxiety [ ] depression  Endocrine: [ ] negative [ ] diabetes [ ] thyroid problem  Hematologic/Lymphatic: [ ] negative [ ] anemia [ ] bleeding problem  Allergic/Immunologic: [ ] negative [ ] itchy eyes [ ] nasal discharge [ ] hives [ ] angioedema  [ ] All other systems negative  [ ] Unable to assess ROS because ________    OBJECTIVE:  ICU Vital Signs Last 24 Hrs  T(C): 37.2 (18 Apr 2019 03:00), Max: 38.3 (17 Apr 2019 08:00)  T(F): 99 (18 Apr 2019 03:00), Max: 100.9 (17 Apr 2019 08:00)  HR: 70 (18 Apr 2019 07:00) (52 - 91)  BP: 102/51 (18 Apr 2019 07:00) (73/38 - 123/58)  BP(mean): 74 (18 Apr 2019 07:00) (53 - 84)  ABP: --  ABP(mean): --  RR: 15 (18 Apr 2019 07:00) (6 - 120)  SpO2: 97% (18 Apr 2019 07:00) (93% - 100%)    Mode: CPAP with PS, FiO2: 25, PEEP: 5, PS: 5, PIP: 12    04-17 @ 07:01  -  04-18 @ 07:00  --------------------------------------------------------  IN: 1120.4 mL / OUT: 1000 mL / NET: 120.4 mL      CAPILLARY BLOOD GLUCOSE  179 (18 Apr 2019 06:00)      POCT Blood Glucose.: 179 mg/dL (18 Apr 2019 05:49)      PHYSICAL EXAM:  General:   HEENT:   Lymph Nodes:  Neck:   Respiratory:   Cardiovascular:   Abdomen:   Extremities:   Skin:   Neurological:  Psychiatry:    LINES:    HOSPITAL MEDICATIONS:  aspirin  chewable 81 milliGRAM(s) Oral daily  heparin  Injectable 5000 Unit(s) SubCutaneous every 8 hours    ceFAZolin   IVPB 1000 milliGRAM(s) IV Intermittent every 24 hours  gentamicin   IVPB 130 milliGRAM(s) IV Intermittent <User Schedule>    midodrine 10 milliGRAM(s) Oral three times a day  norepinephrine Infusion 0.05 MICROgram(s)/kG/Min IV Continuous <Continuous>    insulin glargine Injectable (LANTUS) 2 Unit(s) SubCutaneous at bedtime  insulin lispro (HumaLOG) corrective regimen sliding scale   SubCutaneous every 6 hours  levothyroxine Injectable 75 MICROGram(s) IV Push at bedtime      acetaminophen    Suspension .. 650 milliGRAM(s) Oral every 6 hours PRN  dexmedetomidine Infusion 0.2 MICROgram(s)/kG/Hr IV Continuous <Continuous>  ondansetron Injectable 4 milliGRAM(s) IV Push every 12 hours PRN  propofol Infusion 15 MICROgram(s)/kG/Min IV Continuous <Continuous>    pantoprazole  Injectable 40 milliGRAM(s) IV Push every 12 hours  polyethylene glycol 3350 17 Gram(s) Oral daily  senna Syrup 10 milliLiter(s) Oral at bedtime        dextrose 5%. 1000 milliLiter(s) IV Continuous <Continuous>      artificial tears (preservative free) Ophthalmic Solution 1 Drop(s) Both EYES every 2 hours  chlorhexidine 2% Cloths 1 Application(s) Topical daily  chlorhexidine 4% Liquid 1 Application(s) Topical <User Schedule>  ketotifen 0.025% Ophthalmic Solution 1 Drop(s) Both EYES every 8 hours  ketotifen 0.025% Ophthalmic Solution      lidocaine/prilocaine Cream 1 Application(s) Topical daily PRN  petrolatum Ophthalmic Ointment 1 Application(s) Both EYES three times a day        LABS:                        10.1   12.1  )-----------( 269      ( 18 Apr 2019 00:12 )             30.7     Hgb Trend: 10.1<--, 10.8<--, 9.6<--, 9.9<--, 10.9<--  04-18    136  |  96  |  37<H>  ----------------------------<  206<H>  4.5   |  25  |  3.31<H>    Ca    8.4      18 Apr 2019 00:12  Phos  4.1     04-18  Mg     1.8     04-18    TPro  6.6  /  Alb  2.8<L>  /  TBili  0.6  /  DBili  x   /  AST  32  /  ALT  <5<L>  /  AlkPhos  119  04-18    Creatinine Trend: 3.31<--, 2.27<--, 2.66<--, 3.60<--, 2.93<--, 3.52<--  PT/INR - ( 18 Apr 2019 00:12 )   PT: 15.5 sec;   INR: 1.35 ratio         PTT - ( 18 Apr 2019 00:12 )  PTT:50.5 sec          MICROBIOLOGY:     RADIOLOGY:  [ ] Reviewed and interpreted by me    EKG: CHIEF COMPLAINT:    Interval Events:    Pt off Precedex this AM, awake alert and following commands. Planning extubation.     OBJECTIVE:  ICU Vital Signs Last 24 Hrs  T(C): 37.2 (18 Apr 2019 03:00), Max: 38.3 (17 Apr 2019 08:00)  T(F): 99 (18 Apr 2019 03:00), Max: 100.9 (17 Apr 2019 08:00)  HR: 70 (18 Apr 2019 07:00) (52 - 91)  BP: 102/51 (18 Apr 2019 07:00) (73/38 - 123/58)  BP(mean): 74 (18 Apr 2019 07:00) (53 - 84)  ABP: --  ABP(mean): --  RR: 15 (18 Apr 2019 07:00) (6 - 120)  SpO2: 97% (18 Apr 2019 07:00) (93% - 100%)    Mode: CPAP with PS, FiO2: 25, PEEP: 5, PS: 5, PIP: 12    04-17 @ 07:01  -  04-18 @ 07:00  --------------------------------------------------------  IN: 1120.4 mL / OUT: 1000 mL / NET: 120.4 mL      CAPILLARY BLOOD GLUCOSE  179 (18 Apr 2019 06:00)      POCT Blood Glucose.: 179 mg/dL (18 Apr 2019 05:49)      PHYSICAL EXAM:  General: Intubated, awake and responsive   HEENT: EOMI. Mild erythema of sclera noted, no discharge/pus noted.   Respiratory: Coarse breath sounds heard in bilateral lung fields.   Cardiovascular: S1, S2 noted.   Abdomen: Soft, nontender, nondistended.   Extremities: RLE in cast and wrapped in ACE bandage. LLE AVF.   Skin: No lesions noted.    Neurological: Intubated, awake and able to communicate via gesturing/writing.     LINES:    HOSPITAL MEDICATIONS:  aspirin  chewable 81 milliGRAM(s) Oral daily  heparin  Injectable 5000 Unit(s) SubCutaneous every 8 hours    ceFAZolin   IVPB 1000 milliGRAM(s) IV Intermittent every 24 hours  gentamicin   IVPB 130 milliGRAM(s) IV Intermittent <User Schedule>    midodrine 10 milliGRAM(s) Oral three times a day  norepinephrine Infusion 0.05 MICROgram(s)/kG/Min IV Continuous <Continuous>    insulin glargine Injectable (LANTUS) 2 Unit(s) SubCutaneous at bedtime  insulin lispro (HumaLOG) corrective regimen sliding scale   SubCutaneous every 6 hours  levothyroxine Injectable 75 MICROGram(s) IV Push at bedtime      acetaminophen    Suspension .. 650 milliGRAM(s) Oral every 6 hours PRN  dexmedetomidine Infusion 0.2 MICROgram(s)/kG/Hr IV Continuous <Continuous>  ondansetron Injectable 4 milliGRAM(s) IV Push every 12 hours PRN  propofol Infusion 15 MICROgram(s)/kG/Min IV Continuous <Continuous>    pantoprazole  Injectable 40 milliGRAM(s) IV Push every 12 hours  polyethylene glycol 3350 17 Gram(s) Oral daily  senna Syrup 10 milliLiter(s) Oral at bedtime        dextrose 5%. 1000 milliLiter(s) IV Continuous <Continuous>      artificial tears (preservative free) Ophthalmic Solution 1 Drop(s) Both EYES every 2 hours  chlorhexidine 2% Cloths 1 Application(s) Topical daily  chlorhexidine 4% Liquid 1 Application(s) Topical <User Schedule>  ketotifen 0.025% Ophthalmic Solution 1 Drop(s) Both EYES every 8 hours  ketotifen 0.025% Ophthalmic Solution      lidocaine/prilocaine Cream 1 Application(s) Topical daily PRN  petrolatum Ophthalmic Ointment 1 Application(s) Both EYES three times a day        LABS:                        10.1   12.1  )-----------( 269      ( 18 Apr 2019 00:12 )             30.7     Hgb Trend: 10.1<--, 10.8<--, 9.6<--, 9.9<--, 10.9<--  04-18    136  |  96  |  37<H>  ----------------------------<  206<H>  4.5   |  25  |  3.31<H>    Ca    8.4      18 Apr 2019 00:12  Phos  4.1     04-18  Mg     1.8     04-18    TPro  6.6  /  Alb  2.8<L>  /  TBili  0.6  /  DBili  x   /  AST  32  /  ALT  <5<L>  /  AlkPhos  119  04-18    Creatinine Trend: 3.31<--, 2.27<--, 2.66<--, 3.60<--, 2.93<--, 3.52<--  PT/INR - ( 18 Apr 2019 00:12 )   PT: 15.5 sec;   INR: 1.35 ratio         PTT - ( 18 Apr 2019 00:12 )  PTT:50.5 sec          MICROBIOLOGY:     RADIOLOGY:  [ ] Reviewed and interpreted by me    EKG:

## 2019-04-18 NOTE — PROGRESS NOTE ADULT - ASSESSMENT
78 F PMH CAD s/p CABG x 4 2015, T2DM with peripheral neuropathy, ESRD on HD T/Th/S via L AVF, hypotension on midodrine, HFrEF, HTN, HLD, b/l cataracts s/p surgery, p/w mechanical fall and R leg pain, found to have R tib/fib fracture.   pt with resp failure, now intubated in micu    resp failure  likely multifactorial  now extubated  monitor closely     bacteremia/sepsis / endocarditis   id f/u  iv abx as per id  f/u official LISA   not surgical candidate    open fracture of proximal end of right tibia,  confirmed on XR and CT  -ortho f/u  s/p cast. no surgical intervention at this time  outpt f/u  NWB RLE     Coronary artery disease without angina pectoris  -c/w aspirin, statin  -f/u cards .      Type 2 diabetes mellitus with hyperglycemia, with long-term current use of insulin.  start HISS  endo consult dr ronak maciel, CC diet,    ESRD on hemodialysis.  HD as per renal      ascites.   s/p paracentesis   f/u fluid analysis     emesis  gi consulted  f/u guaiac  ppi  monitor cbc       mngt as per MICU

## 2019-04-18 NOTE — PROGRESS NOTE ADULT - SUBJECTIVE AND OBJECTIVE BOX
VIRGILIO KAPLAN  78y Female  MRN:[id number]    Patient is a 78y old  Female who presents with a chief complaint of R tib/fib fx (10 Apr 2019 12:01)    HPI:  78 F PMH CAD s/p CABG x 4 2015, T2DM with peripheral neuropathy, ESRD on HD T/Th/S via L AVF, hypotension on midodrine, HFrEF, HTN, HLD, b/l cataracts s/p surgery, p/w mechanical fall and R leg pain. Pt lives in basement apartment and ordinarily ambulates with walker. Today was getting ready for HD, made it to the first step on her set of stairs, and reportedly felt her R leg "give out." Pt fell backwards but was caught by her family member who was assisting her up the stairs. She denies LOC/syncope or head strike. +Pain in R leg post fall, worse with movement and much improved with immobilization. Could not bear weight or walk after fall.  Denies cp, sob at rest, f/c, n/v/d, dysuria, cough. +chronic BENAVIDEZ, reportedly stable since her CABG. Family at bedside providing collateral and confirms above details.     Vs: 99.1, 76, 102/60, 16, 90% RA --> 100% 4LNC.  Labs: no leukocytosis, chronic stable anemia, rest cbc unrevealing, coags unrevealing, hypoNa 126, HAGMA with cmp c/w known ESRD, hyperglycemia, chronic stable alkp elevation mild AST elevation. XR tib/fib/femur/hip/knee reveals acute prox tib/fib fxs with possible intraarticular extension. (10 Apr 2019 00:56)      Patient seen and evaluated in micu.       Interval HPI: extubated earlier this am     PAST MEDICAL & SURGICAL HISTORY:  CHF (congestive heart failure): Oct 2015- still sob  Shortness of breath  Hypotension  Type 2 diabetes mellitus  Chronic kidney disease (CKD): ON DIALYSIS - [facility], Sat  Nephrolithiasis: 2001  Ulcer: 2001  Hydronephrosis: Right 1/2012  Charcot Foot: Right Foot  Herniated Disc  Diabetic Neuropathy  Anemia: CKD  Hypothyroidism  Hypertension: NOT ANY MORE  Hyperlipidemia  S/P mitral valve repair: with CABG X 4 MAY 2015  S/P CABG (coronary artery bypass graft): x 4, May 2015  History of extraction of renal calculus  S/P Foot Surgery: 2002, 2014  S/P Cholecystectomy: 2001  S/P Breast Lumpectomy: 1994, 2003 - left breast benign      REVIEW OF SYSTEMS:  as per hpi    VITALS:  Vital Signs Last 24 Hrs  T(C): 36.1 (18 Apr 2019 08:00), Max: 37.4 (17 Apr 2019 12:00)  T(F): 97 (18 Apr 2019 08:00), Max: 99.4 (17 Apr 2019 12:00)  HR: 75 (18 Apr 2019 10:45) (53 - 91)  BP: 102/50 (18 Apr 2019 10:45) (85/36 - 123/58)  BP(mean): 72 (18 Apr 2019 10:45) (59 - 84)  RR: 26 (18 Apr 2019 10:45) (6 - 120)  SpO2: 99% (18 Apr 2019 10:45) (93% - 100%)    PHYSICAL EXAM:  GENERAL: NAD. calm  HEAD:  Atraumatic, Normocephalic  EYES: EOMI, PERRLA, conjunctiva and sclera clear  NECK: no jvd, supple  CHEST/LUNG: coarse bs b/l  HEART: S1, S2;   ABDOMEN: Soft, Nontender, nondistended; Bowel sounds present  EXTREMITIES:  2+ Peripheral Pulses, No clubbing, cyanosis, + edema,  right knee cast      Consultant(s) Notes Reviewed:  [x ] YES  [ ] NO  Care Discussed with Consultants/Other Providers [ x] YES  [ ] NO    MEDS:  MEDICATIONS  (STANDING):  artificial tears (preservative free) Ophthalmic Solution 1 Drop(s) Both EYES every 2 hours  aspirin  chewable 81 milliGRAM(s) Oral daily  ceFAZolin   IVPB 1000 milliGRAM(s) IV Intermittent every 24 hours  chlorhexidine 2% Cloths 1 Application(s) Topical daily  chlorhexidine 4% Liquid 1 Application(s) Topical <User Schedule>  dextrose 5%. 1000 milliLiter(s) (50 mL/Hr) IV Continuous <Continuous>  gentamicin   IVPB 130 milliGRAM(s) IV Intermittent <User Schedule>  heparin  Injectable 5000 Unit(s) SubCutaneous every 8 hours  insulin glargine Injectable (LANTUS) 2 Unit(s) SubCutaneous at bedtime  insulin lispro (HumaLOG) corrective regimen sliding scale   SubCutaneous every 6 hours  ketotifen 0.025% Ophthalmic Solution 1 Drop(s) Both EYES every 8 hours  ketotifen 0.025% Ophthalmic Solution      levothyroxine Injectable 75 MICROGram(s) IV Push at bedtime  midodrine 10 milliGRAM(s) Oral three times a day  norepinephrine Infusion 0.05 MICROgram(s)/kG/Min (6.403 mL/Hr) IV Continuous <Continuous>  pantoprazole  Injectable 40 milliGRAM(s) IV Push every 12 hours  petrolatum Ophthalmic Ointment 1 Application(s) Both EYES three times a day  polyethylene glycol 3350 17 Gram(s) Oral daily  senna Syrup 10 milliLiter(s) Oral at bedtime    MEDICATIONS  (PRN):  acetaminophen    Suspension .. 650 milliGRAM(s) Oral every 6 hours PRN Temp greater or equal to 38C (100.4F)  lidocaine/prilocaine Cream 1 Application(s) Topical daily PRN hd days  ondansetron Injectable 4 milliGRAM(s) IV Push every 12 hours PRN Nausea and/or Vomiting        ALLERGIES:  allopurinol (Rash)  Augmentin (Rash)  Levaquin (Rash)      LABS:                                        10.1   12.1  )-----------( 269      ( 18 Apr 2019 00:12 )             30.7   04-18    136  |  96  |  37<H>  ----------------------------<  206<H>  4.5   |  25  |  3.31<H>    Ca    8.4      18 Apr 2019 00:12  Phos  4.1     04-18  Mg     1.8     04-18    TPro  6.6  /  Alb  2.8<L>  /  TBili  0.6  /  DBili  x   /  AST  32  /  ALT  <5<L>  /  AlkPhos  119  04-18        Culture - Blood (04.13.19 @ 07:27)    Gram Stain:   Growth in aerobic bottle: Gram Positive Cocci in Clusters    Specimen Source: .Blood    Culture Results:   Growth in aerobic bottle: Gram Positive Cocci in Clusters        < from: CT Knee No Cont, Right (04.10.19 @ 03:34) >  Impression:    Acute, impacted fractures of the right proximal tibia and fibula as   described.      < end of copied text >           < from: Xray Tibia + Fibula 2 Views, Right (04.09.19 @ 20:28) >  impression:    There is an acute, comminuted fracture of the proximal tibial metaphysis   with mild anterior displacement of the distal fracture fragment. There is   questionable intra-articular extension on the lateral view. There is a   large knee joint effusion. There is a proximal fibular fracture.    There is no fracture of the hip or femur. The ankle is poorly visualized   secondary to technique and may be externally rotated with respect to the   knee. There is no ankle joint effusion. Vascular calcifications are noted.     CT of the knee can be performed for further evaluation.    < end of copied text >

## 2019-04-18 NOTE — PROGRESS NOTE ADULT - SUBJECTIVE AND OBJECTIVE BOX
INTERVAL HPI/OVERNIGHT EVENTS:    seen s/p extubation  resting comfortably, following commands     MEDICATIONS  (STANDING):  ALBUTerol/ipratropium for Nebulization 3 milliLiter(s) Nebulizer every 6 hours  artificial tears (preservative free) Ophthalmic Solution 1 Drop(s) Both EYES every 2 hours  aspirin  chewable 81 milliGRAM(s) Oral daily  ceFAZolin   IVPB 1000 milliGRAM(s) IV Intermittent every 24 hours  chlorhexidine 2% Cloths 1 Application(s) Topical daily  chlorhexidine 4% Liquid 1 Application(s) Topical <User Schedule>  dextrose 5%. 1000 milliLiter(s) (50 mL/Hr) IV Continuous <Continuous>  gentamicin   IVPB 130 milliGRAM(s) IV Intermittent <User Schedule>  heparin  Injectable 5000 Unit(s) SubCutaneous every 8 hours  insulin glargine Injectable (LANTUS) 2 Unit(s) SubCutaneous at bedtime  insulin lispro (HumaLOG) corrective regimen sliding scale   SubCutaneous every 6 hours  ketotifen 0.025% Ophthalmic Solution 1 Drop(s) Both EYES every 8 hours  ketotifen 0.025% Ophthalmic Solution      levothyroxine Injectable 75 MICROGram(s) IV Push at bedtime  midodrine 10 milliGRAM(s) Oral three times a day  norepinephrine Infusion 0.05 MICROgram(s)/kG/Min (6.403 mL/Hr) IV Continuous <Continuous>  pantoprazole  Injectable 40 milliGRAM(s) IV Push every 12 hours  petrolatum Ophthalmic Ointment 1 Application(s) Both EYES three times a day  polyethylene glycol 3350 17 Gram(s) Oral daily  senna Syrup 10 milliLiter(s) Oral at bedtime    MEDICATIONS  (PRN):  acetaminophen    Suspension .. 650 milliGRAM(s) Oral every 6 hours PRN Temp greater or equal to 38C (100.4F)  lidocaine/prilocaine Cream 1 Application(s) Topical daily PRN hd days  ondansetron Injectable 4 milliGRAM(s) IV Push every 12 hours PRN Nausea and/or Vomiting      Allergies    allopurinol (Rash)  Augmentin (Rash)  Levaquin (Rash)    Intolerances        Review of Systems:    General:  No wt loss, fevers, chills, night sweats,fatigue,   Eyes:  Good vision, no reported pain  ENT:  No sore throat, pain, runny nose, dysphagia  CV:  No pain, palpitations, hypo/hypertension  Resp:  No dyspnea, cough, tachypnea, wheezing  GI:  No pain, No nausea, No vomiting, No diarrhea, No constipation, No weight loss, No fever, No pruritis, No rectal bleeding, No melena, No dysphagia  :  No pain, bleeding, incontinence, nocturia  Muscle:  No pain, weakness  Neuro:  No weakness, tingling, memory problems  Psych:  No fatigue, insomnia, mood problems, depression  Endocrine:  No polyuria, polydypsia, cold/heat intolerance  Heme:  No petechiae, ecchymosis, easy bruisability  Skin:  No rash, tattoos, scars, edema      Vital Signs Last 24 Hrs  T(C): 36.9 (18 Apr 2019 12:25), Max: 37.2 (18 Apr 2019 03:00)  T(F): 98.4 (18 Apr 2019 12:25), Max: 99 (18 Apr 2019 03:00)  HR: 78 (18 Apr 2019 13:30) (53 - 91)  BP: 124/58 (18 Apr 2019 12:25) (87/41 - 124/58)  BP(mean): 82 (18 Apr 2019 12:25) (59 - 84)  RR: 13 (18 Apr 2019 12:25) (6 - 120)  SpO2: 100% (18 Apr 2019 12:29) (93% - 100%)    PHYSICAL EXAM:    Constitutional: NAD, well-developed  HEENT: EOMI, throat clear  Neck: No LAD, supple  Respiratory: CTA and P  Cardiovascular: S1 and S2, RRR, no M  Gastrointestinal: BS+, soft, NT/ND, neg HSM,  Extremities: No peripheral edema, neg clubing, cyanosis  Vascular: 2+ peripheral pulses  Neurological: A/O x 3, no focal deficits  Psychiatric: Normal mood, normal affect  Skin: No rashes      LABS:                        10.1   12.1  )-----------( 269      ( 18 Apr 2019 00:12 )             30.7     04-18    136  |  96  |  37<H>  ----------------------------<  206<H>  4.5   |  25  |  3.31<H>    Ca    8.4      18 Apr 2019 00:12  Phos  4.1     04-18  Mg     1.8     04-18    TPro  6.6  /  Alb  2.8<L>  /  TBili  0.6  /  DBili  x   /  AST  32  /  ALT  <5<L>  /  AlkPhos  119  04-18    PT/INR - ( 18 Apr 2019 00:12 )   PT: 15.5 sec;   INR: 1.35 ratio         PTT - ( 18 Apr 2019 00:12 )  PTT:50.5 sec      RADIOLOGY & ADDITIONAL TESTS:

## 2019-04-18 NOTE — PROGRESS NOTE ADULT - SUBJECTIVE AND OBJECTIVE BOX
[facility]   [phone]  DIALYSIS NOTE  Chief Complaint: ESRD/Ongoing hemodialysis requirement.     24 hour events/subjective:      seen earlier. intubated when seen.   d/w ICU team when seen       ALLERGIES & MEDICATIONS  --------------------------------------------------------------------------------  Allergies    allopurinol (Rash)  Augmentin (Rash)  Levaquin (Rash)    Intolerances      Standing Inpatient Medications  ALBUTerol/ipratropium for Nebulization 3 milliLiter(s) Nebulizer every 6 hours  artificial tears (preservative free) Ophthalmic Solution 1 Drop(s) Both EYES every 2 hours  aspirin  chewable 81 milliGRAM(s) Oral daily  ceFAZolin   IVPB 1000 milliGRAM(s) IV Intermittent every 24 hours  chlorhexidine 2% Cloths 1 Application(s) Topical daily  chlorhexidine 4% Liquid 1 Application(s) Topical <User Schedule>  dextrose 5%. 1000 milliLiter(s) IV Continuous <Continuous>  gentamicin   IVPB 130 milliGRAM(s) IV Intermittent <User Schedule>  heparin  Injectable 5000 Unit(s) SubCutaneous every 8 hours  insulin glargine Injectable (LANTUS) 2 Unit(s) SubCutaneous at bedtime  insulin lispro (HumaLOG) corrective regimen sliding scale   SubCutaneous every 6 hours  ketotifen 0.025% Ophthalmic Solution 1 Drop(s) Both EYES every 8 hours  ketotifen 0.025% Ophthalmic Solution      levothyroxine Injectable 75 MICROGram(s) IV Push at bedtime  midodrine 10 milliGRAM(s) Oral three times a day  norepinephrine Infusion 0.05 MICROgram(s)/kG/Min IV Continuous <Continuous>  pantoprazole  Injectable 40 milliGRAM(s) IV Push every 12 hours  petrolatum Ophthalmic Ointment 1 Application(s) Both EYES three times a day  polyethylene glycol 3350 17 Gram(s) Oral daily  senna Syrup 10 milliLiter(s) Oral at bedtime    PRN Inpatient Medications  acetaminophen    Suspension .. 650 milliGRAM(s) Oral every 6 hours PRN  lidocaine/prilocaine Cream 1 Application(s) Topical daily PRN  ondansetron Injectable 4 milliGRAM(s) IV Push every 12 hours PRN      REVIEW OF SYSTEMS  --------------------------------------------------------------------------------  intubated       VITALS/PHYSICAL EXAM  --------------------------------------------------------------------------------  T(C): 36.9 (04-18-19 @ 12:25), Max: 37.2 (04-18-19 @ 03:00)  HR: 98 (04-18-19 @ 14:13) (53 - 98)  BP: 86/44 (04-18-19 @ 14:13) (86/44 - 124/58)  RR: 25 (04-18-19 @ 14:13) (8 - 120)  SpO2: 94% (04-18-19 @ 14:13) (82% - 100%)  Wt(kg): --        04-17-19 @ 07:01  -  04-18-19 @ 07:00  --------------------------------------------------------  IN: 1120.4 mL / OUT: 1000 mL / NET: 120.4 mL    04-18-19 @ 07:01  -  04-18-19 @ 15:00  --------------------------------------------------------  IN: 30 mL / OUT: 0 mL / NET: 30 mL      Physical Exam:  		  Gen: intubated responsive   	no jvd   	Pulm: decrease bs  no rales or ronchi or wheezing  	CV: RRR, S1S2; no rub  	Abd: +BS, soft,  + distended ascites softer   	: No suprapubic tenderness  	UE: Warm, no cyanosis  no clubbing,  no edema  	LE: Warm, no cyanosis  no clubbing, no edema RLE cast   	avf +  bruit and thrill 	  			  	    LABS/STUDIES  --------------------------------------------------------------------------------              10.1   12.1  >-----------<  269      [04-18-19 @ 00:12]              30.7     136  |  96  |  37  ----------------------------<  206      [04-18-19 @ 00:12]  4.5   |  25  |  3.31        Ca     8.4     [04-18-19 @ 00:12]      Mg     1.8     [04-18-19 @ 00:12]      Phos  4.1     [04-18-19 @ 00:12]    TPro  6.6  /  Alb  2.8  /  TBili  0.6  /  DBili  x   /  AST  32  /  ALT  <5  /  AlkPhos  119  [04-18-19 @ 00:12]    PT/INR: PT 15.5 , INR 1.35       [04-18-19 @ 00:12]  PTT: 50.5       [04-18-19 @ 00:12]              imp/suggest: ESRD      Hemodialysis Prescription:  	Access:  	Dialyzer: revaclear   	Blood Flow (mL/Min): 400  	Dialysate Flow (mL/Min): 600  	Target UF (Liters):  	Treatment Time:  	Potassium:   	Calcium: 2.5  	  KYLEIGH    Vitamin D     continue with hd   see hd flow sheet

## 2019-04-18 NOTE — PROCEDURE NOTE - NSTRACHPOSTINTU_RESP_A_CORE
Appropriate capnography/Chest excursion noted/Breath sounds equal/Breath sounds bilateral
Appropriate capnography/Chest excursion noted/Positive end tidal Co2 noted/Breath sounds bilateral/Breath sounds equal

## 2019-04-18 NOTE — PROGRESS NOTE ADULT - ATTENDING COMMENTS
78F PMH CAD s/p CABG, HFrEF, DM2, ESRD on HD, hypotension on midodrine, HTN, HLD, initially admitted with R tib/fib fx after a mechanical fall. Hospital course complicated by acute hypercapnic respiratory failure requiring mechanical ventilation. Course further complicated by severe sepsis due to high grade MSSA bacteremia. 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     S/p R thoracentesis 4/17 with post-procedure ptx     Recs:  --Neuro: off Precedex, nonfocal neuro exam   --Cardiac: wean Levo as tolerated, continue aspirin & midodrine   --Pulm: passed weaning trial, extubated now. Start IS and DuoNebs. Aspiration precautions. R pleural fluid exudative but not infected, f/u Cx and cyto. 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

## 2019-04-18 NOTE — PROGRESS NOTE ADULT - ASSESSMENT
78F with CAD s/p CABG x4, HFrEF (EF 26% 4/2019), T2DM with peripheral neuropathy, ESRD on HD T/Th/S via L AVF, hypotension on midodrine, HTN, HLD initially admitted 4/9 after mechanical fall c/b R proximal tib/fib fracture not requiring emergent surgical intervention s/p cast placement with hospital course c/b possible melena now resolved, and RRT on 4/11 for acute hypercapnic respiratory failure in setting of opioid use requiring intubation. MICU course c/b SVT s/p synchronized cardioversion x2 now in sinus rhythm and Staph aureus bacteremia.     #Neuro:   - Patient transferred to MICU for AMS 2/2 hypercarbia. Unclear etiology, possibly opioid-induced as patient receiving pain medications for leg fracture. Ammonia wnl. CTH unremarkable.   - Pt awake and responsive, communicative and following commands off sedation   - c/w off sedation today     #CV:   - CAD s/p multiple CABG: continue ASA, holding statin in setting of transaminitis  - HFrEF: repeat TTE 4/2019 with EF 26% severe global RV systolic dysfunction, moderate MS, mild TR, normal pulm pressures  - Troponin elevation likely in setting of underlying ESRD and demand ischemia  - SVT s/p cardioversion: now rate controlled, sinus rhythm, continue to monitor on tele  - Shock: likely septic given +BCx, requiring low dose phenylephrine, will switch to levo given significantly reduced EF and component of cardiogenic shock, continue midodrine 10mg q8h  - Bedside LISA with suspected vegetations on  Aortic and Mitral Valve, formal LISA done. Not a surgical candidate. Will need repeat LISA in 6 weeks to confirm resolution with IV antibiotics    #Pulmonary:   - Initially transferred for acute hypercapnic respiratory failure of unclear etiology but suspect 2/2 opioid use  - Continue mechanical ventilation, daily spontaneous breathing trials  - still w/ b/l pleural effusion s/p thoracentesis 4/17 with 1 L out. Pleural fluid studies indicative of exudate, culture pending however pt afebrile, on antibiotics     #GI:   - Continue Nepro tube feeds via OGT, monitor residuals  - Transaminitis with abdominal sonogram showing early signs of cirrhosis likely 2/2 heart failure. Hepatitis studies negative.   - Melena and questionable coffee-ground emesis (apparently chocolate milk). Continue PPI. No plans for GI intervention at this time.   - S/p paracentesis on 4/12 with removal of 4.9L, fluid studies with SAAG 0.6, likely 2/2 heart failure  - POCUS still with mild ascites but overall improved     #Renal:   - Hx of ESRD on HD T/Th/S; continue HD as per Nephrology  - Pt appears volume overloaded on bedside sono with bilateral pleural effusions, IVC 2 cm does not change with respirations   - HD with goal 2.5L fluid removal on 4/16  - Continue to monitor electrolytes    #Endo:   - TSH 34 but free T4 within normal range  - HbA1c 8.3%, continue insulin sliding scale  - c/w  Lantus 2 units at bedtime while NPO, will uptitrate as necessary   - Fingersticks q6h while on tube feeds    #ID:   - Found to have MSSA bacteremia on cultures from 4/12 and 4/13,   - Repeat cultures from 4/14 pending   - Bedside LISA with vegetations on aortic and mitral valve   - will consult CT surgery   - appreciate ID reccs, c/w ancef 4/14-, start gentamycin for dual coverage given hx of mitral ring     DVT PPx:   - HSQ 78F with CAD s/p CABG x4, HFrEF (EF 26% 4/2019), T2DM with peripheral neuropathy, ESRD on HD T/Th/S via L AVF, hypotension on midodrine, HTN, HLD initially admitted 4/9 after mechanical fall c/b R proximal tib/fib fracture not requiring emergent surgical intervention s/p cast placement with hospital course c/b possible melena now resolved, and RRT on 4/11 for acute hypercapnic respiratory failure in setting of opioid use requiring intubation. MICU course c/b SVT s/p synchronized cardioversion x2 now in sinus rhythm and Staph aureus bacteremia with noted vegetations seen on TTE, not a surgical candidate.      #Neuro:   - Patient transferred to MICU for AMS 2/2 hypercarbia. Unclear etiology, possibly opioid-induced as patient receiving pain medications for leg fracture. Ammonia wnl. CTH unremarkable.   - Pt awake and responsive, communicative and following commands off sedation   - c/w off sedation today     #CV:   - CAD s/p multiple CABG: continue ASA, holding statin in setting of transaminitis  - HFrEF: repeat TTE 4/2019 with EF 26% severe global RV systolic dysfunction, moderate MS, mild TR, normal pulm pressures  - Troponin elevation likely in setting of underlying ESRD and demand ischemia  - SVT s/p cardioversion: now rate controlled, sinus rhythm, continue to monitor on tele  - Shock: likely septic given +BCx, requiring low dose phenylephrine, will switch to levo given significantly reduced EF and component of cardiogenic shock, continue midodrine 10mg q8h  - Bedside LISA with suspected vegetations on  Aortic and Mitral Valve, formal TTE done. Not a surgical candidate. Will need repeat TTE/LISA in 6 weeks to confirm resolution with IV antibiotics    #Pulmonary:   - Initially transferred for acute hypercapnic respiratory failure of unclear etiology but suspect 2/2 opioid use  - Plan for extubation this AM   - b/l pleural effusion s/p thoracentesis 4/17 with 1 L out. Pleural fluid studies indicative of exudate, culture pending however pt afebrile, on antibiotics. Likely chronic pleural effusion 2/2 HF     #GI:   - Continue Nepro tube feeds via OGT, monitor residuals  - Transaminitis with abdominal sonogram showing early signs of cirrhosis likely 2/2 heart failure. Hepatitis studies negative.   - S/p paracentesis on 4/12 with removal of 4.9L, fluid studies with SAAG 0.6, likely 2/2 heart failure  - POCUS still with mild ascites but overall improved     #Renal:   - Hx of ESRD on HD T/Th/S; continue HD as per Nephrology  - HD with goal 2.5L fluid removal on 4/16, plan for HD today   - Continue to monitor electrolytes    #Endo:   - TSH 34 but free T4 within normal range  - HbA1c 8.3%, continue insulin sliding scale  - c/w  Lantus 2 units at bedtime, will uptitrate as necessary   - Fingersticks q6h while on tube feeds    #ID:   - Found to have MSSA bacteremia on cultures from 4/12 and 4/13,   - Repeat cultures from 4/14 pending   - Bedside LISA with vegetations on aortic and mitral valve   - will consult CT surgery   - appreciate ID reccs, c/w ancef 4/14-, start gentamycin for dual coverage given hx of mitral ring     DVT PPx:   - HSQ 78F with CAD s/p CABG x4, HFrEF (EF 26% 4/2019), T2DM with peripheral neuropathy, ESRD on HD T/Th/S via L AVF, hypotension on midodrine, HTN, HLD initially admitted 4/9 after mechanical fall c/b R proximal tib/fib fracture not requiring emergent surgical intervention s/p cast placement with hospital course c/b possible melena now resolved, and RRT on 4/11 for acute hypercapnic respiratory failure in setting of opioid use requiring intubation. MICU course c/b SVT s/p synchronized cardioversion x2 now in sinus rhythm and Staph aureus bacteremia with noted vegetations seen on TTE, not a surgical candidate.      #Neuro:   - Patient transferred to MICU for AMS 2/2 hypercarbia. Unclear etiology, possibly opioid-induced as patient receiving pain medications for leg fracture. Ammonia wnl. CTH unremarkable.   - Pt awake and responsive, communicative and following commands off sedation   - c/w off sedation today     #CV:   - CAD s/p multiple CABG: continue ASA, holding statin in setting of transaminitis  - HFrEF: repeat TTE 4/2019 with EF 26% severe global RV systolic dysfunction, moderate MS, mild TR, normal pulm pressures  - Troponin elevation likely in setting of underlying ESRD and demand ischemia  - SVT s/p cardioversion: now rate controlled, sinus rhythm, continue to monitor on tele  - Shock: likely septic given +BCx, requiring low dose phenylephrine, will switch to levo given significantly reduced EF and component of cardiogenic shock, continue midodrine 10mg q8h  - Bedside LISA with suspected vegetations on  Aortic and Mitral Valve, formal TTE done. Not a surgical candidate. Will need repeat TTE/LISA in 6 weeks to confirm resolution with IV antibiotics    #Pulmonary:   - Initially transferred for acute hypercapnic respiratory failure of unclear etiology but suspect 2/2 opioid use  - Plan for extubation this AM   - b/l pleural effusion s/p thoracentesis 4/17 with 1 L out. C/b R pneumothorax ex vacuo, stable on serial CXRs. Pt HD stable, saturating well.   - Pleural fluid studies indicative of exudate, culture pending however pt afebrile, on antibiotics. Likely chronic pleural effusion 2/2 HF     #GI:   - Continue Nepro tube feeds via OGT, monitor residuals  - Transaminitis with abdominal sonogram showing early signs of cirrhosis likely 2/2 heart failure. Hepatitis studies negative.   - S/p paracentesis on 4/12 with removal of 4.9L, fluid studies with SAAG 0.6, likely 2/2 heart failure  - POCUS still with mild ascites but overall improved     #Renal:   - Hx of ESRD on HD T/Th/S; continue HD as per Nephrology  - HD with goal 2.5L fluid removal on 4/16, plan for HD today   - Continue to monitor electrolytes    #Endo:   - TSH 34 but free T4 within normal range  - HbA1c 8.3%, continue insulin sliding scale  - c/w  Lantus 2 units at bedtime, will uptitrate as necessary   - Fingersticks q6h while on tube feeds    #ID:   - Found to have MSSA bacteremia on cultures from 4/12 and 4/13,   - Repeat cultures from 4/14 pending   - Bedside LISA with vegetations on aortic and mitral valve   - will consult CT surgery   - appreciate ID reccs, c/w ancef 4/14-, start gentamycin for dual coverage given hx of mitral ring     DVT PPx:   - HSQ

## 2019-04-18 NOTE — PROGRESS NOTE ADULT - ASSESSMENT
78 F with DM, CAD s/p CABG, mitral annuloplasty ring, HFrEF on midodrine, ESRD on HD via Left AVF, admitted 4/9/19 after a fall at home and sustaining a Right tib/fib fracture now casted, developed acute hypercapnic respiratory failure attributed to opioids, intubated, developed fevers 4/12 and high grade MSSA bacteremia   pt was started on vanco   repeat blood cx 4/13 still positive, 4/14 negative for now  bedside LISA and repeat LISA with mitral and aortic valve vegetation but pt not a surgical candidate  s/p thoracentesis cx negative    high grade MSSA bacteremia, in the setting of mitral ring annuloplasty with vegetation on mitral and aortic valves  ?penicillin allergy    * blood cx 4/14 negative for now  * as per cardiothoracic pt is not a surgical candidate   * c/w  cefazolin 1 g qd  * c/w gentamycin 2 mg/kg q 48 post HD  * ophthalmology eval (pt has bacteremia and r/o endophthalmitis)

## 2019-04-18 NOTE — PROGRESS NOTE ADULT - SUBJECTIVE AND OBJECTIVE BOX
Follow Up:  MSSA bacteremia    Interval History:  bedside LISA and repeat TTE showed vegetation on mitral and aortic valve but pt is a poor surgical candidate    ROS:    Unobtainable because: intubated          Allergies  allopurinol (Rash)  Augmentin (Rash)  Levaquin (Rash)        ANTIMICROBIALS:  ceFAZolin   IVPB 1000 every 24 hours  gentamicin   IVPB 130 <User Schedule>      OTHER MEDS:  acetaminophen    Suspension .. 650 milliGRAM(s) Oral every 6 hours PRN  artificial tears (preservative free) Ophthalmic Solution 1 Drop(s) Both EYES every 2 hours  aspirin  chewable 81 milliGRAM(s) Oral daily  chlorhexidine 2% Cloths 1 Application(s) Topical daily  chlorhexidine 4% Liquid 1 Application(s) Topical <User Schedule>  dextrose 5%. 1000 milliLiter(s) IV Continuous <Continuous>  heparin  Injectable 5000 Unit(s) SubCutaneous every 8 hours  insulin glargine Injectable (LANTUS) 2 Unit(s) SubCutaneous at bedtime  insulin lispro (HumaLOG) corrective regimen sliding scale   SubCutaneous every 6 hours  ketotifen 0.025% Ophthalmic Solution 1 Drop(s) Both EYES every 8 hours  ketotifen 0.025% Ophthalmic Solution      levothyroxine Injectable 75 MICROGram(s) IV Push at bedtime  lidocaine/prilocaine Cream 1 Application(s) Topical daily PRN  midodrine 10 milliGRAM(s) Oral three times a day  norepinephrine Infusion 0.05 MICROgram(s)/kG/Min IV Continuous <Continuous>  ondansetron Injectable 4 milliGRAM(s) IV Push every 12 hours PRN  pantoprazole  Injectable 40 milliGRAM(s) IV Push every 12 hours  petrolatum Ophthalmic Ointment 1 Application(s) Both EYES three times a day  polyethylene glycol 3350 17 Gram(s) Oral daily  senna Syrup 10 milliLiter(s) Oral at bedtime      Vital Signs Last 24 Hrs  T(C): 36.1 (18 Apr 2019 08:00), Max: 37.4 (17 Apr 2019 12:00)  T(F): 97 (18 Apr 2019 08:00), Max: 99.4 (17 Apr 2019 12:00)  HR: 75 (18 Apr 2019 10:45) (53 - 91)  BP: 102/50 (18 Apr 2019 10:45) (85/36 - 123/58)  BP(mean): 72 (18 Apr 2019 10:45) (59 - 84)  RR: 26 (18 Apr 2019 10:45) (6 - 120)  SpO2: 99% (18 Apr 2019 10:45) (93% - 100%)    Physical Exam:  General:    NAD on vent  Head: atraumatic, normocephalic  Eyes: improved redness  ENT:   ET tube, no LAD, neck supple  Cardio:    regular S1,S2  Respiratory:   clear b/l, no wheezing  abd:   soft, BS +, not tender, no hepatosplenomegaly  :     no CVAT, no suprapubic tenderness,  bagley  Musculoskeletal : no joint swelling, R leg in dressing and cast  Skin:    no rash  vascular: LUE AVF, no phlebitis normal pulses  Neurologic:    awake, answered questions with nodding                          10.1   12.1  )-----------( 269      ( 18 Apr 2019 00:12 )             30.7       04-18    136  |  96  |  37<H>  ----------------------------<  206<H>  4.5   |  25  |  3.31<H>    Ca    8.4      18 Apr 2019 00:12  Phos  4.1     04-18  Mg     1.8     04-18    TPro  6.6  /  Alb  2.8<L>  /  TBili  0.6  /  DBili  x   /  AST  32  /  ALT  <5<L>  /  AlkPhos  119  04-18          MICROBIOLOGY:  v  .Body Fluid  04-17-19 --  --    polymorphonuclear leukocytes seen  No organisms seen  by cytocentrifuge      .Blood  04-14-19   No growth to date.  --  --      .Blood  04-13-19   Growth in aerobic and anaerobic bottles: Staphylococcus aureus  See previous culture [id number]  --    Growth in aerobic bottle: Gram Positive Cocci in Clusters  Growth in anaerobic bottle: Gram Positive Cocci in Clusters      .Body Fluid  04-13-19   Culture is being performed.  --    No polymorphonuclear leukocytes seen  No organisms seen  by cytocentrifuge      .Blood  04-12-19   Growth in aerobic and anaerobic bottles: Staphylococcus aureus  See previous culture [id number]  --  Blood Culture PCR  Staphylococcus aureus                RADIOLOGY:  Images below reviewed personally  < from: Xray Chest 1 View AP/PA (04.18.19 @ 08:58) >  Impression:    The heart is normal in size. Small left pleural effusion. Right   pneumothorax. No change when compared to previous study done April 17, 2019. Endotracheal tube and NG tube are in good position. A central line   seen on the right and tip is in superior vena cava. Statuspost   sternotomy.      < from: Limited Transthoracic Echo (04.17.19 @ 15:34) >  Conclusions:  1. An annuloplasty ring is seen inthe mitral position. A  1cm x 0.9cm vegetation is see on the atrial portion of the  anterior leaflet.  Mild-moderate mitral regurgitation. Peak  mitral valve gradient equals 9 mm Hg, mean transmitral  valve gradient equals 4 mm Hg, which is probably normal in  the setting of a An annuloplasty ring is seen in the mitral  position..  2. Normal trileaflet aortic valve. Linear strands seen on  the aortic valve leaflet tips consistent with likely  Lambl's excrescences vs vegetation  3. Severe global left ventricular systolic dysfunction.  4. Right ventricular enlargement with decreased right  ventricular systolic function.

## 2019-04-19 LAB
ALBUMIN SERPL ELPH-MCNC: 2.8 G/DL — LOW (ref 3.3–5)
ALP SERPL-CCNC: 133 U/L — HIGH (ref 40–120)
ALT FLD-CCNC: <5 U/L — LOW (ref 10–45)
ANION GAP SERPL CALC-SCNC: 19 MMOL/L — HIGH (ref 5–17)
APTT BLD: 38.9 SEC — HIGH (ref 27.5–36.3)
AST SERPL-CCNC: 25 U/L — SIGNIFICANT CHANGE UP (ref 10–40)
BILIRUB SERPL-MCNC: 0.5 MG/DL — SIGNIFICANT CHANGE UP (ref 0.2–1.2)
BUN SERPL-MCNC: 29 MG/DL — HIGH (ref 7–23)
CALCIUM SERPL-MCNC: 8.7 MG/DL — SIGNIFICANT CHANGE UP (ref 8.4–10.5)
CHLORIDE SERPL-SCNC: 95 MMOL/L — LOW (ref 96–108)
CO2 SERPL-SCNC: 21 MMOL/L — LOW (ref 22–31)
CREAT SERPL-MCNC: 2.66 MG/DL — HIGH (ref 0.5–1.3)
CULTURE RESULTS: SIGNIFICANT CHANGE UP
GLUCOSE BLDC GLUCOMTR-MCNC: 133 MG/DL — HIGH (ref 70–99)
GLUCOSE BLDC GLUCOMTR-MCNC: 137 MG/DL — HIGH (ref 70–99)
GLUCOSE BLDC GLUCOMTR-MCNC: 140 MG/DL — HIGH (ref 70–99)
GLUCOSE SERPL-MCNC: 174 MG/DL — HIGH (ref 70–99)
HCT VFR BLD CALC: 31.8 % — LOW (ref 34.5–45)
HGB BLD-MCNC: 10.3 G/DL — LOW (ref 11.5–15.5)
INR BLD: 1.33 RATIO — HIGH (ref 0.88–1.16)
MAGNESIUM SERPL-MCNC: 1.8 MG/DL — SIGNIFICANT CHANGE UP (ref 1.6–2.6)
MCHC RBC-ENTMCNC: 31.7 PG — SIGNIFICANT CHANGE UP (ref 27–34)
MCHC RBC-ENTMCNC: 32.5 GM/DL — SIGNIFICANT CHANGE UP (ref 32–36)
MCV RBC AUTO: 97.5 FL — SIGNIFICANT CHANGE UP (ref 80–100)
PHOSPHATE SERPL-MCNC: 3.3 MG/DL — SIGNIFICANT CHANGE UP (ref 2.5–4.5)
PLATELET # BLD AUTO: 284 K/UL — SIGNIFICANT CHANGE UP (ref 150–400)
POTASSIUM SERPL-MCNC: 4 MMOL/L — SIGNIFICANT CHANGE UP (ref 3.5–5.3)
POTASSIUM SERPL-SCNC: 4 MMOL/L — SIGNIFICANT CHANGE UP (ref 3.5–5.3)
PROT SERPL-MCNC: 7 G/DL — SIGNIFICANT CHANGE UP (ref 6–8.3)
PROTHROM AB SERPL-ACNC: 15.4 SEC — HIGH (ref 10–12.9)
RBC # BLD: 3.25 M/UL — LOW (ref 3.8–5.2)
RBC # FLD: 17 % — HIGH (ref 10.3–14.5)
SODIUM SERPL-SCNC: 135 MMOL/L — SIGNIFICANT CHANGE UP (ref 135–145)
SPECIMEN SOURCE: SIGNIFICANT CHANGE UP
WBC # BLD: 22.8 K/UL — HIGH (ref 3.8–10.5)
WBC # FLD AUTO: 22.8 K/UL — HIGH (ref 3.8–10.5)

## 2019-04-19 PROCEDURE — 71045 X-RAY EXAM CHEST 1 VIEW: CPT | Mod: 26

## 2019-04-19 PROCEDURE — 99233 SBSQ HOSP IP/OBS HIGH 50: CPT

## 2019-04-19 PROCEDURE — 99291 CRITICAL CARE FIRST HOUR: CPT

## 2019-04-19 RX ORDER — SODIUM CHLORIDE 9 MG/ML
4 INJECTION INTRAMUSCULAR; INTRAVENOUS; SUBCUTANEOUS EVERY 6 HOURS
Qty: 0 | Refills: 0 | Status: DISCONTINUED | OUTPATIENT
Start: 2019-04-19 | End: 2019-04-21

## 2019-04-19 RX ORDER — MAGNESIUM SULFATE 500 MG/ML
2 VIAL (ML) INJECTION ONCE
Qty: 0 | Refills: 0 | Status: COMPLETED | OUTPATIENT
Start: 2019-04-19 | End: 2019-04-19

## 2019-04-19 RX ORDER — ACETAMINOPHEN 500 MG
1000 TABLET ORAL ONCE
Qty: 0 | Refills: 0 | Status: COMPLETED | OUTPATIENT
Start: 2019-04-19 | End: 2019-04-19

## 2019-04-19 RX ORDER — ACETYLCYSTEINE 200 MG/ML
3 VIAL (ML) MISCELLANEOUS EVERY 6 HOURS
Qty: 0 | Refills: 0 | Status: DISCONTINUED | OUTPATIENT
Start: 2019-04-19 | End: 2019-04-21

## 2019-04-19 RX ORDER — DEXMEDETOMIDINE HYDROCHLORIDE IN 0.9% SODIUM CHLORIDE 4 UG/ML
0.5 INJECTION INTRAVENOUS
Qty: 200 | Refills: 0 | Status: DISCONTINUED | OUTPATIENT
Start: 2019-04-19 | End: 2019-04-20

## 2019-04-19 RX ORDER — ERYTHROMYCIN BASE 5 MG/GRAM
1 OINTMENT (GRAM) OPHTHALMIC (EYE)
Qty: 0 | Refills: 0 | Status: DISCONTINUED | OUTPATIENT
Start: 2019-04-19 | End: 2019-04-23

## 2019-04-19 RX ORDER — ACETYLCYSTEINE 200 MG/ML
3 VIAL (ML) MISCELLANEOUS THREE TIMES A DAY
Qty: 0 | Refills: 0 | Status: DISCONTINUED | OUTPATIENT
Start: 2019-04-19 | End: 2019-04-19

## 2019-04-19 RX ADMIN — Medication 1 DROP(S): at 04:00

## 2019-04-19 RX ADMIN — Medication 1 APPLICATION(S): at 22:00

## 2019-04-19 RX ADMIN — Medication 75 MICROGRAM(S): at 21:35

## 2019-04-19 RX ADMIN — SODIUM CHLORIDE 4 MILLILITER(S): 9 INJECTION INTRAMUSCULAR; INTRAVENOUS; SUBCUTANEOUS at 12:05

## 2019-04-19 RX ADMIN — Medication 100 MILLIGRAM(S): at 14:01

## 2019-04-19 RX ADMIN — PANTOPRAZOLE SODIUM 40 MILLIGRAM(S): 20 TABLET, DELAYED RELEASE ORAL at 18:03

## 2019-04-19 RX ADMIN — MIDODRINE HYDROCHLORIDE 10 MILLIGRAM(S): 2.5 TABLET ORAL at 14:01

## 2019-04-19 RX ADMIN — Medication 1 DROP(S): at 08:13

## 2019-04-19 RX ADMIN — Medication 3 MILLILITER(S): at 12:09

## 2019-04-19 RX ADMIN — Medication 1 DROP(S): at 14:00

## 2019-04-19 RX ADMIN — KETOTIFEN FUMARATE 1 DROP(S): 0.34 SOLUTION OPHTHALMIC at 22:00

## 2019-04-19 RX ADMIN — Medication 3 MILLILITER(S): at 17:49

## 2019-04-19 RX ADMIN — SODIUM CHLORIDE 4 MILLILITER(S): 9 INJECTION INTRAMUSCULAR; INTRAVENOUS; SUBCUTANEOUS at 23:26

## 2019-04-19 RX ADMIN — Medication 1 DROP(S): at 02:09

## 2019-04-19 RX ADMIN — Medication 400 MILLIGRAM(S): at 15:03

## 2019-04-19 RX ADMIN — Medication 1 DROP(S): at 10:18

## 2019-04-19 RX ADMIN — Medication 3 MILLILITER(S): at 23:26

## 2019-04-19 RX ADMIN — Medication 50 GRAM(S): at 11:53

## 2019-04-19 RX ADMIN — Medication 1: at 00:12

## 2019-04-19 RX ADMIN — HEPARIN SODIUM 5000 UNIT(S): 5000 INJECTION INTRAVENOUS; SUBCUTANEOUS at 14:01

## 2019-04-19 RX ADMIN — DEXMEDETOMIDINE HYDROCHLORIDE IN 0.9% SODIUM CHLORIDE 8.54 MICROGRAM(S)/KG/HR: 4 INJECTION INTRAVENOUS at 11:30

## 2019-04-19 RX ADMIN — CHLORHEXIDINE GLUCONATE 1 APPLICATION(S): 213 SOLUTION TOPICAL at 06:00

## 2019-04-19 RX ADMIN — Medication 3 MILLILITER(S): at 12:05

## 2019-04-19 RX ADMIN — MIDODRINE HYDROCHLORIDE 10 MILLIGRAM(S): 2.5 TABLET ORAL at 06:02

## 2019-04-19 RX ADMIN — KETOTIFEN FUMARATE 1 DROP(S): 0.34 SOLUTION OPHTHALMIC at 14:25

## 2019-04-19 RX ADMIN — Medication 3 MILLILITER(S): at 00:05

## 2019-04-19 RX ADMIN — Medication 1 APPLICATION(S): at 14:23

## 2019-04-19 RX ADMIN — Medication 3 MILLILITER(S): at 17:50

## 2019-04-19 RX ADMIN — Medication 1 DROP(S): at 11:47

## 2019-04-19 RX ADMIN — HEPARIN SODIUM 5000 UNIT(S): 5000 INJECTION INTRAVENOUS; SUBCUTANEOUS at 06:00

## 2019-04-19 RX ADMIN — Medication 1 DROP(S): at 20:00

## 2019-04-19 RX ADMIN — Medication 1 DROP(S): at 17:40

## 2019-04-19 RX ADMIN — Medication 1000 MILLIGRAM(S): at 15:33

## 2019-04-19 RX ADMIN — Medication 3 MILLILITER(S): at 06:02

## 2019-04-19 RX ADMIN — Medication 1 APPLICATION(S): at 06:02

## 2019-04-19 RX ADMIN — Medication 1 DROP(S): at 16:37

## 2019-04-19 RX ADMIN — Medication 1 DROP(S): at 06:00

## 2019-04-19 RX ADMIN — Medication 81 MILLIGRAM(S): at 11:53

## 2019-04-19 RX ADMIN — MIDODRINE HYDROCHLORIDE 10 MILLIGRAM(S): 2.5 TABLET ORAL at 21:31

## 2019-04-19 RX ADMIN — HEPARIN SODIUM 5000 UNIT(S): 5000 INJECTION INTRAVENOUS; SUBCUTANEOUS at 21:31

## 2019-04-19 RX ADMIN — CHLORHEXIDINE GLUCONATE 1 APPLICATION(S): 213 SOLUTION TOPICAL at 11:48

## 2019-04-19 RX ADMIN — Medication 1 DROP(S): at 22:00

## 2019-04-19 RX ADMIN — PANTOPRAZOLE SODIUM 40 MILLIGRAM(S): 20 TABLET, DELAYED RELEASE ORAL at 06:02

## 2019-04-19 RX ADMIN — KETOTIFEN FUMARATE 1 DROP(S): 0.34 SOLUTION OPHTHALMIC at 06:00

## 2019-04-19 RX ADMIN — SODIUM CHLORIDE 4 MILLILITER(S): 9 INJECTION INTRAMUSCULAR; INTRAVENOUS; SUBCUTANEOUS at 17:51

## 2019-04-19 NOTE — PROGRESS NOTE ADULT - ATTENDING COMMENTS
78F PMH CAD s/p CABG, HFrEF, DM2, ESRD on HD, hypotension on midodrine, HTN, HLD, initially admitted with R tib/fib fx after a mechanical fall. Hospital course complicated by acute hypercapnic respiratory failure requiring mechanical ventilation. Course further complicated by severe sepsis due to high grade MSSA bacteremia. 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     Extubated 4/18 however went into acute respiratory failure along with hypotension and VTach and had to be re-intubated. She was placed on 2 pressors but came off one by am.     Recs:  --Neuro: off Precedex, nonfocal neuro exam   --Cardiac: wean Levo as tolerated, continue aspirin & midodrine   --Pulm: passed weaning trial, extubated now. Start IS and DuoNebs. Aspiration precautions. R pleural fluid exudative but not infected, f/u Cx and cyto. 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 Hospital course complicated by acute hypercapnic respiratory failure requiring mechanical ventilation. Course further complicated by severe sepsis due to high grade MSSA bacteremia. 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     Extubated 4/18 however went into acute respiratory failure along with hypotension and VTach and had to be re-intubated. She was placed on 2 pressors but came off one by am. She was in NSR and denied any complaints during rounds today.     Recs:  --Neuro: on propofol - switch to Precedex, neuro exam remains nonfocal   --Cardiac: was placed on Bhaskar because of VT, she is in NSR now, will add Levo and try to wean off Bhaskar, continue aspirin & midodrine. Will consider Amio drip if develops VT again   --Pulm: went back into respiratory failure likely due to mucus plugging, continue pulmonary toilet with DuoNebs, Mucomyst, hypertonic saline, chest PT. Continue vent support for now. Will attempt to extubate in 1-2 days if tolerates, may need a therapeutic bronch prior to extubation if continues to have copious secretions. 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 Check bronchial cx  --Renal: HD per renal   --MSK: RLE casted, outpatient ortho f/u, no urgent surgical intervention per ortho  --Endo: Lantus and Humalog sliding scale, glucose goal 140-200  --PPx: sc heparin, Protonix  --Opthalmology consult pending? - will reconsult   --Prognosis poor  --Patient critically ill, 43mins

## 2019-04-19 NOTE — PROGRESS NOTE ADULT - ASSESSMENT
78 F PMH CAD s/p CABG x 4 2015, T2DM with peripheral neuropathy, ESRD on HD T/Th/S via L AVF, hypotension on midodrine, HFrEF, HTN, HLD, b/l cataracts s/p surgery, p/w mechanical fall and R leg pain, found to have R tib/fib fracture.   pt with resp failure, now intubated in micu    resp failure  likely multifactorial  extubated yesterday, then re-intubated for resp distress   vent support     bacteremia/sepsis / endocarditis   id f/u  iv abx as per id  f/u official LISA   not surgical candidate    open fracture of proximal end of right tibia,  confirmed on XR and CT  -ortho f/u  s/p cast. no surgical intervention at this time  outpt f/u  NWB RLE     Coronary artery disease without angina pectoris  -c/w aspirin, statin  -f/u cards .      Type 2 diabetes mellitus with hyperglycemia, with long-term current use of insulin.  start HISS  endo consult dr ronak maciel, CC diet,    ESRD on hemodialysis.  HD as per renal      ascites.   s/p paracentesis   f/u fluid analysis     emesis  gi consulted  f/u guaiac  ppi  monitor cbc       mngt as per MICU

## 2019-04-19 NOTE — PROGRESS NOTE ADULT - ATTENDING COMMENTS
remain critically ill: for TEDDY: Cont antibiotics and full resp support!  4/15: Still febrile with respiratory failure: Being considered for TEDDY today :  4/16: for teddy today : remains intubated and sedated: Cont full vent support for now:  4/17: Now suspected to have IE: For repeat TEDDY today :  4/19: overall pt remains sick: initially admitted with rt tibial fracture then developed resp failure ? secondary to narcotics: And now has I.E: On antibiotics: not a surgical candidate per CTS: cont conservative management:

## 2019-04-19 NOTE — PROGRESS NOTE ADULT - ASSESSMENT
Acute hyopercapneic repisratory failure rquiring mechanical intubataion  Tib-Fib fracture s/p mechanical fall  ESRD  ACute on chronic sytolic and diastolic CHF  CAD  Bilateral R>L pleural effusions, ascites due to fluid overload in setting of CHF and ESRD    4/14  1) Acute respiratory failure.  s/p RRT for unresponsiveness. Mildly improved w/ narcan. Intubated for airway protection. S/p synchronized cardioversion + ketamine for SVT on 4/12/19  Today ABG noted. Pt is alert and follows simple command. I&O 3.8 liters negative. 4.8 liter removed from paracentesis yesterday. Pt is currently on CPAP trial.  Continue wean off ventilator support per MICU team.  4/14 intubated and sedated. Per night MICU attending pt tolerated CPAP well yesterday. Pt remains intubated for TEDDY. Vent management, weaning per MICU  4/15: remains intubated:: for teddy? today : Cont full ventilator support:   4/16: remains intubated on pressors: TEDDY today: yesterday could not be done: Has staph aureus bacteremia   4/17: remains intubated : on antibiotics for official TEDDY today : micu teddy study revealed possible I.E  4/19: events noted: pt had thoracentesis done on the right side: has exudative fluid: Has pneumothorax on rt side ? trapped lung: no increase in today's chest x-ray : pt is on mechanical ventilation: would defer to primary MICU team for further management : no refillin gof fluis as of uet:     2) fever  24 T-max 38.3, yesterday, elevated inflammatory markers and liver enzyme. Chest x-ray reviewed Pt is already on Cefepime. Management defer to MICU, GI  4/14 24 hr Tmax 38.2 on vancomycin and Meropenum. Vanco level 11 yesterday. Bacteremia, gram positive cocci cluster (4/13). Pending TEDDY    4/15: she is still febrile: on antibiotics  4/156: Cont antibiotics:   4/17: for teddy today to for reevalution of IE: ? FOR SURGERY  4/19: last blood cultures' from 17th are still positive: has IE: On antibiotics await identity     3) Pleural effusions.   Chronic left and large right side. s/p paracentesis, removed more than 6 liters yesterday. Repeat chest x-ray.   4/14 no chest x-ray to review. s/p paracentesis. Clinically stable   4/15: has pl effusion on rt side: she had paracentesis done and not thoracentesis:   4/16: doing ok: still with bilateral pleural effusions: s/p paracentesis: cytology is negative:   4/17: no intervention for now: : Has pretty poor EF on teddy y esterday 4/19: s/p tap on 17th : exudative fluid: has tox: ? trapped lung     4) Hypotension  Small dose of Bhaskar, continue wean off as possible   4/14 IVF at 50cc/hr. titrate bhaskar to keep map greater than 65.   4/15: cont to suport blood pressure to MAP of more then 65 mm HG  4/16: ont vasopressors:   4/17: Cont on vasopressors for hemodynamic support:   4/19: She is still requiring low dose vasopressors support: cont supportive carE:     5)  Prophylaxis   Pt is on heparin SQ and PPI

## 2019-04-19 NOTE — PROGRESS NOTE ADULT - SUBJECTIVE AND OBJECTIVE BOX
Follow Up:  MSSA bacteremia    Interval History:  pt was extubated yesterday but could not handle the secretions and was reintubated    ROS:    Unobtainable because: intubated          Allergies  allopurinol (Rash)  Augmentin (Rash)  Levaquin (Rash)        ANTIMICROBIALS:  ceFAZolin   IVPB 1000 every 24 hours  gentamicin   IVPB 130 <User Schedule>      OTHER MEDS:  acetaminophen    Suspension .. 650 milliGRAM(s) Oral every 6 hours PRN  acetaminophen  IVPB .. 1000 milliGRAM(s) IV Intermittent once  acetylcysteine 10%  Inhalation 3 milliLiter(s) Inhalation three times a day  ALBUTerol/ipratropium for Nebulization 3 milliLiter(s) Nebulizer every 6 hours  artificial  tears Solution 1 Drop(s) Both EYES every 2 hours  aspirin  chewable 81 milliGRAM(s) Oral daily  chlorhexidine 2% Cloths 1 Application(s) Topical daily  chlorhexidine 4% Liquid 1 Application(s) Topical <User Schedule>  dexmedetomidine Infusion 0.5 MICROgram(s)/kG/Hr IV Continuous <Continuous>  dextrose 5%. 1000 milliLiter(s) IV Continuous <Continuous>  heparin  Injectable 5000 Unit(s) SubCutaneous every 8 hours  insulin lispro (HumaLOG) corrective regimen sliding scale   SubCutaneous every 6 hours  ketotifen 0.025% Ophthalmic Solution 1 Drop(s) Both EYES every 8 hours  ketotifen 0.025% Ophthalmic Solution      levothyroxine Injectable 75 MICROGram(s) IV Push at bedtime  lidocaine/prilocaine Cream 1 Application(s) Topical daily PRN  midodrine 10 milliGRAM(s) Oral three times a day  ondansetron Injectable 4 milliGRAM(s) IV Push every 12 hours PRN  pantoprazole  Injectable 40 milliGRAM(s) IV Push every 12 hours  petrolatum Ophthalmic Ointment 1 Application(s) Both EYES three times a day  phenylephrine    Infusion 2 MICROgram(s)/kG/Min IV Continuous <Continuous>  polyethylene glycol 3350 17 Gram(s) Oral daily  senna Syrup 10 milliLiter(s) Oral at bedtime  sodium chloride 3%  Inhalation 4 milliLiter(s) Inhalation every 6 hours      Vital Signs Last 24 Hrs  T(C): 36.5 (19 Apr 2019 12:00), Max: 36.6 (18 Apr 2019 20:00)  T(F): 97.7 (19 Apr 2019 12:00), Max: 97.9 (18 Apr 2019 20:00)  HR: 62 (19 Apr 2019 15:45) (62 - 164)  BP: 102/49 (19 Apr 2019 15:30) (72/40 - 145/65)  BP(mean): 70 (19 Apr 2019 15:30) (51 - 94)  RR: 24 (19 Apr 2019 15:45) (11 - 37)  SpO2: 100% (19 Apr 2019 15:45) (91% - 100%)    Physical Exam:  General:    NAD on vent  Head: atraumatic, normocephalic  Eyes: improved redness  ENT:   ET tube, no LAD, neck supple  Cardio:    regular S1,S2  Respiratory:   clear b/l, no wheezing  abd:   soft, BS +, not tender, no hepatosplenomegaly  :     no CVAT, no suprapubic tenderness,  bagley  Musculoskeletal : no joint swelling, R leg in dressing and cast  Skin:    no rash  vascular: LUE AVF, no phlebitis normal pulses  Neurologic:    awake, answered questions with nodding                          10.3   22.8  )-----------( 284      ( 19 Apr 2019 01:14 )             31.8       04-19    135  |  95<L>  |  29<H>  ----------------------------<  174<H>  4.0   |  21<L>  |  2.66<H>    Ca    8.7      19 Apr 2019 01:14  Phos  3.3     04-19  Mg     1.8     04-19    TPro  7.0  /  Alb  2.8<L>  /  TBili  0.5  /  DBili  x   /  AST  25  /  ALT  <5<L>  /  AlkPhos  133<H>  04-19          MICROBIOLOGY:  v  .Body Fluid  04-17-19   No growth  --    polymorphonuclear leukocytes seen  No organisms seen  by cytocentrifuge      .Blood  04-17-19   No growth to date.  --    Growth in aerobic bottle: Gram Positive Cocci in Clusters      .Blood  04-14-19   No growth at 5 days.  --  --      .Blood  04-13-19   Growth in aerobic and anaerobic bottles: Staphylococcus aureus  See previous culture [id number]  --    Growth in aerobic bottle: Gram Positive Cocci in Clusters  Growth in anaerobic bottle: Gram Positive Cocci in Clusters      .Body Fluid  04-13-19   Culture is being performed.  --    No polymorphonuclear leukocytes seen  No organisms seen  by cytocentrifuge      .Blood  04-12-19   Growth in aerobic and anaerobic bottles: Staphylococcus aureus  See previous culture [id number]  --  Blood Culture PCR  Staphylococcus aureus                RADIOLOGY:  Images below reviewed personally  < from: Xray Chest 1 View- PORTABLE-Routine (04.19.19 @ 06:54) >  Impression:    The heart is normal in size. A right pneumothorax is still present and   remain unchanged compared to previous study done on April 18, 2019. Left   pleural effusion. Status post mitral valve annulus repair. All life   support devices in good position and unchanged. Status post sternotomy.        < from: Limited Transthoracic Echo (04.17.19 @ 15:34) >  Conclusions:  1. An annuloplasty ring is seen inthe mitral position. A  1cm x 0.9cm vegetation is see on the atrial portion of the  anterior leaflet.  Mild-moderate mitral regurgitation. Peak  mitral valve gradient equals 9 mm Hg, mean transmitral  valve gradient equals 4 mm Hg, which is probably normal in  the setting of a An annuloplasty ring is seen in the mitral  position..  2. Normal trileaflet aortic valve. Linear strands seen on  the aortic valve leaflet tips consistent with likely  Lambl's excrescences vs vegetation  3. Severe global left ventricular systolic dysfunction.  4. Right ventricular enlargement with decreased right  ventricular systolic function.  D/w MICU and CTS

## 2019-04-19 NOTE — PROGRESS NOTE ADULT - SUBJECTIVE AND OBJECTIVE BOX
INTERVAL HPI/OVERNIGHT EVENTS:    pt was emergently intubated last night for respiratory distress  pt remains sedated  no acute GI issues  pt is having loose brown stools, now off stool softeners     MEDICATIONS  (STANDING):  acetylcysteine 10%  Inhalation 3 milliLiter(s) Inhalation three times a day  ALBUTerol/ipratropium for Nebulization 3 milliLiter(s) Nebulizer every 6 hours  artificial  tears Solution 1 Drop(s) Both EYES every 2 hours  aspirin  chewable 81 milliGRAM(s) Oral daily  ceFAZolin   IVPB 1000 milliGRAM(s) IV Intermittent every 24 hours  chlorhexidine 2% Cloths 1 Application(s) Topical daily  chlorhexidine 4% Liquid 1 Application(s) Topical <User Schedule>  dexmedetomidine Infusion 0.5 MICROgram(s)/kG/Hr (8.537 mL/Hr) IV Continuous <Continuous>  dextrose 5%. 1000 milliLiter(s) (50 mL/Hr) IV Continuous <Continuous>  gentamicin   IVPB 130 milliGRAM(s) IV Intermittent <User Schedule>  heparin  Injectable 5000 Unit(s) SubCutaneous every 8 hours  insulin lispro (HumaLOG) corrective regimen sliding scale   SubCutaneous every 6 hours  ketotifen 0.025% Ophthalmic Solution 1 Drop(s) Both EYES every 8 hours  ketotifen 0.025% Ophthalmic Solution      levothyroxine Injectable 75 MICROGram(s) IV Push at bedtime  midodrine 10 milliGRAM(s) Oral three times a day  pantoprazole  Injectable 40 milliGRAM(s) IV Push every 12 hours  petrolatum Ophthalmic Ointment 1 Application(s) Both EYES three times a day  phenylephrine    Infusion 2 MICROgram(s)/kG/Min (51.225 mL/Hr) IV Continuous <Continuous>  polyethylene glycol 3350 17 Gram(s) Oral daily  propofol Infusion 40 MICROgram(s)/kG/Min (16.392 mL/Hr) IV Continuous <Continuous>  senna Syrup 10 milliLiter(s) Oral at bedtime  sodium chloride 3%  Inhalation 4 milliLiter(s) Inhalation every 6 hours    MEDICATIONS  (PRN):  acetaminophen    Suspension .. 650 milliGRAM(s) Oral every 6 hours PRN Temp greater or equal to 38C (100.4F)  lidocaine/prilocaine Cream 1 Application(s) Topical daily PRN hd days  ondansetron Injectable 4 milliGRAM(s) IV Push every 12 hours PRN Nausea and/or Vomiting      Allergies    allopurinol (Rash)  Augmentin (Rash)  Levaquin (Rash)    Intolerances        Review of Systems: unable to obtain      Vital Signs Last 24 Hrs  T(C): 36.5 (19 Apr 2019 12:00), Max: 36.6 (18 Apr 2019 20:00)  T(F): 97.7 (19 Apr 2019 12:00), Max: 97.9 (18 Apr 2019 20:00)  HR: 65 (19 Apr 2019 12:47) (63 - 164)  BP: 97/52 (19 Apr 2019 12:30) (72/40 - 145/65)  BP(mean): 72 (19 Apr 2019 12:30) (51 - 94)  RR: 24 (19 Apr 2019 12:30) (11 - 37)  SpO2: 98% (19 Apr 2019 12:47) (56% - 100%)    PHYSICAL EXAM:    Constitutional: NAD, well-developed  HEENT: EOMI, throat clear  Neck: No LAD, supple  Respiratory: intubated  Cardiovascular: S1 and S2, RRR, no M  Gastrointestinal: BS+, soft, NT/ND, neg HSM,  Extremities: No peripheral edema, neg clubbing, cyanosis  Vascular: 2+ peripheral pulses  Neurological: no focal deficits  Psychiatric: Normal mood, normal affect  Skin: No rashes      LABS:                        10.3   22.8  )-----------( 284      ( 19 Apr 2019 01:14 )             31.8     04-19    135  |  95<L>  |  29<H>  ----------------------------<  174<H>  4.0   |  21<L>  |  2.66<H>    Ca    8.7      19 Apr 2019 01:14  Phos  3.3     04-19  Mg     1.8     04-19    TPro  7.0  /  Alb  2.8<L>  /  TBili  0.5  /  DBili  x   /  AST  25  /  ALT  <5<L>  /  AlkPhos  133<H>  04-19    PT/INR - ( 19 Apr 2019 01:14 )   PT: 15.4 sec;   INR: 1.33 ratio         PTT - ( 19 Apr 2019 01:14 )  PTT:38.9 sec      RADIOLOGY & ADDITIONAL TESTS:

## 2019-04-19 NOTE — PROGRESS NOTE ADULT - ASSESSMENT
78F with CAD s/p CABG x4, HFrEF (EF 26% 4/2019), T2DM with peripheral neuropathy, ESRD on HD T/Th/S via L AVF, hypotension on midodrine, HTN, HLD initially admitted 4/9 after mechanical fall c/b R proximal tib/fib fracture not requiring emergent surgical intervention s/p cast placement with hospital course c/b possible melena now resolved, and RRT on 4/11 for acute hypercapnic respiratory failure in setting of opioid use requiring intubation. MICU course c/b SVT s/p synchronized cardioversion x2 now in sinus rhythm and Staph aureus bacteremia with noted vegetations seen on TTE, not a surgical candidate.      #Neuro:   - Patient transferred to MICU for AMS 2/2 hypercarbia. Unclear etiology, possibly opioid-induced as patient receiving pain medications for leg fracture. Ammonia wnl. CTH unremarkable.   - Pt awake and responsive, communicative and following commands off sedation   - c/w off sedation today     #CV:   - CAD s/p multiple CABG: continue ASA, holding statin in setting of transaminitis  - HFrEF: repeat TTE 4/2019 with EF 26% severe global RV systolic dysfunction, moderate MS, mild TR, normal pulm pressures  - Troponin elevation likely in setting of underlying ESRD and demand ischemia  - SVT s/p cardioversion: now rate controlled, sinus rhythm, continue to monitor on tele  - Shock: likely septic given +BCx, requiring low dose phenylephrine, will switch to levo given significantly reduced EF and component of cardiogenic shock, continue midodrine 10mg q8h  - Bedside LISA with suspected vegetations on  Aortic and Mitral Valve, formal TTE done. Not a surgical candidate. Will need repeat TTE/LISA in 6 weeks to confirm resolution with IV antibiotics    #Pulmonary:   - Initially transferred for acute hypercapnic respiratory failure of unclear etiology but suspect 2/2 opioid use  - Plan for extubation this AM   - b/l pleural effusion s/p thoracentesis 4/17 with 1 L out. C/b R pneumothorax ex vacuo, stable on serial CXRs. Pt HD stable, saturating well.   - Pleural fluid studies indicative of exudate, culture pending however pt afebrile, on antibiotics. Likely chronic pleural effusion 2/2 HF     #GI:   - Continue Nepro tube feeds via OGT, monitor residuals  - Transaminitis with abdominal sonogram showing early signs of cirrhosis likely 2/2 heart failure. Hepatitis studies negative.   - S/p paracentesis on 4/12 with removal of 4.9L, fluid studies with SAAG 0.6, likely 2/2 heart failure  - POCUS still with mild ascites but overall improved     #Renal:   - Hx of ESRD on HD T/Th/S; continue HD as per Nephrology  - HD with goal 2.5L fluid removal on 4/16, plan for HD today   - Continue to monitor electrolytes    #Endo:   - TSH 34 but free T4 within normal range  - HbA1c 8.3%, continue insulin sliding scale  - c/w  Lantus 2 units at bedtime, will uptitrate as necessary   - Fingersticks q6h while on tube feeds    #ID:   - Found to have MSSA bacteremia on cultures from 4/12 and 4/13,   - Repeat cultures from 4/14 pending   - Bedside LISA with vegetations on aortic and mitral valve   - will consult CT surgery   - appreciate ID reccs, c/w ancef 4/14-, start gentamycin for dual coverage given hx of mitral ring     DVT PPx:   - HSQ 78F with CAD s/p CABG x4, HFrEF (EF 26% 4/2019), T2DM with peripheral neuropathy, ESRD on HD T/Th/S via L AVF, hypotension on midodrine, HTN, HLD initially admitted 4/9 after mechanical fall c/b R proximal tib/fib fracture not requiring emergent surgical intervention s/p cast placement with hospital course c/b possible melena now resolved, and RRT on 4/11 for acute hypercapnic respiratory failure in setting of opioid use requiring intubation. MICU course c/b SVT s/p synchronized cardioversion x2 now in sinus rhythm and Staph aureus bacteremia with noted vegetations seen on TTE, not a surgical candidate.  Hospital course further complicated by likely mucous plugging causing hypotension, Vtach and respiratory failure requiring urgent re-intubation on 4/18.     #Neuro:   - Patient transferred to MICU for AMS 2/2 hypercarbia. Unclear etiology, possibly opioid-induced as patient receiving pain medications for leg fracture. Ammonia wnl. CTH unremarkable.   - Pt awake and responsive, communicative and following commands off sedation   - c/w off sedation today     #CV:   - CAD s/p multiple CABG: continue ASA, holding statin in setting of transaminitis  - HFrEF: repeat TTE 4/2019 with EF 26% severe global RV systolic dysfunction, moderate MS, mild TR, normal pulm pressures  - Troponin elevation likely in setting of underlying ESRD and demand ischemia  - SVT s/p cardioversion: now rate controlled, sinus rhythm, continue to monitor on tele. Pt with sustained Vtach on 4/19 during respiratory failure, s/p 1 dose of amiodarone, now in sinus rhythm.   - Bhaskar and Levo, will wean as tolerated   - Bedside LISA with suspected vegetations on  Aortic and Mitral Valve, formal TTE done. Not a surgical candidate. Will need repeat TTE/LISA in 6 weeks to confirm resolution with IV antibiotics    #Pulmonary:   - Initially transferred for acute hypercapnic respiratory failure of unclear etiology but suspect 2/2 opioid use  - Extubated and reintubated for respiratory failure on 4/18, likely 2/2 mucous plugging. Chest PT, Mucomyst, Duonebs, hypertonic saline ordered.    - b/l pleural effusion s/p thoracentesis 4/17 with 1 L out. C/b R pneumothorax ex vacuo, stable on serial CXRs. Pt HD stable, saturating well on vent    - Pleural fluid studies indicative of exudate, culture pending however pt afebrile, on antibiotics. Likely chronic pleural effusion 2/2 HF   - will send sputum culture     #GI:   - Continue Nepro tube feeds via OGT, monitor residuals  - Transaminitis with abdominal sonogram showing early signs of cirrhosis likely 2/2 heart failure. Hepatitis studies negative.   - S/p paracentesis on 4/12 with removal of 4.9L, fluid studies with SAAG 0.6, likely 2/2 heart failure  - POCUS still with mild ascites but overall improved     #Renal:   - Hx of ESRD on HD T/Th/S; continue HD as per Nephrology  - HD with fluid removal T/Th/Sat, pt required increased pressor req during HD yesterday   - Continue to monitor electrolytes    #Endo:   - TSH 34 but free T4 within normal range  - HbA1c 8.3%, continue insulin sliding scale  - Fingersticks q6h while on tube feeds    #ID:   - Found to have high grade MSSA bacteremia on cultures from 4/12 and 4/13,   - Repeat cultures from 4/14 negative. 4/17 cultures positive. Cultures resent 4/18.   - Bedside LISA with vegetations on aortic and mitral valve, not surgical candidate.   - appreciate ID reccs, c/w ancef 4/14-, gentamycin for dual coverage given hx of mitral ring     DVT PPx:   - HSQ

## 2019-04-19 NOTE — CHART NOTE - NSCHARTNOTEFT_GEN_A_CORE
Nutrition Follow Up Note    Source: comprehensive chart review, RN     Diet : NPO except medications    Chart reviewed, events noted. Pt admitted with tib/fib fracture. Admitted to MICU following RRT () for acute hypercapnic respiratory failure in setting of opoid use requiring intubation. MICU course complicated by SVP s/p synchronized cardioversion x2. On , s/p paracentesis - 4.7 L removed. On , bedside LISA showed vegetation on mitral and aortic valve. Per CT surgery, pt not a candidate for surgery due to comorbidities. On , s/p thoracentesis - 1 L removed. Last HD  - 2 L removed. On , pt extubated; after HD, pt hypotensive and unresponsive and emergently reintubated. Pt remains intubated and sedated on propofol at time of visit. Currently NPO except medications. Spoke with RN who reports OGT may begin soon. Recommendations below. No reports GI distress. Pt with 2 liquid BMs on .      Enteral /Parenteral Nutrition: none at this time. Pt previously ordered for Vital 1.2 at goal rate of 40 ml/hr x 18 hours    EN provision:  4/15 - 720 mL - 100%   -  400 mL - 55%    - 180 mL - 25%   - 160 mL - 22%     Daily Weight in k.6 (-), Weight in k.6 (), Weight in k.6 (), Weight in k.6 (-), Weight in k.4 (-), Weight in k.9 (-), Weight in k.6 (-15)   % Weight Change - weight change likely related to fluid shifts as pt is on HD     Pertinent Medications: MEDICATIONS  (STANDING):  ALBUTerol/ipratropium for Nebulization 3 milliLiter(s) Nebulizer every 6 hours  artificial  tears Solution 1 Drop(s) Both EYES every 2 hours  aspirin  chewable 81 milliGRAM(s) Oral daily  ceFAZolin   IVPB 1000 milliGRAM(s) IV Intermittent every 24 hours  chlorhexidine 2% Cloths 1 Application(s) Topical daily  chlorhexidine 4% Liquid 1 Application(s) Topical <User Schedule>  dextrose 5%. 1000 milliLiter(s) (50 mL/Hr) IV Continuous <Continuous>  gentamicin   IVPB 130 milliGRAM(s) IV Intermittent <User Schedule>  heparin  Injectable 5000 Unit(s) SubCutaneous every 8 hours  insulin lispro (HumaLOG) corrective regimen sliding scale   SubCutaneous every 6 hours  ketotifen 0.025% Ophthalmic Solution 1 Drop(s) Both EYES every 8 hours  ketotifen 0.025% Ophthalmic Solution      levothyroxine Injectable 75 MICROGram(s) IV Push at bedtime  midodrine 10 milliGRAM(s) Oral three times a day  pantoprazole  Injectable 40 milliGRAM(s) IV Push every 12 hours  petrolatum Ophthalmic Ointment 1 Application(s) Both EYES three times a day  phenylephrine    Infusion 2 MICROgram(s)/kG/Min (51.225 mL/Hr) IV Continuous <Continuous>  polyethylene glycol 3350 17 Gram(s) Oral daily  propofol Infusion 40 MICROgram(s)/kG/Min (16.392 mL/Hr) IV Continuous <Continuous>  senna Syrup 10 milliLiter(s) Oral at bedtime    MEDICATIONS  (PRN):  acetaminophen    Suspension .. 650 milliGRAM(s) Oral every 6 hours PRN Temp greater or equal to 38C (100.4F)  lidocaine/prilocaine Cream 1 Application(s) Topical daily PRN hd days  ondansetron Injectable 4 milliGRAM(s) IV Push every 12 hours PRN Nausea and/or Vomiting    Pertinent Labs:  @ 01:14: Na 135, BUN 29<H>, Cr 2.66<H>, <H>, K+ 4.0, Phos 3.3, Mg 1.8, Alk Phos 133<H>, ALT/SGPT <5<L>, AST/SGOT 25, HbA1c --   @ 17:33: Na 135, BUN 23, Cr 2.25<H>, <H>, K+ 4.0, Phos 3.4, Mg 1.8, Alk Phos 152<H>, ALT/SGPT <5<L>, AST/SGOT 31, HbA1c --    Finger Sticks:  POCT Blood Glucose.: 140 mg/dL ( @ 05:56)  POCT Blood Glucose.: 184 mg/dL ( @ 23:55)  POCT Blood Glucose.: 198 mg/dL ( @ 12:14)    Skin per nursing documentation: sacrum stage 2, left heel DTI, right upper arm wound  Edema: none noted    Estimated Needs:   [ x] no change since previous assessment  [ ] recalculated:     Previous Nutrition Moderate Malnutrition  Nutrition Diagnosis is: ongoing, to be addressed with EN provision     New Nutrition Diagnosis: n/a     Interventions:   1) Once medically feasible, recommend resume Vital 1.2 at 65 mL/hr x 18 hours to provide 1404 kcal, 88 grams protein, 949 mL free water (meeting 23.9 kcal/kg, 1.5 grams protein/kg using dry weight 58.6kg on )    Monitoring and Evaluation:   Continue to monitor Nutritional intake, Tolerance to diet prescription, weights, labs, skin integrity  RD remains available upon request and will follow up per protocol    Marysol Garcia, Dietetic Intern  Pager # [phone] Nutrition Follow Up Note    Source: comprehensive chart review, RN     Diet : NPO except medications    Chart reviewed, events noted. Pt admitted with tib/fib fracture. Admitted to MICU following RRT () for acute hypercapnic respiratory failure in setting of opoid use requiring intubation. MICU course complicated by SVP s/p synchronized cardioversion x2. On , s/p paracentesis - 4.7 L removed. On , bedside LISA showed vegetation on mitral and aortic valve. Per CT surgery, pt not a candidate for surgery due to comorbidities. On , s/p thoracentesis - 1 L removed. Last HD  - 2 L removed. On , pt extubated; after HD, pt hypotensive and unresponsive and emergently reintubated. Pt remains intubated and sedated on propofol at time of visit. Currently NPO except medications. Spoke with RN who reports OGT may begin soon. Recommendations below. No reports GI distress. Pt with 2 liquid BMs on .      Enteral /Parenteral Nutrition: none at this time. Pt previously ordered for Vital 1.2 at goal rate of 40 ml/hr x 18 hours    EN provision:  4/15 - 720 mL - 100%   -  400 mL - 55%    - 180 mL - 25%   - 160 mL - 22%     Daily Weight in k.6 (-), Weight in k.6 (), Weight in k.6 (), Weight in k.6 (-), Weight in k.4 (-), Weight in k.9 (-), Weight in k.6 (-15)   % Weight Change - weight change likely related to fluid shifts as pt is on HD     Pertinent Medications: MEDICATIONS  (STANDING):  ALBUTerol/ipratropium for Nebulization 3 milliLiter(s) Nebulizer every 6 hours  artificial  tears Solution 1 Drop(s) Both EYES every 2 hours  aspirin  chewable 81 milliGRAM(s) Oral daily  ceFAZolin   IVPB 1000 milliGRAM(s) IV Intermittent every 24 hours  chlorhexidine 2% Cloths 1 Application(s) Topical daily  chlorhexidine 4% Liquid 1 Application(s) Topical <User Schedule>  dextrose 5%. 1000 milliLiter(s) (50 mL/Hr) IV Continuous <Continuous>  gentamicin   IVPB 130 milliGRAM(s) IV Intermittent <User Schedule>  heparin  Injectable 5000 Unit(s) SubCutaneous every 8 hours  insulin lispro (HumaLOG) corrective regimen sliding scale   SubCutaneous every 6 hours  ketotifen 0.025% Ophthalmic Solution 1 Drop(s) Both EYES every 8 hours  ketotifen 0.025% Ophthalmic Solution      levothyroxine Injectable 75 MICROGram(s) IV Push at bedtime  midodrine 10 milliGRAM(s) Oral three times a day  pantoprazole  Injectable 40 milliGRAM(s) IV Push every 12 hours  petrolatum Ophthalmic Ointment 1 Application(s) Both EYES three times a day  phenylephrine    Infusion 2 MICROgram(s)/kG/Min (51.225 mL/Hr) IV Continuous <Continuous>  polyethylene glycol 3350 17 Gram(s) Oral daily  propofol Infusion 40 MICROgram(s)/kG/Min (16.392 mL/Hr) IV Continuous <Continuous>  senna Syrup 10 milliLiter(s) Oral at bedtime    MEDICATIONS  (PRN):  acetaminophen    Suspension .. 650 milliGRAM(s) Oral every 6 hours PRN Temp greater or equal to 38C (100.4F)  lidocaine/prilocaine Cream 1 Application(s) Topical daily PRN hd days  ondansetron Injectable 4 milliGRAM(s) IV Push every 12 hours PRN Nausea and/or Vomiting    Pertinent Labs:  @ 01:14: Na 135, BUN 29<H>, Cr 2.66<H>, <H>, K+ 4.0, Phos 3.3, Mg 1.8, Alk Phos 133<H>, ALT/SGPT <5<L>, AST/SGOT 25, HbA1c --   @ 17:33: Na 135, BUN 23, Cr 2.25<H>, <H>, K+ 4.0, Phos 3.4, Mg 1.8, Alk Phos 152<H>, ALT/SGPT <5<L>, AST/SGOT 31, HbA1c --    Finger Sticks:  POCT Blood Glucose.: 140 mg/dL ( @ 05:56)  POCT Blood Glucose.: 184 mg/dL ( @ 23:55)  POCT Blood Glucose.: 198 mg/dL ( @ 12:14)    Skin per nursing documentation: sacrum stage 2, left heel DTI, right upper arm wound  Edema: none noted    Estimated Needs:   [ x] no change since previous assessment  [ ] recalculated:     Previous Nutrition Moderate Malnutrition  Nutrition Diagnosis is: ongoing, to be addressed with EN provision     New Nutrition Diagnosis: n/a     Interventions:   1) Once medically feasible, recommend resume Vital 1.2 at 65 mL/hr x 18 hours to provide 1404 kcal, 88 grams protein, 949 mL free water (meeting 23.9 kcal/kg, 1.5 grams protein/kg using dry weight 58.6kg on )  2) Recommend add Sherman twice daily to promote wound healing     Monitoring and Evaluation:   Continue to monitor Nutritional intake, Tolerance to diet prescription, weights, labs, skin integrity  RD remains available upon request and will follow up per protocol    Marysol Garcia, Dietetic Intern  Pager # [phone]

## 2019-04-19 NOTE — PROGRESS NOTE ADULT - SUBJECTIVE AND OBJECTIVE BOX
Patient is a 78y old  Female who presents with a chief complaint of R tib/fib fx (19 Apr 2019 07:37)      Any change in ROS: Events noted: pt is intubated again after a trial of extubation yesterday: Had increased heart rate and excessive secretions Currently intubated and sedated:     MEDICATIONS  (STANDING):  ALBUTerol/ipratropium for Nebulization 3 milliLiter(s) Nebulizer every 6 hours  artificial  tears Solution 1 Drop(s) Both EYES every 2 hours  aspirin  chewable 81 milliGRAM(s) Oral daily  ceFAZolin   IVPB 1000 milliGRAM(s) IV Intermittent every 24 hours  chlorhexidine 2% Cloths 1 Application(s) Topical daily  chlorhexidine 4% Liquid 1 Application(s) Topical <User Schedule>  dextrose 5%. 1000 milliLiter(s) (50 mL/Hr) IV Continuous <Continuous>  gentamicin   IVPB 130 milliGRAM(s) IV Intermittent <User Schedule>  heparin  Injectable 5000 Unit(s) SubCutaneous every 8 hours  insulin lispro (HumaLOG) corrective regimen sliding scale   SubCutaneous every 6 hours  ketotifen 0.025% Ophthalmic Solution 1 Drop(s) Both EYES every 8 hours  ketotifen 0.025% Ophthalmic Solution      levothyroxine Injectable 75 MICROGram(s) IV Push at bedtime  midodrine 10 milliGRAM(s) Oral three times a day  pantoprazole  Injectable 40 milliGRAM(s) IV Push every 12 hours  petrolatum Ophthalmic Ointment 1 Application(s) Both EYES three times a day  phenylephrine    Infusion 2 MICROgram(s)/kG/Min (51.225 mL/Hr) IV Continuous <Continuous>  polyethylene glycol 3350 17 Gram(s) Oral daily  propofol Infusion 40 MICROgram(s)/kG/Min (16.392 mL/Hr) IV Continuous <Continuous>  senna Syrup 10 milliLiter(s) Oral at bedtime    MEDICATIONS  (PRN):  acetaminophen    Suspension .. 650 milliGRAM(s) Oral every 6 hours PRN Temp greater or equal to 38C (100.4F)  lidocaine/prilocaine Cream 1 Application(s) Topical daily PRN hd days  ondansetron Injectable 4 milliGRAM(s) IV Push every 12 hours PRN Nausea and/or Vomiting    Vital Signs Last 24 Hrs  T(C): 36.6 (19 Apr 2019 04:00), Max: 36.9 (18 Apr 2019 12:25)  T(F): 97.9 (19 Apr 2019 04:00), Max: 98.4 (18 Apr 2019 12:25)  HR: 71 (19 Apr 2019 10:15) (63 - 164)  BP: 98/47 (19 Apr 2019 10:15) (72/40 - 145/65)  BP(mean): 68 (19 Apr 2019 10:15) (51 - 94)  RR: 24 (19 Apr 2019 10:15) (11 - 37)  SpO2: 100% (19 Apr 2019 10:15) (56% - 100%)  Mode: AC/ CMV (Assist Control/ Continuous Mandatory Ventilation)  RR (machine): 12  TV (machine): 450  FiO2: 30  PEEP: 5  ITime: 0.9  MAP: 9  PIP: 24    I&O's Summary    18 Apr 2019 07:01  -  19 Apr 2019 07:00  --------------------------------------------------------  IN: 1217.4 mL / OUT: 2000 mL / NET: -782.6 mL          Physical Exam:   GENERAL: NAD, well-groomed, well-developed  HEENT: FLOWER/   Atraumatic, Normocephalic  ENMT: No tonsillar erythema, exudates, or enlargement; Moist mucous membranes, Good dentition, No lesions  NECK: Supple, No JVD, Normal thyroid  CHEST/LUNG: no wheezing  CVS: Regular rate and rhythm; No murmurs, rubs, or gallops  GI: : Soft, Nontender, Nondistended; Bowel sounds present  NERVOUS SYSTEM:  Intubated and sedated   EXTREMITIES: -edema  LYMPH: No lymphadenopathy noted  SKIN: No rashes or lesions  ENDOCRINOLOGY: No Thyromegaly  PSYCH:sedated    Labs:  ABG - ( 18 Apr 2019 21:56 )  pH, Arterial: 7.44  pH, Blood: x     /  pCO2: 36    /  pO2: 137   / HCO3: 24    / Base Excess: .5    /  SaO2: 99                                          10.3   22.8  )-----------( 284      ( 19 Apr 2019 01:14 )             31.8                         11.3   29.1  )-----------( 303      ( 18 Apr 2019 17:33 )             33.6                         10.1   12.1  )-----------( 269      ( 18 Apr 2019 00:12 )             30.7                         10.8   9.7   )-----------( 293      ( 17 Apr 2019 00:45 )             33.5                         9.6    10.0  )-----------( 239      ( 16 Apr 2019 00:46 )             28.4     04-19    135  |  95<L>  |  29<H>  ----------------------------<  174<H>  4.0   |  21<L>  |  2.66<H>  04-18    135  |  95<L>  |  23  ----------------------------<  181<H>  4.0   |  22  |  2.25<H>  04-18    136  |  96  |  37<H>  ----------------------------<  206<H>  4.5   |  25  |  3.31<H>  04-17    137  |  98  |  24<H>  ----------------------------<  224<H>  4.2   |  25  |  2.27<H>  04-16    134<L>  |  94<L>  |  29<H>  ----------------------------<  291<H>  4.0   |  25  |  2.66<H>    Ca    8.7      19 Apr 2019 01:14  Ca    8.6      18 Apr 2019 17:33  Ca    8.4      18 Apr 2019 00:12  Phos  3.3     04-19  Phos  3.4     04-18  Phos  4.1     04-18  Mg     1.8     04-19  Mg     1.8     04-18  Mg     1.8     04-18    TPro  7.0  /  Alb  2.8<L>  /  TBili  0.5  /  DBili  x   /  AST  25  /  ALT  <5<L>  /  AlkPhos  133<H>  04-19  TPro  7.8  /  Alb  3.1<L>  /  TBili  0.7  /  DBili  x   /  AST  31  /  ALT  <5<L>  /  AlkPhos  152<H>  04-18  TPro  6.6  /  Alb  2.8<L>  /  TBili  0.6  /  DBili  x   /  AST  32  /  ALT  <5<L>  /  AlkPhos  119  04-18  TPro  7.0  /  Alb  2.9<L>  /  TBili  0.8  /  DBili  x   /  AST  48<H>  /  ALT  <5<L>  /  AlkPhos  140<H>  04-17  TPro  6.2  /  Alb  2.6<L>  /  TBili  0.6  /  DBili  x   /  AST  69<H>  /  ALT  10  /  AlkPhos  129<H>  04-16    CAPILLARY BLOOD GLUCOSE      POCT Blood Glucose.: 140 mg/dL (19 Apr 2019 05:56)  POCT Blood Glucose.: 184 mg/dL (18 Apr 2019 23:55)  POCT Blood Glucose.: 198 mg/dL (18 Apr 2019 12:14)     (04-12 @ 20:42)    LIVER FUNCTIONS - ( 19 Apr 2019 01:14 )  Alb: 2.8 g/dL / Pro: 7.0 g/dL / ALK PHOS: 133 U/L / ALT: <5 U/L / AST: 25 U/L / GGT: x           PT/INR - ( 19 Apr 2019 01:14 )   PT: 15.4 sec;   INR: 1.33 ratio         PTT - ( 19 Apr 2019 01:14 )  PTT:38.9 sec    Fluid Source --  Albumin, Fluid--  Glucose, [id number] mg/dL  Protein total, Fluid4.2 g/dL  Lacatate Dehydrogenase, [id number] U/L  pH, Fluid7.39  Cytopathology-Non Gyn Report--  Fluid Source --  Albumin, Fluid2.9 g/dL  Glucose, [id number] mg/dL  Protein total, Fluid5.5 g/dL  Lacatate Dehydrogenase, Fluid91 U/L  pH, Fluid--  Cytopathology-Non Gyn Report--  Fluid Source --  Albumin, Fluid--  Glucose, Fluid--  Protein total, Fluid--  Lacatate Dehydrogenase, Fluid--  pH, Fluid--  Cytopathology-Non Gyn Report  ACCESSION No:  [id number]    VIRGILIO KAPLAN                          1        Cytopathology Report            Specimen(s) Submitted  PERITONEAL FLUID      Clinical History  HFREF, hypercapnic respiratory failure 2/2 to pain meds versus  large ascities, ESRD.      Gross Description  Received: 30  ml of yellow fluid in CytoLyt  Prepared: 1 ThinPrep slide, 1 Cell block, 1 Smear      Final Diagnosis  PERITONEAL FLUID  NEGATIVE FOR MALIGNANT CELLS.  Cytology specimens and cell block are hypocellularconsisting of  rare reactive mesothelial cells with few  inflammatory cells and proteinaceous debris in background.    Screened by: Mer FUENTES(ASCP)  Verified by: Linda Tate MD  (Electronic Signature)  Reported on: 04/15/19 21:49 EDT, [address] [place] [address]  [id number]  Cytology technical processing performed at [address] [place] [id number]  _________________________________________________________________        RECENT CULTURES:  04-17 @ 17:20 .Body Fluid       polymorphonuclear leukocytes seen  No organisms seen  by cytocentrifuge           No growth    04-17 @ 15:13 .Blood       Growth in aerobic bottle: Gram Positive Cocci in Clusters           No growth to date.    04-14 @ 12:25 .Blood                No growth to date.    04-13 @ 07:27 .Blood       Growth in aerobic bottle: Gram Positive Cocci in Clusters  Growth in anaerobic bottle: Gram Positive Cocci in Clusters       < from: Xray Chest 1 View- PORTABLE-Routine (04.19.19 @ 06:54) >    EXAM:  XR CHEST PORTABLE ROUTINE 1V                            PROCEDURE DATE:  04/19/2019            INTERPRETATION:  A single chest x-ray was obtained on April 19, 2019.    Indication: Follow-up right pneumothorax.    Impression:    The heart is normal in size. A right pneumothorax is still present and   remain unchanged compared to previous study done on April 18, 2019. Left   pleural effusion. Status post mitral valve annulus repair. All life   support devices in good position and unchanged. Status post sternotomy.                    ISRAEL ROSENBAUM M.D., ATTENDING RADIOLOGIST  This document has been electronically signed. Apr 19 2019  7:44AM              < end of copied text >      Growth in aerobic and anaerobic bottles: Staphylococcus aureus  See previous culture [id number]    04-13 @ 00:45 .Body Fluid       No polymorphonuclear leukocytes seen  No organisms seen  by cytocentrifuge           Culture is being performed.          RESPIRATORY CULTURES:          Studies  Chest X-RAY  CT SCAN Chest   Venous Dopplers: LE:   CT Abdomen  Others

## 2019-04-19 NOTE — PROGRESS NOTE ADULT - SUBJECTIVE AND OBJECTIVE BOX
CHIEF COMPLAINT:    Interval Events:    REVIEW OF SYSTEMS:  Constitutional: [ ] negative [ ] fevers [ ] chills [ ] weight loss [ ] weight gain  HEENT: [ ] negative [ ] dry eyes [ ] eye irritation [ ] postnasal drip [ ] nasal congestion  CV: [ ] negative  [ ] chest pain [ ] orthopnea [ ] palpitations [ ] murmur  Resp: [ ] negative [ ] cough [ ] shortness of breath [ ] dyspnea [ ] wheezing [ ] sputum [ ] hemoptysis  GI: [ ] negative [ ] nausea [ ] vomiting [ ] diarrhea [ ] constipation [ ] abd pain [ ] dysphagia   : [ ] negative [ ] dysuria [ ] nocturia [ ] hematuria [ ] increased urinary frequency  Musculoskeletal: [ ] negative [ ] back pain [ ] myalgias [ ] arthralgias [ ] fracture  Skin: [ ] negative [ ] rash [ ] itch  Neurological: [ ] negative [ ] headache [ ] dizziness [ ] syncope [ ] weakness [ ] numbness  Psychiatric: [ ] negative [ ] anxiety [ ] depression  Endocrine: [ ] negative [ ] diabetes [ ] thyroid problem  Hematologic/Lymphatic: [ ] negative [ ] anemia [ ] bleeding problem  Allergic/Immunologic: [ ] negative [ ] itchy eyes [ ] nasal discharge [ ] hives [ ] angioedema  [ ] All other systems negative  [ ] Unable to assess ROS because ________    OBJECTIVE:  ICU Vital Signs Last 24 Hrs  T(C): 36.6 (19 Apr 2019 04:00), Max: 36.9 (18 Apr 2019 12:25)  T(F): 97.9 (19 Apr 2019 04:00), Max: 98.4 (18 Apr 2019 12:25)  HR: 71 (19 Apr 2019 07:30) (63 - 164)  BP: 102/52 (19 Apr 2019 07:30) (72/40 - 145/65)  BP(mean): 75 (19 Apr 2019 07:30) (51 - 94)  ABP: --  ABP(mean): --  RR: 18 (19 Apr 2019 07:30) (11 - 37)  SpO2: 100% (19 Apr 2019 07:30) (56% - 100%)    Mode: AC/ CMV (Assist Control/ Continuous Mandatory Ventilation), RR (machine): 12, TV (machine): 450, FiO2: 30, PEEP: 5, ITime: 0.9, MAP: 9, PIP: 22    04-18 @ 07:01  -  04-19 @ 07:00  --------------------------------------------------------  IN: 1217.4 mL / OUT: 2000 mL / NET: -782.6 mL      CAPILLARY BLOOD GLUCOSE  179 (18 Apr 2019 06:00)      POCT Blood Glucose.: 140 mg/dL (19 Apr 2019 05:56)      PHYSICAL EXAM:  General:   HEENT:   Lymph Nodes:  Neck:   Respiratory:   Cardiovascular:   Abdomen:   Extremities:   Skin:   Neurological:  Psychiatry:    LINES:    HOSPITAL MEDICATIONS:  aspirin  chewable 81 milliGRAM(s) Oral daily  heparin  Injectable 5000 Unit(s) SubCutaneous every 8 hours    ceFAZolin   IVPB 1000 milliGRAM(s) IV Intermittent every 24 hours  gentamicin   IVPB 130 milliGRAM(s) IV Intermittent <User Schedule>    midodrine 10 milliGRAM(s) Oral three times a day  phenylephrine    Infusion 2 MICROgram(s)/kG/Min IV Continuous <Continuous>    insulin lispro (HumaLOG) corrective regimen sliding scale   SubCutaneous every 6 hours  levothyroxine Injectable 75 MICROGram(s) IV Push at bedtime    ALBUTerol/ipratropium for Nebulization 3 milliLiter(s) Nebulizer every 6 hours    acetaminophen    Suspension .. 650 milliGRAM(s) Oral every 6 hours PRN  ondansetron Injectable 4 milliGRAM(s) IV Push every 12 hours PRN  propofol Infusion 40 MICROgram(s)/kG/Min IV Continuous <Continuous>    pantoprazole  Injectable 40 milliGRAM(s) IV Push every 12 hours  polyethylene glycol 3350 17 Gram(s) Oral daily  senna Syrup 10 milliLiter(s) Oral at bedtime        dextrose 5%. 1000 milliLiter(s) IV Continuous <Continuous>      artificial  tears Solution 1 Drop(s) Both EYES every 2 hours  chlorhexidine 2% Cloths 1 Application(s) Topical daily  chlorhexidine 4% Liquid 1 Application(s) Topical <User Schedule>  ketotifen 0.025% Ophthalmic Solution 1 Drop(s) Both EYES every 8 hours  ketotifen 0.025% Ophthalmic Solution      lidocaine/prilocaine Cream 1 Application(s) Topical daily PRN  petrolatum Ophthalmic Ointment 1 Application(s) Both EYES three times a day        LABS:                        10.3   22.8  )-----------( 284      ( 19 Apr 2019 01:14 )             31.8     Hgb Trend: 10.3<--, 11.3<--, 10.1<--, 10.8<--, 9.6<--  04-19    135  |  95<L>  |  29<H>  ----------------------------<  174<H>  4.0   |  21<L>  |  2.66<H>    Ca    8.7      19 Apr 2019 01:14  Phos  3.3     04-19  Mg     1.8     04-19    TPro  7.0  /  Alb  2.8<L>  /  TBili  0.5  /  DBili  x   /  AST  25  /  ALT  <5<L>  /  AlkPhos  133<H>  04-19    Creatinine Trend: 2.66<--, 2.25<--, 3.31<--, 2.27<--, 2.66<--, 3.60<--  PT/INR - ( 19 Apr 2019 01:14 )   PT: 15.4 sec;   INR: 1.33 ratio         PTT - ( 19 Apr 2019 01:14 )  PTT:38.9 sec    Arterial Blood Gas:  04-18 @ 21:56  7.44/36/137/24/99/.5  ABG lactate: --  Arterial Blood Gas:  04-18 @ 17:29  7.34/48/134/25/98/-.6  ABG lactate: --  Arterial Blood Gas:  04-18 @ 09:49  7.40/42/82/26/96/1.6  ABG lactate: --        MICROBIOLOGY:     RADIOLOGY:  [ ] Reviewed and interpreted by me    EKG: CHIEF COMPLAINT:    Interval Events:    Pt reportedly became apneic when being turned yesterday PM. When seen at bedside, pt was gasping for air and hypotensive to 70s systolic. Telemetry monitoring showed the pt went into Vtach, was given 1 dose of amiodarone. The patient subsequently was intubated. The patient possibly had mucous plugging from secretions she had trouble coughing up after being extubated yesterday. Serial CXRs show stable R PTX. Pt HD stable overnight.     OBJECTIVE:  ICU Vital Signs Last 24 Hrs  T(C): 36.6 (19 Apr 2019 04:00), Max: 36.9 (18 Apr 2019 12:25)  T(F): 97.9 (19 Apr 2019 04:00), Max: 98.4 (18 Apr 2019 12:25)  HR: 71 (19 Apr 2019 07:30) (63 - 164)  BP: 102/52 (19 Apr 2019 07:30) (72/40 - 145/65)  BP(mean): 75 (19 Apr 2019 07:30) (51 - 94)  ABP: --  ABP(mean): --  RR: 18 (19 Apr 2019 07:30) (11 - 37)  SpO2: 100% (19 Apr 2019 07:30) (56% - 100%)    Mode: AC/ CMV (Assist Control/ Continuous Mandatory Ventilation), RR (machine): 12, TV (machine): 450, FiO2: 30, PEEP: 5, ITime: 0.9, MAP: 9, PIP: 22    04-18 @ 07:01  -  04-19 @ 07:00  --------------------------------------------------------  IN: 1217.4 mL / OUT: 2000 mL / NET: -782.6 mL      CAPILLARY BLOOD GLUCOSE  179 (18 Apr 2019 06:00)      POCT Blood Glucose.: 140 mg/dL (19 Apr 2019 05:56)      PHYSICAL EXAM:  General: Intubated, arousable - follows commands   HEENT: EOMI. Mild erythema of sclera noted, no discharge/pus noted.   Respiratory: Coarse breath sounds heard in bilateral lung fields.   Cardiovascular: S1, S2 noted.   Abdomen: Soft, nontender, nondistended.   Extremities: RLE in cast and wrapped in ACE bandage. LLE AVF.   Skin: No lesions noted.    Neurological: Intubated, responsive, able to squeeze fingers    LINES:    HOSPITAL MEDICATIONS:  aspirin  chewable 81 milliGRAM(s) Oral daily  heparin  Injectable 5000 Unit(s) SubCutaneous every 8 hours    ceFAZolin   IVPB 1000 milliGRAM(s) IV Intermittent every 24 hours  gentamicin   IVPB 130 milliGRAM(s) IV Intermittent <User Schedule>    midodrine 10 milliGRAM(s) Oral three times a day  phenylephrine    Infusion 2 MICROgram(s)/kG/Min IV Continuous <Continuous>    insulin lispro (HumaLOG) corrective regimen sliding scale   SubCutaneous every 6 hours  levothyroxine Injectable 75 MICROGram(s) IV Push at bedtime    ALBUTerol/ipratropium for Nebulization 3 milliLiter(s) Nebulizer every 6 hours    acetaminophen    Suspension .. 650 milliGRAM(s) Oral every 6 hours PRN  ondansetron Injectable 4 milliGRAM(s) IV Push every 12 hours PRN  propofol Infusion 40 MICROgram(s)/kG/Min IV Continuous <Continuous>    pantoprazole  Injectable 40 milliGRAM(s) IV Push every 12 hours  polyethylene glycol 3350 17 Gram(s) Oral daily  senna Syrup 10 milliLiter(s) Oral at bedtime        dextrose 5%. 1000 milliLiter(s) IV Continuous <Continuous>      artificial  tears Solution 1 Drop(s) Both EYES every 2 hours  chlorhexidine 2% Cloths 1 Application(s) Topical daily  chlorhexidine 4% Liquid 1 Application(s) Topical <User Schedule>  ketotifen 0.025% Ophthalmic Solution 1 Drop(s) Both EYES every 8 hours  ketotifen 0.025% Ophthalmic Solution      lidocaine/prilocaine Cream 1 Application(s) Topical daily PRN  petrolatum Ophthalmic Ointment 1 Application(s) Both EYES three times a day        LABS:                        10.3   22.8  )-----------( 284      ( 19 Apr 2019 01:14 )             31.8     Hgb Trend: 10.3<--, 11.3<--, 10.1<--, 10.8<--, 9.6<--  04-19    135  |  95<L>  |  29<H>  ----------------------------<  174<H>  4.0   |  21<L>  |  2.66<H>    Ca    8.7      19 Apr 2019 01:14  Phos  3.3     04-19  Mg     1.8     04-19    TPro  7.0  /  Alb  2.8<L>  /  TBili  0.5  /  DBili  x   /  AST  25  /  ALT  <5<L>  /  AlkPhos  133<H>  04-19    Creatinine Trend: 2.66<--, 2.25<--, 3.31<--, 2.27<--, 2.66<--, 3.60<--  PT/INR - ( 19 Apr 2019 01:14 )   PT: 15.4 sec;   INR: 1.33 ratio         PTT - ( 19 Apr 2019 01:14 )  PTT:38.9 sec    Arterial Blood Gas:  04-18 @ 21:56  7.44/36/137/24/99/.5  ABG lactate: --  Arterial Blood Gas:  04-18 @ 17:29  7.34/48/134/25/98/-.6  ABG lactate: --  Arterial Blood Gas:  04-18 @ 09:49  7.40/42/82/26/96/1.6  ABG lactate: --        MICROBIOLOGY:     RADIOLOGY:  [ ] Reviewed and interpreted by me    EKG:

## 2019-04-19 NOTE — PROGRESS NOTE ADULT - SUBJECTIVE AND OBJECTIVE BOX
VIRGILIO KAPLAN  78y Female  MRN:[id number]    Patient is a 78y old  Female who presents with a chief complaint of R tib/fib fx (10 Apr 2019 12:01)    HPI:  78 F PMH CAD s/p CABG x 4 2015, T2DM with peripheral neuropathy, ESRD on HD T/Th/S via L AVF, hypotension on midodrine, HFrEF, HTN, HLD, b/l cataracts s/p surgery, p/w mechanical fall and R leg pain. Pt lives in basement apartment and ordinarily ambulates with walker. Today was getting ready for HD, made it to the first step on her set of stairs, and reportedly felt her R leg "give out." Pt fell backwards but was caught by her family member who was assisting her up the stairs. She denies LOC/syncope or head strike. +Pain in R leg post fall, worse with movement and much improved with immobilization. Could not bear weight or walk after fall.  Denies cp, sob at rest, f/c, n/v/d, dysuria, cough. +chronic BENAVIDEZ, reportedly stable since her CABG. Family at bedside providing collateral and confirms above details.     Vs: 99.1, 76, 102/60, 16, 90% RA --> 100% 4LNC.  Labs: no leukocytosis, chronic stable anemia, rest cbc unrevealing, coags unrevealing, hypoNa 126, HAGMA with cmp c/w known ESRD, hyperglycemia, chronic stable alkp elevation mild AST elevation. XR tib/fib/femur/hip/knee reveals acute prox tib/fib fxs with possible intraarticular extension. (10 Apr 2019 00:56)      Patient seen and evaluated in micu.       Interval HPI: re-intubated for resp distress      PAST MEDICAL & SURGICAL HISTORY:  CHF (congestive heart failure): Oct 2015- still sob  Shortness of breath  Hypotension  Type 2 diabetes mellitus  Chronic kidney disease (CKD): ON DIALYSIS - Tue, Thur, Sat  Nephrolithiasis: 2001  Ulcer: 2001  Hydronephrosis: Right 1/2012  Charcot Foot: Right Foot  Herniated Disc  Diabetic Neuropathy  Anemia: CKD  Hypothyroidism  Hypertension: NOT ANY MORE  Hyperlipidemia  S/P mitral valve repair: with CABG X 4 MAY 2015  S/P CABG (coronary artery bypass graft): x 4, May 2015  History of extraction of renal calculus  S/P Foot Surgery: 2002, 2014  S/P Cholecystectomy: 2001  S/P Breast Lumpectomy: 1994, 2003 - left breast benign      REVIEW OF SYSTEMS:  as per hpi    VITALS:  Vital Signs Last 24 Hrs  T(C): 36.6 (19 Apr 2019 04:00), Max: 36.9 (18 Apr 2019 12:25)  T(F): 97.9 (19 Apr 2019 04:00), Max: 98.4 (18 Apr 2019 12:25)  HR: 71 (19 Apr 2019 10:15) (63 - 164)  BP: 98/47 (19 Apr 2019 10:15) (72/40 - 145/65)  BP(mean): 68 (19 Apr 2019 10:15) (51 - 94)  RR: 24 (19 Apr 2019 10:15) (11 - 37)  SpO2: 100% (19 Apr 2019 10:15) (56% - 100%)    PHYSICAL EXAM:  GENERAL: intubated, sedated  HEAD:  Atraumatic, Normocephalic  EYES: EOMI, PERRLA, conjunctiva and sclera clear  NECK: no jvd, supple  CHEST/LUNG: coarse bs b/l  HEART: S1, S2;   ABDOMEN: Soft, Nontender, nondistended; Bowel sounds present  EXTREMITIES:  2+ Peripheral Pulses, No clubbing, cyanosis, + edema,  right knee cast      Consultant(s) Notes Reviewed:  [x ] YES  [ ] NO  Care Discussed with Consultants/Other Providers [ x] YES  [ ] NO    MEDS:  MEDICATIONS  (STANDING):  acetylcysteine 10%  Inhalation 3 milliLiter(s) Inhalation three times a day  ALBUTerol/ipratropium for Nebulization 3 milliLiter(s) Nebulizer every 6 hours  artificial  tears Solution 1 Drop(s) Both EYES every 2 hours  aspirin  chewable 81 milliGRAM(s) Oral daily  ceFAZolin   IVPB 1000 milliGRAM(s) IV Intermittent every 24 hours  chlorhexidine 2% Cloths 1 Application(s) Topical daily  chlorhexidine 4% Liquid 1 Application(s) Topical <User Schedule>  dexmedetomidine Infusion 0.5 MICROgram(s)/kG/Hr (8.537 mL/Hr) IV Continuous <Continuous>  dextrose 5%. 1000 milliLiter(s) (50 mL/Hr) IV Continuous <Continuous>  gentamicin   IVPB 130 milliGRAM(s) IV Intermittent <User Schedule>  heparin  Injectable 5000 Unit(s) SubCutaneous every 8 hours  insulin lispro (HumaLOG) corrective regimen sliding scale   SubCutaneous every 6 hours  ketotifen 0.025% Ophthalmic Solution 1 Drop(s) Both EYES every 8 hours  ketotifen 0.025% Ophthalmic Solution      levothyroxine Injectable 75 MICROGram(s) IV Push at bedtime  magnesium sulfate  IVPB 2 Gram(s) IV Intermittent once  midodrine 10 milliGRAM(s) Oral three times a day  pantoprazole  Injectable 40 milliGRAM(s) IV Push every 12 hours  petrolatum Ophthalmic Ointment 1 Application(s) Both EYES three times a day  phenylephrine    Infusion 2 MICROgram(s)/kG/Min (51.225 mL/Hr) IV Continuous <Continuous>  polyethylene glycol 3350 17 Gram(s) Oral daily  propofol Infusion 40 MICROgram(s)/kG/Min (16.392 mL/Hr) IV Continuous <Continuous>  senna Syrup 10 milliLiter(s) Oral at bedtime  sodium chloride 3%  Inhalation 4 milliLiter(s) Inhalation every 6 hours      ALLERGIES:  allopurinol (Rash)  Augmentin (Rash)  Levaquin (Rash)      LABS:                                       10.3   22.8  )-----------( 284      ( 19 Apr 2019 01:14 )             31.8   04-19    135  |  95<L>  |  29<H>  ----------------------------<  174<H>  4.0   |  21<L>  |  2.66<H>    Ca    8.7      19 Apr 2019 01:14  Phos  3.3     04-19  Mg     1.8     04-19    TPro  7.0  /  Alb  2.8<L>  /  TBili  0.5  /  DBili  x   /  AST  25  /  ALT  <5<L>  /  AlkPhos  133<H>  04-19        Culture - Blood (04.13.19 @ 07:27)    Gram Stain:   Growth in aerobic bottle: Gram Positive Cocci in Clusters    Specimen Source: .Blood    Culture Results:   Growth in aerobic bottle: Gram Positive Cocci in Clusters        < from: CT Knee No Cont, Right (04.10.19 @ 03:34) >  Impression:    Acute, impacted fractures of the right proximal tibia and fibula as   described.      < end of copied text >           < from: Xray Tibia + Fibula 2 Views, Right (04.09.19 @ 20:28) >  impression:    There is an acute, comminuted fracture of the proximal tibial metaphysis   with mild anterior displacement of the distal fracture fragment. There is   questionable intra-articular extension on the lateral view. There is a   large knee joint effusion. There is a proximal fibular fracture.    There is no fracture of the hip or femur. The ankle is poorly visualized   secondary to technique and may be externally rotated with respect to the   knee. There is no ankle joint effusion. Vascular calcifications are noted.     CT of the knee can be performed for further evaluation.    < end of copied text >

## 2019-04-19 NOTE — PROGRESS NOTE ADULT - SUBJECTIVE AND OBJECTIVE BOX
[facility]   [phone]  RENAL FOLLOW UP NOTE  --------------------------------------------------------------------------------  Chief Complaint:    24 hour events/subjective:    above events noted.   d/w icu team when seen  re-intubated after acute sob/hypotension/svt followed by sustained V tach     PAST HISTORY  --------------------------------------------------------------------------------  No significant changes to PMH, PSH, FHx, SHx, unless otherwise noted    ALLERGIES & MEDICATIONS  --------------------------------------------------------------------------------  Allergies    allopurinol (Rash)  Augmentin (Rash)  Levaquin (Rash)    Intolerances      Standing Inpatient Medications  acetylcysteine 10%  Inhalation 3 milliLiter(s) Inhalation three times a day  ALBUTerol/ipratropium for Nebulization 3 milliLiter(s) Nebulizer every 6 hours  artificial  tears Solution 1 Drop(s) Both EYES every 2 hours  aspirin  chewable 81 milliGRAM(s) Oral daily  ceFAZolin   IVPB 1000 milliGRAM(s) IV Intermittent every 24 hours  chlorhexidine 2% Cloths 1 Application(s) Topical daily  chlorhexidine 4% Liquid 1 Application(s) Topical <User Schedule>  dexmedetomidine Infusion 0.5 MICROgram(s)/kG/Hr IV Continuous <Continuous>  dextrose 5%. 1000 milliLiter(s) IV Continuous <Continuous>  gentamicin   IVPB 130 milliGRAM(s) IV Intermittent <User Schedule>  heparin  Injectable 5000 Unit(s) SubCutaneous every 8 hours  insulin lispro (HumaLOG) corrective regimen sliding scale   SubCutaneous every 6 hours  ketotifen 0.025% Ophthalmic Solution 1 Drop(s) Both EYES every 8 hours  ketotifen 0.025% Ophthalmic Solution      levothyroxine Injectable 75 MICROGram(s) IV Push at bedtime  midodrine 10 milliGRAM(s) Oral three times a day  pantoprazole  Injectable 40 milliGRAM(s) IV Push every 12 hours  petrolatum Ophthalmic Ointment 1 Application(s) Both EYES three times a day  phenylephrine    Infusion 2 MICROgram(s)/kG/Min IV Continuous <Continuous>  polyethylene glycol 3350 17 Gram(s) Oral daily  propofol Infusion 40 MICROgram(s)/kG/Min IV Continuous <Continuous>  senna Syrup 10 milliLiter(s) Oral at bedtime  sodium chloride 3%  Inhalation 4 milliLiter(s) Inhalation every 6 hours    PRN Inpatient Medications  acetaminophen    Suspension .. 650 milliGRAM(s) Oral every 6 hours PRN  lidocaine/prilocaine Cream 1 Application(s) Topical daily PRN  ondansetron Injectable 4 milliGRAM(s) IV Push every 12 hours PRN      REVIEW OF SYSTEMS  --------------------------------------------------------------------------------  intubated     VITALS/PHYSICAL EXAM  --------------------------------------------------------------------------------  T(C): 36.5 (04-19-19 @ 12:00), Max: 36.6 (04-18-19 @ 20:00)  HR: 69 (04-19-19 @ 14:30) (63 - 164)  BP: 100/52 (04-19-19 @ 14:15) (72/40 - 145/65)  RR: 12 (04-19-19 @ 14:30) (11 - 37)  SpO2: 100% (04-19-19 @ 14:30) (56% - 100%)  Wt(kg): --        04-18-19 @ 07:01  -  04-19-19 @ 07:00  --------------------------------------------------------  IN: 1217.4 mL / OUT: 2000 mL / NET: -782.6 mL    04-19-19 @ 07:01  -  04-19-19 @ 14:33  --------------------------------------------------------  IN: 481.5 mL / OUT: 0 mL / NET: 481.5 mL      Physical Exam:  	  Gen: intubated   	no jvd   	Pulm: decrease bs  no rales or ronchi or wheezing  	CV: RRR, S1S2; no rub  	Abd: +BS, soft,  + distended ascites softer   	: No suprapubic tenderness  	UE: Warm, no cyanosis  no clubbing,  no edema  	LE: Warm, no cyanosis  no clubbing, no edema RLE cast   	avf +  bruit and thrill 	  			  	  	    LABS/STUDIES  --------------------------------------------------------------------------------              10.3   22.8  >-----------<  284      [04-19-19 @ 01:14]              31.8     135  |  95  |  29  ----------------------------<  174      [04-19-19 @ 01:14]  4.0   |  21  |  2.66        Ca     8.7     [04-19-19 @ 01:14]      Mg     1.8     [04-19-19 @ 01:14]      Phos  3.3     [04-19-19 @ 01:14]    TPro  7.0  /  Alb  2.8  /  TBili  0.5  /  DBili  x   /  AST  25  /  ALT  <5  /  AlkPhos  133  [04-19-19 @ 01:14]    PT/INR: PT 15.4 , INR 1.33       [04-19-19 @ 01:14]  PTT: 38.9       [04-19-19 @ 01:14]      Creatinine Trend:  SCr 2.66 [04-19 @ 01:14]  SCr 2.25 [04-18 @ 17:33]  SCr 3.31 [04-18 @ 00:12]  SCr 2.27 [04-17 @ 00:45]  SCr 2.66 [04-16 @ 00:46]                  Iron 71, TIBC 135, %sat 53      [04-11-19 @ 17:50]  Ferritin 6834      [04-11-19 @ 17:57]  HbA1c 8.3      [04-12-19 @ 09:00]  TSH 34.70      [04-12-19 @ 07:41]  Lipid: chol 63, , HDL 12, LDL 23      [04-12-19 @ 09:06]

## 2019-04-19 NOTE — PROGRESS NOTE ADULT - ASSESSMENT
79 yo F with hx of cad, cabg, DM, esrd, on HD, chronic hypotension on midodrine 10 mg tid with sob, pleural effusions, chf, ascites, and new tib/fib fx    1- esrd  2- hypotension   3- chf  4- ascites   5- tib/fib fx   6- respiratory failure     vent support to cont   tube feeds for nutrition   neosynephrine drip and midodrine for bp support  hd am

## 2019-04-19 NOTE — CONSULT NOTE ADULT - SUBJECTIVE AND OBJECTIVE BOX
[facility] Ophthalmology Consult Note    HPI: "78F with CAD s/p CABG x4, HFrEF (EF 26% 4/2019), T2DM with peripheral neuropathy, ESRD on HD T/Th/S via L AVF, hypotension on midodrine, HTN, HLD initially admitted 4/9 after mechanical fall c/b R proximal tib/fib fracture not requiring emergent surgical intervention s/p cast placement with hospital course c/b possible melena now resolved, and RRT on 4/11 for acute hypercapnic respiratory failure in setting of opioid use requiring intubation. MICU course c/b SVT s/p synchronized cardioversion x2 now in sinus rhythm and Staph aureus bacteremia with noted vegetations seen on TTE, not a surgical candidate.  Hospital course further complicated by likely mucous plugging causing hypotension, Vtach and respiratory failure requiring urgent re-intubation on 4/18. "    Ophthalmology consulted because patient complains of foreign body sensation, irritation, and eye redness when lucid. These symptoms started earlier in the week. Patient seen and examined this evening. She is currently intubated and sedated. History obtained from MICU team and chart review.       PMH: CHF (congestive heart failure): Oct 2015- still sob  Shortness of breath  Hypotension  Type 2 diabetes mellitus  Chronic kidney disease (CKD): ON DIALYSIS - Tue, Thur, Sat  Nephrolithiasis: 2001  Ulcer: 2001  Hydronephrosis: Right 1/2012  Charcot Foot: Right Foot  Herniated Disc  Diabetic Neuropathy  Anemia: CKD  Hypothyroidism  Hypertension: NOT ANY MORE  Hyperlipidemia  S/P mitral valve repair: with CABG X 4 MAY 2015  S/P CABG (coronary artery bypass graft): x 4, May 2015  History of extraction of renal calculus  S/P Foot Surgery: 2002, 2014  S/P Cholecystectomy: 2001  S/P Breast Lumpectomy: 1994, 2003 - left breast benign    Meds: see sunrise  POcHx (including surgeries/lasers/trauma):  B/L cataract surgery, unknown date - obtained from chart review  Drops: Lacrilube TID; PFAT Q2; Ketotifen BID  FamHx: None  Social Hx: None  Allergies: NKDA    ROS:  General (neg), Vision (per HPI), Head and Neck (neg), Pulm (neg), CV (neg), GI (neg),  (neg), Musculoskeletal (neg), Skin/Integ (neg), Neuro (neg), Endocrine (neg), Heme (neg), All/Immuno (neg)    Mood and Affect Appropriate ( isaac ),  Oriented to Time, Place, and Person x 3 ( isaac )    Ophthalmology Exam    Visual acuity (sc): ISAAC 2/2 mental status  Pupils: PERRL OU, no APD  Ttono: 8 OU  Extraocular movements (EOMs): ISAAC 2/2 mental status  Confrontational Visual Field (CVF):  ISAAC 2/2 mental status  Color Plates: ISAAC 2/2 mental status    Pen Light Exam (PLE)  External:  Flat OU  Lids/Lashes/Lacrimal Ducts: mild blepharitis OU  Sclera/Conjunctiva:  W+Q OU  Cornea: 3-4+ SPK OU. No focal area of epithelial defects. No lagophthalmos noted on exam.  Anterior Chamber: D+F OU  Iris:  Flat OU  Lens:  Cl OU    Fundus Exam: dilated with 1% tropicamide and 2.5% phenylephrine  Approval obtained from primary team for dilation  Patient aware that pupils can remained dilated for at least 4-6 hours  Exam performed with 20D lens    Vitreous: wnl OU  Disc, cup/disc: sharp and pink, 0.4 OU  Macula:  wnl OU  Vessels:  wnl OU  Periphery: wnl OU    Diagnostic Testing:       Assessment: 78F with CAD s/p CABG x4, HFrEF (EF 26% 4/2019), T2DM with peripheral neuropathy, ESRD on HD T/Th/S via L AVF, hypotension on midodrine, HTN, HLD initially admitted 4/9 after mechanical fall, c/o irritation, FBS, eye redness x 3-4 days. Patient intubated and sedated on interview. 3-4 SPK noted OU with no lagophthalmos and no focal epithelial defects. 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 Discussions with nursing reveal that patient is currently less agitated and sleeping more soundly lately, possible she was sleeping with her eyes open earlier in the week.      Plan:  - Preservative free artificial tears Q2 hours both eyes  - Erythromycin ointment QID Both eyes  - stop ketotifen  - given no lagophthalmos on exam, will defer lid taping QHS for now  - d/w icu team and nursing staff      Follow-Up:  Patient should follow up his/her ophthalmologist or in the [facility] Ophthalmology Practice within 1 week of discharge.  [address] [place] [address] [phone] [facility] Ophthalmology Consult Note    HPI: "78F with CAD s/p CABG x4, HFrEF (EF 26% 4/2019), T2DM with peripheral neuropathy, ESRD on HD T/Th/S via L AVF, hypotension on midodrine, HTN, HLD initially admitted 4/9 after mechanical fall c/b R proximal tib/fib fracture not requiring emergent surgical intervention s/p cast placement with hospital course c/b possible melena now resolved, and RRT on 4/11 for acute hypercapnic respiratory failure in setting of opioid use requiring intubation. MICU course c/b SVT s/p synchronized cardioversion x2 now in sinus rhythm and Staph aureus bacteremia with noted vegetations seen on TTE, not a surgical candidate.  Hospital course further complicated by likely mucous plugging causing hypotension, Vtach and respiratory failure requiring urgent re-intubation on 4/18. "    Ophthalmology consulted because patient complains of foreign body sensation, irritation, and eye redness when lucid. These symptoms started earlier in the week. Patient seen and examined this evening. She is currently intubated and sedated. History obtained from MICU team and chart review.       PMH: CHF (congestive heart failure): Oct 2015- still sob  Shortness of breath  Hypotension  Type 2 diabetes mellitus  Chronic kidney disease (CKD): ON DIALYSIS - Tue, Thur, Sat  Nephrolithiasis: 2001  Ulcer: 2001  Hydronephrosis: Right 1/2012  Charcot Foot: Right Foot  Herniated Disc  Diabetic Neuropathy  Anemia: CKD  Hypothyroidism  Hypertension: NOT ANY MORE  Hyperlipidemia  S/P mitral valve repair: with CABG X 4 MAY 2015  S/P CABG (coronary artery bypass graft): x 4, May 2015  History of extraction of renal calculus  S/P Foot Surgery: 2002, 2014  S/P Cholecystectomy: 2001  S/P Breast Lumpectomy: 1994, 2003 - left breast benign    Meds: see sunrise  POcHx (including surgeries/lasers/trauma):  B/L cataract surgery, unknown date - obtained from chart review  Drops: Lacrilube TID; PFAT Q2; Ketotifen BID  FamHx: None  Social Hx: None  Allergies: NKDA    ROS:  General (neg), Vision (per HPI), Head and Neck (neg), Pulm (neg), CV (neg), GI (neg),  (neg), Musculoskeletal (neg), Skin/Integ (neg), Neuro (neg), Endocrine (neg), Heme (neg), All/Immuno (neg)    Mood and Affect Appropriate ( isaac ),  Oriented to Time, Place, and Person x 3 ( isaac )    Ophthalmology Exam    Visual acuity (sc): ISAAC 2/2 mental status  Pupils: PERRL OU, no APD  Ttono: 8 OU  Extraocular movements (EOMs): ISAAC 2/2 mental status  Confrontational Visual Field (CVF):  ISAAC 2/2 mental status  Color Plates: ISAAC 2/2 mental status    Pen Light Exam (PLE)  External:  Flat OU  Lids/Lashes/Lacrimal Ducts: mild blepharitis OU  Sclera/Conjunctiva:  W+Q OU  Cornea: 3-4+ SPK OU. No focal area of epithelial defects. 1 mm lagophthalmos noted on exam.  Anterior Chamber: D+F OU  Iris:  Flat OU  Lens:  Cl OU    Fundus Exam: dilated with 1% tropicamide and 2.5% phenylephrine  Approval obtained from primary team for dilation  Patient aware that pupils can remained dilated for at least 4-6 hours  Exam performed with 20D lens    Vitreous: wnl OU  Disc, cup/disc: sharp and pink, 0.4 OU  Macula:  wnl OU  Vessels:  wnl OU  Periphery: wnl OU    Diagnostic Testing:       Assessment: 78F with CAD s/p CABG x4, HFrEF (EF 26% 4/2019), T2DM with peripheral neuropathy, ESRD on HD T/Th/S via L AVF, hypotension on midodrine, HTN, HLD initially admitted 4/9 after mechanical fall, c/o irritation, FBS, eye redness x 3-4 days. Patient intubated and sedated on interview. 3-4 SPK noted OU with 1mm lagophthalmos and no focal epithelial defects. 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       Plan:  - Preservative free artificial tears Q2 hours both eyes  - Erythromycin ointment QID Both eyes  - stop ketotifen  - given lagophthalmos on exam, recommend lid taping QHS if family amenable. If not, can try cool compresses PRN during day.  - d/w icu team and nursing staff      Follow-Up:  Patient should follow up his/her ophthalmologist or in the [facility] Ophthalmology Practice within 1 week of discharge.  [address] [facility] [place] [address]  [phone] [facility] Ophthalmology Consult Note    HPI: "78F with CAD s/p CABG x4, HFrEF (EF 26% 4/2019), T2DM with peripheral neuropathy, ESRD on HD T/Th/S via L AVF, hypotension on midodrine, HTN, HLD initially admitted 4/9 after mechanical fall c/b R proximal tib/fib fracture not requiring emergent surgical intervention s/p cast placement with hospital course c/b possible melena now resolved, and RRT on 4/11 for acute hypercapnic respiratory failure in setting of opioid use requiring intubation. MICU course c/b SVT s/p synchronized cardioversion x2 now in sinus rhythm and Staph aureus bacteremia with noted vegetations seen on TTE, not a surgical candidate.  Hospital course further complicated by likely mucous plugging causing hypotension, Vtach and respiratory failure requiring urgent re-intubation on 4/18. "    Ophthalmology consulted because patient complains of foreign body sensation, irritation, and eye redness when lucid. These symptoms started earlier in the week. Patient seen and examined this evening. She is currently intubated and sedated. History obtained from MICU team and chart review.       PMH: CHF (congestive heart failure): Oct 2015- still sob  Shortness of breath  Hypotension  Type 2 diabetes mellitus  Chronic kidney disease (CKD): ON DIALYSIS - Tue, Thur, Sat  Nephrolithiasis: 2001  Ulcer: 2001  Hydronephrosis: Right 1/2012  Charcot Foot: Right Foot  Herniated Disc  Diabetic Neuropathy  Anemia: CKD  Hypothyroidism  Hypertension: NOT ANY MORE  Hyperlipidemia  S/P mitral valve repair: with CABG X 4 MAY 2015  S/P CABG (coronary artery bypass graft): x 4, May 2015  History of extraction of renal calculus  S/P Foot Surgery: 2002, 2014  S/P Cholecystectomy: 2001  S/P Breast Lumpectomy: 1994, 2003 - left breast benign    Meds: see sunrise  POcHx (including surgeries/lasers/trauma):  B/L cataract surgery, unknown date - obtained from chart review  Drops: Lacrilube TID; PFAT Q2; Ketotifen BID  FamHx: None  Social Hx: None  Allergies: NKDA    ROS:  General (neg), Vision (per HPI), Head and Neck (neg), Pulm (neg), CV (neg), GI (neg),  (neg), Musculoskeletal (neg), Skin/Integ (neg), Neuro (neg), Endocrine (neg), Heme (neg), All/Immuno (neg)    Mood and Affect Appropriate ( isaac ),  Oriented to Time, Place, and Person x 3 ( isaac )    Ophthalmology Exam    Visual acuity (sc): ISAAC 2/2 mental status  Pupils: PERRL OU, no APD  Ttono: 8 OU  Extraocular movements (EOMs): ISAAC 2/2 mental status  Confrontational Visual Field (CVF):  ISAAC 2/2 mental status  Color Plates: ISAAC 2/2 mental status    Pen Light Exam (PLE)  External:  Flat OU  Lids/Lashes/Lacrimal Ducts: mild blepharitis OU  Sclera/Conjunctiva:  W+Q OU  Cornea: 3-4+ SPK OU. No focal area of epithelial defects. 1 mm lagophthalmos noted on exam.  Anterior Chamber: D+F OU  Iris:  Flat OU  Lens:  PCIOL OU    Fundus Exam: dilated with 1% tropicamide and 2.5% phenylephrine  Approval obtained from primary team for dilation  Patient aware that pupils can remained dilated for at least 4-6 hours  Exam performed with 20D lens    Vitreous: wnl OU  Disc, cup/disc: sharp and pink, 0.2 OU  Macula:  mas OD, CWS proximal to disc  Vessels:  wnl OU  Periphery: wnl OU    Diagnostic Testing:       Assessment: 78F with CAD s/p CABG x4, HFrEF (EF 26% 4/2019), T2DM with peripheral neuropathy, ESRD on HD T/Th/S via L AVF, hypotension on midodrine, HTN, HLD initially admitted 4/9 after mechanical fall, c/o irritation, FBS, eye redness x 3-4 days. Patient intubated and sedated on interview. 3-4 SPK noted OU with 1mm lagophthalmos and no focal epithelial defects. 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       Plan:  - Preservative free artificial tears Q2 hours both eyes  - Erythromycin ointment QID Both eyes  - stop ketotifen  - given lagophthalmos on exam, recommend lid taping QHS if family amenable. If not, can try cool compresses PRN during day.  - d/w icu team and nursing staff      Follow-Up:  Patient should follow up his/her ophthalmologist or in the [facility] Ophthalmology Practice within 1 week of discharge.  [address] [place] [address].  [place] [address]  [phone]

## 2019-04-19 NOTE — PROGRESS NOTE ADULT - ASSESSMENT
78 F with DM, CAD s/p CABG, mitral annuloplasty ring, HFrEF on midodrine, ESRD on HD via Left AVF, admitted 4/9/19 after a fall at home and sustaining a Right tib/fib fracture now casted, developed acute hypercapnic respiratory failure attributed to opioids, intubated, developed fevers 4/12 and high grade MSSA bacteremia   pt was started on vanco   repeat blood cx 4/13 still positive, 4/14 negative for now  bedside LISA and repeat LISA with mitral and aortic valve vegetation but pt not a surgical candidate  s/p thoracentesis cx negative    high grade MSSA bacteremia, in the setting of mitral ring annuloplasty with vegetation on mitral and aortic valves  ?penicillin allergy  pt reintubated again    * blood cx 4/14 negative, 4/17 with coag neg staph  * as per cardiothoracic pt is not a surgical candidate   * c/w  cefazolin 1 g qd  * c/w gentamycin 2 mg/kg q 48 post HD, started 4/16, now day 4  * ophthalmology eval (pt has bacteremia and r/o endophthalmitis)

## 2019-04-20 LAB
ALBUMIN SERPL ELPH-MCNC: 2.4 G/DL — LOW (ref 3.3–5)
ALP SERPL-CCNC: 117 U/L — SIGNIFICANT CHANGE UP (ref 40–120)
ALT FLD-CCNC: <5 U/L — LOW (ref 10–45)
ANION GAP SERPL CALC-SCNC: 21 MMOL/L — HIGH (ref 5–17)
APTT BLD: 38 SEC — HIGH (ref 27.5–36.3)
AST SERPL-CCNC: 23 U/L — SIGNIFICANT CHANGE UP (ref 10–40)
BILIRUB SERPL-MCNC: 0.4 MG/DL — SIGNIFICANT CHANGE UP (ref 0.2–1.2)
BUN SERPL-MCNC: 36 MG/DL — HIGH (ref 7–23)
CALCIUM SERPL-MCNC: 8.2 MG/DL — LOW (ref 8.4–10.5)
CHLORIDE SERPL-SCNC: 97 MMOL/L — SIGNIFICANT CHANGE UP (ref 96–108)
CO2 SERPL-SCNC: 17 MMOL/L — LOW (ref 22–31)
CREAT SERPL-MCNC: 3.28 MG/DL — HIGH (ref 0.5–1.3)
GAS PNL BLDA: SIGNIFICANT CHANGE UP
GAS PNL BLDA: SIGNIFICANT CHANGE UP
GLUCOSE BLDC GLUCOMTR-MCNC: 119 MG/DL — HIGH (ref 70–99)
GLUCOSE BLDC GLUCOMTR-MCNC: 149 MG/DL — HIGH (ref 70–99)
GLUCOSE BLDC GLUCOMTR-MCNC: 155 MG/DL — HIGH (ref 70–99)
GLUCOSE BLDC GLUCOMTR-MCNC: 195 MG/DL — HIGH (ref 70–99)
GLUCOSE SERPL-MCNC: 118 MG/DL — HIGH (ref 70–99)
GRAM STN FLD: SIGNIFICANT CHANGE UP
HCT VFR BLD CALC: 32.1 % — LOW (ref 34.5–45)
HGB BLD-MCNC: 10.2 G/DL — LOW (ref 11.5–15.5)
INR BLD: 1.2 RATIO — HIGH (ref 0.88–1.16)
MAGNESIUM SERPL-MCNC: 2.5 MG/DL — SIGNIFICANT CHANGE UP (ref 1.6–2.6)
MCHC RBC-ENTMCNC: 30.4 PG — SIGNIFICANT CHANGE UP (ref 27–34)
MCHC RBC-ENTMCNC: 31.7 GM/DL — LOW (ref 32–36)
MCV RBC AUTO: 95.9 FL — SIGNIFICANT CHANGE UP (ref 80–100)
NON-GYNECOLOGICAL CYTOLOGY STUDY: SIGNIFICANT CHANGE UP
PHOSPHATE SERPL-MCNC: 3.4 MG/DL — SIGNIFICANT CHANGE UP (ref 2.5–4.5)
PLATELET # BLD AUTO: 349 K/UL — SIGNIFICANT CHANGE UP (ref 150–400)
POTASSIUM SERPL-MCNC: 3.9 MMOL/L — SIGNIFICANT CHANGE UP (ref 3.5–5.3)
POTASSIUM SERPL-SCNC: 3.9 MMOL/L — SIGNIFICANT CHANGE UP (ref 3.5–5.3)
PROT SERPL-MCNC: 6.9 G/DL — SIGNIFICANT CHANGE UP (ref 6–8.3)
PROTHROM AB SERPL-ACNC: 13.8 SEC — HIGH (ref 10–12.9)
RBC # BLD: 3.35 M/UL — LOW (ref 3.8–5.2)
RBC # FLD: 17.5 % — HIGH (ref 10.3–14.5)
SODIUM SERPL-SCNC: 135 MMOL/L — SIGNIFICANT CHANGE UP (ref 135–145)
SPECIMEN SOURCE: SIGNIFICANT CHANGE UP
WBC # BLD: 15.3 K/UL — HIGH (ref 3.8–10.5)
WBC # FLD AUTO: 15.3 K/UL — HIGH (ref 3.8–10.5)

## 2019-04-20 PROCEDURE — 93010 ELECTROCARDIOGRAM REPORT: CPT

## 2019-04-20 PROCEDURE — 99291 CRITICAL CARE FIRST HOUR: CPT

## 2019-04-20 RX ORDER — METOCLOPRAMIDE HCL 10 MG
5 TABLET ORAL EVERY 8 HOURS
Qty: 0 | Refills: 0 | Status: DISCONTINUED | OUTPATIENT
Start: 2019-04-20 | End: 2019-04-20

## 2019-04-20 RX ORDER — DEXMEDETOMIDINE HYDROCHLORIDE IN 0.9% SODIUM CHLORIDE 4 UG/ML
0.2 INJECTION INTRAVENOUS
Qty: 200 | Refills: 0 | Status: DISCONTINUED | OUTPATIENT
Start: 2019-04-20 | End: 2019-04-23

## 2019-04-20 RX ORDER — FENTANYL CITRATE 50 UG/ML
50 INJECTION INTRAVENOUS ONCE
Qty: 0 | Refills: 0 | Status: DISCONTINUED | OUTPATIENT
Start: 2019-04-20 | End: 2019-04-20

## 2019-04-20 RX ORDER — PROPOFOL 10 MG/ML
10 INJECTION, EMULSION INTRAVENOUS
Qty: 500 | Refills: 0 | Status: DISCONTINUED | OUTPATIENT
Start: 2019-04-20 | End: 2019-04-21

## 2019-04-20 RX ORDER — ERYTHROPOIETIN 10000 [IU]/ML
6000 INJECTION, SOLUTION INTRAVENOUS; SUBCUTANEOUS ONCE
Qty: 0 | Refills: 0 | Status: COMPLETED | OUTPATIENT
Start: 2019-04-20 | End: 2019-04-20

## 2019-04-20 RX ORDER — MIDAZOLAM HYDROCHLORIDE 1 MG/ML
2 INJECTION, SOLUTION INTRAMUSCULAR; INTRAVENOUS ONCE
Qty: 0 | Refills: 0 | Status: DISCONTINUED | OUTPATIENT
Start: 2019-04-20 | End: 2019-04-20

## 2019-04-20 RX ORDER — ACETAMINOPHEN 500 MG
1000 TABLET ORAL ONCE
Qty: 0 | Refills: 0 | Status: COMPLETED | OUTPATIENT
Start: 2019-04-20 | End: 2019-04-20

## 2019-04-20 RX ORDER — SODIUM CHLORIDE 9 MG/ML
1000 INJECTION, SOLUTION INTRAVENOUS
Qty: 0 | Refills: 0 | Status: DISCONTINUED | OUTPATIENT
Start: 2019-04-20 | End: 2019-04-21

## 2019-04-20 RX ADMIN — PANTOPRAZOLE SODIUM 40 MILLIGRAM(S): 20 TABLET, DELAYED RELEASE ORAL at 05:39

## 2019-04-20 RX ADMIN — SODIUM CHLORIDE 75 MILLILITER(S): 9 INJECTION, SOLUTION INTRAVENOUS at 06:10

## 2019-04-20 RX ADMIN — Medication 1 DROP(S): at 20:46

## 2019-04-20 RX ADMIN — FENTANYL CITRATE 50 MICROGRAM(S): 50 INJECTION INTRAVENOUS at 03:15

## 2019-04-20 RX ADMIN — Medication 1 DROP(S): at 04:07

## 2019-04-20 RX ADMIN — FENTANYL CITRATE 50 MICROGRAM(S): 50 INJECTION INTRAVENOUS at 04:07

## 2019-04-20 RX ADMIN — Medication 1 DROP(S): at 00:22

## 2019-04-20 RX ADMIN — MIDODRINE HYDROCHLORIDE 10 MILLIGRAM(S): 2.5 TABLET ORAL at 13:47

## 2019-04-20 RX ADMIN — Medication 400 MILLIGRAM(S): at 16:00

## 2019-04-20 RX ADMIN — Medication 1 APPLICATION(S): at 21:53

## 2019-04-20 RX ADMIN — MIDODRINE HYDROCHLORIDE 10 MILLIGRAM(S): 2.5 TABLET ORAL at 21:54

## 2019-04-20 RX ADMIN — Medication 1 DROP(S): at 21:52

## 2019-04-20 RX ADMIN — Medication 1 DROP(S): at 12:47

## 2019-04-20 RX ADMIN — DEXMEDETOMIDINE HYDROCHLORIDE IN 0.9% SODIUM CHLORIDE 3.42 MICROGRAM(S)/KG/HR: 4 INJECTION INTRAVENOUS at 19:46

## 2019-04-20 RX ADMIN — SODIUM CHLORIDE 4 MILLILITER(S): 9 INJECTION INTRAMUSCULAR; INTRAVENOUS; SUBCUTANEOUS at 12:28

## 2019-04-20 RX ADMIN — Medication 75 MICROGRAM(S): at 21:53

## 2019-04-20 RX ADMIN — Medication 1: at 00:20

## 2019-04-20 RX ADMIN — HEPARIN SODIUM 5000 UNIT(S): 5000 INJECTION INTRAVENOUS; SUBCUTANEOUS at 21:53

## 2019-04-20 RX ADMIN — Medication 3 MILLILITER(S): at 06:12

## 2019-04-20 RX ADMIN — PHENYLEPHRINE HYDROCHLORIDE 51.23 MICROGRAM(S)/KG/MIN: 10 INJECTION INTRAVENOUS at 19:54

## 2019-04-20 RX ADMIN — Medication 1 APPLICATION(S): at 13:48

## 2019-04-20 RX ADMIN — Medication 1 DROP(S): at 18:06

## 2019-04-20 RX ADMIN — Medication 1 DROP(S): at 08:56

## 2019-04-20 RX ADMIN — PANTOPRAZOLE SODIUM 40 MILLIGRAM(S): 20 TABLET, DELAYED RELEASE ORAL at 17:42

## 2019-04-20 RX ADMIN — Medication 1000 MILLIGRAM(S): at 16:30

## 2019-04-20 RX ADMIN — Medication 81 MILLIGRAM(S): at 13:48

## 2019-04-20 RX ADMIN — HEPARIN SODIUM 5000 UNIT(S): 5000 INJECTION INTRAVENOUS; SUBCUTANEOUS at 06:00

## 2019-04-20 RX ADMIN — Medication 3 MILLILITER(S): at 12:27

## 2019-04-20 RX ADMIN — Medication 100 MILLIGRAM(S): at 14:13

## 2019-04-20 RX ADMIN — Medication 1 APPLICATION(S): at 00:20

## 2019-04-20 RX ADMIN — SODIUM CHLORIDE 4 MILLILITER(S): 9 INJECTION INTRAMUSCULAR; INTRAVENOUS; SUBCUTANEOUS at 18:11

## 2019-04-20 RX ADMIN — ERYTHROPOIETIN 6000 UNIT(S): 10000 INJECTION, SOLUTION INTRAVENOUS; SUBCUTANEOUS at 18:20

## 2019-04-20 RX ADMIN — SODIUM CHLORIDE 75 MILLILITER(S): 9 INJECTION, SOLUTION INTRAVENOUS at 19:52

## 2019-04-20 RX ADMIN — POLYETHYLENE GLYCOL 3350 17 GRAM(S): 17 POWDER, FOR SOLUTION ORAL at 13:48

## 2019-04-20 RX ADMIN — Medication 1 DROP(S): at 16:00

## 2019-04-20 RX ADMIN — FENTANYL CITRATE 50 MICROGRAM(S): 50 INJECTION INTRAVENOUS at 02:45

## 2019-04-20 RX ADMIN — Medication 1 APPLICATION(S): at 06:00

## 2019-04-20 RX ADMIN — Medication 3 MILLILITER(S): at 12:30

## 2019-04-20 RX ADMIN — Medication 3 MILLILITER(S): at 18:10

## 2019-04-20 RX ADMIN — Medication 1 DROP(S): at 13:50

## 2019-04-20 RX ADMIN — Medication 1 DROP(S): at 06:00

## 2019-04-20 RX ADMIN — SENNA PLUS 10 MILLILITER(S): 8.6 TABLET ORAL at 21:54

## 2019-04-20 RX ADMIN — MIDODRINE HYDROCHLORIDE 10 MILLIGRAM(S): 2.5 TABLET ORAL at 05:39

## 2019-04-20 RX ADMIN — Medication 1: at 18:06

## 2019-04-20 RX ADMIN — HEPARIN SODIUM 5000 UNIT(S): 5000 INJECTION INTRAVENOUS; SUBCUTANEOUS at 13:50

## 2019-04-20 RX ADMIN — CHLORHEXIDINE GLUCONATE 1 APPLICATION(S): 213 SOLUTION TOPICAL at 05:40

## 2019-04-20 RX ADMIN — SODIUM CHLORIDE 4 MILLILITER(S): 9 INJECTION INTRAMUSCULAR; INTRAVENOUS; SUBCUTANEOUS at 06:12

## 2019-04-20 RX ADMIN — FENTANYL CITRATE 50 MICROGRAM(S): 50 INJECTION INTRAVENOUS at 03:16

## 2019-04-20 RX ADMIN — Medication 3 MILLILITER(S): at 18:11

## 2019-04-20 RX ADMIN — Medication 1 APPLICATION(S): at 17:41

## 2019-04-20 RX ADMIN — Medication 1 DROP(S): at 02:00

## 2019-04-20 NOTE — PROGRESS NOTE ADULT - ASSESSMENT
79 yo F with hx of cad, cabg, DM, esrd, on HD, chronic hypotension on midodrine 10 mg tid with sob, pleural effusions, chf, ascites, and new tib/fib fx    1- esrd  2- hypotension   3- chf  4- ascites   5- tib/fib fx   6- respiratory failure     hd revaclear 300 3 k bath bfr 250 cc/min 0.5 liter fluid removal

## 2019-04-20 NOTE — PROGRESS NOTE ADULT - SUBJECTIVE AND OBJECTIVE BOX
[facility]   [phone]  DIALYSIS NOTE  Chief Complaint: ESRD/Ongoing hemodialysis requirement.     24 hour events/subjective:      seen earlier  and d/w icu team when seen       ALLERGIES & MEDICATIONS  --------------------------------------------------------------------------------  Allergies    allopurinol (Rash)  Augmentin (Rash)  Levaquin (Rash)    Intolerances      Standing Inpatient Medications  acetylcysteine 10%  Inhalation 3 milliLiter(s) Inhalation every 6 hours  ALBUTerol/ipratropium for Nebulization 3 milliLiter(s) Nebulizer every 6 hours  artificial  tears Solution 1 Drop(s) Both EYES every 2 hours  aspirin  chewable 81 milliGRAM(s) Oral daily  ceFAZolin   IVPB 1000 milliGRAM(s) IV Intermittent every 24 hours  chlorhexidine 2% Cloths 1 Application(s) Topical daily  chlorhexidine 4% Liquid 1 Application(s) Topical <User Schedule>  dexmedetomidine Infusion 0.2 MICROgram(s)/kG/Hr IV Continuous <Continuous>  dextrose 5% + sodium chloride 0.45%. 1000 milliLiter(s) IV Continuous <Continuous>  dextrose 5%. 1000 milliLiter(s) IV Continuous <Continuous>  epoetin halle Injectable 6000 Unit(s) IV Push once  erythromycin   Ointment 1 Application(s) Both EYES four times a day  gentamicin   IVPB 130 milliGRAM(s) IV Intermittent <User Schedule>  heparin  Injectable 5000 Unit(s) SubCutaneous every 8 hours  insulin lispro (HumaLOG) corrective regimen sliding scale   SubCutaneous every 6 hours  levothyroxine Injectable 75 MICROGram(s) IV Push at bedtime  midodrine 10 milliGRAM(s) Oral three times a day  pantoprazole  Injectable 40 milliGRAM(s) IV Push every 12 hours  petrolatum Ophthalmic Ointment 1 Application(s) Both EYES three times a day  phenylephrine    Infusion 2 MICROgram(s)/kG/Min IV Continuous <Continuous>  polyethylene glycol 3350 17 Gram(s) Oral daily  propofol Infusion 10 MICROgram(s)/kG/Min IV Continuous <Continuous>  senna Syrup 10 milliLiter(s) Oral at bedtime  sodium chloride 3%  Inhalation 4 milliLiter(s) Inhalation every 6 hours    PRN Inpatient Medications  acetaminophen    Suspension .. 650 milliGRAM(s) Oral every 6 hours PRN  lidocaine/prilocaine Cream 1 Application(s) Topical daily PRN  ondansetron Injectable 4 milliGRAM(s) IV Push every 12 hours PRN      REVIEW OF SYSTEMS  --------------------------------------------------------------------------------  intubated       VITALS/PHYSICAL EXAM  --------------------------------------------------------------------------------  T(C): 37.4 (04-20-19 @ 12:00), Max: 37.7 (04-20-19 @ 08:00)  HR: 62 (04-20-19 @ 12:30) (57 - 84)  BP: 119/57 (04-20-19 @ 12:00) (75/40 - 127/56)  RR: 21 (04-20-19 @ 12:00) (12 - 33)  SpO2: 100% (04-20-19 @ 12:30) (98% - 100%)  Wt(kg): --        04-19-19 @ 07:01  -  04-20-19 @ 07:00  --------------------------------------------------------  IN: 1292.5 mL / OUT: 460 mL / NET: 832.5 mL    04-20-19 @ 07:01  -  04-20-19 @ 13:42  --------------------------------------------------------  IN: 676.6 mL / OUT: 0 mL / NET: 676.6 mL      Physical Exam:  		  Gen: intubated   	no jvd   	Pulm: decrease bs  no rales or ronchi or wheezing  	CV: RRR, S1S2; no rub  	Abd: +BS, soft,  + distended ascites softer   	: No suprapubic tenderness  	UE: Warm, no cyanosis  no clubbing,  no edema  	LE: Warm, no cyanosis  no clubbing, no edema RLE cast   	avf +  bruit and thrill 	  				    LABS/STUDIES  --------------------------------------------------------------------------------              10.2   15.3  >-----------<  349      [04-20-19 @ 01:52]              32.1     135  |  97  |  36  ----------------------------<  118      [04-20-19 @ 01:52]  3.9   |  17  |  3.28        Ca     8.2     [04-20-19 @ 01:52]      Mg     2.5     [04-20-19 @ 01:52]      Phos  3.4     [04-20-19 @ 01:52]    TPro  6.9  /  Alb  2.4  /  TBili  0.4  /  DBili  x   /  AST  23  /  ALT  <5  /  AlkPhos  117  [04-20-19 @ 01:52]    PT/INR: PT 13.8 , INR 1.20       [04-20-19 @ 01:52]  PTT: 38.0       [04-20-19 @ 01:52]              imp/suggest: ESRD      Hemodialysis Prescription:  	Access:  	Dialyzer: revaclear   	Blood Flow (mL/Min): 400  	Dialysate Flow (mL/Min): 600  	Target UF (Liters):  	Treatment Time:  	Potassium:   	Calcium: 2.5  	  KYLEIGH    Vitamin D     continue with hd   see hd flow sheet

## 2019-04-20 NOTE — PROGRESS NOTE ADULT - SUBJECTIVE AND OBJECTIVE BOX
CHIEF COMPLAINT:    Interval Events:    REVIEW OF SYSTEMS:  Constitutional: [ ] negative [ ] fevers [ ] chills [ ] weight loss [ ] weight gain  HEENT: [ ] negative [ ] dry eyes [ ] eye irritation [ ] postnasal drip [ ] nasal congestion  CV: [ ] negative  [ ] chest pain [ ] orthopnea [ ] palpitations [ ] murmur  Resp: [ ] negative [ ] cough [ ] shortness of breath [ ] dyspnea [ ] wheezing [ ] sputum [ ] hemoptysis  GI: [ ] negative [ ] nausea [ ] vomiting [ ] diarrhea [ ] constipation [ ] abd pain [ ] dysphagia   : [ ] negative [ ] dysuria [ ] nocturia [ ] hematuria [ ] increased urinary frequency  Musculoskeletal: [ ] negative [ ] back pain [ ] myalgias [ ] arthralgias [ ] fracture  Skin: [ ] negative [ ] rash [ ] itch  Neurological: [ ] negative [ ] headache [ ] dizziness [ ] syncope [ ] weakness [ ] numbness  Psychiatric: [ ] negative [ ] anxiety [ ] depression  Endocrine: [ ] negative [ ] diabetes [ ] thyroid problem  Hematologic/Lymphatic: [ ] negative [ ] anemia [ ] bleeding problem  Allergic/Immunologic: [ ] negative [ ] itchy eyes [ ] nasal discharge [ ] hives [ ] angioedema  [ ] All other systems negative  [ ] Unable to assess ROS because ________    OBJECTIVE:  ICU Vital Signs Last 24 Hrs  T(C): 36.7 (20 Apr 2019 04:00), Max: 37.3 (20 Apr 2019 00:00)  T(F): 98.1 (20 Apr 2019 04:00), Max: 99.1 (20 Apr 2019 00:00)  HR: 63 (20 Apr 2019 07:46) (57 - 84)  BP: 105/52 (20 Apr 2019 07:00) (75/40 - 122/58)  BP(mean): 75 (20 Apr 2019 07:00) (52 - 83)  ABP: --  ABP(mean): --  RR: 24 (20 Apr 2019 07:00) (12 - 29)  SpO2: 100% (20 Apr 2019 07:46) (98% - 100%)    Mode: AC/ CMV (Assist Control/ Continuous Mandatory Ventilation), RR (machine): 12, TV (machine): 450, FiO2: 30, PEEP: 5, ITime: 0.9, MAP: 7, PIP: 27    04-19 @ 07:01  -  04-20 @ 07:00  --------------------------------------------------------  IN: 1292.5 mL / OUT: 460 mL / NET: 832.5 mL      CAPILLARY BLOOD GLUCOSE      POCT Blood Glucose.: 155 mg/dL (20 Apr 2019 00:19)      PHYSICAL EXAM:  General:   HEENT:   Lymph Nodes:  Neck:   Respiratory:   Cardiovascular:   Abdomen:   Extremities:   Skin:   Neurological:  Psychiatry:    LINES:    HOSPITAL MEDICATIONS:  aspirin  chewable 81 milliGRAM(s) Oral daily  heparin  Injectable 5000 Unit(s) SubCutaneous every 8 hours    ceFAZolin   IVPB 1000 milliGRAM(s) IV Intermittent every 24 hours  gentamicin   IVPB 130 milliGRAM(s) IV Intermittent <User Schedule>    midodrine 10 milliGRAM(s) Oral three times a day  phenylephrine    Infusion 2 MICROgram(s)/kG/Min IV Continuous <Continuous>    insulin lispro (HumaLOG) corrective regimen sliding scale   SubCutaneous every 6 hours  levothyroxine Injectable 75 MICROGram(s) IV Push at bedtime    acetylcysteine 10%  Inhalation 3 milliLiter(s) Inhalation every 6 hours  ALBUTerol/ipratropium for Nebulization 3 milliLiter(s) Nebulizer every 6 hours  sodium chloride 3%  Inhalation 4 milliLiter(s) Inhalation every 6 hours    acetaminophen    Suspension .. 650 milliGRAM(s) Oral every 6 hours PRN  dexmedetomidine Infusion 0.5 MICROgram(s)/kG/Hr IV Continuous <Continuous>  midazolam Injectable 2 milliGRAM(s) IV Push once  ondansetron Injectable 4 milliGRAM(s) IV Push every 12 hours PRN  propofol Infusion 10 MICROgram(s)/kG/Min IV Continuous <Continuous>    pantoprazole  Injectable 40 milliGRAM(s) IV Push every 12 hours  polyethylene glycol 3350 17 Gram(s) Oral daily  senna Syrup 10 milliLiter(s) Oral at bedtime        dextrose 5% + sodium chloride 0.45%. 1000 milliLiter(s) IV Continuous <Continuous>  dextrose 5%. 1000 milliLiter(s) IV Continuous <Continuous>      artificial  tears Solution 1 Drop(s) Both EYES every 2 hours  chlorhexidine 2% Cloths 1 Application(s) Topical daily  chlorhexidine 4% Liquid 1 Application(s) Topical <User Schedule>  erythromycin   Ointment 1 Application(s) Both EYES four times a day  lidocaine/prilocaine Cream 1 Application(s) Topical daily PRN  petrolatum Ophthalmic Ointment 1 Application(s) Both EYES three times a day        LABS:                        10.2   15.3  )-----------( 349      ( 20 Apr 2019 01:52 )             32.1     Hgb Trend: 10.2<--, 10.3<--, 11.3<--, 10.1<--, 10.8<--  04-20    135  |  97  |  36<H>  ----------------------------<  118<H>  3.9   |  17<L>  |  3.28<H>    Ca    8.2<L>      20 Apr 2019 01:52  Phos  3.4     04-20  Mg     2.5     04-20    TPro  6.9  /  Alb  2.4<L>  /  TBili  0.4  /  DBili  x   /  AST  23  /  ALT  <5<L>  /  AlkPhos  117  04-20    Creatinine Trend: 3.28<--, 2.66<--, 2.25<--, 3.31<--, 2.27<--, 2.66<--  PT/INR - ( 20 Apr 2019 01:52 )   PT: 13.8 sec;   INR: 1.20 ratio         PTT - ( 20 Apr 2019 01:52 )  PTT:38.0 sec    Arterial Blood Gas:  04-20 @ 05:06  7.43/36/95/23/97/-.3  ABG lactate: --  Arterial Blood Gas:  04-20 @ 01:38  7.54/23/101/20/98/-1.5  ABG lactate: --  Arterial Blood Gas:  04-18 @ 21:56  7.44/36/137/24/99/.5  ABG lactate: --  Arterial Blood Gas:  04-18 @ 17:29  7.34/48/134/25/98/-.6  ABG lactate: --  Arterial Blood Gas:  04-18 @ 09:49  7.40/42/82/26/96/1.6  ABG lactate: --        MICROBIOLOGY:     RADIOLOGY:  [ ] Reviewed and interpreted by me    EKG: CHIEF COMPLAINT:    Interval Events:    Pt tachypneic overnight, placed on Propofol and given pushes of Fentanyl and Versed. The patient is arousable and follows commands this AM.     OBJECTIVE:  ICU Vital Signs Last 24 Hrs  T(C): 36.7 (20 Apr 2019 04:00), Max: 37.3 (20 Apr 2019 00:00)  T(F): 98.1 (20 Apr 2019 04:00), Max: 99.1 (20 Apr 2019 00:00)  HR: 63 (20 Apr 2019 07:46) (57 - 84)  BP: 105/52 (20 Apr 2019 07:00) (75/40 - 122/58)  BP(mean): 75 (20 Apr 2019 07:00) (52 - 83)  ABP: --  ABP(mean): --  RR: 24 (20 Apr 2019 07:00) (12 - 29)  SpO2: 100% (20 Apr 2019 07:46) (98% - 100%)    Mode: AC/ CMV (Assist Control/ Continuous Mandatory Ventilation), RR (machine): 12, TV (machine): 450, FiO2: 30, PEEP: 5, ITime: 0.9, MAP: 7, PIP: 27    04-19 @ 07:01  -  04-20 @ 07:00  --------------------------------------------------------  IN: 1292.5 mL / OUT: 460 mL / NET: 832.5 mL      CAPILLARY BLOOD GLUCOSE      POCT Blood Glucose.: 155 mg/dL (20 Apr 2019 00:19)      PHYSICAL EXAM:  General: Intubated, arousable - follows commands   HEENT: EOMI. Mild erythema of sclera noted, no discharge/pus noted.   Respiratory: Coarse breath sounds heard in bilateral lung fields.   Cardiovascular: S1, S2 noted.   Abdomen: Soft, nontender, nondistended.   Extremities: RLE in cast and wrapped in ACE bandage. LLE AVF.   Skin: No lesions noted.    Neurological: Intubated, responsive, able to squeeze fingers      LINES:    HOSPITAL MEDICATIONS:  aspirin  chewable 81 milliGRAM(s) Oral daily  heparin  Injectable 5000 Unit(s) SubCutaneous every 8 hours    ceFAZolin   IVPB 1000 milliGRAM(s) IV Intermittent every 24 hours  gentamicin   IVPB 130 milliGRAM(s) IV Intermittent <User Schedule>    midodrine 10 milliGRAM(s) Oral three times a day  phenylephrine    Infusion 2 MICROgram(s)/kG/Min IV Continuous <Continuous>    insulin lispro (HumaLOG) corrective regimen sliding scale   SubCutaneous every 6 hours  levothyroxine Injectable 75 MICROGram(s) IV Push at bedtime    acetylcysteine 10%  Inhalation 3 milliLiter(s) Inhalation every 6 hours  ALBUTerol/ipratropium for Nebulization 3 milliLiter(s) Nebulizer every 6 hours  sodium chloride 3%  Inhalation 4 milliLiter(s) Inhalation every 6 hours    acetaminophen    Suspension .. 650 milliGRAM(s) Oral every 6 hours PRN  dexmedetomidine Infusion 0.5 MICROgram(s)/kG/Hr IV Continuous <Continuous>  midazolam Injectable 2 milliGRAM(s) IV Push once  ondansetron Injectable 4 milliGRAM(s) IV Push every 12 hours PRN  propofol Infusion 10 MICROgram(s)/kG/Min IV Continuous <Continuous>    pantoprazole  Injectable 40 milliGRAM(s) IV Push every 12 hours  polyethylene glycol 3350 17 Gram(s) Oral daily  senna Syrup 10 milliLiter(s) Oral at bedtime        dextrose 5% + sodium chloride 0.45%. 1000 milliLiter(s) IV Continuous <Continuous>  dextrose 5%. 1000 milliLiter(s) IV Continuous <Continuous>      artificial  tears Solution 1 Drop(s) Both EYES every 2 hours  chlorhexidine 2% Cloths 1 Application(s) Topical daily  chlorhexidine 4% Liquid 1 Application(s) Topical <User Schedule>  erythromycin   Ointment 1 Application(s) Both EYES four times a day  lidocaine/prilocaine Cream 1 Application(s) Topical daily PRN  petrolatum Ophthalmic Ointment 1 Application(s) Both EYES three times a day        LABS:                        10.2   15.3  )-----------( 349      ( 20 Apr 2019 01:52 )             32.1     Hgb Trend: 10.2<--, 10.3<--, 11.3<--, 10.1<--, 10.8<--  04-20    135  |  97  |  36<H>  ----------------------------<  118<H>  3.9   |  17<L>  |  3.28<H>    Ca    8.2<L>      20 Apr 2019 01:52  Phos  3.4     04-20  Mg     2.5     04-20    TPro  6.9  /  Alb  2.4<L>  /  TBili  0.4  /  DBili  x   /  AST  23  /  ALT  <5<L>  /  AlkPhos  117  04-20    Creatinine Trend: 3.28<--, 2.66<--, 2.25<--, 3.31<--, 2.27<--, 2.66<--  PT/INR - ( 20 Apr 2019 01:52 )   PT: 13.8 sec;   INR: 1.20 ratio         PTT - ( 20 Apr 2019 01:52 )  PTT:38.0 sec    Arterial Blood Gas:  04-20 @ 05:06  7.43/36/95/23/97/-.3  ABG lactate: --  Arterial Blood Gas:  04-20 @ 01:38  7.54/23/101/20/98/-1.5  ABG lactate: --  Arterial Blood Gas:  04-18 @ 21:56  7.44/36/137/24/99/.5  ABG lactate: --  Arterial Blood Gas:  04-18 @ 17:29  7.34/48/134/25/98/-.6  ABG lactate: --  Arterial Blood Gas:  04-18 @ 09:49  7.40/42/82/26/96/1.6  ABG lactate: --        MICROBIOLOGY:     RADIOLOGY:  [ ] Reviewed and interpreted by me    EKG:

## 2019-04-20 NOTE — PROGRESS NOTE ADULT - ASSESSMENT
78F with CAD s/p CABG x4, HFrEF (EF 26% 4/2019), T2DM with peripheral neuropathy, ESRD on HD T/Th/S via L AVF, hypotension on midodrine, HTN, HLD initially admitted 4/9 after mechanical fall c/b R proximal tib/fib fracture not requiring emergent surgical intervention s/p cast placement with hospital course c/b possible melena now resolved, and RRT on 4/11 for acute hypercapnic respiratory failure in setting of opioid use requiring intubation. MICU course c/b SVT s/p synchronized cardioversion x2 now in sinus rhythm and Staph aureus bacteremia with noted vegetations seen on TTE, not a surgical candidate.  Hospital course further complicated by likely mucous plugging causing hypotension, Vtach and respiratory failure requiring urgent re-intubation on 4/18.     #Neuro:   - Patient transferred to MICU for AMS 2/2 hypercarbia. Unclear etiology, possibly opioid-induced as patient receiving pain medications for leg fracture. Ammonia wnl. CTH unremarkable.   - Pt awake and responsive, communicative and following commands off sedation   - c/w off sedation today     #CV:   - CAD s/p multiple CABG: continue ASA, holding statin in setting of transaminitis  - HFrEF: repeat TTE 4/2019 with EF 26% severe global RV systolic dysfunction, moderate MS, mild TR, normal pulm pressures  - Troponin elevation likely in setting of underlying ESRD and demand ischemia  - SVT s/p cardioversion: now rate controlled, sinus rhythm, continue to monitor on tele. Pt with sustained Vtach on 4/19 during respiratory failure, s/p 1 dose of amiodarone, now in sinus rhythm.   - Bhaskar and Levo, will wean as tolerated   - Bedside LISA with suspected vegetations on  Aortic and Mitral Valve, formal TTE done. Not a surgical candidate. Will need repeat TTE/LISA in 6 weeks to confirm resolution with IV antibiotics    #Pulmonary:   - Initially transferred for acute hypercapnic respiratory failure of unclear etiology but suspect 2/2 opioid use  - Extubated and reintubated for respiratory failure on 4/18, likely 2/2 mucous plugging. Chest PT, Mucomyst, Duonebs, hypertonic saline ordered.    - b/l pleural effusion s/p thoracentesis 4/17 with 1 L out. C/b R pneumothorax ex vacuo, stable on serial CXRs. Pt HD stable, saturating well on vent    - Pleural fluid studies indicative of exudate, culture pending however pt afebrile, on antibiotics. Likely chronic pleural effusion 2/2 HF   - will send sputum culture     #GI:   - Continue Nepro tube feeds via OGT, monitor residuals  - Transaminitis with abdominal sonogram showing early signs of cirrhosis likely 2/2 heart failure. Hepatitis studies negative.   - S/p paracentesis on 4/12 with removal of 4.9L, fluid studies with SAAG 0.6, likely 2/2 heart failure  - POCUS still with mild ascites but overall improved     #Renal:   - Hx of ESRD on HD T/Th/S; continue HD as per Nephrology  - HD with fluid removal T/Th/Sat, pt required increased pressor req during HD yesterday   - Continue to monitor electrolytes    #Endo:   - TSH 34 but free T4 within normal range  - HbA1c 8.3%, continue insulin sliding scale  - Fingersticks q6h while on tube feeds    #ID:   - Found to have high grade MSSA bacteremia on cultures from 4/12 and 4/13,   - Repeat cultures from 4/14 negative. 4/17 cultures positive. Cultures resent 4/18.   - Bedside LISA with vegetations on aortic and mitral valve, not surgical candidate.   - appreciate ID reccs, c/w ancef 4/14-, gentamycin for dual coverage given hx of mitral ring     DVT PPx:   - HSQ 78F with CAD s/p CABG x4, HFrEF (EF 26% 4/2019), T2DM with peripheral neuropathy, ESRD on HD T/Th/S via L AVF, hypotension on midodrine, HTN, HLD initially admitted 4/9 after mechanical fall c/b R proximal tib/fib fracture not requiring emergent surgical intervention s/p cast placement with hospital course c/b possible melena now resolved, and RRT on 4/11 for acute hypercapnic respiratory failure in setting of opioid use requiring intubation. MICU course c/b SVT s/p synchronized cardioversion x2 now in sinus rhythm and Staph aureus bacteremia with noted vegetations seen on TTE, not a surgical candidate.  Hospital course further complicated by likely mucous plugging causing hypotension, Vtach and respiratory failure requiring urgent re-intubation on 4/18.     #Neuro:   - Patient transferred to MICU for AMS 2/2 hypercarbia. Unclear etiology, possibly opioid-induced as patient receiving pain medications for leg fracture. Ammonia wnl. CTH unremarkable.   - Pt awake and responsive, communicative and following commands off sedation   - c/w off sedation today     #CV:   - CAD s/p multiple CABG: continue ASA, holding statin in setting of transaminitis  - HFrEF: repeat TTE 4/2019 with EF 26% severe global RV systolic dysfunction, moderate MS, mild TR, normal pulm pressures  - Troponin elevation likely in setting of underlying ESRD and demand ischemia  - SVT s/p cardioversion: now rate controlled, sinus rhythm, continue to monitor on tele. Pt with sustained Vtach on 4/19 during respiratory failure, s/p 1 dose of amiodarone, now in sinus rhythm.   - Bhaskar, will wean as tolerated   - Bedside LISA with suspected vegetations on  Aortic and Mitral Valve, formal TTE done. Not a surgical candidate. Will need repeat TTE/LISA in 6 weeks to confirm resolution with IV antibiotics    #Pulmonary:   - Initially transferred for acute hypercapnic respiratory failure of unclear etiology but suspect 2/2 opioid use  - Extubated and reintubated for respiratory failure on 4/18, likely 2/2 mucous plugging. Chest PT, Mucomyst, Duonebs, hypertonic saline ordered.    - b/l pleural effusion s/p thoracentesis 4/17 with 1 L out. C/b R pneumothorax ex vacuo, stable on serial CXRs. Pt HD stable, saturating well on vent    - Pleural fluid studies indicative of exudate, culture pending however pt afebrile, on antibiotics. Likely chronic pleural effusion 2/2 HF   - will send sputum culture     #GI:   - Continue Nepro tube feeds via OGT, monitor residuals  - Transaminitis with abdominal sonogram showing early signs of cirrhosis likely 2/2 heart failure. Hepatitis studies negative.   - S/p paracentesis on 4/12 with removal of 4.9L, fluid studies with SAAG 0.6, likely 2/2 heart failure  - POCUS still with mild ascites but overall improved     #Renal:   - Hx of ESRD on HD T/Th/S; continue HD as per Nephrology  - HD with fluid removal T/Th/Sat, pt required increased pressor req during HD yesterday   - Continue to monitor electrolytes    #Endo:   - TSH 34 but free T4 within normal range  - HbA1c 8.3%, continue insulin sliding scale  - Fingersticks q6h while on tube feeds    #ID:   - Found to have high grade MSSA bacteremia on cultures from 4/12 and 4/13,   - Repeat cultures from 4/14 negative. 4/17 cultures positive. Cultures resent 4/18.   - Bedside LISA with vegetations on aortic and mitral valve, not surgical candidate.   - appreciate ID reccs, c/w ancef 4/14-, gentamycin for dual coverage given hx of mitral ring     DVT PPx:   - HSQ 78F with CAD s/p CABG x4, HFrEF (EF 26% 4/2019), T2DM with peripheral neuropathy, ESRD on HD T/Th/S via L AVF, hypotension on midodrine, HTN, HLD initially admitted 4/9 after mechanical fall c/b R proximal tib/fib fracture not requiring emergent surgical intervention s/p cast placement with hospital course c/b possible melena now resolved, and RRT on 4/11 for acute hypercapnic respiratory failure in setting of opioid use requiring intubation. MICU course c/b SVT s/p synchronized cardioversion x2 now in sinus rhythm and Staph aureus bacteremia with noted vegetations seen on TTE, not a surgical candidate.  Hospital course further complicated by likely mucous plugging causing hypotension, Vtach and respiratory failure requiring urgent re-intubation on 4/18.     #Neuro:   - Patient transferred to MICU for AMS 2/2 hypercarbia. Unclear etiology, possibly opioid-induced as patient receiving pain medications for leg fracture. Ammonia wnl. CTH unremarkable.   - Pt awake and responsive, communicative and following commands off sedation   - c/w off sedation today     #CV:   - CAD s/p multiple CABG: continue ASA, holding statin in setting of transaminitis  - HFrEF: repeat TTE 4/2019 with EF 26% severe global RV systolic dysfunction, moderate MS, mild TR, normal pulm pressures  - Troponin elevation likely in setting of underlying ESRD and demand ischemia  - SVT s/p cardioversion: now rate controlled, sinus rhythm, continue to monitor on tele. Pt with sustained Vtach on 4/19 during respiratory failure, s/p 1 dose of amiodarone, now in sinus rhythm.   - Bhaskar, will wean as tolerated   - Bedside LISA with suspected vegetations on  Aortic and Mitral Valve, formal TTE done. Not a surgical candidate. Will need repeat TTE/LISA in 6 weeks to confirm resolution with IV antibiotics    #Pulmonary:   - Initially transferred for acute hypercapnic respiratory failure of unclear etiology but suspect 2/2 opioid use  - Extubated and reintubated for respiratory failure on 4/18, likely 2/2 mucous plugging. Chest PT, Mucomyst, Duonebs, hypertonic saline ordered.    - b/l pleural effusion s/p thoracentesis 4/17 with 1 L out. C/b R pneumothorax ex vacuo, stable on serial CXRs. Pt HD stable, saturating well on vent    - Pleural fluid studies indicative of exudate, culture pending however pt afebrile, on antibiotics. Likely chronic pleural effusion 2/2 HF     #GI:   - Continue Nepro tube feeds via OGT, monitor residuals  - Transaminitis with abdominal sonogram showing early signs of cirrhosis likely 2/2 heart failure. Hepatitis studies negative.   - S/p paracentesis on 4/12 with removal of 4.9L, fluid studies with SAAG 0.6, likely 2/2 heart failure  - POCUS still with mild ascites but overall improved   - TFs held this AM due to residuals, will check QTC and start on Reglan for motility     #Renal:   - Hx of ESRD on HD T/Th/S; continue HD as per Nephrology  - HD with fluid removal T/Th/Sat, pt required increased pressor req during HD yesterday   - Continue to monitor electrolytes    #Endo:   - TSH 34 but free T4 within normal range  - HbA1c 8.3%, continue insulin sliding scale  - Fingersticks q6h    #ID:   - Found to have high grade MSSA bacteremia on cultures from 4/12 and 4/13,   - Repeat cultures from 4/14 negative. 4/17 cultures positive. Cultures resent 4/18.   - Bedside LISA with vegetations on aortic and mitral valve, not surgical candidate.   - appreciate ID reccs, c/w ancef 4/14-, gentamycin for dual coverage given hx of mitral ring     DVT PPx:   - HSQ

## 2019-04-20 NOTE — PROGRESS NOTE ADULT - ATTENDING COMMENTS
78F PMH CAD s/p CABG, HFrEF, DM2, ESRD on HD, hypotension on midodrine, HTN, HLD, initially admitted with R tib/fib fx after a mechanical fall. Hospital course complicated by acute hypercapnic respiratory failure requiring mechanical ventilation. Course further complicated by severe sepsis due to high grade MSSA bacteremia. 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     Extubated 4/18 however went into acute respiratory failure along with hypotension and VTach and had to be re-intubated.     Recs:  --Neuro: on precedex for sedation/anxiety. Nonfocal exam. Was reportedly writing while on vent previously.  --Cardiac: Shock state due to sepsis. Continue vasopressors to maintain MAP > 65, continue aspirin & midodrine. Will consider Amio drip if develops VT again   --Pulm: went back into respiratory failure likely due to mucus plugging, continue pulmonary toilet with DuoNebs, Mucomyst, hypertonic saline, chest PT. Continue vent support for now.    -- Pneumothorax: R ptx/trapped lung - no intervention at this time. This has been stable for the last few days - lung point visualized posterior/lateral on US. She does not have any evidence of hypoxia, respiratory distress, or elevation in peak pressures to suggest need for needle deompression at this time.  --GI: Tube feeds held in setting of residuals and patient placed on D51/2NS. Will start trickle feeds and try and decrease IVF.  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 Check bronchial cx  --Renal: HD per renal with plan for HD today.   --MSK: RLE casted, outpatient ortho f/u, no urgent surgical intervention per ortho  --Endo: Lantus and Humalog sliding scale, glucose goal 140-200  --PPx: sc heparin, Protonix  --Opthalmology consult noted    Prongnosis Poor.  Patient is critically ill and requiring ICU care. Critical Care Time 40 minutes

## 2019-04-21 LAB
ALBUMIN SERPL ELPH-MCNC: 2.1 G/DL — LOW (ref 3.3–5)
ALP SERPL-CCNC: 114 U/L — SIGNIFICANT CHANGE UP (ref 40–120)
ALT FLD-CCNC: <5 U/L — LOW (ref 10–45)
ANION GAP SERPL CALC-SCNC: 12 MMOL/L — SIGNIFICANT CHANGE UP (ref 5–17)
APTT BLD: 38.4 SEC — HIGH (ref 27.5–36.3)
AST SERPL-CCNC: 19 U/L — SIGNIFICANT CHANGE UP (ref 10–40)
BILIRUB SERPL-MCNC: 0.4 MG/DL — SIGNIFICANT CHANGE UP (ref 0.2–1.2)
BUN SERPL-MCNC: 18 MG/DL — SIGNIFICANT CHANGE UP (ref 7–23)
CALCIUM SERPL-MCNC: 7.7 MG/DL — LOW (ref 8.4–10.5)
CHLORIDE SERPL-SCNC: 101 MMOL/L — SIGNIFICANT CHANGE UP (ref 96–108)
CO2 SERPL-SCNC: 22 MMOL/L — SIGNIFICANT CHANGE UP (ref 22–31)
CREAT SERPL-MCNC: 1.88 MG/DL — HIGH (ref 0.5–1.3)
GAS PNL BLDA: SIGNIFICANT CHANGE UP
GAS PNL BLDA: SIGNIFICANT CHANGE UP
GLUCOSE BLDC GLUCOMTR-MCNC: 141 MG/DL — HIGH (ref 70–99)
GLUCOSE BLDC GLUCOMTR-MCNC: 162 MG/DL — HIGH (ref 70–99)
GLUCOSE BLDC GLUCOMTR-MCNC: 181 MG/DL — HIGH (ref 70–99)
GLUCOSE SERPL-MCNC: 186 MG/DL — HIGH (ref 70–99)
HCT VFR BLD CALC: 30.2 % — LOW (ref 34.5–45)
HGB BLD-MCNC: 10.1 G/DL — LOW (ref 11.5–15.5)
INR BLD: 1.36 RATIO — HIGH (ref 0.88–1.16)
MAGNESIUM SERPL-MCNC: 2.1 MG/DL — SIGNIFICANT CHANGE UP (ref 1.6–2.6)
MCHC RBC-ENTMCNC: 32.4 PG — SIGNIFICANT CHANGE UP (ref 27–34)
MCHC RBC-ENTMCNC: 33.4 GM/DL — SIGNIFICANT CHANGE UP (ref 32–36)
MCV RBC AUTO: 97 FL — SIGNIFICANT CHANGE UP (ref 80–100)
PHOSPHATE SERPL-MCNC: 2.1 MG/DL — LOW (ref 2.5–4.5)
PLATELET # BLD AUTO: 284 K/UL — SIGNIFICANT CHANGE UP (ref 150–400)
POTASSIUM SERPL-MCNC: 3.7 MMOL/L — SIGNIFICANT CHANGE UP (ref 3.5–5.3)
POTASSIUM SERPL-SCNC: 3.7 MMOL/L — SIGNIFICANT CHANGE UP (ref 3.5–5.3)
PROT SERPL-MCNC: 6.1 G/DL — SIGNIFICANT CHANGE UP (ref 6–8.3)
PROTHROM AB SERPL-ACNC: 15.8 SEC — HIGH (ref 10–12.9)
RBC # BLD: 3.11 M/UL — LOW (ref 3.8–5.2)
RBC # FLD: 17.6 % — HIGH (ref 10.3–14.5)
SODIUM SERPL-SCNC: 135 MMOL/L — SIGNIFICANT CHANGE UP (ref 135–145)
WBC # BLD: 9.1 K/UL — SIGNIFICANT CHANGE UP (ref 3.8–10.5)
WBC # FLD AUTO: 9.1 K/UL — SIGNIFICANT CHANGE UP (ref 3.8–10.5)

## 2019-04-21 PROCEDURE — 99291 CRITICAL CARE FIRST HOUR: CPT | Mod: 25

## 2019-04-21 PROCEDURE — 71045 X-RAY EXAM CHEST 1 VIEW: CPT | Mod: 26,77

## 2019-04-21 PROCEDURE — 32557 INSERT CATH PLEURA W/ IMAGE: CPT | Mod: GC

## 2019-04-21 PROCEDURE — 71045 X-RAY EXAM CHEST 1 VIEW: CPT | Mod: 26

## 2019-04-21 RX ORDER — LACTULOSE 10 G/15ML
20 SOLUTION ORAL ONCE
Qty: 0 | Refills: 0 | Status: COMPLETED | OUTPATIENT
Start: 2019-04-21 | End: 2019-04-22

## 2019-04-21 RX ORDER — SODIUM CHLORIDE 9 MG/ML
4 INJECTION INTRAMUSCULAR; INTRAVENOUS; SUBCUTANEOUS EVERY 12 HOURS
Qty: 0 | Refills: 0 | Status: DISCONTINUED | OUTPATIENT
Start: 2019-04-21 | End: 2019-04-23

## 2019-04-21 RX ORDER — FENTANYL CITRATE 50 UG/ML
50 INJECTION INTRAVENOUS
Qty: 0 | Refills: 0 | Status: DISCONTINUED | OUTPATIENT
Start: 2019-04-21 | End: 2019-04-23

## 2019-04-21 RX ORDER — POTASSIUM PHOSPHATE, MONOBASIC POTASSIUM PHOSPHATE, DIBASIC 236; 224 MG/ML; MG/ML
15 INJECTION, SOLUTION INTRAVENOUS ONCE
Qty: 0 | Refills: 0 | Status: COMPLETED | OUTPATIENT
Start: 2019-04-21 | End: 2019-04-21

## 2019-04-21 RX ORDER — ACETAMINOPHEN 500 MG
1000 TABLET ORAL ONCE
Qty: 0 | Refills: 0 | Status: COMPLETED | OUTPATIENT
Start: 2019-04-21 | End: 2019-04-21

## 2019-04-21 RX ORDER — FENTANYL CITRATE 50 UG/ML
50 INJECTION INTRAVENOUS ONCE
Qty: 0 | Refills: 0 | Status: DISCONTINUED | OUTPATIENT
Start: 2019-04-21 | End: 2019-04-22

## 2019-04-21 RX ADMIN — SODIUM CHLORIDE 4 MILLILITER(S): 9 INJECTION INTRAMUSCULAR; INTRAVENOUS; SUBCUTANEOUS at 11:41

## 2019-04-21 RX ADMIN — Medication 1 DROP(S): at 02:50

## 2019-04-21 RX ADMIN — FENTANYL CITRATE 50 MICROGRAM(S): 50 INJECTION INTRAVENOUS at 13:10

## 2019-04-21 RX ADMIN — Medication 1 DROP(S): at 21:50

## 2019-04-21 RX ADMIN — FENTANYL CITRATE 50 MICROGRAM(S): 50 INJECTION INTRAVENOUS at 13:40

## 2019-04-21 RX ADMIN — Medication 3 MILLILITER(S): at 00:32

## 2019-04-21 RX ADMIN — Medication 1 DROP(S): at 18:00

## 2019-04-21 RX ADMIN — HEPARIN SODIUM 5000 UNIT(S): 5000 INJECTION INTRAVENOUS; SUBCUTANEOUS at 05:11

## 2019-04-21 RX ADMIN — Medication 1 APPLICATION(S): at 00:23

## 2019-04-21 RX ADMIN — Medication 400 MILLIGRAM(S): at 12:30

## 2019-04-21 RX ADMIN — Medication 1 APPLICATION(S): at 21:57

## 2019-04-21 RX ADMIN — HEPARIN SODIUM 5000 UNIT(S): 5000 INJECTION INTRAVENOUS; SUBCUTANEOUS at 14:43

## 2019-04-21 RX ADMIN — Medication 3 MILLILITER(S): at 05:58

## 2019-04-21 RX ADMIN — Medication 81 MILLIGRAM(S): at 12:40

## 2019-04-21 RX ADMIN — Medication 1 APPLICATION(S): at 05:13

## 2019-04-21 RX ADMIN — Medication 1: at 14:39

## 2019-04-21 RX ADMIN — Medication 103.25 MILLIGRAM(S): at 00:35

## 2019-04-21 RX ADMIN — Medication 3 MILLILITER(S): at 17:41

## 2019-04-21 RX ADMIN — Medication 75 MICROGRAM(S): at 21:50

## 2019-04-21 RX ADMIN — Medication 1 DROP(S): at 12:00

## 2019-04-21 RX ADMIN — Medication 100 MILLIGRAM(S): at 14:43

## 2019-04-21 RX ADMIN — MIDODRINE HYDROCHLORIDE 10 MILLIGRAM(S): 2.5 TABLET ORAL at 05:11

## 2019-04-21 RX ADMIN — CHLORHEXIDINE GLUCONATE 1 APPLICATION(S): 213 SOLUTION TOPICAL at 13:01

## 2019-04-21 RX ADMIN — SODIUM CHLORIDE 4 MILLILITER(S): 9 INJECTION INTRAMUSCULAR; INTRAVENOUS; SUBCUTANEOUS at 00:33

## 2019-04-21 RX ADMIN — Medication 3 MILLILITER(S): at 23:31

## 2019-04-21 RX ADMIN — Medication 1 DROP(S): at 05:12

## 2019-04-21 RX ADMIN — SODIUM CHLORIDE 4 MILLILITER(S): 9 INJECTION INTRAMUSCULAR; INTRAVENOUS; SUBCUTANEOUS at 06:01

## 2019-04-21 RX ADMIN — Medication 1 DROP(S): at 03:39

## 2019-04-21 RX ADMIN — PANTOPRAZOLE SODIUM 40 MILLIGRAM(S): 20 TABLET, DELAYED RELEASE ORAL at 05:11

## 2019-04-21 RX ADMIN — Medication 1 APPLICATION(S): at 12:40

## 2019-04-21 RX ADMIN — Medication 1 DROP(S): at 00:23

## 2019-04-21 RX ADMIN — Medication 1: at 05:58

## 2019-04-21 RX ADMIN — PANTOPRAZOLE SODIUM 40 MILLIGRAM(S): 20 TABLET, DELAYED RELEASE ORAL at 18:00

## 2019-04-21 RX ADMIN — Medication 1 DROP(S): at 08:00

## 2019-04-21 RX ADMIN — POTASSIUM PHOSPHATE, MONOBASIC POTASSIUM PHOSPHATE, DIBASIC 62.5 MILLIMOLE(S): 236; 224 INJECTION, SOLUTION INTRAVENOUS at 03:39

## 2019-04-21 RX ADMIN — POLYETHYLENE GLYCOL 3350 17 GRAM(S): 17 POWDER, FOR SOLUTION ORAL at 12:40

## 2019-04-21 RX ADMIN — Medication 1 DROP(S): at 20:04

## 2019-04-21 RX ADMIN — Medication 3 MILLILITER(S): at 05:59

## 2019-04-21 RX ADMIN — Medication 1 APPLICATION(S): at 18:00

## 2019-04-21 RX ADMIN — MIDODRINE HYDROCHLORIDE 10 MILLIGRAM(S): 2.5 TABLET ORAL at 14:44

## 2019-04-21 RX ADMIN — SENNA PLUS 10 MILLILITER(S): 8.6 TABLET ORAL at 22:12

## 2019-04-21 RX ADMIN — CHLORHEXIDINE GLUCONATE 1 APPLICATION(S): 213 SOLUTION TOPICAL at 05:14

## 2019-04-21 RX ADMIN — Medication 1 DROP(S): at 16:18

## 2019-04-21 RX ADMIN — HEPARIN SODIUM 5000 UNIT(S): 5000 INJECTION INTRAVENOUS; SUBCUTANEOUS at 21:50

## 2019-04-21 RX ADMIN — MIDODRINE HYDROCHLORIDE 10 MILLIGRAM(S): 2.5 TABLET ORAL at 21:50

## 2019-04-21 RX ADMIN — Medication 1 DROP(S): at 10:00

## 2019-04-21 RX ADMIN — Medication 1000 MILLIGRAM(S): at 13:00

## 2019-04-21 RX ADMIN — Medication 3 MILLILITER(S): at 11:41

## 2019-04-21 RX ADMIN — SODIUM CHLORIDE 4 MILLILITER(S): 9 INJECTION INTRAMUSCULAR; INTRAVENOUS; SUBCUTANEOUS at 17:41

## 2019-04-21 RX ADMIN — Medication 1: at 00:36

## 2019-04-21 RX ADMIN — Medication 1 APPLICATION(S): at 14:15

## 2019-04-21 NOTE — PROGRESS NOTE ADULT - ATTENDING COMMENTS
remain critically ill: for TEDDY: Cont antibiotics and full resp support!  4/15: Still febrile with respiratory failure: Being considered for TEDDY today :  4/16: for teddy today : remains intubated and sedated: Cont full vent support for now:  4/17: Now suspected to have IE: For repeat TEDDY today :  4/19: overall pt remains sick: initially admitted with rt tibial fracture then developed resp failure ? secondary to narcotics: And now has I.E: On antibiotics: not a surgical candidate per CTS: cont conservative management:  4/21: Pt remains critically ill: On treatment for infective endocarditis: still with rigth sided stable pneumothorax and remains intubated: Cont supportive care ? for PTX drainage ? [facility] MICU attending: would defer the decision to MICU team:

## 2019-04-21 NOTE — PROCEDURE NOTE - NSICDXPROCEDURE_GEN_ALL_CORE_FT
PROCEDURES:  Tracheal intubation 18-Apr-2019 19:41:23  Deon Mclean
PROCEDURES:  Chest tube insertion 21-Apr-2019 17:13:54  Edmundo Ralph

## 2019-04-21 NOTE — PROGRESS NOTE ADULT - SUBJECTIVE AND OBJECTIVE BOX
[facility]   [phone]  RENAL FOLLOW UP NOTE  --------------------------------------------------------------------------------  Chief Complaint:    24 hour events/subjective:    intubated on pressors. high ngt tube high residual therefore has been on ivf now     PAST HISTORY  --------------------------------------------------------------------------------  No significant changes to PMH, PSH, FHx, SHx, unless otherwise noted    ALLERGIES & MEDICATIONS  --------------------------------------------------------------------------------  Allergies    allopurinol (Rash)  Augmentin (Rash)  Levaquin (Rash)    Intolerances      Standing Inpatient Medications  ALBUTerol/ipratropium for Nebulization 3 milliLiter(s) Nebulizer every 6 hours  artificial  tears Solution 1 Drop(s) Both EYES every 2 hours  aspirin  chewable 81 milliGRAM(s) Oral daily  ceFAZolin   IVPB 1000 milliGRAM(s) IV Intermittent every 24 hours  chlorhexidine 2% Cloths 1 Application(s) Topical daily  chlorhexidine 4% Liquid 1 Application(s) Topical <User Schedule>  dexmedetomidine Infusion 0.2 MICROgram(s)/kG/Hr IV Continuous <Continuous>  dextrose 5% + sodium chloride 0.45%. 1000 milliLiter(s) IV Continuous <Continuous>  dextrose 5%. 1000 milliLiter(s) IV Continuous <Continuous>  erythromycin   Ointment 1 Application(s) Both EYES four times a day  gentamicin   IVPB 130 milliGRAM(s) IV Intermittent <User Schedule>  heparin  Injectable 5000 Unit(s) SubCutaneous every 8 hours  insulin lispro (HumaLOG) corrective regimen sliding scale   SubCutaneous every 6 hours  levothyroxine Injectable 75 MICROGram(s) IV Push at bedtime  midodrine 10 milliGRAM(s) Oral three times a day  pantoprazole  Injectable 40 milliGRAM(s) IV Push every 12 hours  petrolatum Ophthalmic Ointment 1 Application(s) Both EYES three times a day  phenylephrine    Infusion 2 MICROgram(s)/kG/Min IV Continuous <Continuous>  polyethylene glycol 3350 17 Gram(s) Oral daily  senna Syrup 10 milliLiter(s) Oral at bedtime  sodium chloride 3%  Inhalation 4 milliLiter(s) Inhalation every 6 hours    PRN Inpatient Medications  acetaminophen    Suspension .. 650 milliGRAM(s) Oral every 6 hours PRN  lidocaine/prilocaine Cream 1 Application(s) Topical daily PRN  ondansetron Injectable 4 milliGRAM(s) IV Push every 12 hours PRN      REVIEW OF SYSTEMS  --------------------------------------------------------------------------------  intubated         VITALS/PHYSICAL EXAM  --------------------------------------------------------------------------------  T(C): 36.9 (04-21-19 @ 08:00), Max: 36.9 (04-21-19 @ 08:00)  HR: 66 (04-21-19 @ 12:10) (54 - 68)  BP: 94/46 (04-21-19 @ 10:15) (87/41 - 127/60)  RR: 26 (04-21-19 @ 10:15) (12 - 33)  SpO2: 100% (04-21-19 @ 12:10) (97% - 100%)  Wt(kg): --        04-20-19 @ 07:01  -  04-21-19 @ 07:00  --------------------------------------------------------  IN: 4010.3 mL / OUT: 1400 mL / NET: 2610.3 mL    04-21-19 @ 07:01  -  04-21-19 @ 13:51  --------------------------------------------------------  IN: 271 mL / OUT: 0 mL / NET: 271 mL      Physical Exam:  	  Gen: intubated   	no jvd   	Pulm: decrease bs  no rales or ronchi or wheezing  	CV: RRR, S1S2; no rub  	Abd: +BS, soft,  + distended ascites softer   	: No suprapubic tenderness  	UE: Warm, no cyanosis  no clubbing,  no edema  	LE: Warm, no cyanosis  no clubbing, no edema RLE cast   	avf +  bruit and thrill 	  					        LABS/STUDIES  --------------------------------------------------------------------------------              10.1   9.1   >-----------<  284      [04-21-19 @ 00:32]              30.2     135  |  101  |  18  ----------------------------<  186      [04-21-19 @ 00:32]  3.7   |  22  |  1.88        Ca     7.7     [04-21-19 @ 00:32]      Mg     2.1     [04-21-19 @ 00:32]      Phos  2.1     [04-21-19 @ 00:32]    TPro  6.1  /  Alb  2.1  /  TBili  0.4  /  DBili  x   /  AST  19  /  ALT  <5  /  AlkPhos  114  [04-21-19 @ 00:32]    PT/INR: PT 15.8 , INR 1.36       [04-21-19 @ 00:32]  PTT: 38.4       [04-21-19 @ 00:32]      Creatinine Trend:  SCr 1.88 [04-21 @ 00:32]  SCr 3.28 [04-20 @ 01:52]  SCr 2.66 [04-19 @ 01:14]  SCr 2.25 [04-18 @ 17:33]  SCr 3.31 [04-18 @ 00:12]                  Iron 71, TIBC 135, %sat 53      [04-11-19 @ 17:50]  Ferritin 6834      [04-11-19 @ 17:57]  HbA1c 8.3      [04-12-19 @ 09:00]  TSH 34.70      [04-12-19 @ 07:41]  Lipid: chol 63, , HDL 12, LDL 23      [04-12-19 @ 09:06]

## 2019-04-21 NOTE — PROCEDURE NOTE - NSPOSTCAREGUIDE_GEN_A_CORE
Keep the cast/splint/dressing clean and dry/Verbal/written post procedure instructions were given to patient/caregiver
Verbal/written post procedure instructions were given to patient/caregiver
Verbal/written post procedure instructions were given to patient/caregiver
Care for catheter as per unit/ICU protocols

## 2019-04-21 NOTE — PROGRESS NOTE ADULT - ASSESSMENT
78F with CAD s/p CABG x4, HFrEF (EF 26% 4/2019), T2DM with peripheral neuropathy, ESRD on HD T/Th/S via L AVF, hypotension on midodrine, HTN, HLD initially admitted 4/9 after mechanical fall c/b R proximal tib/fib fracture not requiring emergent surgical intervention s/p cast placement with hospital course c/b possible melena now resolved, and RRT on 4/11 for acute hypercapnic respiratory failure in setting of opioid use requiring intubation. MICU course c/b SVT s/p synchronized cardioversion x2 now in sinus rhythm and Staph aureus bacteremia with noted vegetations seen on TTE, not a surgical candidate.  Hospital course further complicated by likely mucous plugging causing hypotension, Vtach and respiratory failure requiring urgent re-intubation on 4/18.     #Neuro:   - Patient transferred to MICU for AMS 2/2 hypercarbia. Unclear etiology, possibly opioid-induced as patient receiving pain medications for leg fracture. Ammonia wnl. CTH unremarkable.   - Pt awake and responsive, communicative and following commands off sedation   - c/w off sedation today     #CV:   - CAD s/p multiple CABG: continue ASA, holding statin in setting of transaminitis  - HFrEF: repeat TTE 4/2019 with EF 26% severe global RV systolic dysfunction, moderate MS, mild TR, normal pulm pressures  - Troponin elevation likely in setting of underlying ESRD and demand ischemia  - SVT s/p cardioversion: now rate controlled, sinus rhythm, continue to monitor on tele. Pt with sustained Vtach on 4/19 during respiratory failure, s/p 1 dose of amiodarone, now in sinus rhythm.   - Bhaskar, will wean as tolerated   - Bedside LISA with suspected vegetations on  Aortic and Mitral Valve, formal TTE done. Not a surgical candidate. Will need repeat TTE/LISA in 6 weeks to confirm resolution with IV antibiotics    #Pulmonary:   - Initially transferred for acute hypercapnic respiratory failure of unclear etiology but suspect 2/2 opioid use  - Extubated and reintubated for respiratory failure on 4/18, likely 2/2 mucous plugging. Chest PT, Mucomyst, Duonebs, hypertonic saline ordered.    - b/l pleural effusion s/p thoracentesis 4/17 with 1 L out. C/b R pneumothorax ex vacuo, stable on serial CXRs. Pt HD stable, saturating well on vent    - Pleural fluid studies indicative of exudate, culture pending however pt afebrile, on antibiotics. Likely chronic pleural effusion 2/2 HF     #GI:   - Continue Nepro tube feeds via OGT, monitor residuals  - Transaminitis with abdominal sonogram showing early signs of cirrhosis likely 2/2 heart failure. Hepatitis studies negative.   - S/p paracentesis on 4/12 with removal of 4.9L, fluid studies with SAAG 0.6, likely 2/2 heart failure  - POCUS still with mild ascites but overall improved   - TFs held this AM due to residuals, will check QTC and start on Reglan for motility     #Renal:   - Hx of ESRD on HD T/Th/S; continue HD as per Nephrology  - HD with fluid removal T/Th/Sat, pt required increased pressor req during HD yesterday   - Continue to monitor electrolytes    #Endo:   - TSH 34 but free T4 within normal range  - HbA1c 8.3%, continue insulin sliding scale  - Fingersticks q6h    #ID:   - Found to have high grade MSSA bacteremia on cultures from 4/12 and 4/13,   - Repeat cultures from 4/14 negative. 4/17 cultures positive. Cultures resent 4/18.   - Bedside LISA with vegetations on aortic and mitral valve, not surgical candidate.   - appreciate ID reccs, c/w ancef 4/14-, gentamycin for dual coverage given hx of mitral ring     DVT PPx:   - HSQ 78F with CAD s/p CABG x4, HFrEF (EF 26% 4/2019), T2DM with peripheral neuropathy, ESRD on HD T/Th/S via L AVF, hypotension on midodrine, HTN, HLD initially admitted 4/9 after mechanical fall c/b R proximal tib/fib fracture not requiring emergent surgical intervention s/p cast placement with hospital course c/b possible melena now resolved, and RRT on 4/11 for acute hypercapnic respiratory failure in setting of opioid use requiring intubation. MICU course c/b SVT s/p synchronized cardioversion x2 now in sinus rhythm and Staph aureus bacteremia with noted vegetations seen on TTE, not a surgical candidate.  Hospital course further complicated by likely mucous plugging causing hypotension, Vtach and respiratory failure requiring urgent re-intubation on 4/18.     #Neuro:   - Patient transferred to MICU for AMS 2/2 hypercarbia. Unclear etiology, possibly opioid-induced as patient receiving pain medications for leg fracture. Ammonia wnl. CTH unremarkable.   - Pt awake and responsive, communicative and following commands off sedation   - c/w minimal precedex sedation today     #CV:   - CAD s/p multiple CABG: continue ASA, holding statin in setting of transaminitis  - HFrEF: repeat TTE 4/2019 with EF 26% severe global RV systolic dysfunction, moderate MS, mild TR, normal pulm pressures  - Troponin elevation likely in setting of underlying ESRD and demand ischemia  - SVT s/p cardioversion: now rate controlled, sinus rhythm, continue to monitor on tele. Pt with sustained Vtach on 4/19 during respiratory failure, s/p 1 dose of amiodarone, now in sinus rhythm.   - Bhaskar, will wean as tolerated   - Bedside LISA with suspected vegetations on  Aortic and Mitral Valve, formal TTE done. Not a surgical candidate. Will need repeat TTE/LISA in 6 weeks to confirm resolution with IV antibiotics    #Pulmonary:   - Initially transferred for acute hypercapnic respiratory failure of unclear etiology but suspect 2/2 opioid use  - Extubated and reintubated for respiratory failure on 4/18, likely 2/2 mucous plugging. Chest PT, Mucomyst, Duonebs, hypertonic saline ordered.    - b/l pleural effusion s/p thoracentesis 4/17 with 1 L out. C/b R pneumothorax ex vacuo, stable on serial CXRs. Pt HD stable, saturating well on vent    - Pleural fluid studies indicative of exudate, culture pending however pt afebrile, on antibiotics. Likely chronic pleural effusion 2/2 HF   - S/p R pigtail today.    #GI:   - Continue Nepro tube feeds via OGT, monitor residuals  - Transaminitis with abdominal sonogram showing early signs of cirrhosis likely 2/2 heart failure. Hepatitis studies negative.   - S/p paracentesis on 4/12 with removal of 4.9L, fluid studies with SAAG 0.6, likely 2/2 heart failure  - POCUS still with mild ascites but overall improved   - TFs held d/t high residuals. QTc is prolonged, cannot start Reglan.    #Renal:   - Hx of ESRD on HD T/Th/S; continue HD as per Nephrology  - HD with fluid removal T/Th/Sat, pt required increased pressor req during HD.  - Continue to monitor electrolytes    #Endo:   - TSH 34 but free T4 within normal range  - HbA1c 8.3%, continue insulin sliding scale  - Fingersticks q6h    #ID:   - Found to have high grade MSSA bacteremia on cultures from 4/12 and 4/13,   - Repeat cultures from 4/14 negative. 4/17 cultures positive. Cultures resent 4/18.   - Bedside LISA with vegetations on aortic and mitral valve, not surgical candidate.   - appreciate ID recs, c/w ancef 4/14-, gentamycin for dual coverage given hx of mitral ring     DVT PPx:   - HSQ

## 2019-04-21 NOTE — PROGRESS NOTE ADULT - ASSESSMENT
Acute hyopercapneic repisratory failure rquiring mechanical intubataion  Tib-Fib fracture s/p mechanical fall  ESRD  ACute on chronic sytolic and diastolic CHF  CAD  Bilateral R>L pleural effusions, ascites due to fluid overload in setting of CHF and ESRD      1) Acute respiratory failure.  s/p RRT for unresponsiveness. Mildly improved w/ narcan. Intubated for airway protection. S/p synchronized cardioversion + ketamine for SVT on 19  Today ABG noted. Pt is alert and follows simple command. I&O 3.8 liters negative. 4.8 liter removed from paracentesis yesterday. Pt is currently on CPAP trial.  Continue wean off ventilator support per MICU team.   intubated and sedated. Per night MICU attending pt tolerated CPAP well yesterday. Pt remains intubated for TEDDY. Vent management, weaning per MICU  4/15: remains intubated:: for teddy? today : Cont full ventilator support:   : remains intubated on pressors: TEDDY today: yesterday could not be done: Has staph aureus bacteremia   : remains intubated : on antibiotics for official TEDDY today : micu teddy study revealed possible I.E  : events noted: pt had thoracentesis done on the right side: has exudative fluid: Has pneumothorax on rt side ? trapped lung: no increase in today's chest x-ray : pt is on mechanical ventilation: would defer to primary MICU team for further management : no refillin gof fluis as of uet:   : she still has persistent ptx on the right side though it has not increased: ? chest tube: pt is requiring low dose vasopressors: SIGRID MICU attendin) fever  24 T-max 38.3, yesterday, elevated inflammatory markers and liver enzyme. Chest x-ray reviewed Pt is already on Cefepime. Management defer to MICU, GI   24 hr Tmax 38.2 on vancomycin and Meropenum. Vanco level 11 yesterday. Bacteremia, gram positive cocci cluster (). Pending TEDDY    4/15: she is still febrile: on antibiotics  : Cont antibiotics:   : for teddy today to for reevalution of IE: ? FOR SURGERY  : last blood cultures' from  are still positive: has IE: On antibiotics await identity   : Last blood cultures are now negative: cont antibiotics     3) Pleural effusions.   Chronic left and large right side. s/p paracentesis, removed more than 6 liters yesterday. Repeat chest x-ray.    no chest x-ray to review. s/p paracentesis. Clinically stable   4/15: has pl effusion on rt side: she had paracentesis done and not thoracentesis:   : doing ok: still with bilateral pleural effusions: s/p paracentesis: cytology is negative:   : no intervention for now: : Has pretty poor EF on teddy yesterday   : s/p tap on  : exudative fluid: has tox: ? trapped lung   : Persistent pneumothorax ? chest tube  and weaning trials!    4) Hypotension  Small dose of Bhaskar, continue wean off as possible    IVF at 50cc/hr. titrate bhaskar to keep map greater than 65.   4/15: cont to suport blood pressure to MAP of more then 65 mm HG  : ont vasopressors:   : Cont on vasopressors for hemodynamic support:   : She is still requiring low dose vasopressors support: cont supportive carE:   : Still requiring low dose vasopressors: cont to mantian MAP above 65 mm Hg     5)  Prophylaxis   Pt is on heparin SQ and PPI

## 2019-04-21 NOTE — PROGRESS NOTE ADULT - ASSESSMENT
77 yo F with hx of cad, cabg, DM, esrd, on HD, chronic hypotension on midodrine 10 mg tid with sob, pleural effusions, chf, ascites, and new tib/fib fx    1- esrd  2- hypotension   3- chf  4- ascites   5- tib/fib fx   6- respiratory failure     hd am   limit ivf given hx of chf and ascites  cont neosynephrine  vent support

## 2019-04-21 NOTE — PROGRESS NOTE ADULT - ATTENDING COMMENTS
78F PMH CAD s/p CABG, HFrEF, DM2, ESRD on HD, hypotension on midodrine, HTN, HLD, initially admitted with R tib/fib fx after a mechanical fall. Hospital course complicated by acute hypercapnic respiratory failure requiring mechanical ventilation. Course further complicated by severe sepsis due to high grade MSSA bacteremia. 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     Extubated 4/18 however went into acute respiratory failure along with hypotension and VTach and had to be re-intubated.     Recs:  --Neuro: awake, alert, and doing well. She is writing. Nonfocal exam.   --Cardiac: Shock state due to sepsis. Continue vasopressors to maintain MAP > 65, continue aspirin & midodrine. Will consider Amio drip if develops VT again   --Pulm: went back into respiratory failure likely due to mucus plugging, continue pulmonary toilet with DuoNebs, Mucomyst, hypertonic saline, chest PT. Continue vent support for now.    -- Pneumothorax: R ptx/trapped lung - She does not have any distress and her peak pressures are ok but she went into respiratory failure at time of last extubation. Given her clinical improvement and continued persistent PTX will plan to place chest tube today and then consider PSV and possible extubation.  --GI: Tube feeds held in setting of residuals and patient placed on D51/2NS. Will also hold feeds for possible extubation.   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   --Renal: HD per renal with plan for HD Tu/Th/Sat. No emergent need for HD today  --MSK: RLE casted, outpatient ortho f/u, no urgent surgical intervention per ortho  --Endo: Lantus and Humalog sliding scale, glucose goal 140-200  --PPx: sc heparin, Protonix  --Opthalmology consult noted    Prongnosis Poor.  Patient is critically ill and requiring ICU care. Critical Care Time 40 minutes. Palliative care evalation

## 2019-04-21 NOTE — PROCEDURE NOTE - PROCEDURE DATE TIME, MLM
11-Apr-2019 11:15
17-Apr-2019 14:20
12-Apr-2019 00:05
12-Apr-2019 19:00
18-Apr-2019 17:30
21-Apr-2019 13:02

## 2019-04-21 NOTE — PROGRESS NOTE ADULT - SUBJECTIVE AND OBJECTIVE BOX
CHIEF COMPLAINT:    Interval Events:      OBJECTIVE:  ICU Vital Signs Last 24 Hrs      Mode:           CAPILLARY BLOOD GLUCOSE        PHYSICAL EXAM:      LINES:    HOSPITAL MEDICATIONS:      LABS:        MICROBIOLOGY:     RADIOLOGY:  [ ] Reviewed and interpreted by me    EKG: Interval Events: Patient noted to have stable R pneumothorax. Now s/p R pigtail.    OBJECTIVE:  ICU Vital Signs Last 24 Hrs  T(C): 37.4 (21 Apr 2019 12:00), Max: 37.4 (21 Apr 2019 12:00)  T(F): 99.4 (21 Apr 2019 12:00), Max: 99.4 (21 Apr 2019 12:00)  HR: 77 (21 Apr 2019 16:05) (54 - 82)  BP: 97/49 (21 Apr 2019 15:45) (87/41 - 126/56)  BP(mean): 71 (21 Apr 2019 15:45) (59 - 87)  ABP: --  ABP(mean): --  RR: 30 (21 Apr 2019 15:45) (12 - 33)  SpO2: 100% (21 Apr 2019 16:05) (97% - 100%)    Mode: CPAP with PS  FiO2: 30  PEEP: 5  PS: 10  ITime: 0.9  MAP: 8  PIP: 16    CAPILLARY BLOOD GLUCOSE  POCT Blood Glucose.: 162 mg/dL (21 Apr 2019 13:59)  POCT Blood Glucose.: 181 mg/dL (21 Apr 2019 05:53)  POCT Blood Glucose.: 195 mg/dL (20 Apr 2019 18:03)    PHYSICAL EXAM:  GENERAL: NAD, sedated, but alert and responding to questions non-verbally  HEAD:  Atraumatic, Normocephalic  EYES: EOMI, conjunctiva and sclera clear  NECK: Supple, No JVD  CHEST/LUNG: Clear to auscultation bilaterally; No wheeze, ronchi or rales  HEART: Regular rate and rhythm; No murmurs, rubs, or gallops  ABDOMEN: Soft, Nontender, Nondistended; Bowel sounds present  EXTREMITIES:  2+ Peripheral Pulses, No clubbing, cyanosis, or edema  NEUROLOGY: non-focal, following commands and answering questions  SKIN: No rashes or lesions    LINES:    MEDICATIONS  (STANDING):  ALBUTerol/ipratropium for Nebulization 3 milliLiter(s) Nebulizer every 6 hours  artificial  tears Solution 1 Drop(s) Both EYES every 2 hours  aspirin  chewable 81 milliGRAM(s) Oral daily  ceFAZolin   IVPB 1000 milliGRAM(s) IV Intermittent every 24 hours  chlorhexidine 2% Cloths 1 Application(s) Topical daily  chlorhexidine 4% Liquid 1 Application(s) Topical <User Schedule>  dexmedetomidine Infusion 0.2 MICROgram(s)/kG/Hr (3.415 mL/Hr) IV Continuous <Continuous>  dextrose 5% + sodium chloride 0.45%. 1000 milliLiter(s) (75 mL/Hr) IV Continuous <Continuous>  dextrose 5%. 1000 milliLiter(s) (50 mL/Hr) IV Continuous <Continuous>  erythromycin   Ointment 1 Application(s) Both EYES four times a day  gentamicin   IVPB 130 milliGRAM(s) IV Intermittent <User Schedule>  heparin  Injectable 5000 Unit(s) SubCutaneous every 8 hours  insulin lispro (HumaLOG) corrective regimen sliding scale   SubCutaneous every 6 hours  levothyroxine Injectable 75 MICROGram(s) IV Push at bedtime  midodrine 10 milliGRAM(s) Oral three times a day  pantoprazole  Injectable 40 milliGRAM(s) IV Push every 12 hours  petrolatum Ophthalmic Ointment 1 Application(s) Both EYES three times a day  phenylephrine    Infusion 2 MICROgram(s)/kG/Min (51.225 mL/Hr) IV Continuous <Continuous>  polyethylene glycol 3350 17 Gram(s) Oral daily  senna Syrup 10 milliLiter(s) Oral at bedtime  sodium chloride 3%  Inhalation 4 milliLiter(s) Inhalation every 12 hours    MEDICATIONS  (PRN):  acetaminophen    Suspension .. 650 milliGRAM(s) Oral every 6 hours PRN Temp greater or equal to 38C (100.4F)  lidocaine/prilocaine Cream 1 Application(s) Topical daily PRN hd days  ondansetron Injectable 4 milliGRAM(s) IV Push every 12 hours PRN Nausea and/or Vomiting      LABS:  CBC Full  -  ( 21 Apr 2019 00:32 )  WBC Count : 9.1 K/uL  RBC Count : 3.11 M/uL  Hemoglobin : 10.1 g/dL  Hematocrit : 30.2 %  Platelet Count - Automated : 284 K/uL  Mean Cell Volume : 97.0 fl  Mean Cell Hemoglobin : 32.4 pg  Mean Cell Hemoglobin Concentration : 33.4 gm/dL  Auto Neutrophil # : x  Auto Lymphocyte # : x  Auto Monocyte # : x  Auto Eosinophil # : x  Auto Basophil # : x  Auto Neutrophil % : x  Auto Lymphocyte % : x  Auto Monocyte % : x  Auto Eosinophil % : x  Auto Basophil % : x    04-21    135  |  101  |  18  ----------------------------<  186<H>  3.7   |  22  |  1.88<H>    Ca    7.7<L>      21 Apr 2019 00:32  Phos  2.1     04-21  Mg     2.1     04-21    TPro  6.1  /  Alb  2.1<L>  /  TBili  0.4  /  DBili  x   /  AST  19  /  ALT  <5<L>  /  AlkPhos  114  04-21    ABG - ( 21 Apr 2019 01:58 )  pH, Arterial: 7.46  pH, Blood: x     /  pCO2: 37    /  pO2: 95    / HCO3: 26    / Base Excess: 2.7   /  SaO2: 97          MICROBIOLOGY:     Culture - Bronchial (collected 20 Apr 2019 08:26)  Source: Bronch Wash TRAP  Gram Stain (20 Apr 2019 14:20):    No polymorphonuclear cells seen per low power field    No squamous epithelial cells per low power field    No organisms seen per oil power field  Preliminary Report (21 Apr 2019 09:48):    No growth    Culture - Blood (collected 18 Apr 2019 21:34)  Source: .Blood  Preliminary Report (19 Apr 2019 22:01):    No growth to date.    Culture - Blood (collected 18 Apr 2019 21:34)  Source: .Blood  Preliminary Report (19 Apr 2019 22:01):    No growth to date.      RADIOLOGY:  [ ] Reviewed and interpreted by me    EKG:

## 2019-04-21 NOTE — PROGRESS NOTE ADULT - SUBJECTIVE AND OBJECTIVE BOX
Patient is a 78y old  Female who presents with a chief complaint of R tib/fib fx (21 Apr 2019 09:09)      Any change in ROS: pt is alert and awake: no SOB : she is on precedex: but responds: still intubated:     MEDICATIONS  (STANDING):  ALBUTerol/ipratropium for Nebulization 3 milliLiter(s) Nebulizer every 6 hours  artificial  tears Solution 1 Drop(s) Both EYES every 2 hours  aspirin  chewable 81 milliGRAM(s) Oral daily  ceFAZolin   IVPB 1000 milliGRAM(s) IV Intermittent every 24 hours  chlorhexidine 2% Cloths 1 Application(s) Topical daily  chlorhexidine 4% Liquid 1 Application(s) Topical <User Schedule>  dexmedetomidine Infusion 0.2 MICROgram(s)/kG/Hr (3.415 mL/Hr) IV Continuous <Continuous>  dextrose 5% + sodium chloride 0.45%. 1000 milliLiter(s) (75 mL/Hr) IV Continuous <Continuous>  dextrose 5%. 1000 milliLiter(s) (50 mL/Hr) IV Continuous <Continuous>  erythromycin   Ointment 1 Application(s) Both EYES four times a day  gentamicin   IVPB 130 milliGRAM(s) IV Intermittent <User Schedule>  heparin  Injectable 5000 Unit(s) SubCutaneous every 8 hours  insulin lispro (HumaLOG) corrective regimen sliding scale   SubCutaneous every 6 hours  levothyroxine Injectable 75 MICROGram(s) IV Push at bedtime  midodrine 10 milliGRAM(s) Oral three times a day  pantoprazole  Injectable 40 milliGRAM(s) IV Push every 12 hours  petrolatum Ophthalmic Ointment 1 Application(s) Both EYES three times a day  phenylephrine    Infusion 2 MICROgram(s)/kG/Min (51.225 mL/Hr) IV Continuous <Continuous>  polyethylene glycol 3350 17 Gram(s) Oral daily  senna Syrup 10 milliLiter(s) Oral at bedtime  sodium chloride 3%  Inhalation 4 milliLiter(s) Inhalation every 6 hours    MEDICATIONS  (PRN):  acetaminophen    Suspension .. 650 milliGRAM(s) Oral every 6 hours PRN Temp greater or equal to 38C (100.4F)  lidocaine/prilocaine Cream 1 Application(s) Topical daily PRN hd days  ondansetron Injectable 4 milliGRAM(s) IV Push every 12 hours PRN Nausea and/or Vomiting    Vital Signs Last 24 Hrs  T(C): 36.9 (21 Apr 2019 08:00), Max: 37.4 (20 Apr 2019 12:00)  T(F): 98.4 (21 Apr 2019 08:00), Max: 99.4 (20 Apr 2019 12:00)  HR: 64 (21 Apr 2019 10:15) (54 - 68)  BP: 94/46 (21 Apr 2019 10:15) (87/41 - 132/60)  BP(mean): 66 (21 Apr 2019 10:15) (59 - 91)  RR: 26 (21 Apr 2019 10:15) (12 - 33)  SpO2: 100% (21 Apr 2019 10:15) (97% - 100%)  Mode: CPAP with PS  FiO2: 30  PEEP: 5  ITime: 0.9  MAP: 8  PIP: 16    I&O's Summary    20 Apr 2019 07:01  -  21 Apr 2019 07:00  --------------------------------------------------------  IN: 4010.3 mL / OUT: 1400 mL / NET: 2610.3 mL    21 Apr 2019 07:01  -  21 Apr 2019 11:09  --------------------------------------------------------  IN: 271 mL / OUT: 0 mL / NET: 271 mL          Physical Exam:   GENERAL: NAD, well-groomed, well-developed  HEENT: FLOWER/   Atraumatic, Normocephalic  ENMT: No tonsillar erythema, exudates, or enlargement; Moist mucous membranes, Good dentition, No lesions  NECK: Supple, No JVD, Normal thyroid  CHEST/LUNG: Decreased air entry rigth base   CVS: Regular rate and rhythm; No murmurs, rubs, or gallops  GI: : Soft, Nontender, Nondistended; Bowel sounds present  NERVOUS SYSTEM:  awake and writes on papaer  EXTREMITIES:  2+ Peripheral Pulses, No clubbing, cyanosis, or edema  LYMPH: No lymphadenopathy noted  SKIN: No rashes or lesions  ENDOCRINOLOGY: No Thyromegaly  PSYCH: calm    Labs:  ABG - ( 21 Apr 2019 01:58 )  pH, Arterial: 7.46  pH, Blood: x     /  pCO2: 37    /  pO2: 95    / HCO3: 26    / Base Excess: 2.7   /  SaO2: 97                                          10.1   9.1   )-----------( 284      ( 21 Apr 2019 00:32 )             30.2                         10.2   15.3  )-----------( 349      ( 20 Apr 2019 01:52 )             32.1                         10.3   22.8  )-----------( 284      ( 19 Apr 2019 01:14 )             31.8                         11.3   29.1  )-----------( 303      ( 18 Apr 2019 17:33 )             33.6                         10.1   12.1  )-----------( 269      ( 18 Apr 2019 00:12 )             30.7     04-21    135  |  101  |  18  ----------------------------<  186<H>  3.7   |  22  |  1.88<H>  04-20    135  |  97  |  36<H>  ----------------------------<  118<H>  3.9   |  17<L>  |  3.28<H>  04-19    135  |  95<L>  |  29<H>  ----------------------------<  174<H>  4.0   |  21<L>  |  2.66<H>  04-18    135  |  95<L>  |  23  ----------------------------<  181<H>  4.0   |  22  |  2.25<H>  04-18    136  |  96  |  37<H>  ----------------------------<  206<H>  4.5   |  25  |  3.31<H>    Ca    7.7<L>      21 Apr 2019 00:32  Ca    8.2<L>      20 Apr 2019 01:52  Phos  2.1     04-21  Phos  3.4     04-20  Mg     2.1     04-21  Mg     2.5     04-20    TPro  6.1  /  Alb  2.1<L>  /  TBili  0.4  /  DBili  x   /  AST  19  /  ALT  <5<L>  /  AlkPhos  114  04-21  TPro  6.9  /  Alb  2.4<L>  /  TBili  0.4  /  DBili  x   /  AST  23  /  ALT  <5<L>  /  AlkPhos  117  04-20  TPro  7.0  /  Alb  2.8<L>  /  TBili  0.5  /  DBili  x   /  AST  25  /  ALT  <5<L>  /  AlkPhos  133<H>  04-19  TPro  7.8  /  Alb  3.1<L>  /  TBili  0.7  /  DBili  x   /  AST  31  /  ALT  <5<L>  /  AlkPhos  152<H>  04-18  TPro  6.6  /  Alb  2.8<L>  /  TBili  0.6  /  DBili  x   /  AST  32  /  ALT  <5<L>  /  AlkPhos  119  04-18    CAPILLARY BLOOD GLUCOSE      POCT Blood Glucose.: 181 mg/dL (21 Apr 2019 05:53)  POCT Blood Glucose.: 195 mg/dL (20 Apr 2019 18:03)  POCT Blood Glucose.: 149 mg/dL (20 Apr 2019 11:31)     (04-17 @ 15:00)    LIVER FUNCTIONS - ( 21 Apr 2019 00:32 )  Alb: 2.1 g/dL / Pro: 6.1 g/dL / ALK PHOS: 114 U/L / ALT: <5 U/L / AST: 19 U/L / GGT: x           PT/INR - ( 21 Apr 2019 00:32 )   PT: 15.8 sec;   INR: 1.36 ratio         PTT - ( 21 Apr 2019 00:32 )  PTT:38.4 sec    Fluid Source --  Albumin, Fluid--  Glucose, Fluid--  Protein total, Fluid--  Lacatate Dehydrogenase, Fluid--  pH, Fluid--  Cytopathology-Non Gyn Report  ACCESSION No:  [id number]    VIRGILIO KAPLAN                          1        Cytopathology Report            Specimen(s) Submitted  PLEURAL FLUID      Clinical History  HF.      Gross Description  Received: 65  ml of dark-yellow fluid in CytoLyt  Prepared: 1 ThinPrep slide, 1 Cell block, 1 Smear      Final Diagnosis  PLEURAL FLUID  NEGATIVE FOR MALIGNANT CELLS.    Cytology slides and cell block are composed of benign mesothelial  cells, histiocytes and lymphocytes.    Screened by: [facility](ASCP)  Verified by: Paola Tracy MD  (Electronic Signature)  Reported on: 04/20/19 18:03 EDT, [address] [facility], [place]  [address]  Cytology technical processing performed at [address] [place] [address], [place] [address]  _________________________________________________________________  Fluid Source --  Albumin, Fluid--  Glucose, [id number] mg/dL  Protein total, Fluid4.2 g/dL  Lacatate Dehydrogenase, [id number] U/L  pH, Fluid7.39  Cytopathology-Non Gyn Report--        RECENT CULTURES:  04-20 @ 08:26 Bronch Wash TRAP       No polymorphonuclear cells seen per low power field  No squamous epithelial cells per low power field  No organisms seen per oil power field           No growth    04-18 @ 21:34 .Blood                No growth to date.    04-17 @ 17:20 .Body Fluid       polymorphonuclear leukocytes seen  No organisms seen  by cytocentrifuge           No growth    04-17 @ 15:13 .Blood       Growth in aerobic bottle: Gram Positive Cocci in Clusters           No growth to date.    04-14 @ 12:25 .Blood       < from: Xray Chest 1 View- PORTABLE-Routine (04.19.19 @ 06:54) >    EXAM:  XR CHEST PORTABLE ROUTINE 1V                            PROCEDURE DATE:  04/19/2019            INTERPRETATION:  A single chest x-ray was obtained on April 19, 2019.    Indication: Follow-up right pneumothorax.    Impression:    The heart is normal in size. A right pneumothorax is still present and   remain unchanged compared to previous study done on April 18, 2019. Left   pleural effusion. Status post mitral valve annulus repair. All life   support devices in good position and unchanged. Status post sternotomy.                    ISRAEL ROSENBAUM M.D., ATTENDING RADIOLOGIST  This document has been electronically signed. Apr 19 2019  7:44AM        < end of copied text >           No growth at 5 days.          RESPIRATORY CULTURES:          Studies  Chest X-RAY  CT SCAN Chest   Venous Dopplers: LE:   CT Abdomen  Others

## 2019-04-21 NOTE — PROCEDURE NOTE - NSINDICATIONS_GEN_A_CORE
critical patient/respiratory failure
critical patient/airway protection/mental status change/respiratory distress
pleural effusion
ascites
critical illness
pneumothorax

## 2019-04-22 DIAGNOSIS — Z78.9 OTHER SPECIFIED HEALTH STATUS: ICD-10-CM

## 2019-04-22 DIAGNOSIS — J96.00 ACUTE RESPIRATORY FAILURE, UNSPECIFIED WHETHER WITH HYPOXIA OR HYPERCAPNIA: ICD-10-CM

## 2019-04-22 DIAGNOSIS — Z71.89 OTHER SPECIFIED COUNSELING: ICD-10-CM

## 2019-04-22 DIAGNOSIS — R18.8 OTHER ASCITES: ICD-10-CM

## 2019-04-22 DIAGNOSIS — Z51.5 ENCOUNTER FOR PALLIATIVE CARE: ICD-10-CM

## 2019-04-22 LAB
ALBUMIN SERPL ELPH-MCNC: 2.3 G/DL — LOW (ref 3.3–5)
ALP SERPL-CCNC: 119 U/L — SIGNIFICANT CHANGE UP (ref 40–120)
ALT FLD-CCNC: <5 U/L — LOW (ref 10–45)
ANION GAP SERPL CALC-SCNC: 17 MMOL/L — SIGNIFICANT CHANGE UP (ref 5–17)
APTT BLD: 45.1 SEC — HIGH (ref 27.5–36.3)
AST SERPL-CCNC: 17 U/L — SIGNIFICANT CHANGE UP (ref 10–40)
BILIRUB SERPL-MCNC: 0.5 MG/DL — SIGNIFICANT CHANGE UP (ref 0.2–1.2)
BUN SERPL-MCNC: 23 MG/DL — SIGNIFICANT CHANGE UP (ref 7–23)
CALCIUM SERPL-MCNC: 7.8 MG/DL — LOW (ref 8.4–10.5)
CHLORIDE SERPL-SCNC: 101 MMOL/L — SIGNIFICANT CHANGE UP (ref 96–108)
CO2 SERPL-SCNC: 19 MMOL/L — LOW (ref 22–31)
CREAT SERPL-MCNC: 2.61 MG/DL — HIGH (ref 0.5–1.3)
CULTURE RESULTS: SIGNIFICANT CHANGE UP
GLUCOSE BLDC GLUCOMTR-MCNC: 117 MG/DL — HIGH (ref 70–99)
GLUCOSE BLDC GLUCOMTR-MCNC: 133 MG/DL — HIGH (ref 70–99)
GLUCOSE BLDC GLUCOMTR-MCNC: 149 MG/DL — HIGH (ref 70–99)
GLUCOSE BLDC GLUCOMTR-MCNC: 172 MG/DL — HIGH (ref 70–99)
GLUCOSE SERPL-MCNC: 165 MG/DL — HIGH (ref 70–99)
HCT VFR BLD CALC: 31.6 % — LOW (ref 34.5–45)
HGB BLD-MCNC: 10.5 G/DL — LOW (ref 11.5–15.5)
INR BLD: 1.37 RATIO — HIGH (ref 0.88–1.16)
MAGNESIUM SERPL-MCNC: 2.1 MG/DL — SIGNIFICANT CHANGE UP (ref 1.6–2.6)
MCHC RBC-ENTMCNC: 32.9 PG — SIGNIFICANT CHANGE UP (ref 27–34)
MCHC RBC-ENTMCNC: 33.3 GM/DL — SIGNIFICANT CHANGE UP (ref 32–36)
MCV RBC AUTO: 98.7 FL — SIGNIFICANT CHANGE UP (ref 80–100)
PHOSPHATE SERPL-MCNC: 4 MG/DL — SIGNIFICANT CHANGE UP (ref 2.5–4.5)
PLATELET # BLD AUTO: 378 K/UL — SIGNIFICANT CHANGE UP (ref 150–400)
POTASSIUM SERPL-MCNC: 4.2 MMOL/L — SIGNIFICANT CHANGE UP (ref 3.5–5.3)
POTASSIUM SERPL-SCNC: 4.2 MMOL/L — SIGNIFICANT CHANGE UP (ref 3.5–5.3)
PROT SERPL-MCNC: 6.5 G/DL — SIGNIFICANT CHANGE UP (ref 6–8.3)
PROTHROM AB SERPL-ACNC: 15.8 SEC — HIGH (ref 10–12.9)
RBC # BLD: 3.2 M/UL — LOW (ref 3.8–5.2)
RBC # FLD: 17.3 % — HIGH (ref 10.3–14.5)
SODIUM SERPL-SCNC: 137 MMOL/L — SIGNIFICANT CHANGE UP (ref 135–145)
SPECIMEN SOURCE: SIGNIFICANT CHANGE UP
T3FREE SERPL-MCNC: 0.87 PG/ML — LOW (ref 1.8–4.6)
T4 FREE SERPL-MCNC: 0.7 NG/DL — LOW (ref 0.9–1.8)
TSH SERPL-MCNC: 16.3 UIU/ML — HIGH (ref 0.27–4.2)
WBC # BLD: 10.1 K/UL — SIGNIFICANT CHANGE UP (ref 3.8–10.5)
WBC # FLD AUTO: 10.1 K/UL — SIGNIFICANT CHANGE UP (ref 3.8–10.5)

## 2019-04-22 PROCEDURE — 99497 ADVNCD CARE PLAN 30 MIN: CPT | Mod: 25,GC

## 2019-04-22 PROCEDURE — 99291 CRITICAL CARE FIRST HOUR: CPT

## 2019-04-22 PROCEDURE — 99223 1ST HOSP IP/OBS HIGH 75: CPT | Mod: GC

## 2019-04-22 PROCEDURE — 99233 SBSQ HOSP IP/OBS HIGH 50: CPT

## 2019-04-22 PROCEDURE — 71045 X-RAY EXAM CHEST 1 VIEW: CPT | Mod: 26,76

## 2019-04-22 RX ORDER — HYDROCORTISONE 1 %
1 OINTMENT (GRAM) TOPICAL EVERY 6 HOURS
Qty: 0 | Refills: 0 | Status: DISCONTINUED | OUTPATIENT
Start: 2019-04-22 | End: 2019-04-23

## 2019-04-22 RX ORDER — LEVOTHYROXINE SODIUM 125 MCG
88 TABLET ORAL AT BEDTIME
Qty: 0 | Refills: 0 | Status: DISCONTINUED | OUTPATIENT
Start: 2019-04-22 | End: 2019-04-23

## 2019-04-22 RX ORDER — SODIUM CHLORIDE 9 MG/ML
250 INJECTION INTRAMUSCULAR; INTRAVENOUS; SUBCUTANEOUS ONCE
Qty: 0 | Refills: 0 | Status: COMPLETED | OUTPATIENT
Start: 2019-04-22 | End: 2019-04-22

## 2019-04-22 RX ORDER — ACETAMINOPHEN 500 MG
1000 TABLET ORAL ONCE
Qty: 0 | Refills: 0 | Status: COMPLETED | OUTPATIENT
Start: 2019-04-22 | End: 2019-04-22

## 2019-04-22 RX ORDER — HYDROMORPHONE HYDROCHLORIDE 2 MG/ML
0.2 INJECTION INTRAMUSCULAR; INTRAVENOUS; SUBCUTANEOUS EVERY 4 HOURS
Qty: 0 | Refills: 0 | Status: DISCONTINUED | OUTPATIENT
Start: 2019-04-22 | End: 2019-04-23

## 2019-04-22 RX ORDER — HYDROMORPHONE HYDROCHLORIDE 2 MG/ML
0.2 INJECTION INTRAMUSCULAR; INTRAVENOUS; SUBCUTANEOUS ONCE
Qty: 0 | Refills: 0 | Status: DISCONTINUED | OUTPATIENT
Start: 2019-04-22 | End: 2019-04-22

## 2019-04-22 RX ORDER — MIDODRINE HYDROCHLORIDE 2.5 MG/1
20 TABLET ORAL EVERY 8 HOURS
Qty: 0 | Refills: 0 | Status: DISCONTINUED | OUTPATIENT
Start: 2019-04-22 | End: 2019-04-23

## 2019-04-22 RX ORDER — IPRATROPIUM/ALBUTEROL SULFATE 18-103MCG
3 AEROSOL WITH ADAPTER (GRAM) INHALATION EVERY 6 HOURS
Qty: 0 | Refills: 0 | Status: DISCONTINUED | OUTPATIENT
Start: 2019-04-22 | End: 2019-04-23

## 2019-04-22 RX ADMIN — Medication 1 DROP(S): at 10:07

## 2019-04-22 RX ADMIN — Medication 1 DROP(S): at 23:34

## 2019-04-22 RX ADMIN — Medication 1 APPLICATION(S): at 05:38

## 2019-04-22 RX ADMIN — Medication 3 MILLILITER(S): at 17:30

## 2019-04-22 RX ADMIN — SODIUM CHLORIDE 4 MILLILITER(S): 9 INJECTION INTRAMUSCULAR; INTRAVENOUS; SUBCUTANEOUS at 05:03

## 2019-04-22 RX ADMIN — Medication 1 DROP(S): at 12:12

## 2019-04-22 RX ADMIN — Medication 1 DROP(S): at 17:37

## 2019-04-22 RX ADMIN — Medication 1 APPLICATION(S): at 23:34

## 2019-04-22 RX ADMIN — Medication 1 APPLICATION(S): at 13:21

## 2019-04-22 RX ADMIN — CHLORHEXIDINE GLUCONATE 1 APPLICATION(S): 213 SOLUTION TOPICAL at 05:39

## 2019-04-22 RX ADMIN — Medication 400 MILLIGRAM(S): at 21:10

## 2019-04-22 RX ADMIN — Medication 1000 MILLIGRAM(S): at 21:37

## 2019-04-22 RX ADMIN — HYDROMORPHONE HYDROCHLORIDE 0.2 MILLIGRAM(S): 2 INJECTION INTRAMUSCULAR; INTRAVENOUS; SUBCUTANEOUS at 23:15

## 2019-04-22 RX ADMIN — Medication 1 APPLICATION(S): at 22:32

## 2019-04-22 RX ADMIN — HEPARIN SODIUM 5000 UNIT(S): 5000 INJECTION INTRAVENOUS; SUBCUTANEOUS at 13:20

## 2019-04-22 RX ADMIN — HEPARIN SODIUM 5000 UNIT(S): 5000 INJECTION INTRAVENOUS; SUBCUTANEOUS at 05:38

## 2019-04-22 RX ADMIN — HYDROMORPHONE HYDROCHLORIDE 0.2 MILLIGRAM(S): 2 INJECTION INTRAMUSCULAR; INTRAVENOUS; SUBCUTANEOUS at 22:31

## 2019-04-22 RX ADMIN — PANTOPRAZOLE SODIUM 40 MILLIGRAM(S): 20 TABLET, DELAYED RELEASE ORAL at 17:37

## 2019-04-22 RX ADMIN — PHENYLEPHRINE HYDROCHLORIDE 51.23 MICROGRAM(S)/KG/MIN: 10 INJECTION INTRAVENOUS at 19:00

## 2019-04-22 RX ADMIN — CHLORHEXIDINE GLUCONATE 1 APPLICATION(S): 213 SOLUTION TOPICAL at 12:35

## 2019-04-22 RX ADMIN — SODIUM CHLORIDE 250 MILLILITER(S): 9 INJECTION INTRAMUSCULAR; INTRAVENOUS; SUBCUTANEOUS at 16:27

## 2019-04-22 RX ADMIN — PANTOPRAZOLE SODIUM 40 MILLIGRAM(S): 20 TABLET, DELAYED RELEASE ORAL at 05:39

## 2019-04-22 RX ADMIN — Medication 1 DROP(S): at 00:32

## 2019-04-22 RX ADMIN — Medication 1 DROP(S): at 03:10

## 2019-04-22 RX ADMIN — Medication 1 DROP(S): at 01:41

## 2019-04-22 RX ADMIN — Medication 1 APPLICATION(S): at 12:35

## 2019-04-22 RX ADMIN — ONDANSETRON 4 MILLIGRAM(S): 8 TABLET, FILM COATED ORAL at 19:51

## 2019-04-22 RX ADMIN — FENTANYL CITRATE 50 MICROGRAM(S): 50 INJECTION INTRAVENOUS at 03:00

## 2019-04-22 RX ADMIN — Medication 1: at 08:12

## 2019-04-22 RX ADMIN — Medication 3 MILLILITER(S): at 05:03

## 2019-04-22 RX ADMIN — Medication 3 MILLILITER(S): at 11:40

## 2019-04-22 RX ADMIN — Medication 3 MILLILITER(S): at 23:34

## 2019-04-22 RX ADMIN — SODIUM CHLORIDE 4 MILLILITER(S): 9 INJECTION INTRAMUSCULAR; INTRAVENOUS; SUBCUTANEOUS at 17:30

## 2019-04-22 RX ADMIN — Medication 1 DROP(S): at 20:04

## 2019-04-22 RX ADMIN — Medication 75 MICROGRAM(S): at 22:30

## 2019-04-22 RX ADMIN — Medication 1 APPLICATION(S): at 17:50

## 2019-04-22 RX ADMIN — FENTANYL CITRATE 50 MICROGRAM(S): 50 INJECTION INTRAVENOUS at 03:15

## 2019-04-22 RX ADMIN — Medication 1 APPLICATION(S): at 00:32

## 2019-04-22 RX ADMIN — HEPARIN SODIUM 5000 UNIT(S): 5000 INJECTION INTRAVENOUS; SUBCUTANEOUS at 22:31

## 2019-04-22 RX ADMIN — Medication 1 DROP(S): at 16:17

## 2019-04-22 RX ADMIN — Medication 1 DROP(S): at 22:31

## 2019-04-22 RX ADMIN — HYDROMORPHONE HYDROCHLORIDE 0.2 MILLIGRAM(S): 2 INJECTION INTRAMUSCULAR; INTRAVENOUS; SUBCUTANEOUS at 14:01

## 2019-04-22 RX ADMIN — HYDROMORPHONE HYDROCHLORIDE 0.2 MILLIGRAM(S): 2 INJECTION INTRAMUSCULAR; INTRAVENOUS; SUBCUTANEOUS at 18:15

## 2019-04-22 RX ADMIN — Medication 1 DROP(S): at 07:59

## 2019-04-22 RX ADMIN — Medication 100 MILLIGRAM(S): at 13:47

## 2019-04-22 RX ADMIN — MIDODRINE HYDROCHLORIDE 10 MILLIGRAM(S): 2.5 TABLET ORAL at 06:07

## 2019-04-22 RX ADMIN — Medication 400 MILLIGRAM(S): at 08:09

## 2019-04-22 RX ADMIN — Medication 1 APPLICATION(S): at 12:12

## 2019-04-22 RX ADMIN — DEXMEDETOMIDINE HYDROCHLORIDE IN 0.9% SODIUM CHLORIDE 3.42 MICROGRAM(S)/KG/HR: 4 INJECTION INTRAVENOUS at 12:34

## 2019-04-22 RX ADMIN — Medication 1 DROP(S): at 05:39

## 2019-04-22 RX ADMIN — Medication 1 DROP(S): at 13:21

## 2019-04-22 RX ADMIN — LACTULOSE 20 GRAM(S): 10 SOLUTION ORAL at 05:38

## 2019-04-22 NOTE — CONSULT NOTE ADULT - CONSULT REQUESTED BY NAME
Dr BRODY Ross
Dr Menjivar
Dr PIERRE Rapp ( [facility])
Dr. Foreign Butler
Dr. Menjivar
Dr. Ross
ED
Maulik FitzgeraldMD
casimiro
micu team
Dr Vandana Lincoln

## 2019-04-22 NOTE — PROGRESS NOTE ADULT - ASSESSMENT
78 F PMH CAD s/p CABG x 4 2015, T2DM with peripheral neuropathy, ESRD on HD T/Th/S via L AVF, hypotension on midodrine, HFrEF, HTN, HLD, b/l cataracts s/p surgery, p/w mechanical fall and R leg pain, found to have R tib/fib fracture.   pt with resp failure, now intubated in micu    resp failure  likely multifactorial  extubated last week, then re-intubated for resp distress   now extubated this am  cont to monitor closely    bacteremia/sepsis / endocarditis   id f/u  iv abx as per id  not surgical candidate    open fracture of proximal end of right tibia,  confirmed on XR and CT  -ortho f/u  s/p cast. no surgical intervention at this time  outpt f/u  NWB RLE     Coronary artery disease without angina pectoris  -c/w aspirin, statin  -f/u cards .      Type 2 diabetes mellitus with hyperglycemia, with long-term current use of insulin.  start HISS  endo consult dr simons  -james, CC diet,    ESRD on hemodialysis.  HD as per renal      ascites.   s/p paracentesis   f/u fluid analysis     emesis  gi consulted  f/u guaiac  ppi  monitor cbc       mngt as per MICU

## 2019-04-22 NOTE — PROGRESS NOTE ADULT - SUBJECTIVE AND OBJECTIVE BOX
CHIEF COMPLAINT:    Interval Events:    REVIEW OF SYSTEMS:  Constitutional: [ ] negative [ ] fevers [ ] chills [ ] weight loss [ ] weight gain  HEENT: [ ] negative [ ] dry eyes [ ] eye irritation [ ] postnasal drip [ ] nasal congestion  CV: [ ] negative  [ ] chest pain [ ] orthopnea [ ] palpitations [ ] murmur  Resp: [ ] negative [ ] cough [ ] shortness of breath [ ] dyspnea [ ] wheezing [ ] sputum [ ] hemoptysis  GI: [ ] negative [ ] nausea [ ] vomiting [ ] diarrhea [ ] constipation [ ] abd pain [ ] dysphagia   : [ ] negative [ ] dysuria [ ] nocturia [ ] hematuria [ ] increased urinary frequency  Musculoskeletal: [ ] negative [ ] back pain [ ] myalgias [ ] arthralgias [ ] fracture  Skin: [ ] negative [ ] rash [ ] itch  Neurological: [ ] negative [ ] headache [ ] dizziness [ ] syncope [ ] weakness [ ] numbness  Psychiatric: [ ] negative [ ] anxiety [ ] depression  Endocrine: [ ] negative [ ] diabetes [ ] thyroid problem  Hematologic/Lymphatic: [ ] negative [ ] anemia [ ] bleeding problem  Allergic/Immunologic: [ ] negative [ ] itchy eyes [ ] nasal discharge [ ] hives [ ] angioedema  [ ] All other systems negative  [ ] Unable to assess ROS because ________    OBJECTIVE:  ICU Vital Signs Last 24 Hrs  T(C): 36.6 (22 Apr 2019 04:00), Max: 37.4 (21 Apr 2019 12:00)  T(F): 97.8 (22 Apr 2019 04:00), Max: 99.4 (21 Apr 2019 12:00)  HR: 71 (22 Apr 2019 07:30) (59 - 82)  BP: 108/51 (22 Apr 2019 07:30) (84/44 - 123/56)  BP(mean): 73 (22 Apr 2019 07:30) (59 - 83)  ABP: --  ABP(mean): --  RR: 25 (22 Apr 2019 07:30) (13 - 31)  SpO2: 98% (22 Apr 2019 07:30) (95% - 100%)    Mode: AC/ CMV (Assist Control/ Continuous Mandatory Ventilation), RR (machine): 12, TV (machine): 430, FiO2: 30, PEEP: 5, ITime: 0.9, MAP: 9, PIP: 20    04-21 @ 07:01  -  04-22 @ 07:00  --------------------------------------------------------  IN: 1962.3 mL / OUT: 510 mL / NET: 1452.3 mL      CAPILLARY BLOOD GLUCOSE      POCT Blood Glucose.: 172 mg/dL (22 Apr 2019 08:07)      PHYSICAL EXAM:  General:   HEENT:   Lymph Nodes:  Neck:   Respiratory:   Cardiovascular:   Abdomen:   Extremities:   Skin:   Neurological:  Psychiatry:    LINES:    HOSPITAL MEDICATIONS:  aspirin  chewable 81 milliGRAM(s) Oral daily  heparin  Injectable 5000 Unit(s) SubCutaneous every 8 hours    ceFAZolin   IVPB 1000 milliGRAM(s) IV Intermittent every 24 hours  gentamicin   IVPB 130 milliGRAM(s) IV Intermittent <User Schedule>    midodrine 10 milliGRAM(s) Oral three times a day  phenylephrine    Infusion 2 MICROgram(s)/kG/Min IV Continuous <Continuous>    insulin lispro (HumaLOG) corrective regimen sliding scale   SubCutaneous every 6 hours  levothyroxine Injectable 75 MICROGram(s) IV Push at bedtime    ALBUTerol/ipratropium for Nebulization 3 milliLiter(s) Nebulizer every 6 hours  sodium chloride 3%  Inhalation 4 milliLiter(s) Inhalation every 12 hours    acetaminophen    Suspension .. 650 milliGRAM(s) Oral every 6 hours PRN  dexmedetomidine Infusion 0.2 MICROgram(s)/kG/Hr IV Continuous <Continuous>  fentaNYL    Injectable 50 MICROGram(s) IV Push every 15 minutes PRN  ondansetron Injectable 4 milliGRAM(s) IV Push every 12 hours PRN    pantoprazole  Injectable 40 milliGRAM(s) IV Push every 12 hours  polyethylene glycol 3350 17 Gram(s) Oral daily  senna Syrup 10 milliLiter(s) Oral at bedtime        dextrose 5%. 1000 milliLiter(s) IV Continuous <Continuous>      artificial  tears Solution 1 Drop(s) Both EYES every 2 hours  chlorhexidine 2% Cloths 1 Application(s) Topical daily  chlorhexidine 4% Liquid 1 Application(s) Topical <User Schedule>  erythromycin   Ointment 1 Application(s) Both EYES four times a day  hydrocortisone 1% Cream 1 Application(s) Topical every 6 hours  lidocaine/prilocaine Cream 1 Application(s) Topical daily PRN  petrolatum Ophthalmic Ointment 1 Application(s) Both EYES three times a day        LABS:                        10.5   10.1  )-----------( 378      ( 22 Apr 2019 00:19 )             31.6     Hgb Trend: 10.5<--, 10.1<--, 10.2<--, 10.3<--, 11.3<--  04-22    137  |  101  |  23  ----------------------------<  165<H>  4.2   |  19<L>  |  2.61<H>    Ca    7.8<L>      22 Apr 2019 00:19  Phos  4.0     04-22  Mg     2.1     04-22    TPro  6.5  /  Alb  2.3<L>  /  TBili  0.5  /  DBili  x   /  AST  17  /  ALT  <5<L>  /  AlkPhos  119  04-22    Creatinine Trend: 2.61<--, 1.88<--, 3.28<--, 2.66<--, 2.25<--, 3.31<--  PT/INR - ( 22 Apr 2019 00:19 )   PT: 15.8 sec;   INR: 1.37 ratio         PTT - ( 22 Apr 2019 00:19 )  PTT:45.1 sec    Arterial Blood Gas:  04-21 @ 01:58  7.46/37/95/26/97/2.7  ABG lactate: --  Arterial Blood Gas:  04-21 @ 00:17  7.55/31/110/27/99/4.6  ABG lactate: --        MICROBIOLOGY:     RADIOLOGY:  [ ] Reviewed and interpreted by me    EKG: CHIEF COMPLAINT:    Interval Events:    Pt with high residuals overnight, tube feeds on trickle this AM. Alert and responsive, reports some back pain from a sore she has had "For years." On CPAP 10/5, doing well.      OBJECTIVE:  ICU Vital Signs Last 24 Hrs  T(C): 36.6 (22 Apr 2019 04:00), Max: 37.4 (21 Apr 2019 12:00)  T(F): 97.8 (22 Apr 2019 04:00), Max: 99.4 (21 Apr 2019 12:00)  HR: 71 (22 Apr 2019 07:30) (59 - 82)  BP: 108/51 (22 Apr 2019 07:30) (84/44 - 123/56)  BP(mean): 73 (22 Apr 2019 07:30) (59 - 83)  ABP: --  ABP(mean): --  RR: 25 (22 Apr 2019 07:30) (13 - 31)  SpO2: 98% (22 Apr 2019 07:30) (95% - 100%)    Mode: AC/ CMV (Assist Control/ Continuous Mandatory Ventilation), RR (machine): 12, TV (machine): 430, FiO2: 30, PEEP: 5, ITime: 0.9, MAP: 9, PIP: 20    04-21 @ 07:01  -  04-22 @ 07:00  --------------------------------------------------------  IN: 1962.3 mL / OUT: 510 mL / NET: 1452.3 mL      CAPILLARY BLOOD GLUCOSE      POCT Blood Glucose.: 172 mg/dL (22 Apr 2019 08:07)      PHYSICAL EXAM:  General: Intubated, arousable - follows commands   HEENT: EOMI. Mild erythema of sclera noted, no discharge/pus noted.   Respiratory: Coarse breath sounds heard in bilateral lung fields.   Cardiovascular: S1, S2 noted.   Abdomen: Soft, nontender, nondistended.   Extremities: RLE in cast and wrapped in ACE bandage. LLE AVF.   Skin: No lesions noted.    Neurological: Intubated, responsive, able to squeeze fingers    LINES:    HOSPITAL MEDICATIONS:  aspirin  chewable 81 milliGRAM(s) Oral daily  heparin  Injectable 5000 Unit(s) SubCutaneous every 8 hours    ceFAZolin   IVPB 1000 milliGRAM(s) IV Intermittent every 24 hours  gentamicin   IVPB 130 milliGRAM(s) IV Intermittent <User Schedule>    midodrine 10 milliGRAM(s) Oral three times a day  phenylephrine    Infusion 2 MICROgram(s)/kG/Min IV Continuous <Continuous>    insulin lispro (HumaLOG) corrective regimen sliding scale   SubCutaneous every 6 hours  levothyroxine Injectable 75 MICROGram(s) IV Push at bedtime    ALBUTerol/ipratropium for Nebulization 3 milliLiter(s) Nebulizer every 6 hours  sodium chloride 3%  Inhalation 4 milliLiter(s) Inhalation every 12 hours    acetaminophen    Suspension .. 650 milliGRAM(s) Oral every 6 hours PRN  dexmedetomidine Infusion 0.2 MICROgram(s)/kG/Hr IV Continuous <Continuous>  fentaNYL    Injectable 50 MICROGram(s) IV Push every 15 minutes PRN  ondansetron Injectable 4 milliGRAM(s) IV Push every 12 hours PRN    pantoprazole  Injectable 40 milliGRAM(s) IV Push every 12 hours  polyethylene glycol 3350 17 Gram(s) Oral daily  senna Syrup 10 milliLiter(s) Oral at bedtime        dextrose 5%. 1000 milliLiter(s) IV Continuous <Continuous>      artificial  tears Solution 1 Drop(s) Both EYES every 2 hours  chlorhexidine 2% Cloths 1 Application(s) Topical daily  chlorhexidine 4% Liquid 1 Application(s) Topical <User Schedule>  erythromycin   Ointment 1 Application(s) Both EYES four times a day  hydrocortisone 1% Cream 1 Application(s) Topical every 6 hours  lidocaine/prilocaine Cream 1 Application(s) Topical daily PRN  petrolatum Ophthalmic Ointment 1 Application(s) Both EYES three times a day        LABS:                        10.5   10.1  )-----------( 378      ( 22 Apr 2019 00:19 )             31.6     Hgb Trend: 10.5<--, 10.1<--, 10.2<--, 10.3<--, 11.3<--  04-22    137  |  101  |  23  ----------------------------<  165<H>  4.2   |  19<L>  |  2.61<H>    Ca    7.8<L>      22 Apr 2019 00:19  Phos  4.0     04-22  Mg     2.1     04-22    TPro  6.5  /  Alb  2.3<L>  /  TBili  0.5  /  DBili  x   /  AST  17  /  ALT  <5<L>  /  AlkPhos  119  04-22    Creatinine Trend: 2.61<--, 1.88<--, 3.28<--, 2.66<--, 2.25<--, 3.31<--  PT/INR - ( 22 Apr 2019 00:19 )   PT: 15.8 sec;   INR: 1.37 ratio         PTT - ( 22 Apr 2019 00:19 )  PTT:45.1 sec    Arterial Blood Gas:  04-21 @ 01:58  7.46/37/95/26/97/2.7  ABG lactate: --  Arterial Blood Gas:  04-21 @ 00:17  7.55/31/110/27/99/4.6  ABG lactate: --        MICROBIOLOGY:     RADIOLOGY:  [ ] Reviewed and interpreted by me    EKG:

## 2019-04-22 NOTE — PROGRESS NOTE ADULT - PROBLEM SELECTOR PLAN 1
appears to have resolved   doubt GI bleed; patient had stated that emesis was her chocolate milk  trend hgb/hct daily  transfuse prn   iv proton pump inhibitor bid  MICU care appreciated

## 2019-04-22 NOTE — CONSULT NOTE ADULT - PROBLEM SELECTOR RECOMMENDATION 5
Family meeting with pts daughter Sheridan Lee, Timoteo Kelley (son / over the phone) and niece. Discussed pts clinical decline since admission and concern of recurrent respiratory failure. Family stated pt does not want to be dependent on life support and when her   she has stated she wanted to be DNR. They understand pt has poor prognosis and could die during this admission. Family for now requested for pt to be DNR, but is unsure about intubation or withdrawal of other care like HD or other aggressive care. Discussed with family if pt continues to decline or with worsening hypotension may not be a candidate to continue with HD.

## 2019-04-22 NOTE — PROGRESS NOTE ADULT - ASSESSMENT
78 F with DM, CAD s/p CABG, mitral annuloplasty ring, HFrEF on midodrine, ESRD on HD via Left AVF, admitted 4/9/19 after a fall at home and sustaining a Right tib/fib fracture now casted, developed acute hypercapnic respiratory failure attributed to opioids, intubated, developed fevers 4/12 and high grade MSSA bacteremia   pt was started on vanco   repeat blood cx 4/13 still positive, 4/14 negative for now  bedside LISA and repeat LISA with mitral and aortic valve vegetation but pt not a surgical candidate  s/p thoracentesis cx negative    high grade MSSA bacteremia, in the setting of mitral ring annuloplasty with vegetation on mitral and aortic valves  ?penicillin allergy  pt reintubated again    * blood cx 4/14 negative, 4/17 with coag neg staph  * as per cardiothoracic pt is not a surgical candidate   * c/w  cefazolin 1 g qd, will complete a 6 week course from 4/14, On DC switch to 2 g post HD (on day that there is an additional day before the next HD, 3 g post HD)  * c/w gentamycin 2 mg/kg q 48 post HD, started 4/16, now day 7, will do 2 weeks of genta  * add rifampin 300 q 8

## 2019-04-22 NOTE — PROGRESS NOTE ADULT - ASSESSMENT
78F with CAD s/p CABG x4, HFrEF (EF 26% 4/2019), T2DM with peripheral neuropathy, ESRD on HD T/Th/S via L AVF, hypotension on midodrine, HTN, HLD initially admitted 4/9 after mechanical fall c/b R proximal tib/fib fracture not requiring emergent surgical intervention s/p cast placement with hospital course c/b possible melena now resolved, and RRT on 4/11 for acute hypercapnic respiratory failure in setting of opioid use requiring intubation. MICU course c/b SVT s/p synchronized cardioversion x2 now in sinus rhythm and Staph aureus bacteremia with noted vegetations seen on TTE, not a surgical candidate.  Hospital course further complicated by likely mucous plugging causing hypotension, Vtach and respiratory failure requiring urgent re-intubation on 4/18.     #Neuro:   - Patient transferred to MICU for AMS 2/2 hypercarbia. Unclear etiology, possibly opioid-induced as patient receiving pain medications for leg fracture. Ammonia wnl. CTH unremarkable.   - Pt awake and responsive, communicative and following commands off sedation   - c/w minimal precedex sedation today     #CV:   - CAD s/p multiple CABG: continue ASA, holding statin in setting of transaminitis  - HFrEF: repeat TTE 4/2019 with EF 26% severe global RV systolic dysfunction, moderate MS, mild TR, normal pulm pressures  - Troponin elevation likely in setting of underlying ESRD and demand ischemia  - SVT s/p cardioversion: now rate controlled, sinus rhythm, continue to monitor on tele. Pt with sustained Vtach on 4/19 during respiratory failure, s/p 1 dose of amiodarone, now in sinus rhythm.   - Bhaskar, will wean as tolerated   - Bedside LISA with suspected vegetations on  Aortic and Mitral Valve, formal TTE done. Not a surgical candidate. Will need repeat TTE/LISA in 6 weeks to confirm resolution with IV antibiotics    #Pulmonary:   - Initially transferred for acute hypercapnic respiratory failure of unclear etiology but suspect 2/2 opioid use  - Extubated and reintubated for respiratory failure on 4/18, likely 2/2 mucous plugging. Chest PT, Mucomyst, Duonebs, hypertonic saline ordered.    - b/l pleural effusion s/p thoracentesis 4/17 with 1 L out. C/b R pneumothorax ex vacuo, stable on serial CXRs. Pt HD stable, saturating well on vent    - Pleural fluid studies indicative of exudate, culture pending however pt afebrile, on antibiotics. Likely chronic pleural effusion 2/2 HF   - S/p R pigtail today.    #GI:   - Continue Nepro tube feeds via OGT, monitor residuals  - Transaminitis with abdominal sonogram showing early signs of cirrhosis likely 2/2 heart failure. Hepatitis studies negative.   - S/p paracentesis on 4/12 with removal of 4.9L, fluid studies with SAAG 0.6, likely 2/2 heart failure  - POCUS still with mild ascites but overall improved   - TFs held d/t high residuals. QTc is prolonged, cannot start Reglan.    #Renal:   - Hx of ESRD on HD T/Th/S; continue HD as per Nephrology  - HD with fluid removal T/Th/Sat, pt required increased pressor req during HD.  - Continue to monitor electrolytes    #Endo:   - TSH 34 but free T4 within normal range  - HbA1c 8.3%, continue insulin sliding scale  - Fingersticks q6h    #ID:   - Found to have high grade MSSA bacteremia on cultures from 4/12 and 4/13,   - Repeat cultures from 4/14 negative. 4/17 cultures positive. Cultures resent 4/18.   - Bedside LISA with vegetations on aortic and mitral valve, not surgical candidate.   - appreciate ID recs, c/w ancef 4/14-, gentamycin for dual coverage given hx of mitral ring     DVT PPx:   - HSQ 78F with CAD s/p CABG x4, HFrEF (EF 26% 4/2019), T2DM with peripheral neuropathy, ESRD on HD T/Th/S via L AVF, hypotension on midodrine, HTN, HLD initially admitted 4/9 after mechanical fall c/b R proximal tib/fib fracture not requiring emergent surgical intervention s/p cast placement with hospital course c/b possible melena now resolved, and RRT on 4/11 for acute hypercapnic respiratory failure in setting of opioid use requiring intubation. MICU course c/b SVT s/p synchronized cardioversion x2 now in sinus rhythm and Staph aureus bacteremia with noted vegetations seen on TTE, not a surgical candidate.  Hospital course further complicated by likely mucous plugging causing hypotension, Vtach and respiratory failure requiring urgent re-intubation on 4/18. S/p Chest tube placement for stable R pneumo (for extubation optimization) on 4/21.     #Neuro:   - Patient transferred to MICU for AMS 2/2 hypercarbia. Unclear etiology, possibly opioid-induced as patient receiving pain medications for leg fracture.   - Pt awake and responsive, communicative and following commands off sedation   - c/w minimal precedex sedation today. Will possibly extubate today  - MRI Head ordered for possible neurological events causing respiratory arrests during this admission     #CV:   - CAD s/p multiple CABG: continue ASA, holding statin in setting of transaminitis  - HFrEF: repeat TTE 4/2019 with EF 26% severe global RV systolic dysfunction, moderate MS, mild TR, normal pulm pressures  - Troponin elevation likely in setting of underlying ESRD and demand ischemia  - SVT s/p cardioversion: now rate controlled, sinus rhythm, continue to monitor on tele. Pt with sustained Vtach on 4/19 during respiratory failure, s/p 1 dose of amiodarone, now in sinus rhythm.   - Bhaskar, will wean as tolerated   - Bedside LISA with suspected vegetations on  Aortic and Mitral Valve, formal TTE done. Not a surgical candidate. Will need repeat TTE/LISA in 6 weeks to confirm resolution with IV antibiotics    #Pulmonary:   - Initially transferred for acute hypercapnic respiratory failure of unclear etiology but suspect 2/2 opioid use  - Extubated and reintubated for respiratory failure on 4/18, likely 2/2 mucous plugging. Chest PT, Mucomyst, Duonebs, hypertonic saline ordered.    - b/l pleural effusion s/p thoracentesis 4/17 with 1 L out. C/b R pneumothorax ex vacuo, stable on serial CXRs. Pt HD stable, saturating well on vent. Chest Tube placed on 4/21 to optimize for extubation. ~500ccs total out, ~100ccs out overnight     - Pleural fluid studies indicative of exudate, culture pending however pt afebrile, on antibiotics. Likely chronic pleural effusion 2/2 HF     #GI:   - Continue Nepro tube feeds via OGT, monitor residuals  - Transaminitis with abdominal sonogram showing early signs of cirrhosis likely 2/2 heart failure. Hepatitis studies negative.   - S/p paracentesis on 4/12 with removal of 4.9L, fluid studies with SAAG 0.6, likely 2/2 heart failure  - POCUS still with mild ascites but overall improved   - TFs held d/t high residuals. QTc is prolonged, cannot start Reglan.    #Renal:   - Hx of ESRD on HD T/Th/S; continue HD as per Nephrology  - HD with fluid removal T/Th/Sat, pt required increased pressor req during HD.  - Continue to monitor electrolytes    #Endo:   - TSH 34 but free T4 within normal range  - HbA1c 8.3%, continue insulin sliding scale  - Fingersticks q6h    #ID:   - Found to have high grade MSSA bacteremia on cultures from 4/12 and 4/13,   - Repeat cultures from 4/14 negative. 4/17 cultures positive. Cultures resent 4/18.   - Bedside LISA with vegetations on aortic and mitral valve, not surgical candidate.   - appreciate ID recs, c/w ancef 4/14-, gentamycin for dual coverage given hx of mitral ring     DVT PPx:   - HSQ

## 2019-04-22 NOTE — CONSULT NOTE ADULT - PROBLEM SELECTOR RECOMMENDATION 9
Patient transferred to MICU for AMS 2/2 hypercarbia. Unclear etiology, possibly opioid-induced as patient receiving pain medications for leg fracture, she was extubated and was intubated on 4/18 due to mucous plug  pt was extubated on 4/22, but shortly after became tachypneic and was placed on BiPAP  pt reported feeling uncomfortable with respiratory distress while on BiPAP, discussed with family recommendations to add low dose opioids for respiratory distress but family requested to hold off from now (due to prior hx of respiratory failure 2/2 opioid use?)  If family agrees recommend adding Dilaudid 0.2mg IV q/ 4 hrs as needed for SOB / labored breathing Patient transferred to MICU for AMS 2/2 hypercarbia. Unclear etiology, possibly opioid-induced as patient receiving pain medications for leg fracture, she was extubated and was intubated on 4/18 due to mucous plug  pt was extubated on 4/22, but shortly after became tachypneic and was placed on BiPAP  pt reported feeling uncomfortable with respiratory distress while on BiPAP, discussed with family recommendations to add low dose opioids for respiratory distress but family requested to hold off from now (due to prior hx of respiratory failure 2/2 opioid use?)  recommend adding Dilaudid 0.2mg IV q/ 4 hrs as needed for SOB / labored breathing  Do not want to reintubate

## 2019-04-22 NOTE — PROGRESS NOTE ADULT - ASSESSMENT
79 yo F with hx of cad, cabg, DM, esrd, on HD, chronic hypotension on midodrine 10 mg tid with sob, pleural effusions, chf, ascites, and new tib/fib fx    1- esrd  2- hypotension   3- chf  4- ascites   5- tib/fib fx   6- respiratory failure     hd  revaclear 300 3.5 hr 1 liter 2 k bath bfr 350 cc/min dfr 600   cont bipap   d/w family at bedside   prognosis will remain guarded  cont neosynephrine

## 2019-04-22 NOTE — PROGRESS NOTE ADULT - SUBJECTIVE AND OBJECTIVE BOX
VIRGILIO KAPLAN  78y Female  MRN:[id number]    Patient is a 78y old  Female who presents with a chief complaint of R tib/fib fx (10 Apr 2019 12:01)    HPI:  78 F PMH CAD s/p CABG x 4 2015, T2DM with peripheral neuropathy, ESRD on HD T/Th/S via L AVF, hypotension on midodrine, HFrEF, HTN, HLD, b/l cataracts s/p surgery, p/w mechanical fall and R leg pain. Pt lives in basement apartment and ordinarily ambulates with walker. Today was getting ready for HD, made it to the first step on her set of stairs, and reportedly felt her R leg "give out." Pt fell backwards but was caught by her family member who was assisting her up the stairs. She denies LOC/syncope or head strike. +Pain in R leg post fall, worse with movement and much improved with immobilization. Could not bear weight or walk after fall.  Denies cp, sob at rest, f/c, n/v/d, dysuria, cough. +chronic BENAVIDEZ, reportedly stable since her CABG. Family at bedside providing collateral and confirms above details.     Vs: 99.1, 76, 102/60, 16, 90% RA --> 100% 4LNC.  Labs: no leukocytosis, chronic stable anemia, rest cbc unrevealing, coags unrevealing, hypoNa 126, HAGMA with cmp c/w known ESRD, hyperglycemia, chronic stable alkp elevation mild AST elevation. XR tib/fib/femur/hip/knee reveals acute prox tib/fib fxs with possible intraarticular extension. (10 Apr 2019 00:56)      Patient seen and evaluated in micu.   interval events noted     Interval HPI: extubated this am    PAST MEDICAL & SURGICAL HISTORY:  CHF (congestive heart failure): Oct 2015- still sob  Shortness of breath  Hypotension  Type 2 diabetes mellitus  Chronic kidney disease (CKD): ON DIALYSIS - [facility], Sat  Nephrolithiasis: 2001  Ulcer: 2001  Hydronephrosis: Right 1/2012  Charcot Foot: Right Foot  Herniated Disc  Diabetic Neuropathy  Anemia: CKD  Hypothyroidism  Hypertension: NOT ANY MORE  Hyperlipidemia  S/P mitral valve repair: with CABG X 4 MAY 2015  S/P CABG (coronary artery bypass graft): x 4, May 2015  History of extraction of renal calculus  S/P Foot Surgery: 2002, 2014  S/P Cholecystectomy: 2001  S/P Breast Lumpectomy: 1994, 2003 - left breast benign      REVIEW OF SYSTEMS:  as per hpi    VITALS:  Vital Signs Last 24 Hrs  T(C): 36.4 (22 Apr 2019 12:00), Max: 37 (22 Apr 2019 08:00)  T(F): 97.6 (22 Apr 2019 12:00), Max: 98.6 (22 Apr 2019 08:00)  HR: 95 (22 Apr 2019 13:30) (62 - 107)  BP: 96/43 (22 Apr 2019 13:30) (82/49 - 123/56)  BP(mean): 62 (22 Apr 2019 13:30) (60 - 83)  RR: 31 (22 Apr 2019 13:30) (13 - 34)  SpO2: 90% (22 Apr 2019 13:30) (68% - 100%)    PHYSICAL EXAM:  GENERAL: comfortable   HEAD:  Atraumatic, Normocephalic  EYES: EOMI, PERRLA, conjunctiva and sclera clear  NECK: no jvd, supple  CHEST/LUNG: coarse bs b/l  HEART: S1, S2;   ABDOMEN: Soft, Nontender, nondistended; Bowel sounds present  EXTREMITIES:  2+ Peripheral Pulses, No clubbing, cyanosis, + edema,  right knee cast      Consultant(s) Notes Reviewed:  [x ] YES  [ ] NO  Care Discussed with Consultants/Other Providers [ x] YES  [ ] NO    MEDS:  MEDICATIONS  (STANDING):  ALBUTerol/ipratropium for Nebulization 3 milliLiter(s) Nebulizer every 6 hours  artificial  tears Solution 1 Drop(s) Both EYES every 2 hours  aspirin  chewable 81 milliGRAM(s) Oral daily  ceFAZolin   IVPB 1000 milliGRAM(s) IV Intermittent every 24 hours  chlorhexidine 2% Cloths 1 Application(s) Topical daily  chlorhexidine 4% Liquid 1 Application(s) Topical <User Schedule>  dexmedetomidine Infusion 0.2 MICROgram(s)/kG/Hr (3.415 mL/Hr) IV Continuous <Continuous>  dextrose 5%. 1000 milliLiter(s) (50 mL/Hr) IV Continuous <Continuous>  erythromycin   Ointment 1 Application(s) Both EYES four times a day  gentamicin   IVPB 130 milliGRAM(s) IV Intermittent <User Schedule>  heparin  Injectable 5000 Unit(s) SubCutaneous every 8 hours  hydrocortisone 1% Cream 1 Application(s) Topical every 6 hours  HYDROmorphone  Injectable 0.2 milliGRAM(s) IV Push once  insulin lispro (HumaLOG) corrective regimen sliding scale   SubCutaneous every 6 hours  levothyroxine Injectable 75 MICROGram(s) IV Push at bedtime  midodrine 20 milliGRAM(s) Oral every 8 hours  pantoprazole  Injectable 40 milliGRAM(s) IV Push every 12 hours  petrolatum Ophthalmic Ointment 1 Application(s) Both EYES three times a day  phenylephrine    Infusion 2 MICROgram(s)/kG/Min (51.225 mL/Hr) IV Continuous <Continuous>  polyethylene glycol 3350 17 Gram(s) Oral daily  senna Syrup 10 milliLiter(s) Oral at bedtime  sodium chloride 3%  Inhalation 4 milliLiter(s) Inhalation every 12 hours    MEDICATIONS  (PRN):  acetaminophen    Suspension .. 650 milliGRAM(s) Oral every 6 hours PRN Temp greater or equal to 38C (100.4F)  fentaNYL    Injectable 50 MICROGram(s) IV Push every 15 minutes PRN Mild Pain (1 - 3)  lidocaine/prilocaine Cream 1 Application(s) Topical daily PRN hd days  ondansetron Injectable 4 milliGRAM(s) IV Push every 12 hours PRN Nausea and/or Vomiting        ALLERGIES:  allopurinol (Rash)  Augmentin (Rash)  Levaquin (Rash)      LABS:                                        10.5   10.1  )-----------( 378      ( 22 Apr 2019 00:19 )             31.6   04-22    137  |  101  |  23  ----------------------------<  165<H>  4.2   |  19<L>  |  2.61<H>    Ca    7.8<L>      22 Apr 2019 00:19  Phos  4.0     04-22  Mg     2.1     04-22    TPro  6.5  /  Alb  2.3<L>  /  TBili  0.5  /  DBili  x   /  AST  17  /  ALT  <5<L>  /  AlkPhos  119  04-22        Culture - Blood (04.13.19 @ 07:27)    Gram Stain:   Growth in aerobic bottle: Gram Positive Cocci in Clusters    Specimen Source: .Blood    Culture Results:   Growth in aerobic bottle: Gram Positive Cocci in Clusters        < from: CT Knee No Cont, Right (04.10.19 @ 03:34) >  Impression:    Acute, impacted fractures of the right proximal tibia and fibula as   described.      < end of copied text >           < from: Xray Tibia + Fibula 2 Views, Right (04.09.19 @ 20:28) >  impression:    There is an acute, comminuted fracture of the proximal tibial metaphysis   with mild anterior displacement of the distal fracture fragment. There is   questionable intra-articular extension on the lateral view. There is a   large knee joint effusion. There is a proximal fibular fracture.    There is no fracture of the hip or femur. The ankle is poorly visualized   secondary to technique and may be externally rotated with respect to the   knee. There is no ankle joint effusion. Vascular calcifications are noted.     CT of the knee can be performed for further evaluation.    < end of copied text >

## 2019-04-22 NOTE — CONSULT NOTE ADULT - SUBJECTIVE AND OBJECTIVE BOX
HPI:  78 F PMH CAD s/p CABG x 4 2015, T2DM with peripheral neuropathy, ESRD on HD T/Th/S via L AVF, hypotension on midodrine, HFrEF, HTN, HLD, b/l cataracts s/p surgery, p/w mechanical fall and R leg pain. Pt lives in basement apartment and ordinarily ambulates with walker. Today was getting ready for HD, made it to the first step on her set of stairs, and reportedly felt her R leg "give out." Pt fell backwards but was caught by her family member who was assisting her up the stairs. She denies LOC/syncope or head strike. +Pain in R leg post fall, worse with movement and much improved with immobilization. Could not bear weight or walk after fall.  Denies cp, sob at rest, f/c, n/v/d, dysuria, cough. +chronic BENAVIDEZ, reportedly stable since her CABG. Family at bedside providing collateral and confirms above details.     Vs: 99.1, 76, 102/60, 16, 90% RA --> 100% 4LNC.  Labs: no leukocytosis, chronic stable anemia, rest cbc unrevealing, coags unrevealing, hypoNa 126, HAGMA with cmp c/w known ESRD, hyperglycemia, chronic stable alkp elevation mild AST elevation. XR tib/fib/femur/hip/knee reveals acute prox tib/fib fxs with possible intraarticular extension. (10 Apr 2019 00:56)    PERTINENT PM/SXH:   CHF (congestive heart failure)  Shortness of breath  Hypotension  Type 2 diabetes mellitus  Chronic kidney disease (CKD)  Diabetes mellitus  Nephrolithiasis  Ulcer  Hydronephrosis  Charcot Foot  Herniated Disc  Diabetic Neuropathy  Anemia  Bladder Cancer  Hypothyroidism  Renal Colic  Hypertension  Hyperlipidemia    S/P mitral valve repair  S/P CABG (coronary artery bypass graft)  History of extraction of renal calculus  S/P Foot Surgery  S/P Cholecystectomy  S/P Breast Lumpectomy    FAMILY HISTORY:  FH: breast cancer: mother  FH: CHF (congestive heart failure): father    ITEMS NOT CHECKED ARE NOT PRESENT    SOCIAL HISTORY:   Significant other/partner:  [x]  Children:  [x] 1 son /1 daughter Advent/Spirituality: Pentec[employer]al  Substance hx:  [ ]   Tobacco hx:  [ ]   Alcohol hx: [ ]   Home Opioid hx: no [x] I-Stop Reference No: #: [id number]  Living Situation: [x]Home  [ ]Long term care  [ ]Rehab [ ]Other    ADVANCE DIRECTIVES:    DNR  Yes  MOLST  [x]  Living Will  [ ]   DECISION MAKER(s):  [ ] Health Care Proxy(s)  [x] Surrogate(s)  [ ] Guardian           Name(s): Sheridan Lee (daughter) Phone Number(s): [phone]                Timoteo Kelley (son)    BASELINE (I)ADL(s) (prior to admission):  [place]: [ ]Total  [x] Moderate [ ]Dependent    Allergies    allopurinol (Rash)  Augmentin (Rash)  Levaquin (Rash)    Intolerances    MEDICATIONS  (STANDING):  ALBUTerol/ipratropium for Nebulization 3 milliLiter(s) Nebulizer every 6 hours  artificial  tears Solution 1 Drop(s) Both EYES every 2 hours  aspirin  chewable 81 milliGRAM(s) Oral daily  ceFAZolin   IVPB 1000 milliGRAM(s) IV Intermittent every 24 hours  chlorhexidine 2% Cloths 1 Application(s) Topical daily  chlorhexidine 4% Liquid 1 Application(s) Topical <User Schedule>  dexmedetomidine Infusion 0.2 MICROgram(s)/kG/Hr (3.415 mL/Hr) IV Continuous <Continuous>  dextrose 5%. 1000 milliLiter(s) (50 mL/Hr) IV Continuous <Continuous>  erythromycin   Ointment 1 Application(s) Both EYES four times a day  gentamicin   IVPB 130 milliGRAM(s) IV Intermittent <User Schedule>  heparin  Injectable 5000 Unit(s) SubCutaneous every 8 hours  hydrocortisone 1% Cream 1 Application(s) Topical every 6 hours  insulin lispro (HumaLOG) corrective regimen sliding scale   SubCutaneous every 6 hours  levothyroxine Injectable 75 MICROGram(s) IV Push at bedtime  midodrine 20 milliGRAM(s) Oral every 8 hours  pantoprazole  Injectable 40 milliGRAM(s) IV Push every 12 hours  petrolatum Ophthalmic Ointment 1 Application(s) Both EYES three times a day  phenylephrine    Infusion 2 MICROgram(s)/kG/Min (51.225 mL/Hr) IV Continuous <Continuous>  polyethylene glycol 3350 17 Gram(s) Oral daily  senna Syrup 10 milliLiter(s) Oral at bedtime  sodium chloride 3%  Inhalation 4 milliLiter(s) Inhalation every 12 hours    MEDICATIONS  (PRN):  acetaminophen    Suspension .. 650 milliGRAM(s) Oral every 6 hours PRN Temp greater or equal to 38C (100.4F)  fentaNYL    Injectable 50 MICROGram(s) IV Push every 15 minutes PRN Mild Pain (1 - 3)  lidocaine/prilocaine Cream 1 Application(s) Topical daily PRN hd days  ondansetron Injectable 4 milliGRAM(s) IV Push every 12 hours PRN Nausea and/or Vomiting    PRESENT SYMPTOMS: [ ]Unable to obtain due to poor mentation   Source if other than patient:  [x]Family   [ ]Team     Pain (Impact on QOL):    Location -         Minimal acceptable level (0-10 scale):             Aggravating factors -  Quality -  Radiation -  Severity (0-10 scale) -    Timing -    PAIN AD Score:      [email or web address] (press ctrl +  left click to view)    Dyspnea:                           [ ]Mild [ ]Moderate [x]Severe  Anxiety:                             [ ]Mild [x]Moderate [ ]Severe  Fatigue:                             [ ]Mild [x]Moderate [ ]Severe  Nausea:                             [ ]Mild [ ]Moderate [ ]Severe  Loss of appetite:              [ ]Mild [ ]Moderate [ ]Severe  Constipation:                    [ ]Mild [ ]Moderate [ ]Severe    Other Symptoms:  [x]All other review of systems negative     Karnofsky Performance Score/Palliative Performance Status Version 2: 30%    http://palliative.info/resource_material/[email or web address].pdf    PHYSICAL EXAM:  Vital Signs Last 24 Hrs  T(C): 37 (22 Apr 2019 08:00), Max: 37 (22 Apr 2019 08:00)  T(F): 98.6 (22 Apr 2019 08:00), Max: 98.6 (22 Apr 2019 08:00)  HR: 90 (22 Apr 2019 11:48) (62 - 107)  BP: 94/52 (22 Apr 2019 11:30) (82/49 - 123/56)  BP(mean): 69 (22 Apr 2019 11:30) (59 - 83)  RR: 29 (22 Apr 2019 11:30) (13 - 34)  SpO2: 100% (22 Apr 2019 11:48) (68% - 100%) I&O's Summary    21 Apr 2019 07:01  -  22 Apr 2019 07:00  --------------------------------------------------------  IN: 1962.3 mL / OUT: 510 mL / NET: 1452.3 mL    22 Apr 2019 07:01  -  22 Apr 2019 12:04  --------------------------------------------------------  IN: 79.6 mL / OUT: 150 mL / NET: -70.4 mL    GENERAL:  [x]Alert  [x]Oriented x 3  [ ]Lethargic  [ ]Cachexia  [ ]Unarousable  [x]Verbal  [ ]Non-Verbal  Behavioral:   [ ] Anxiety  [ ] Delirium [ ] Agitation [ ] Other  HEENT:  [ ]Normal   [ ]Dry mouth   [ ]ET Tube/Trach  [ ]Oral lesions  PULMONARY:   [ ]Clear [ ]Tachypnea  [ ]Audible excessive secretions [x] decreased air entry on right side  [ ]Rhonchi        [ ]Right [ ]Left [ ]Bilateral  [ ]Crackles        [ ]Right [ ]Left [ ]Bilateral  [ ]Wheezing     [ ]Right [ ]Left [ ]Bilateral  CARDIOVASCULAR:    [x]Regular [ ]Irregular [x]Tachy  [ ]Abhi [ ]Murmur [ ]Other  GASTROINTESTINAL:  [x]Soft  [ ]Distended   [x]+BS  [x]Non tender [ ]Tender  [ ]PEG [ ]OGT/ NGT  Last BM:   GENITOURINARY:  [ ]Normal [ ] Incontinent   [x]Oliguria/Anuria   [ ]Rivero  MUSCULOSKELETAL:   [ ]Normal   [ ]Weakness  [x]Bed/Wheelchair bound [ ]Edema  NEUROLOGIC:   [x]No focal deficits  [ ] Cognitive impairment  [ ] Dysphagia [ ]Dysarthria [ ] Paresis [ ]Other   SKIN:   [ ]Normal   [x]Pressure ulcer(s)  [ ]Rash    CRITICAL CARE:  [ ] Shock Present  [ ]Septic [ ]Cardiogenic [ ]Neurologic [ ]Hypovolemic  [ ]  Vasopressors [ ]  Inotropes   [x] Respiratory failure present  [x] Acute  [ ] Chronic [ ] Hypoxic  [ ] Hypercarbic [ ] Other  [x] Other organ failure / renal failure / HF     LABS:                        10.5   10.1  )-----------( 378      ( 22 Apr 2019 00:19 )             31.6   04-22    137  |  101  |  23  ----------------------------<  165<H>  4.2   |  19<L>  |  2.61<H>    Ca    7.8<L>      22 Apr 2019 00:19  Phos  4.0     04-22  Mg     2.1     04-22    TPro  6.5  /  Alb  2.3<L>  /  TBili  0.5  /  DBili  x   /  AST  17  /  ALT  <5<L>  /  AlkPhos  119  04-22  PT/INR - ( 22 Apr 2019 00:19 )   PT: 15.8 sec;   INR: 1.37 ratio    PTT - ( 22 Apr 2019 00:19 )  PTT:45.1 sec    RADIOLOGY & ADDITIONAL STUDIES:    Xray Chest 1 View- PORTABLE-Urgent 04.22.19 @ 11:10  INTERPRETATION:  A single chest x-ray was obtained on April 22, 2019.    Indication: Shortness of breath. Position of endotracheal tube.    Impression:    The heart is normal in size. Left pleural effusion. Right pneumothorax. A   right chest tube is in place. A central line is seen on the right and the   tip is in the superior vena cava. Endotracheal tube is not seen on the   current study. Status post sternotomy.    PROTEIN CALORIE MALNUTRITION PRESENT: [ ] Yes [ ] No  [x] PPSV2 < or = to 30% [ ] significant weight loss  [ ] poor nutritional intake [ ] catabolic state [ ] anasarca     Albumin, Serum: 2.3 g/dL (04-22-19 @ 00:19)  Artificial Nutrition [ ]     REFERRALS:   [x]Chaplaincy  [ ] Hospice  [ ]Child Life  [x]Social Work  [ ]Case management [ ]Holistic Therapy   Goals of Care Discussion Document: see plan [email or web address]

## 2019-04-22 NOTE — PROGRESS NOTE ADULT - SUBJECTIVE AND OBJECTIVE BOX
Follow Up:  MSSA bacteremia    Interval History:  pt stable and afebrile, still in MICU    ROS:    Unobtainable because: just extubated        Allergies  allopurinol (Rash)  Augmentin (Rash)  Levaquin (Rash)        ANTIMICROBIALS:  ceFAZolin   IVPB 1000 every 24 hours  gentamicin   IVPB 130 <User Schedule>      OTHER MEDS:  acetaminophen    Suspension .. 650 milliGRAM(s) Oral every 6 hours PRN  ALBUTerol/ipratropium for Nebulization 3 milliLiter(s) Nebulizer every 6 hours  artificial  tears Solution 1 Drop(s) Both EYES every 2 hours  aspirin  chewable 81 milliGRAM(s) Oral daily  chlorhexidine 2% Cloths 1 Application(s) Topical daily  chlorhexidine 4% Liquid 1 Application(s) Topical <User Schedule>  dexmedetomidine Infusion 0.2 MICROgram(s)/kG/Hr IV Continuous <Continuous>  dextrose 5%. 1000 milliLiter(s) IV Continuous <Continuous>  erythromycin   Ointment 1 Application(s) Both EYES four times a day  fentaNYL    Injectable 50 MICROGram(s) IV Push every 15 minutes PRN  heparin  Injectable 5000 Unit(s) SubCutaneous every 8 hours  hydrocortisone 1% Cream 1 Application(s) Topical every 6 hours  insulin lispro (HumaLOG) corrective regimen sliding scale   SubCutaneous every 6 hours  levothyroxine Injectable 75 MICROGram(s) IV Push at bedtime  lidocaine/prilocaine Cream 1 Application(s) Topical daily PRN  midodrine 20 milliGRAM(s) Oral every 8 hours  ondansetron Injectable 4 milliGRAM(s) IV Push every 12 hours PRN  pantoprazole  Injectable 40 milliGRAM(s) IV Push every 12 hours  petrolatum Ophthalmic Ointment 1 Application(s) Both EYES three times a day  phenylephrine    Infusion 2 MICROgram(s)/kG/Min IV Continuous <Continuous>  polyethylene glycol 3350 17 Gram(s) Oral daily  senna Syrup 10 milliLiter(s) Oral at bedtime  sodium chloride 3%  Inhalation 4 milliLiter(s) Inhalation every 12 hours      Vital Signs Last 24 Hrs  T(C): 36.4 (22 Apr 2019 12:00), Max: 37 (22 Apr 2019 08:00)  T(F): 97.6 (22 Apr 2019 12:00), Max: 98.6 (22 Apr 2019 08:00)  HR: 97 (22 Apr 2019 17:34) (62 - 107)  BP: 88/48 (22 Apr 2019 16:00) (76/43 - 123/56)  BP(mean): 65 (22 Apr 2019 16:00) (54 - 83)  RR: 24 (22 Apr 2019 16:27) (13 - 34)  SpO2: 100% (22 Apr 2019 17:34) (68% - 100%)    Physical Exam:  General:    NAD on vent  Head: atraumatic, normocephalic  Eyes: improved redness  ENT:    no LAD, neck supple  Cardio:    regular S1,S2  Respiratory:   clear b/l, no wheezing  abd:   soft, BS +, not tender, no hepatosplenomegaly  :     no CVAT, no suprapubic tenderness,  bagley  Musculoskeletal : no joint swelling, R leg in dressing and cast  Skin:    no rash  vascular: LUE AVF, no phlebitis normal pulses  Neurologic:    awake, answered questions with nodding                          10.5   10.1  )-----------( 378      ( 22 Apr 2019 00:19 )             31.6       04-22    137  |  101  |  23  ----------------------------<  165<H>  4.2   |  19<L>  |  2.61<H>    Ca    7.8<L>      22 Apr 2019 00:19  Phos  4.0     04-22  Mg     2.1     04-22    TPro  6.5  /  Alb  2.3<L>  /  TBili  0.5  /  DBili  x   /  AST  17  /  ALT  <5<L>  /  AlkPhos  119  04-22          MICROBIOLOGY:  v  [place] TRAP  04-20-19   No growth at 48 hours  --    No polymorphonuclear cells seen per low power field  No squamous epithelial cells per low power field  No organisms seen per oil power field      .Blood  04-18-19   No growth to date.  --  --      .Body Fluid  04-17-19   No growth  --    polymorphonuclear leukocytes seen  No organisms seen  by cytocentrifuge      .Blood  04-17-19   No growth at 5 days.  --    Growth in aerobic bottle: Gram Positive Cocci in Clusters      .Blood  04-14-19   No growth at 5 days.  --  --      .Blood  04-13-19   Growth in aerobic and anaerobic bottles: Staphylococcus aureus  See previous culture [id number]  --    Growth in aerobic bottle: Gram Positive Cocci in Clusters  Growth in anaerobic bottle: Gram Positive Cocci in Clusters      .Body Fluid  04-13-19   No growth at 1 week.  --    No polymorphonuclear leukocytes seen  No organisms seen  by cytocentrifuge      .Blood  04-12-19   Growth in aerobic and anaerobic bottles: Staphylococcus aureus  See previous culture [id number]  --  Blood Culture PCR  Staphylococcus aureus                RADIOLOGY:  Images below reviewed personally  < from: Xray Chest 1 View- PORTABLE-Urgent (04.22.19 @ 14:59) >    Impression:    The heart is normal in size. Left pleural effusion. Small right   pneumothorax is still present. A small catheter is seen seen on the right   pleural space a central line seen on the right and the tip is in superior   vena cava. No pneumothorax. Status post sternotomy. Status post mitral   valve annulus repair.

## 2019-04-22 NOTE — CONSULT NOTE ADULT - ASSESSMENT
78F with CAD s/p CABG x4, HFrEF (EF 26% 4/2019), T2DM with peripheral neuropathy, ESRD on HD T/Th/S via L AVF, hypotension on midodrine, HTN, HLD initially admitted 4/9 after mechanical fall c/b R proximal tib/fib fracture not requiring emergent surgical intervention s/p cast placement with hospital course c/b possible melena now resolved, and RRT on 4/11 for acute hypercapnic respiratory failure in setting of opioid use requiring intubation. MICU course c/b SVT s/p synchronized cardioversion x2 now in sinus rhythm and Staph aureus bacteremia with noted vegetations seen on TTE, not a surgical candidate.  Hospital course further complicated by likely mucous plugging causing hypotension, Vtach and respiratory failure requiring urgent re-intubation on 4/18. S/p Chest tube placement for stable R pneumo (for extubation optimization) on 4/21.

## 2019-04-22 NOTE — CONSULT NOTE ADULT - REASON FOR ADMISSION
R tib/fib fx

## 2019-04-22 NOTE — AIRWAY REMOVAL NOTE  ADULT & PEDS - ARTIFICAL AIRWAY REMOVAL COMMENTS
Written order for extubation verified. Patient identified  by full name and date of birth as per identification band. Eztubation order verified and signed  Present at the procedure were RN Sariah STALLINGS   No complications...
Written order for extubation verified. The patient was identified by full name and birth date compared to the identification band. Present during the procedure was Kim

## 2019-04-22 NOTE — PROGRESS NOTE ADULT - SUBJECTIVE AND OBJECTIVE BOX
[facility]   [phone]  RENAL FOLLOW UP NOTE  --------------------------------------------------------------------------------  Chief Complaint:    24 hour events/subjective:    seen earlier and d/w icu team and family when seen.   s/p extubation   on bipap   sob     PAST HISTORY  --------------------------------------------------------------------------------  No significant changes to PMH, PSH, FHx, SHx, unless otherwise noted    ALLERGIES & MEDICATIONS  --------------------------------------------------------------------------------  Allergies    allopurinol (Rash)  Augmentin (Rash)  Levaquin (Rash)    Intolerances      Standing Inpatient Medications  ALBUTerol/ipratropium for Nebulization 3 milliLiter(s) Nebulizer every 6 hours  artificial  tears Solution 1 Drop(s) Both EYES every 2 hours  aspirin  chewable 81 milliGRAM(s) Oral daily  ceFAZolin   IVPB 1000 milliGRAM(s) IV Intermittent every 24 hours  chlorhexidine 2% Cloths 1 Application(s) Topical daily  chlorhexidine 4% Liquid 1 Application(s) Topical <User Schedule>  dexmedetomidine Infusion 0.2 MICROgram(s)/kG/Hr IV Continuous <Continuous>  dextrose 5%. 1000 milliLiter(s) IV Continuous <Continuous>  erythromycin   Ointment 1 Application(s) Both EYES four times a day  gentamicin   IVPB 130 milliGRAM(s) IV Intermittent <User Schedule>  heparin  Injectable 5000 Unit(s) SubCutaneous every 8 hours  hydrocortisone 1% Cream 1 Application(s) Topical every 6 hours  insulin lispro (HumaLOG) corrective regimen sliding scale   SubCutaneous every 6 hours  levothyroxine Injectable 75 MICROGram(s) IV Push at bedtime  midodrine 20 milliGRAM(s) Oral every 8 hours  pantoprazole  Injectable 40 milliGRAM(s) IV Push every 12 hours  petrolatum Ophthalmic Ointment 1 Application(s) Both EYES three times a day  phenylephrine    Infusion 2 MICROgram(s)/kG/Min IV Continuous <Continuous>  polyethylene glycol 3350 17 Gram(s) Oral daily  senna Syrup 10 milliLiter(s) Oral at bedtime  sodium chloride 3%  Inhalation 4 milliLiter(s) Inhalation every 12 hours    PRN Inpatient Medications  acetaminophen    Suspension .. 650 milliGRAM(s) Oral every 6 hours PRN  fentaNYL    Injectable 50 MICROGram(s) IV Push every 15 minutes PRN  lidocaine/prilocaine Cream 1 Application(s) Topical daily PRN  ondansetron Injectable 4 milliGRAM(s) IV Push every 12 hours PRN      REVIEW OF SYSTEMS  --------------------------------------------------------------------------------    remains sob     VITALS/PHYSICAL EXAM  --------------------------------------------------------------------------------  T(C): 36.4 (04-22-19 @ 12:00), Max: 37 (04-22-19 @ 08:00)  HR: 99 (04-22-19 @ 16:11) (62 - 107)  BP: 88/48 (04-22-19 @ 16:00) (76/43 - 123/56)  RR: 23 (04-22-19 @ 16:00) (13 - 34)  SpO2: 100% (04-22-19 @ 16:11) (68% - 100%)  Wt(kg): --        04-21-19 @ 07:01  -  04-22-19 @ 07:00  --------------------------------------------------------  IN: 1962.3 mL / OUT: 510 mL / NET: 1452.3 mL    04-22-19 @ 07:01  -  04-22-19 @ 16:20  --------------------------------------------------------  IN: 450.4 mL / OUT: 150 mL / NET: 300.4 mL      Physical Exam:  	  Gen: intubated   	no jvd   	Pulm: decrease bs  no rales or ronchi or wheezing  	CV: RRR, S1S2; no rub  	Abd: +BS, soft,  + distended ascites softer   	: No suprapubic tenderness  	UE: Warm, no cyanosis  no clubbing,  no edema  	LE: Warm, no cyanosis  no clubbing, no edema RLE cast   	avf +  bruit and thrill 	    	    LABS/STUDIES  --------------------------------------------------------------------------------              10.5   10.1  >-----------<  378      [04-22-19 @ 00:19]              31.6     137  |  101  |  23  ----------------------------<  165      [04-22-19 @ 00:19]  4.2   |  19  |  2.61        Ca     7.8     [04-22-19 @ 00:19]      Mg     2.1     [04-22-19 @ 00:19]      Phos  4.0     [04-22-19 @ 00:19]    TPro  6.5  /  Alb  2.3  /  TBili  0.5  /  DBili  x   /  AST  17  /  ALT  <5  /  AlkPhos  119  [04-22-19 @ 00:19]    PT/INR: PT 15.8 , INR 1.37       [04-22-19 @ 00:19]  PTT: 45.1       [04-22-19 @ 00:19]      Creatinine Trend:  SCr 2.61 [04-22 @ 00:19]  SCr 1.88 [04-21 @ 00:32]  SCr 3.28 [04-20 @ 01:52]  SCr 2.66 [04-19 @ 01:14]  SCr 2.25 [04-18 @ 17:33]                  Iron 71, TIBC 135, %sat 53      [04-11-19 @ 17:50]  Ferritin 6834      [04-11-19 @ 17:57]  HbA1c 8.3      [04-12-19 @ 09:00]  TSH 34.70      [04-12-19 @ 07:41]  Lipid: chol 63, , HDL 12, LDL 23      [04-12-19 @ 09:06]

## 2019-04-22 NOTE — PROGRESS NOTE ADULT - SUBJECTIVE AND OBJECTIVE BOX
INTERVAL HPI/OVERNIGHT EVENTS:    extubated this morning; on Bipap       MEDICATIONS  (STANDING):  ALBUTerol/ipratropium for Nebulization 3 milliLiter(s) Nebulizer every 6 hours  artificial  tears Solution 1 Drop(s) Both EYES every 2 hours  aspirin  chewable 81 milliGRAM(s) Oral daily  ceFAZolin   IVPB 1000 milliGRAM(s) IV Intermittent every 24 hours  chlorhexidine 2% Cloths 1 Application(s) Topical daily  chlorhexidine 4% Liquid 1 Application(s) Topical <User Schedule>  dexmedetomidine Infusion 0.2 MICROgram(s)/kG/Hr (3.415 mL/Hr) IV Continuous <Continuous>  dextrose 5%. 1000 milliLiter(s) (50 mL/Hr) IV Continuous <Continuous>  erythromycin   Ointment 1 Application(s) Both EYES four times a day  gentamicin   IVPB 130 milliGRAM(s) IV Intermittent <User Schedule>  heparin  Injectable 5000 Unit(s) SubCutaneous every 8 hours  hydrocortisone 1% Cream 1 Application(s) Topical every 6 hours  insulin lispro (HumaLOG) corrective regimen sliding scale   SubCutaneous every 6 hours  levothyroxine Injectable 75 MICROGram(s) IV Push at bedtime  midodrine 20 milliGRAM(s) Oral every 8 hours  pantoprazole  Injectable 40 milliGRAM(s) IV Push every 12 hours  petrolatum Ophthalmic Ointment 1 Application(s) Both EYES three times a day  phenylephrine    Infusion 2 MICROgram(s)/kG/Min (51.225 mL/Hr) IV Continuous <Continuous>  polyethylene glycol 3350 17 Gram(s) Oral daily  senna Syrup 10 milliLiter(s) Oral at bedtime  sodium chloride 3%  Inhalation 4 milliLiter(s) Inhalation every 12 hours    MEDICATIONS  (PRN):  acetaminophen    Suspension .. 650 milliGRAM(s) Oral every 6 hours PRN Temp greater or equal to 38C (100.4F)  fentaNYL    Injectable 50 MICROGram(s) IV Push every 15 minutes PRN Mild Pain (1 - 3)  lidocaine/prilocaine Cream 1 Application(s) Topical daily PRN hd days  ondansetron Injectable 4 milliGRAM(s) IV Push every 12 hours PRN Nausea and/or Vomiting      Allergies    allopurinol (Rash)  Augmentin (Rash)  Levaquin (Rash)    Intolerances        Review of Systems:    General:  No wt loss, fevers, chills, night sweats, fatigue   Eyes:  Good vision, no reported pain  ENT:  No sore throat, pain, runny nose, dysphagia  CV:  No pain, palpitations, hypo/hypertension  Resp:  +dyspnea, cough, tachypnea, wheezing  GI:  No pain, No nausea, No vomiting, No diarrhea, No constipation, No weight loss, No fever, No pruritis, No rectal bleeding, No melena, No dysphagia  :  No pain, bleeding, incontinence, nocturia  Muscle:  No pain, weakness  Neuro:  No weakness, tingling, memory problems  Psych:  No fatigue, insomnia, mood problems, depression  Endocrine:  No polyuria, polydypsia, cold/heat intolerance  Heme:  No petechiae, ecchymosis, easy bruisability  Skin:  No rash, tattoos, scars, edema      Vital Signs Last 24 Hrs  T(C): 36.4 (22 Apr 2019 12:00), Max: 37 (22 Apr 2019 08:00)  T(F): 97.6 (22 Apr 2019 12:00), Max: 98.6 (22 Apr 2019 08:00)  HR: 96 (22 Apr 2019 12:45) (62 - 107)  BP: 91/53 (22 Apr 2019 12:45) (82/49 - 123/56)  BP(mean): 68 (22 Apr 2019 12:45) (59 - 83)  RR: 30 (22 Apr 2019 12:45) (13 - 34)  SpO2: 100% (22 Apr 2019 12:45) (68% - 100%)    PHYSICAL EXAM:    Constitutional: NAD  HEENT: EOMI, throat clear  Neck: No LAD, supple  Respiratory: CTA and P  Cardiovascular: S1 and S2, RRR, no M  Gastrointestinal: BS+, soft, NT/ND, neg HSM,  Extremities: No peripheral edema, neg clubbing, cyanosis  Vascular: 2+ peripheral pulses  Neurological: A/O x 3, no focal deficits  Psychiatric: Normal mood, normal affect  Skin: No rashes      LABS:                        10.5   10.1  )-----------( 378      ( 22 Apr 2019 00:19 )             31.6     04-22    137  |  101  |  23  ----------------------------<  165<H>  4.2   |  19<L>  |  2.61<H>    Ca    7.8<L>      22 Apr 2019 00:19  Phos  4.0     04-22  Mg     2.1     04-22    TPro  6.5  /  Alb  2.3<L>  /  TBili  0.5  /  DBili  x   /  AST  17  /  ALT  <5<L>  /  AlkPhos  119  04-22    PT/INR - ( 22 Apr 2019 00:19 )   PT: 15.8 sec;   INR: 1.37 ratio         PTT - ( 22 Apr 2019 00:19 )  PTT:45.1 sec      RADIOLOGY & ADDITIONAL TESTS:

## 2019-04-22 NOTE — CONSULT NOTE ADULT - CONSULT REASON
AV/MV endocarditis
FBS, irritation OU
GOC / symptom management
Hypoxia
Staph aureus bacteremia
Tib/Fib Fracture
coffee ground emesis
esrd
pre-op
skin lesion
Sacral wound

## 2019-04-22 NOTE — CONSULT NOTE ADULT - PROBLEM SELECTOR RECOMMENDATION 3
- Found to have high grade MSSA bacteremia on cultures from 4/12 and 4/13,   - Bedside LISA with vegetations on aortic and mitral valve, not surgical candidate.   - currently on Ancef and Gentamycin for dual coverage given hx of mitral ring

## 2019-04-22 NOTE — PROGRESS NOTE ADULT - ATTENDING COMMENTS
78F PMH CAD s/p CABG, HFrEF, DM2, ESRD on HD, hypotension on midodrine, HTN, HLD, initially admitted with R tib/fib fx after a mechanical fall. At baseline frail with long recovery from surgery in the past. Hospital course complicated by acute hypercapnic respiratory failure requiring mechanical ventilation. Course further complicated by severe sepsis due to high grade MSSA bacteremia and endocarditis. 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 Failed extubation 4/18 in a few hours sec to mucus plug and required reintubation. Now again extubated with minimal secretions and now with difficult breathing with recurrent left sided mucus plug and increased right sided pneumothorax after extubation. Patient decided to have no cardiac resuscitation and supported by family.     Pulm - rgt chest tube to suction with repeat CXR; aggressive percussion to chest with vest and deep suction as needed; transition to hi-florence nasal canal to facilitate cough and secretion clearance. Patient currently mentating well and oxygen saturation stable. Initiate 0.2 dilaudid to assist with dyspnea and assess diaphragm excursion.    CV: CHF managed with diuresis - due for HD today; likely septic shock explaining her hypotension     ID: continue endocarditis abx gent and ancef     cc time spent: 60 minutes

## 2019-04-22 NOTE — GOALS OF CARE CONVERSATION - PERSONAL ADVANCE DIRECTIVE - CONVERSATION DETAILS
Family at bedside, patient says she feels short of breath discussed we can add medication (low dose Dilaudid) to make her breathing more comfortable and she agreed. Discussed with family pt is still at a frail state and may require to be reintubated. Family requested for pt to be DNR / DNI, but to continue with current care (like pressors, HD, abx, IVF). Discussed with family will update MOLST form.

## 2019-04-22 NOTE — CONSULT NOTE ADULT - ATTENDING COMMENTS
79 yo diabetic female, on HD, a/w right proximal tib - fib fracture following a fall yesterday at home  Daughter present who reports both a sacral and left arm wound  Patient reports a significant weight loss over past 1 year  Currently she is awake, with right leg in an immobilizer  There is discomfort present over an obvious bony prominence in sacral area , consistent with h/o weight loss,  with a 2 mm area of callous, no open wound, no cellulitis, and  no drainage  Area was dressed with foam  May use Cavilon Prn , and offload  There is an ulcerated wound, with raised edges, of latera aspect of left upper arm  patient denies a pre existent skin lesion in this location  Advise biopsy of this area - Dermatology evaluation advised  Findings discussed with patient and daughter at bdside  Op f/u info provided    remain available
I agree with management above based on exam findings today. Minimal exposure keratopathy. Lid taping at night and lubrication during the day. Ophthalmology will follow the patient.
Multifactorial reasons for hypoxia: Pl effusions, atelectasis, ascites etc: ct chest
Pt seen with fellow.  Agree with above.  No further intubations.  No CPR.  Continue all medical management.  Dilaudid prn dyspnea.  Overall prognosis is poor.  Family aware.  ACP face to face from 11am-11:30am.

## 2019-04-23 VITALS — OXYGEN SATURATION: 59 % | HEART RATE: 50 BPM | RESPIRATION RATE: 3 BRPM

## 2019-04-23 DIAGNOSIS — R11.0 NAUSEA: ICD-10-CM

## 2019-04-23 DIAGNOSIS — R06.02 SHORTNESS OF BREATH: ICD-10-CM

## 2019-04-23 DIAGNOSIS — R53.81 OTHER MALAISE: ICD-10-CM

## 2019-04-23 LAB
ALBUMIN SERPL ELPH-MCNC: 2.4 G/DL — LOW (ref 3.3–5)
ALP SERPL-CCNC: 122 U/L — HIGH (ref 40–120)
ALT FLD-CCNC: <5 U/L — LOW (ref 10–45)
ANION GAP SERPL CALC-SCNC: 16 MMOL/L — SIGNIFICANT CHANGE UP (ref 5–17)
APTT BLD: 51.8 SEC — HIGH (ref 27.5–36.3)
AST SERPL-CCNC: 16 U/L — SIGNIFICANT CHANGE UP (ref 10–40)
BASE EXCESS BLDV CALC-SCNC: -9.3 MMOL/L — LOW (ref -2–2)
BILIRUB SERPL-MCNC: 0.4 MG/DL — SIGNIFICANT CHANGE UP (ref 0.2–1.2)
BUN SERPL-MCNC: 29 MG/DL — HIGH (ref 7–23)
CALCIUM SERPL-MCNC: 7.8 MG/DL — LOW (ref 8.4–10.5)
CHLORIDE SERPL-SCNC: 101 MMOL/L — SIGNIFICANT CHANGE UP (ref 96–108)
CO2 BLDV-SCNC: 23 MMOL/L — SIGNIFICANT CHANGE UP (ref 22–30)
CO2 SERPL-SCNC: 21 MMOL/L — LOW (ref 22–31)
CREAT SERPL-MCNC: 3.19 MG/DL — HIGH (ref 0.5–1.3)
CULTURE RESULTS: SIGNIFICANT CHANGE UP
CULTURE RESULTS: SIGNIFICANT CHANGE UP
GLUCOSE BLDC GLUCOMTR-MCNC: 104 MG/DL — HIGH (ref 70–99)
GLUCOSE BLDC GLUCOMTR-MCNC: 107 MG/DL — HIGH (ref 70–99)
GLUCOSE BLDC GLUCOMTR-MCNC: 112 MG/DL — HIGH (ref 70–99)
GLUCOSE BLDC GLUCOMTR-MCNC: 116 MG/DL — HIGH (ref 70–99)
GLUCOSE BLDC GLUCOMTR-MCNC: 121 MG/DL — HIGH (ref 70–99)
GLUCOSE BLDC GLUCOMTR-MCNC: 97 MG/DL — SIGNIFICANT CHANGE UP (ref 70–99)
GLUCOSE SERPL-MCNC: 131 MG/DL — HIGH (ref 70–99)
HCO3 BLDV-SCNC: 21 MMOL/L — SIGNIFICANT CHANGE UP (ref 21–29)
HCT VFR BLD CALC: 33.1 % — LOW (ref 34.5–45)
HGB BLD-MCNC: 10.2 G/DL — LOW (ref 11.5–15.5)
HOROWITZ INDEX BLDV+IHG-RTO: 70 — SIGNIFICANT CHANGE UP
INR BLD: 1.68 RATIO — HIGH (ref 0.88–1.16)
MAGNESIUM SERPL-MCNC: 2.2 MG/DL — SIGNIFICANT CHANGE UP (ref 1.6–2.6)
MCHC RBC-ENTMCNC: 30.8 GM/DL — LOW (ref 32–36)
MCHC RBC-ENTMCNC: 31.2 PG — SIGNIFICANT CHANGE UP (ref 27–34)
MCV RBC AUTO: 101 FL — HIGH (ref 80–100)
PCO2 BLDV: 73 MMHG — HIGH (ref 35–50)
PH BLDV: 7.08 — CRITICAL LOW (ref 7.35–7.45)
PHOSPHATE SERPL-MCNC: 6.5 MG/DL — HIGH (ref 2.5–4.5)
PLATELET # BLD AUTO: 394 K/UL — SIGNIFICANT CHANGE UP (ref 150–400)
PO2 BLDV: 44 MMHG — SIGNIFICANT CHANGE UP (ref 25–45)
POTASSIUM SERPL-MCNC: 4.7 MMOL/L — SIGNIFICANT CHANGE UP (ref 3.5–5.3)
POTASSIUM SERPL-SCNC: 4.7 MMOL/L — SIGNIFICANT CHANGE UP (ref 3.5–5.3)
PROT SERPL-MCNC: 6.9 G/DL — SIGNIFICANT CHANGE UP (ref 6–8.3)
PROTHROM AB SERPL-ACNC: 19.6 SEC — HIGH (ref 10–12.9)
RBC # BLD: 3.27 M/UL — LOW (ref 3.8–5.2)
RBC # FLD: 17.2 % — HIGH (ref 10.3–14.5)
SAO2 % BLDV: 60 % — LOW (ref 67–88)
SODIUM SERPL-SCNC: 138 MMOL/L — SIGNIFICANT CHANGE UP (ref 135–145)
SPECIMEN SOURCE: SIGNIFICANT CHANGE UP
SPECIMEN SOURCE: SIGNIFICANT CHANGE UP
WBC # BLD: 13.6 K/UL — HIGH (ref 3.8–10.5)
WBC # FLD AUTO: 13.6 K/UL — HIGH (ref 3.8–10.5)

## 2019-04-23 PROCEDURE — 99291 CRITICAL CARE FIRST HOUR: CPT

## 2019-04-23 PROCEDURE — 99232 SBSQ HOSP IP/OBS MODERATE 35: CPT

## 2019-04-23 PROCEDURE — 99233 SBSQ HOSP IP/OBS HIGH 50: CPT | Mod: GC

## 2019-04-23 PROCEDURE — 71045 X-RAY EXAM CHEST 1 VIEW: CPT | Mod: 26

## 2019-04-23 RX ORDER — ALBUMIN HUMAN 25 %
50 VIAL (ML) INTRAVENOUS
Qty: 0 | Refills: 0 | Status: COMPLETED | OUTPATIENT
Start: 2019-04-23 | End: 2019-04-23

## 2019-04-23 RX ORDER — SODIUM CHLORIDE 9 MG/ML
250 INJECTION, SOLUTION INTRAVENOUS ONCE
Qty: 0 | Refills: 0 | Status: DISCONTINUED | OUTPATIENT
Start: 2019-04-23 | End: 2019-04-23

## 2019-04-23 RX ORDER — ONDANSETRON 8 MG/1
4 TABLET, FILM COATED ORAL EVERY 6 HOURS
Qty: 0 | Refills: 0 | Status: DISCONTINUED | OUTPATIENT
Start: 2019-04-23 | End: 2019-04-23

## 2019-04-23 RX ORDER — SODIUM CHLORIDE 9 MG/ML
250 INJECTION, SOLUTION INTRAVENOUS ONCE
Qty: 0 | Refills: 0 | Status: COMPLETED | OUTPATIENT
Start: 2019-04-23 | End: 2019-04-23

## 2019-04-23 RX ORDER — PHENYLEPHRINE HYDROCHLORIDE 10 MG/ML
2 INJECTION INTRAVENOUS
Qty: 160 | Refills: 0 | Status: DISCONTINUED | OUTPATIENT
Start: 2019-04-23 | End: 2019-04-23

## 2019-04-23 RX ORDER — ALBUMIN HUMAN 25 %
100 VIAL (ML) INTRAVENOUS EVERY 6 HOURS
Qty: 0 | Refills: 0 | Status: DISCONTINUED | OUTPATIENT
Start: 2019-04-23 | End: 2019-04-23

## 2019-04-23 RX ORDER — HYDROMORPHONE HYDROCHLORIDE 2 MG/ML
0.2 INJECTION INTRAMUSCULAR; INTRAVENOUS; SUBCUTANEOUS EVERY 4 HOURS
Qty: 0 | Refills: 0 | Status: DISCONTINUED | OUTPATIENT
Start: 2019-04-23 | End: 2019-04-23

## 2019-04-23 RX ORDER — METOCLOPRAMIDE HCL 10 MG
10 TABLET ORAL EVERY 6 HOURS
Qty: 0 | Refills: 0 | Status: DISCONTINUED | OUTPATIENT
Start: 2019-04-23 | End: 2019-04-23

## 2019-04-23 RX ADMIN — Medication 50 MILLILITER(S): at 11:49

## 2019-04-23 RX ADMIN — Medication 1 APPLICATION(S): at 05:31

## 2019-04-23 RX ADMIN — ONDANSETRON 4 MILLIGRAM(S): 8 TABLET, FILM COATED ORAL at 10:41

## 2019-04-23 RX ADMIN — ONDANSETRON 4 MILLIGRAM(S): 8 TABLET, FILM COATED ORAL at 11:49

## 2019-04-23 RX ADMIN — HEPARIN SODIUM 5000 UNIT(S): 5000 INJECTION INTRAVENOUS; SUBCUTANEOUS at 14:32

## 2019-04-23 RX ADMIN — SODIUM CHLORIDE 4 MILLILITER(S): 9 INJECTION INTRAMUSCULAR; INTRAVENOUS; SUBCUTANEOUS at 18:02

## 2019-04-23 RX ADMIN — Medication 1 DROP(S): at 04:01

## 2019-04-23 RX ADMIN — Medication 1 APPLICATION(S): at 05:30

## 2019-04-23 RX ADMIN — Medication 1 APPLICATION(S): at 11:53

## 2019-04-23 RX ADMIN — PHENYLEPHRINE HYDROCHLORIDE 51.23 MICROGRAM(S)/KG/MIN: 10 INJECTION INTRAVENOUS at 08:01

## 2019-04-23 RX ADMIN — Medication 1 DROP(S): at 16:12

## 2019-04-23 RX ADMIN — CHLORHEXIDINE GLUCONATE 1 APPLICATION(S): 213 SOLUTION TOPICAL at 05:30

## 2019-04-23 RX ADMIN — ONDANSETRON 4 MILLIGRAM(S): 8 TABLET, FILM COATED ORAL at 18:35

## 2019-04-23 RX ADMIN — Medication 3 MILLILITER(S): at 05:44

## 2019-04-23 RX ADMIN — Medication 3 MILLILITER(S): at 12:19

## 2019-04-23 RX ADMIN — Medication 1 APPLICATION(S): at 14:33

## 2019-04-23 RX ADMIN — Medication 0.5 MILLIGRAM(S): at 16:49

## 2019-04-23 RX ADMIN — Medication 1 DROP(S): at 02:03

## 2019-04-23 RX ADMIN — PHENYLEPHRINE HYDROCHLORIDE 25.61 MICROGRAM(S)/KG/MIN: 10 INJECTION INTRAVENOUS at 10:19

## 2019-04-23 RX ADMIN — Medication 50 MILLILITER(S): at 10:19

## 2019-04-23 RX ADMIN — Medication 1 DROP(S): at 08:00

## 2019-04-23 RX ADMIN — Medication 1 DROP(S): at 10:08

## 2019-04-23 RX ADMIN — Medication 1 DROP(S): at 11:52

## 2019-04-23 RX ADMIN — PANTOPRAZOLE SODIUM 40 MILLIGRAM(S): 20 TABLET, DELAYED RELEASE ORAL at 18:12

## 2019-04-23 RX ADMIN — Medication 3 MILLILITER(S): at 18:01

## 2019-04-23 RX ADMIN — HEPARIN SODIUM 5000 UNIT(S): 5000 INJECTION INTRAVENOUS; SUBCUTANEOUS at 05:30

## 2019-04-23 RX ADMIN — SODIUM CHLORIDE 250 MILLILITER(S): 9 INJECTION, SOLUTION INTRAVENOUS at 10:07

## 2019-04-23 RX ADMIN — Medication 1 DROP(S): at 14:32

## 2019-04-23 RX ADMIN — HYDROMORPHONE HYDROCHLORIDE 0.2 MILLIGRAM(S): 2 INJECTION INTRAMUSCULAR; INTRAVENOUS; SUBCUTANEOUS at 06:46

## 2019-04-23 RX ADMIN — Medication 1 DROP(S): at 18:13

## 2019-04-23 RX ADMIN — Medication 1 DROP(S): at 05:30

## 2019-04-23 RX ADMIN — Medication 103.25 MILLIGRAM(S): at 18:12

## 2019-04-23 RX ADMIN — Medication 10 MILLIGRAM(S): at 14:33

## 2019-04-23 RX ADMIN — PANTOPRAZOLE SODIUM 40 MILLIGRAM(S): 20 TABLET, DELAYED RELEASE ORAL at 05:30

## 2019-04-23 RX ADMIN — SODIUM CHLORIDE 4 MILLILITER(S): 9 INJECTION INTRAMUSCULAR; INTRAVENOUS; SUBCUTANEOUS at 05:46

## 2019-04-23 RX ADMIN — Medication 100 MILLIGRAM(S): at 14:43

## 2019-04-23 RX ADMIN — Medication 1 APPLICATION(S): at 18:13

## 2019-04-23 RX ADMIN — Medication 1 DROP(S): at 20:27

## 2019-04-23 RX ADMIN — PHENYLEPHRINE HYDROCHLORIDE 25.61 MICROGRAM(S)/KG/MIN: 10 INJECTION INTRAVENOUS at 20:27

## 2019-04-23 NOTE — CHART NOTE - NSCHARTNOTEFT_GEN_A_CORE
MICU Transfer Note    Transfer from: MICU    Transfer to: (  ) Medicine    (  ) Telemetry     (   ) RCU        ( X) Palliative         (   ) Stroke Unit          (   ) __________________    Accepting Physician:  Signout given to:     MICU COURSE:      78F with CAD s/p CABG x4, HFrEF (EF 26% 4/2019), T2DM with peripheral neuropathy, ESRD on HD T/Th/S via L AVF, hypotension on midodrine, HTN, HLD initially admitted 4/9 after mechanical fall c/b R proximal tib/fib fracture not requiring emergent surgical intervention s/p cast placement with hospital course c/b possible melena now resolved, and RRT on 4/11 for acute hypercapnic respiratory failure in setting of opioid use requiring intubation. The patient was transferred to the MICU, where her course was c/b SVT s/p synchronized cardioversion x2 now in sinus rhythm. The patient was also found to have ascites, underwent paracentesis with 4.9L removed, SAAG of 0.6 - likely 2/2 heart failure (EG 26%).The patient also became febrile and was found to have high grade MSSA bacteremia on cultures from 4/12 and 4/13. ID was consulted and the patient was started on Ancef and Gentamicin with culture clearance. Bedside LISA on 4/16 showed possible vegetations. The patient underwent formal TTE on 4/17 showing the vegetation. Cardiothoracic surgery was consulted, who determined the patient was a poor surgical candidate. The patient underwent a thoracentesis of the R lung with 1 L removed on 4/17, studies consistent with exudate. Course further complicated by extubation and reintubation 2/2 respiratory failure (and associated sustained Vtach) on 4/18, possibly 2/2 mucous plugging. The patient had a chest tube placed for stable R pneumothorax on 4/21 to optimize prior to extubation. She was extubated on 4/22 but noted to be tachypneic with shallow breaths, requiring BIPAP and high flow nasal cannula.  The patient, with discussion with her family, elected to be DNR/DNI. Pt transferred to PCU for further care.

## 2019-04-23 NOTE — PROGRESS NOTE ADULT - PROBLEM SELECTOR PLAN 3
Advanced care planning was discussed with patient and family.  Advanced care planning forms were reviewed and discussed.  Risks, benefits and alternatives of gastroenterologic procedures were discussed in detail and all questions were answered.    30 minutes spent.
- MSSA bacteremia  - s/p bedside LISA with vegetations noted on non-coronary cusp of aortic valve and on anterior leaflet of mitral valve; no significant aortic insufficiency noted, but there is mild mitral regurgitation  - severely decreased LV systolic function  - abx/care per ID appreciated
- MSSA bacteremia  - s/p bedside LISA with vegetations noted on non-coronary cusp of aortic valve and on anterior leaflet of mitral valve; no significant aortic insufficiency noted, but there is mild mitral regurgitation  - severely decreased LV systolic function  - abx/care per ID appreciated  - dnr/dni
pt requires assistance on all ADL's  PPS score 30%

## 2019-04-23 NOTE — PROGRESS NOTE ADULT - ASSESSMENT
Acute hyopercapneic repisratory failure rquiring mechanical intubataion  Tib-Fib fracture s/p mechanical fall  ESRD  ACute on chronic sytolic and diastolic CHF  CAD  Bilateral R>L pleural effusions, ascites due to fluid overload in setting of CHF and ESRD    4/14  1) Acute respiratory failure.  s/p RRT for unresponsiveness. Mildly improved w/ narcan. Intubated for airway protection. S/p synchronized cardioversion + ketamine for SVT on 4/12/19  Today ABG noted. Pt is alert and follows simple command. I&O 3.8 liters negative. 4.8 liter removed from paracentesis yesterday. Pt is currently on CPAP trial.  Continue wean off ventilator support per MICU team.  4/23: extubated today : Right sided pneumothorax still present but much better: cont aggressive chest PT:    2) fever  24 T-max 38.3, yesterday, elevated inflammatory markers and liver enzyme. Chest x-ray reviewed Pt is already on Cefepime. Management defer to MICU, GI  4/23: pt is doing ok : being treated for I.E: with antibiotics for a total of 6 weeks:     3) Pleural effusions.   Chronic left and large right side. s/p paracentesis, removed more than 6 liters yesterday. Repeat chest x-ray.   4/14 no chest x-ray to review. s/p paracentesis. Clinically stable   4/15: has pl effusion on rt side: she had paracentesis done and not thoracentesis:   4/16: doing ok: still with bilateral pleural effusions: s/p paracentesis: cytology is negative:   4/17: no intervention for now: : Has pretty poor EF on teddy yesterday   4/19: s/p tap on 17th : exudative fluid: has tox: ? trapped lung   4/21: Persistent pneumothorax ? chest tube  and weaning trials! /23: s/p chest tube placement with improvement in her chest radiograph: cont chest management per MICU:     4) Hypotension  Small dose of Bhaskar, continue wean off as possible   4/23: resolved: on midodrine    5)  Prophylaxis   Pt is on heparin SQ and PPI

## 2019-04-23 NOTE — PROGRESS NOTE ADULT - ATTENDING COMMENTS
Pt seen with fellow. Agree with above.  Actively dying.  Goal is to focus on comfort.  Zofran atc and prn and dilaudid prn dyspnea.  emotional support provided to family at bedside

## 2019-04-23 NOTE — PROGRESS NOTE ADULT - ASSESSMENT
78 F with DM, CAD s/p CABG, mitral annuloplasty ring, HFrEF on midodrine, ESRD on HD via Left AVF, admitted 4/9/19 after a fall at home and sustaining a Right tib/fib fracture now casted, developed acute hypercapnic respiratory failure attributed to opioids, intubated, developed fevers 4/12 and high grade MSSA bacteremia   pt was started on vanco   repeat blood cx 4/13 still positive, 4/14 negative for now  bedside LISA and repeat LISA with mitral and aortic valve vegetation but pt not a surgical candidate  s/p thoracentesis cx negative    high grade MSSA bacteremia, in the setting of mitral ring annuloplasty with vegetation on mitral and aortic valves  ?penicillin allergy  pt reintubated again    * blood cx 4/14 negative, 4/17 with coag neg staph  * as per cardiothoracic pt is not a surgical candidate   * c/w  cefazolin 1 g qd, will complete a 6 week course from 4/14, On DC switch to 2 g post HD (on day that there is an additional day before the next HD, 3 g post HD)  * c/w gentamycin 2 mg/kg q 48 post HD, started 4/16, now day 8, will do 2 weeks of genta  * add rifampin 300 q 8

## 2019-04-23 NOTE — PROGRESS NOTE ADULT - SUBJECTIVE AND OBJECTIVE BOX
VIRGILIO KAPLAN  78y Female  MRN:[id number]    Patient is a 78y old  Female who presents with a chief complaint of R tib/fib fx (10 Apr 2019 12:01)    HPI:  78 F PMH CAD s/p CABG x 4 2015, T2DM with peripheral neuropathy, ESRD on HD T/Th/S via L AVF, hypotension on midodrine, HFrEF, HTN, HLD, b/l cataracts s/p surgery, p/w mechanical fall and R leg pain. Pt lives in basement apartment and ordinarily ambulates with walker. Today was getting ready for HD, made it to the first step on her set of stairs, and reportedly felt her R leg "give out." Pt fell backwards but was caught by her family member who was assisting her up the stairs. She denies LOC/syncope or head strike. +Pain in R leg post fall, worse with movement and much improved with immobilization. Could not bear weight or walk after fall.  Denies cp, sob at rest, f/c, n/v/d, dysuria, cough. +chronic BENAVIDEZ, reportedly stable since her CABG. Family at bedside providing collateral and confirms above details.     Vs: 99.1, 76, 102/60, 16, 90% RA --> 100% 4LNC.  Labs: no leukocytosis, chronic stable anemia, rest cbc unrevealing, coags unrevealing, hypoNa 126, HAGMA with cmp c/w known ESRD, hyperglycemia, chronic stable alkp elevation mild AST elevation. XR tib/fib/femur/hip/knee reveals acute prox tib/fib fxs with possible intraarticular extension. (10 Apr 2019 00:56)      Patient seen and evaluated in micu.   interval events noted     Interval HPI: no acute events o/n    PAST MEDICAL & SURGICAL HISTORY:  CHF (congestive heart failure): Oct 2015- still sob  Shortness of breath  Hypotension  Type 2 diabetes mellitus  Chronic kidney disease (CKD): ON DIALYSIS - Tue, Thur, Sat  Nephrolithiasis: 2001  Ulcer: 2001  Hydronephrosis: Right 1/2012  Charcot Foot: Right Foot  Herniated Disc  Diabetic Neuropathy  Anemia: CKD  Hypothyroidism  Hypertension: NOT ANY MORE  Hyperlipidemia  S/P mitral valve repair: with CABG X 4 MAY 2015  S/P CABG (coronary artery bypass graft): x 4, May 2015  History of extraction of renal calculus  S/P Foot Surgery: 2002, 2014  S/P Cholecystectomy: 2001  S/P Breast Lumpectomy: 1994, 2003 - left breast benign      REVIEW OF SYSTEMS:  as per hpi    VITALS:  Vital Signs Last 24 Hrs  T(C): 36.6 (23 Apr 2019 08:00), Max: 36.9 (23 Apr 2019 04:00)  T(F): 97.8 (23 Apr 2019 08:00), Max: 98.4 (23 Apr 2019 04:00)  HR: 88 (23 Apr 2019 10:15) (85 - 105)  BP: 94/47 (23 Apr 2019 10:15) (76/43 - 116/51)  BP(mean): 68 (23 Apr 2019 10:15) (54 - 78)  RR: 20 (23 Apr 2019 10:15) (0 - 38)  SpO2: 97% (23 Apr 2019 10:15) (83% - 100%)    PHYSICAL EXAM:  GENERAL: comfortable   HEAD:  Atraumatic, Normocephalic  EYES: EOMI, PERRLA, conjunctiva and sclera clear  NECK: no jvd, supple  CHEST/LUNG: coarse bs b/l  HEART: S1, S2;   ABDOMEN: Soft, Nontender, nondistended; Bowel sounds present  EXTREMITIES:  2+ Peripheral Pulses, No clubbing, cyanosis, + edema,  right knee cast      Consultant(s) Notes Reviewed:  [x ] YES  [ ] NO  Care Discussed with Consultants/Other Providers [ x] YES  [ ] NO    MEDS:  MEDICATIONS  (STANDING):  albumin human 25% IVPB 50 milliLiter(s) IV Intermittent every 2 hours  ALBUTerol/ipratropium for Nebulization 3 milliLiter(s) Nebulizer every 6 hours  artificial  tears Solution 1 Drop(s) Both EYES every 2 hours  aspirin  chewable 81 milliGRAM(s) Oral daily  ceFAZolin   IVPB 1000 milliGRAM(s) IV Intermittent every 24 hours  chlorhexidine 2% Cloths 1 Application(s) Topical daily  chlorhexidine 4% Liquid 1 Application(s) Topical <User Schedule>  dexmedetomidine Infusion 0.2 MICROgram(s)/kG/Hr (3.415 mL/Hr) IV Continuous <Continuous>  dextrose 5%. 1000 milliLiter(s) (50 mL/Hr) IV Continuous <Continuous>  erythromycin   Ointment 1 Application(s) Both EYES four times a day  gentamicin   IVPB 130 milliGRAM(s) IV Intermittent <User Schedule>  heparin  Injectable 5000 Unit(s) SubCutaneous every 8 hours  hydrocortisone 1% Cream 1 Application(s) Topical every 6 hours  insulin lispro (HumaLOG) corrective regimen sliding scale   SubCutaneous every 6 hours  levothyroxine Injectable 88 MICROGram(s) IV Push at bedtime  midodrine 20 milliGRAM(s) Oral every 8 hours  pantoprazole  Injectable 40 milliGRAM(s) IV Push every 12 hours  petrolatum Ophthalmic Ointment 1 Application(s) Both EYES three times a day  phenylephrine    Infusion 2 MICROgram(s)/kG/Min (25.613 mL/Hr) IV Continuous <Continuous>  polyethylene glycol 3350 17 Gram(s) Oral daily  senna Syrup 10 milliLiter(s) Oral at bedtime  sodium chloride 3%  Inhalation 4 milliLiter(s) Inhalation every 12 hours    MEDICATIONS  (PRN):  acetaminophen    Suspension .. 650 milliGRAM(s) Oral every 6 hours PRN Temp greater or equal to 38C (100.4F)  fentaNYL    Injectable 50 MICROGram(s) IV Push every 15 minutes PRN Mild Pain (1 - 3)  HYDROmorphone  Injectable 0.2 milliGRAM(s) IV Push every 4 hours PRN Labored breathing or severe pain  lidocaine/prilocaine Cream 1 Application(s) Topical daily PRN hd days  ondansetron Injectable 4 milliGRAM(s) IV Push every 6 hours PRN Nausea and/or Vomiting        ALLERGIES:  allopurinol (Rash)  Augmentin (Rash)  Levaquin (Rash)      LABS:                            10.2   13.6  )-----------( 394      ( 22 Apr 2019 23:52 )             33.1   04-22    138  |  101  |  29<H>  ----------------------------<  131<H>  4.7   |  21<L>  |  3.19<H>    Ca    7.8<L>      22 Apr 2019 23:52  Phos  6.5     04-22  Mg     2.2     04-22    TPro  6.9  /  Alb  2.4<L>  /  TBili  0.4  /  DBili  x   /  AST  16  /  ALT  <5<L>  /  AlkPhos  122<H>  04-22        Culture - Blood (04.13.19 @ 07:27)    Gram Stain:   Growth in aerobic bottle: Gram Positive Cocci in Clusters    Specimen Source: .Blood    Culture Results:   Growth in aerobic bottle: Gram Positive Cocci in Clusters        < from: CT Knee No Cont, Right (04.10.19 @ 03:34) >  Impression:    Acute, impacted fractures of the right proximal tibia and fibula as   described.      < end of copied text >           < from: Xray Tibia + Fibula 2 Views, Right (04.09.19 @ 20:28) >  impression:    There is an acute, comminuted fracture of the proximal tibial metaphysis   with mild anterior displacement of the distal fracture fragment. There is   questionable intra-articular extension on the lateral view. There is a   large knee joint effusion. There is a proximal fibular fracture.    There is no fracture of the hip or femur. The ankle is poorly visualized   secondary to technique and may be externally rotated with respect to the   knee. There is no ankle joint effusion. Vascular calcifications are noted.     CT of the knee can be performed for further evaluation.    < end of copied text >

## 2019-04-23 NOTE — PROGRESS NOTE ADULT - SUBJECTIVE AND OBJECTIVE BOX
SUBJECTIVE AND OBJECTIVE: Pt lying in bed with highflow with daughter at bedside, complains of nausea after receiving Dilaudid dose.    INTERVAL HPI/OVERNIGHT EVENTS: Pt had respiratory failure after extubation yesterday 2/2 mucous plugging, family meeting with pts son Timoteo, daughter Sheridan and niece, who are aware pt has been deteriorating since admission and has poor prognosis. They would like to focus on pts comfort and requested for pt to be DNR / DNI. Agreed to start pt on Dilaudid low dose as needed for SOB, pt required BTD x 3 in past 24 hrs. Today pt IV pressor requirements increased (currently on max dose phenylephrine), pt was not able to go for HD today. Discussed with family potential transfer to the PCU, but family requested to stay in the ICU for now.     DNR on chart: Yes    Allergies    allopurinol (Rash)  Augmentin (Rash)  Levaquin (Rash)    Intolerances    MEDICATIONS  (STANDING):  ALBUTerol/ipratropium for Nebulization 3 milliLiter(s) Nebulizer every 6 hours  artificial  tears Solution 1 Drop(s) Both EYES every 2 hours  aspirin  chewable 81 milliGRAM(s) Oral daily  ceFAZolin   IVPB 1000 milliGRAM(s) IV Intermittent every 24 hours  chlorhexidine 2% Cloths 1 Application(s) Topical daily  chlorhexidine 4% Liquid 1 Application(s) Topical <User Schedule>  dexmedetomidine Infusion 0.2 MICROgram(s)/kG/Hr (3.415 mL/Hr) IV Continuous <Continuous>  dextrose 5%. 1000 milliLiter(s) (50 mL/Hr) IV Continuous <Continuous>  erythromycin   Ointment 1 Application(s) Both EYES four times a day  gentamicin   IVPB 130 milliGRAM(s) IV Intermittent <User Schedule>  heparin  Injectable 5000 Unit(s) SubCutaneous every 8 hours  hydrocortisone 1% Cream 1 Application(s) Topical every 6 hours  insulin lispro (HumaLOG) corrective regimen sliding scale   SubCutaneous every 6 hours  levothyroxine Injectable 88 MICROGram(s) IV Push at bedtime  midodrine 20 milliGRAM(s) Oral every 8 hours  ondansetron Injectable 4 milliGRAM(s) IV Push every 6 hours  pantoprazole  Injectable 40 milliGRAM(s) IV Push every 12 hours  petrolatum Ophthalmic Ointment 1 Application(s) Both EYES three times a day  phenylephrine    Infusion 2 MICROgram(s)/kG/Min (25.613 mL/Hr) IV Continuous <Continuous>  polyethylene glycol 3350 17 Gram(s) Oral daily  senna Syrup 10 milliLiter(s) Oral at bedtime  sodium chloride 3%  Inhalation 4 milliLiter(s) Inhalation every 12 hours    MEDICATIONS  (PRN):  acetaminophen    Suspension .. 650 milliGRAM(s) Oral every 6 hours PRN Temp greater or equal to 38C (100.4F)  fentaNYL    Injectable 50 MICROGram(s) IV Push every 15 minutes PRN Mild Pain (1 - 3)  HYDROmorphone  Injectable 0.2 milliGRAM(s) IV Push every 4 hours PRN Labored breathing/severe pain  lidocaine/prilocaine Cream 1 Application(s) Topical daily PRN hd days  LORazepam   Injectable 0.5 milliGRAM(s) IV Push every 4 hours PRN Nausea/vomiting  metoclopramide Injectable 10 milliGRAM(s) IV Push every 6 hours PRN nausea/vomiting    ITEMS UNCHECKED ARE NOT PRESENT    PRESENT SYMPTOMS: [ ]Unable to obtain due to poor mentation   Source if other than patient:  [ ]Family   [ ]Team     Pain (Impact on QOL):    Location:  Minimal acceptable level (0-10 scale):            Aggravating factors:  Quality:  Radiation:  Severity (0-10 scale):    Timing:    Dyspnea:                           [ ]Mild [ ]Moderate [x]Severe  Anxiety:                             [ ]Mild [ ]Moderate [ ]Severe  Fatigue:                             [ ]Mild [ ]Moderate [x]Severe  Nausea:                             [ ]Mild [ ]Moderate [x]Severe  Loss of appetite:              [ ]Mild [ ]Moderate [x]Severe  Constipation:                    [ ]Mild [ ]Moderate [ ]Severe    PAIN AD Score:	  http://geriatrictoolkit.[place].[facility]/cog/painad.pdf (Ctrl + left click to view)    Other Symptoms:  [x]All other review of systems negative     Karnofsky Performance Score/Palliative Performance Status Version 2:  30 %    http://palliative.info/resource_material/PPSv2.pdf    PHYSICAL EXAM:  Vital Signs Last 24 Hrs  T(C): 36.6 (23 Apr 2019 08:00), Max: 36.9 (23 Apr 2019 04:00)  T(F): 97.8 (23 Apr 2019 08:00), Max: 98.4 (23 Apr 2019 04:00)  HR: 81 (23 Apr 2019 16:45) (76 - 100)  BP: 110/50 (23 Apr 2019 16:45) (82/43 - 116/51)  BP(mean): 69 (23 Apr 2019 16:45) (58 - 78)  RR: 25 (23 Apr 2019 16:45) (0 - 38)  SpO2: 99% (23 Apr 2019 16:45) (90% - 100%) I&O's Summary    22 Apr 2019 07:01  -  23 Apr 2019 07:00  --------------------------------------------------------  IN: 1981.6 mL / OUT: 275 mL / NET: 1706.6 mL    23 Apr 2019 07:01  -  23 Apr 2019 17:06  --------------------------------------------------------  IN: 568.8 mL / OUT: 0 mL / NET: 568.8 mL     GENERAL:  [ ]Alert  [x]Oriented x 3  [x]Lethargic  [ ]Cachexia  [ ]Unarousable  [x]Verbal  [ ]Non-Verbal    Behavioral:   [ ] Anxiety  [ ] Delirium [ ] Agitation [ ] Other    HEENT:  [ ]Normal   [x]Dry mouth   [ ]ET Tube/Trach  [ ]Oral lesions [x] high flow NC    PULMONARY:   [ ]Clear [x]Tachypnea  [ ]Audible excessive secretions   [ ]Rhonchi        [ ]Right [ ]Left [ ]Bilateral  [ ]Crackles        [ ]Right [ ]Left [ ]Bilateral  [ ]Wheezing     [ ]Right [ ]Left [ ]Bilateral    CARDIOVASCULAR:    [x]Regular [ ]Irregular [ ]Tachy  [ ]Abhi [ ]Murmur [ ]Other    GASTROINTESTINAL:  [x]Soft  [ ]Distended   [x]+BS  [x]Non tender [ ]Tender  [ ]PEG [ ]OGT/ NGT   Last BM:    GENITOURINARY:  [ ]Normal [ ] Incontinent   [ ]Oliguria/Anuria   [ ]Rivero    MUSCULOSKELETAL:   [ ]Normal   [ ]Weakness  [x]Bed/Wheelchair bound [ ]Edema [x]RLE in cast and wrapped in ACE bandage [x] LLE AVF.    NEUROLOGIC:   [x]No focal deficits  [ ] Cognitive impairment  [ ] Dysphagia [ ]Dysarthria [ ] Paresis [ ]Other     SKIN:   [x]Normal   [ ]Pressure ulcer(s)  [ ]Rash    CRITICAL CARE:  [ ] Shock Present  [ ]Septic [ ]Cardiogenic [ ]Neurologic [ ]Hypovolemic  [ ]  Vasopressors [ ]  Inotropes   [ ] Respiratory failure present  [ ] Acute  [ ] Chronic [ ] Hypoxic  [ ] Hypercarbic [ ] Other  [ ] Other organ failure     LABS:                        10.2   13.6  )-----------( 394      ( 22 Apr 2019 23:52 )             33.1   04-22    138  |  101  |  29<H>  ----------------------------<  131<H>  4.7   |  21<L>  |  3.19<H>    Ca    7.8<L>      22 Apr 2019 23:52  Phos  6.5     04-22  Mg     2.2     04-22    TPro  6.9  /  Alb  2.4<L>  /  TBili  0.4  /  DBili  x   /  AST  16  /  ALT  <5<L>  /  AlkPhos  122<H>  04-22  PT/INR - ( 22 Apr 2019 23:52 )   PT: 19.6 sec;   INR: 1.68 ratio     PTT - ( 22 Apr 2019 23:52 )  PTT:51.8 sec    RADIOLOGY & ADDITIONAL STUDIES:    < from: Xray Chest 1 View- PORTABLE-Urgent (04.23.19 @ 09:27) >  INTERPRETATION:  A single chest x-ray was obtained on April 23, 2019.    Indication: Shortness of breath.    Impression:    The heart is enlarged. The left costophrenic is not included in this   study. Left lower lobe pneumonia and/or atelectasis. Right pleural   effusion. A central line seen on the right and the tip is in superior   vena cava. Status post sternotomy. Status post mitral valve annulus   repair.    < end of copied text >    < from: Limited Transthoracic Echo (04.17.19 @ 15:34) >  Conclusions:  1. An annuloplasty ring is seen inthe mitral position. A  1cm x 0.9cm vegetation is see on the atrial portion of the  anterior leaflet.  Mild-moderate mitral regurgitation. Peak  mitral valve gradient equals 9 mm Hg, mean transmitral  valve gradient equals 4 mm Hg, which is probably normal in  the setting of a An annuloplasty ring is seen in the mitral  position..  2. Normal trileaflet aortic valve. Linear strands seen on  the aortic valve leaflet tips consistent with likely  Lambl's excrescences vs vegetation  3. Severe global left ventricular systolic dysfunction.  4. Right ventricular enlargement with decreased right  ventricular systolic function.  D/w MICU and CTS  ------------------------------------------------------------------------  Confirmed on  4/17/2019 - 18:10:47 by Mis Pearce M.D.  ------------------------------------------------------------------------    < end of copied text >    Protein Calorie Malnutrition Present: [ ] yes [ ] no  [x] PPSV2 < or = 30%  [ ] significant weight loss [x] poor nutritional intake [ ] anasarca [ ] catabolic state Albumin, Serum: 2.4 g/dL (04-22-19 @ 23:52)  Artificial Nutrition [ ]     REFERRALS:   [ ]Chaplaincy  [ ] Hospice  [ ]Child Life  [ ]Social Work  [ ]Case management [ ]Holistic Therapy   Goals of Care Document: Goals of Care Conversation - Personal Advance Directive [BRIANA Lino] (04-22-19 @ 14:14)

## 2019-04-23 NOTE — PROGRESS NOTE ADULT - SUBJECTIVE AND OBJECTIVE BOX
[facility] Ophthalmology Progress Note    Subjective:  Patient seen and examined this morning, on HFNC.     Mood and Affect Appropriate ( No ),  Oriented to Time, Place, and Person x 3 ( yes )    Ophthalmology Exam    Visual acuity (sc): at least CF OU (uncooeprative)  Pupils: PERRL OU, no APD  Ttono: 11 OU  Extraocular movements (EOMs): BRUNO 2/2 mental status  Confrontational Visual Field (CVF):  BRUNO 2/2 mental status  Color Plates: BRUNO 2/2 mental status    Pen Light Exam (PLE)  External:  Flat OU  Lids/Lashes/Lacrimal Ducts: mild blepharitis OU  Sclera/Conjunctiva:  W+Q OU  Cornea: 1+ SPK OU. No focal area of epithelial defects, decreased blink rate, improved  Anterior Chamber: D+F OU  Iris:  Flat OU  Lens:  PCIOL OU        Assessment: 78F with CAD s/p CABG x4, HFrEF (EF 26% 4/2019), T2DM with peripheral neuropathy, ESRD on HD T/Th/S via L AVF, hypotension on midodrine, HTN, HLD initially admitted 4/9 after mechanical fall, c/o irritation, FBS, eye redness x 3-4 days. Patient intubated and sedated on initial exam. 3-4 SPK noted OU with 1mm lagophthalmos and no focal epithelial defects. 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     Patient now extubated. Ocular surface significantly improved.       Plan:  - Continue Preservative free artificial tears Q2 hours both eyes  - Continue Erythromycin ointment QID Both eyes  - given lagophthalmos on exam, recommend lid taping QHS if family amenable. If not, can try cool compresses PRN during day.  - d/w icu team and nursing staff      Follow-Up:  Patient should follow up his/her ophthalmologist or in the [facility] within 1 week of discharge.  [address] [place] [address]  [phone]

## 2019-04-23 NOTE — PROGRESS NOTE ADULT - SUBJECTIVE AND OBJECTIVE BOX
[facility]   [phone]  RENAL FOLLOW UP NOTE  --------------------------------------------------------------------------------  Chief Complaint:    24 hour events/subjective:    seen. extubated. on high flow O2    PAST HISTORY  --------------------------------------------------------------------------------  No significant changes to PMH, PSH, FHx, SHx, unless otherwise noted    ALLERGIES & MEDICATIONS  --------------------------------------------------------------------------------  Allergies    allopurinol (Rash)  Augmentin (Rash)  Levaquin (Rash)    Intolerances      Standing Inpatient Medications  ALBUTerol/ipratropium for Nebulization 3 milliLiter(s) Nebulizer every 6 hours  artificial  tears Solution 1 Drop(s) Both EYES every 2 hours  aspirin  chewable 81 milliGRAM(s) Oral daily  ceFAZolin   IVPB 1000 milliGRAM(s) IV Intermittent every 24 hours  chlorhexidine 2% Cloths 1 Application(s) Topical daily  chlorhexidine 4% Liquid 1 Application(s) Topical <User Schedule>  dexmedetomidine Infusion 0.2 MICROgram(s)/kG/Hr IV Continuous <Continuous>  dextrose 5%. 1000 milliLiter(s) IV Continuous <Continuous>  erythromycin   Ointment 1 Application(s) Both EYES four times a day  gentamicin   IVPB 130 milliGRAM(s) IV Intermittent <User Schedule>  heparin  Injectable 5000 Unit(s) SubCutaneous every 8 hours  hydrocortisone 1% Cream 1 Application(s) Topical every 6 hours  insulin lispro (HumaLOG) corrective regimen sliding scale   SubCutaneous every 6 hours  levothyroxine Injectable 88 MICROGram(s) IV Push at bedtime  midodrine 20 milliGRAM(s) Oral every 8 hours  ondansetron Injectable 4 milliGRAM(s) IV Push every 6 hours  pantoprazole  Injectable 40 milliGRAM(s) IV Push every 12 hours  petrolatum Ophthalmic Ointment 1 Application(s) Both EYES three times a day  phenylephrine    Infusion 2 MICROgram(s)/kG/Min IV Continuous <Continuous>  polyethylene glycol 3350 17 Gram(s) Oral daily  senna Syrup 10 milliLiter(s) Oral at bedtime  sodium chloride 3%  Inhalation 4 milliLiter(s) Inhalation every 12 hours    PRN Inpatient Medications  acetaminophen    Suspension .. 650 milliGRAM(s) Oral every 6 hours PRN  fentaNYL    Injectable 50 MICROGram(s) IV Push every 15 minutes PRN  lidocaine/prilocaine Cream 1 Application(s) Topical daily PRN      REVIEW OF SYSTEMS  --------------------------------------------------------------------------------  on high flow O2    VITALS/PHYSICAL EXAM  --------------------------------------------------------------------------------  T(C): 36.6 (04-23-19 @ 08:00), Max: 36.9 (04-23-19 @ 04:00)  HR: 76 (04-23-19 @ 12:20) (76 - 105)  BP: 94/47 (04-23-19 @ 10:15) (76/43 - 116/51)  RR: 20 (04-23-19 @ 12:17) (0 - 38)  SpO2: 94% (04-23-19 @ 12:20) (83% - 100%)  Wt(kg): --        04-22-19 @ 07:01  -  04-23-19 @ 07:00  --------------------------------------------------------  IN: 1981.6 mL / OUT: 275 mL / NET: 1706.6 mL    04-23-19 @ 07:01  -  04-23-19 @ 12:44  --------------------------------------------------------  IN: 568.8 mL / OUT: 0 mL / NET: 568.8 mL      Physical Exam:  	    Gen: on high flow O2. frail appearing   	no jvd   	Pulm: diffuse coarse bs b/l decrease bs   	CV: RRR, S1S2; no rub  	Abd: +BS, soft,  + distended ascites softer   	: No suprapubic tenderness  	UE: Warm, no cyanosis  no clubbing,  no edema  	LE: Warm, no cyanosis  no clubbing, no edema RLE cast   	avf +  bruit and thrill 	    LABS/STUDIES  --------------------------------------------------------------------------------              10.2   13.6  >-----------<  394      [04-22-19 @ 23:52]              33.1     138  |  101  |  29  ----------------------------<  131      [04-22-19 @ 23:52]  4.7   |  21  |  3.19        Ca     7.8     [04-22-19 @ 23:52]      Mg     2.2     [04-22-19 @ 23:52]      Phos  6.5     [04-22-19 @ 23:52]    TPro  6.9  /  Alb  2.4  /  TBili  0.4  /  DBili  x   /  AST  16  /  ALT  <5  /  AlkPhos  122  [04-22-19 @ 23:52]    PT/INR: PT 19.6 , INR 1.68       [04-22-19 @ 23:52]  PTT: 51.8       [04-22-19 @ 23:52]      Creatinine Trend:  SCr 3.19 [04-22 @ 23:52]  SCr 2.61 [04-22 @ 00:19]  SCr 1.88 [04-21 @ 00:32]  SCr 3.28 [04-20 @ 01:52]  SCr 2.66 [04-19 @ 01:14]    	              Iron 71, TIBC 135, %sat 53      [04-11-19 @ 17:50]  Ferritin 6834      [04-11-19 @ 17:57]  HbA1c 8.3      [04-12-19 @ 09:00]  TSH 16.30      [04-22-19 @ 22:30]  Lipid: chol 63, , HDL 12, LDL 23      [04-12-19 @ 09:06]

## 2019-04-23 NOTE — DISCHARGE NOTE FOR THE EXPIRED PATIENT - HOSPITAL COURSE
77 yo Female with PMHx of CAD s/p CABG x4, HLD, HTN, HFrEF (EF 26% 4/2019), T2DM with peripheral neuropathy, ESRD on HD T/Th/S via L AVF, hypotension on midodrine, initially admitted 4/9 after mechanical fall c/b R proximal tib/fib fracture not requiring emergent surgical intervention s/p cast placement with hospital course c/b possible melena with +FOBT now resolved. RRT on 4/11 for acute hypercapnic respiratory failure in setting of opioid, received narcan with adequate response. The patient was transferred to the MICU 4/11, where her course was c/b SVT s/p synchronized cardioversion x2 now in sinus rhythm however noted to have worsening respiratory status immediately after requiring intubation. The patient was also found to have ascites, underwent paracentesis with 4.9L removed, SAAG of 0.6 - likely 2/2 heart failure (EG 26%).The patient also became febrile and was found to have high grade MSSA bacteremia on cultures from 4/12 and 4/13. ID was consulted and the patient was started on Ancef and Gentamicin with culture clearance. Bedside LISA on 4/16 showed possible vegetations. The patient underwent formal TTE on 4/17 showing the vegetation. Cardiothoracic surgery was consulted, who determined the patient was a poor surgical candidate. The patient underwent a thoracentesis of the R lung with 1 L removed on 4/17, studies consistent with exudate. Course further complicated by extubation and reintubation 2/2 respiratory failure (and associated sustained Vtach) on 4/18, possibly 2/2 mucous plugging. The patient had a chest tube placed for stable R pneumothorax on 4/21 to optimize prior to extubation. She was extubated on 4/22 but noted to be tachypneic with shallow breaths, requiring BIPAP and high flow nasal cannula. Also seen with increasing pressor requirements at this time. The patient, with discussion with her family, ICU Attending and palliative care, elected to be DNR/DNI with no significant medical interventions moving forward, however no withdrawal of care, pt was on maximum phenylephrine pressor requirements and continued on antibiotics. Family requested to focus on symptoms management while aware of patient's limited life expectancy; low dose medications for pain, nausea and shortness of breath were ordered. Pt began to drop her blood pressure evening of 4/23 while on capped pressors, daughter at bedside understanding of patient's prognosis and encouraged to spend time with her mother.    Called by bedside by nurse for bradycardia on monitor to 40s and hypoxemia to 60% at approximately 9:30 PM. Bradycardia progressed to asystole on monitor and patient seen nonresponsive on exam, hypoxemic on monitor without respirations.     On physical exam, no spontaneous movements were present. Patient did not respond to verbal or physical stimuli. Pupils were mid-dilated and fixed. No breath sounds were appreciated over either lung field. No carotid pulses were palpable. No heart sounds were auscultated.   Patient pronounced dead at 21:44 due to cardiopulmonary arrest. Attending notified. Daughter at bedside, condolences were given and emotional support was provided. Family declined autopsy. 77 yo Female with PMHx of CAD s/p CABG x4, HLD, HTN, HFrEF (EF 26% 4/2019), T2DM with peripheral neuropathy, ESRD on HD T/Th/S via L AVF, hypotension on midodrine, initially admitted 4/9 after mechanical fall c/b R proximal tib/fib fracture not requiring emergent surgical intervention s/p cast placement with hospital course c/b possible melena with +FOBT now resolved. RRT on 4/11 for acute hypercapnic respiratory failure in setting of opioid, received narcan with adequate response. The patient was transferred to the MICU 4/11, where her course was c/b SVT s/p synchronized cardioversion x2 now in sinus rhythm however noted to have worsening respiratory status immediately after requiring intubation. The patient was also found to have ascites, underwent paracentesis with 4.9L removed, SAAG of 0.6 - likely 2/2 heart failure (EG 26%).The patient also became febrile and was found to have high grade MSSA bacteremia on cultures from 4/12 and 4/13. ID was consulted and the patient was started on Ancef and Gentamicin with culture clearance. Bedside LISA on 4/16 showed possible vegetations. The patient underwent formal TTE on 4/17 showing the vegetation. Cardiothoracic surgery was consulted, who determined the patient was a poor surgical candidate. The patient underwent a thoracentesis of the R lung with 1 L removed on 4/17, studies consistent with exudate. Course further complicated by extubation and reintubation 2/2 respiratory failure (and associated sustained Vtach) on 4/18, possibly 2/2 mucous plugging. The patient had a chest tube placed for stable R pneumothorax on 4/21 to optimize prior to extubation. She was extubated on 4/22 but noted to be tachypneic with shallow breaths, requiring BIPAP and high flow nasal cannula. Also seen with increasing pressor requirements at this time. The patient, with discussion with her family, ICU Attending and palliative care, elected to be DNR/DNI with no significant medical interventions moving forward, however no withdrawal of care, pt was to be continued on antibiotics with no additional pressors to be added and no longer to undergo hemodialysis.  Family requested to focus on symptoms management while aware of patient's limited life expectancy; low dose medications for pain, nausea and shortness of breath were ordered. Pt began to drop her blood pressure evening of 4/23 while on capped pressors, daughter at bedside understanding of patient's prognosis and encouraged to spend time with her mother.    Called by bedside by nurse for bradycardia on monitor to 40s and hypoxemia to 60% at approximately 9:30 PM. Bradycardia progressed to asystole on monitor and patient seen nonresponsive on exam, hypoxemic on monitor without respirations.     On physical exam, no spontaneous movements were present. Patient did not respond to verbal or physical stimuli. Pupils were mid-dilated and fixed. No breath sounds were appreciated over either lung field. No carotid pulses were palpable. No heart sounds were auscultated.   Patient pronounced dead at 21:44 due to cardiopulmonary arrest. Attending notified. Daughter at bedside, condolences were given and emotional support was provided. Family declined autopsy.

## 2019-04-23 NOTE — PROGRESS NOTE ADULT - ATTENDING COMMENTS
78F PMH CAD s/p CABG, HFrEF, DM2, ESRD on HD, hypotension on midodrine, HTN, HLD, initially admitted with R tib/fib fx after a mechanical fall. At baseline frail with long recovery from surgery in the past. Hospital course complicated by acute hypercapnic respiratory failure requiring mechanical ventilation. Course further complicated by severe sepsis due to MSSA bacteremia and endocarditis. She is also found to have cirrhosis (likely cardiac) with ascites (paracentesis 5L on 4/12). Failed extubation 4/18 in a few hours sec to mucus plug and required reintubation. Extubated yesterday with recurrent left sided mucus plugging. Still with right sided pneumothorax after extubation. Patient decided to have no cardiac resuscitation and supported by family. Also DNI. Patient also with cardiogenic shock with VTI 13 and known dysfxnal dilated RV. IVC on bedside US collapsible with minimal inspiratory effort. Despite being 2 L + with HD dependency I believe needs increased RV preload.     Pulm - rgt chest tube to suction with repeat CXR; aggressive percussion to chest with vest and deep suction as needed; transition to hi-florence nasal canal to facilitate cough and secretion clearance. Patient currently mentating well and oxygen saturation stable. Initiate 0.2 dilaudid to assist with dyspnea and assess diaphragm excursion. Diaphragm dysfxn on US with hypothyroidism on labs yesterday - > increase synthroid and continue with positive pressure support (50Lpm hi-Flow nasal cannula)    CV: BiV heart failure acute on chronic - no evidence of pulmonary edema - CVVH if needs; shock state likely cardiogenic plus septic with decreased RV preload noted one exam. - > cont neosynephrine; 250 Albumin x2; 250 cc LR and reassess BP and vti     ID: continue endocarditis abx gent and ancef     Pain/dyspnea/palliation: Dilaudid 0.2 prn    I have personally provided 60 minutes of critical care time concurrently with the resident/fellow and excludes time spent on separate procedures.   I have reviewed the resident's documentation and I agree with the resident's assessment and plan of care.

## 2019-04-23 NOTE — PROGRESS NOTE ADULT - ASSESSMENT
78F with CAD s/p CABG x4, HFrEF (EF 26% 4/2019), T2DM with peripheral neuropathy, ESRD on HD T/Th/S via L AVF, hypotension on midodrine, HTN, HLD initially admitted 4/9 after mechanical fall c/b R proximal tib/fib fracture not requiring emergent surgical intervention s/p cast placement with hospital course c/b possible melena now resolved, and RRT on 4/11 for acute hypercapnic respiratory failure in setting of opioid use requiring intubation. MICU course c/b SVT s/p synchronized cardioversion x2 now in sinus rhythm and Staph aureus bacteremia with noted vegetations seen on TTE, not a surgical candidate.  Hospital course further complicated by likely mucous plugging causing hypotension, Vtach and respiratory failure requiring urgent re-intubation on 4/18. S/p Chest tube placement for stable R pneumo (for extubation optimization) on 4/21.     #Neuro:   - Patient transferred to MICU for AMS 2/2 hypercarbia. Unclear etiology, possibly opioid-induced as patient receiving pain medications for leg fracture.   - Pt awake and responsive, communicative and following commands off sedation   - c/w minimal precedex sedation today. Will possibly extubate today  - MRI Head ordered for possible neurological events causing respiratory arrests during this admission     #CV:   - CAD s/p multiple CABG: continue ASA, holding statin in setting of transaminitis  - HFrEF: repeat TTE 4/2019 with EF 26% severe global RV systolic dysfunction, moderate MS, mild TR, normal pulm pressures  - Troponin elevation likely in setting of underlying ESRD and demand ischemia  - SVT s/p cardioversion: now rate controlled, sinus rhythm, continue to monitor on tele. Pt with sustained Vtach on 4/19 during respiratory failure, s/p 1 dose of amiodarone, now in sinus rhythm.   - Bhaskar, will wean as tolerated   - Bedside LISA with suspected vegetations on  Aortic and Mitral Valve, formal TTE done. Not a surgical candidate. Will need repeat TTE/LISA in 6 weeks to confirm resolution with IV antibiotics    #Pulmonary:   - Initially transferred for acute hypercapnic respiratory failure of unclear etiology but suspect 2/2 opioid use  - Extubated and reintubated for respiratory failure on 4/18, likely 2/2 mucous plugging. Chest PT, Mucomyst, Duonebs, hypertonic saline ordered.    - b/l pleural effusion s/p thoracentesis 4/17 with 1 L out. C/b R pneumothorax ex vacuo, stable on serial CXRs. Pt HD stable, saturating well on vent. Chest Tube placed on 4/21 to optimize for extubation. ~500ccs total out, ~100ccs out overnight     - Pleural fluid studies indicative of exudate, culture pending however pt afebrile, on antibiotics. Likely chronic pleural effusion 2/2 HF     #GI:   - Continue Nepro tube feeds via OGT, monitor residuals  - Transaminitis with abdominal sonogram showing early signs of cirrhosis likely 2/2 heart failure. Hepatitis studies negative.   - S/p paracentesis on 4/12 with removal of 4.9L, fluid studies with SAAG 0.6, likely 2/2 heart failure  - POCUS still with mild ascites but overall improved   - TFs held d/t high residuals. QTc is prolonged, cannot start Reglan.    #Renal:   - Hx of ESRD on HD T/Th/S; continue HD as per Nephrology  - HD with fluid removal T/Th/Sat, pt required increased pressor req during HD.  - Continue to monitor electrolytes    #Endo:   - TSH 34 but free T4 within normal range  - HbA1c 8.3%, continue insulin sliding scale  - Fingersticks q6h    #ID:   - Found to have high grade MSSA bacteremia on cultures from 4/12 and 4/13,   - Repeat cultures from 4/14 negative. 4/17 cultures positive. Cultures resent 4/18.   - Bedside LISA with vegetations on aortic and mitral valve, not surgical candidate.   - appreciate ID recs, c/w ancef 4/14-, gentamycin for dual coverage given hx of mitral ring     DVT PPx:   - HSQ 78F with CAD s/p CABG x4, HFrEF (EF 26% 4/2019), T2DM with peripheral neuropathy, ESRD on HD T/Th/S via L AVF, hypotension on midodrine, HTN, HLD initially admitted 4/9 after mechanical fall c/b R proximal tib/fib fracture not requiring emergent surgical intervention s/p cast placement with hospital course c/b possible melena now resolved, and RRT on 4/11 for acute hypercapnic respiratory failure in setting of opioid use requiring intubation. MICU course c/b SVT s/p synchronized cardioversion x2 now in sinus rhythm and Staph aureus bacteremia with noted vegetations seen on TTE, not a surgical candidate.  Hospital course further complicated by likely mucous plugging causing hypotension, Vtach and respiratory failure requiring urgent re-intubation on 4/18. S/p Chest tube placement for stable R pneumo (for extubation optimization) on 4/21. Pt extubated  on 4/22, on HFNC but with labored breathing and tachypneic. Now DNR/DNI per family, palliative on board, Dilaudid PRN for comfort.     #Neuro:  - Pt awake and responsive, communicative   -  DNR/DNI per family, palliative on board, Dilaudid PRN for comfort.     #CV:   - CAD s/p multiple CABG: continue ASA, holding statin in setting of transaminitis  - HFrEF: repeat TTE 4/2019 with EF 26% severe global RV systolic dysfunction, moderate MS, mild TR, normal pulm pressures  - Troponin elevation likely in setting of underlying ESRD and demand ischemia  - SVT s/p cardioversion: now rate controlled, sinus rhythm, continue to monitor on tele. Pt with sustained Vtach on 4/19 during respiratory failure, s/p 1 dose of amiodarone, now in sinus rhythm.   - Bhaskar, will increased requirements. Will add Levo is needed   - Bedside LISA with suspected vegetations on  Aortic and Mitral Valve, formal TTE done. Not a surgical candidate. Will need repeat TTE/LISA in 6 weeks to confirm resolution with IV antibiotics    #Pulmonary:   - Initially transferred for acute hypercapnic respiratory failure of unclear etiology but suspect 2/2 opioid use  - Extubated and reintubated for respiratory failure on 4/18, likely 2/2 mucous plugging. Chest PT, Mucomyst, Duonebs, hypertonic saline ordered.  Extubated on 4/22 to HFNC   - b/l pleural effusion s/p thoracentesis 4/17 with 1 L out. C/b R pneumothorax ex vacuo, stable on serial CXRs. Pt HD stable, saturating well on vent. Chest Tube placed on 4/21 to optimize for extubation.   - Pleural fluid studies indicative of exudate, culture pending however pt afebrile, on antibiotics. Likely chronic pleural effusion 2/2 HF     #GI:   - Continue Nepro tube feeds via OGT, monitor residuals  - Transaminitis with abdominal sonogram showing early signs of cirrhosis likely 2/2 heart failure. Hepatitis studies negative.   - S/p paracentesis on 4/12 with removal of 4.9L, fluid studies with SAAG 0.6, likely 2/2 heart failure  - POCUS still with mild ascites but overall improved   - TFs held d/t high residuals. QTc is prolonged, cannot start Reglan.    #Renal:   - Hx of ESRD on HD T/Th/S; did not receive HD yesterday 2/2 increased pressor requirements   - Continue to monitor electrolytes    #Endo:   - TSH elevated, synthroid adjusted to 88mcg at bedtime   - HbA1c 8.3%, continue insulin sliding scale  - Fingersticks q6h    #ID:   - Found to have high grade MSSA bacteremia on cultures from 4/12 and 4/13,   - Repeat cultures from 4/14 negative. 4/17 cultures positive. Cultures resent 4/18.   - Bedside LISA with vegetations on aortic and mitral valve, not surgical candidate.   - appreciate ID recs, c/w ancef 4/14-, gentamycin for dual coverage given hx of mitral ring (per ID recs, gentamycin for 2 weeks total)     DVT PPx:   - HSQ

## 2019-04-23 NOTE — PROGRESS NOTE ADULT - PROBLEM SELECTOR PLAN 1
Patient has recurrent respiratory failure s/p intubation x 2  yesterday pt was extubated on 4/22, but shortly after became tachypneic and was placed on BiPAP later transitioned to HiFlow, family requested for pt to be DNI  pt is tachypneic and family agreed to start dose opioids for respiratory distress    was started on Dilaudid 0.2mg IV q/ 4 hrs as needed for SOB / labored breathing  pt required BTD x 3 in past 24 hrs

## 2019-04-23 NOTE — PROGRESS NOTE ADULT - PROBLEM SELECTOR PROBLEM 2
Nausea
ACP (advance care planning)
Ascites
Bacteremia
Ascites
Bacteremia

## 2019-04-23 NOTE — PROGRESS NOTE ADULT - PROBLEM SELECTOR PLAN 1
appears to have resolved   doubt GI bleed; patient had stated that emesis was her chocolate milk  trend hgb/hct daily  transfuse prn   iv proton pump inhibitor bid  MICU care appreciated appears to have resolved   doubt GI bleed; patient had stated that emesis was her chocolate milk  trend hgb/hct daily  transfuse prn   iv proton pump inhibitor bid  MICU care appreciated  will sign off, please call with questions

## 2019-04-23 NOTE — PROGRESS NOTE ADULT - PROBLEM SELECTOR PLAN 5
Family meeting yesterday with pts daughter Sheridan Lee, Timoteo Kelley (son / over the phone) and niece. Discussed pts clinical decline since admission and concern of recurrent respiratory failure. Family requested for pt to be DNR / DNI. They understand pt has poor prognosis and could die during this admission. Family would like to focus on comfort care. Requested  visit yesterday. Emotional support provided.

## 2019-04-23 NOTE — PROGRESS NOTE ADULT - ASSESSMENT
77 yo F with hx of cad, cabg, DM, esrd, on HD, chronic hypotension on midodrine 10 mg tid with sob, pleural effusions, chf, ascites, and new tib/fib fx    1- esrd  2- hypotension   3- chf  4- ascites   5- tib/fib fx   6- respiratory failure     cont neosynephrine drip /  cont high flow O2.   will hold hd this am due to hypotension if bp improves will initiate hd for now lytes are in range   d/w icu team as well as pt daughter all in agreement

## 2019-04-23 NOTE — PROGRESS NOTE ADULT - SUBJECTIVE AND OBJECTIVE BOX
INTERVAL HPI/OVERNIGHT EVENTS:    pt seen this morning; no new gi events   remains extubated  pt now dnr/dni    MEDICATIONS  (STANDING):  ALBUTerol/ipratropium for Nebulization 3 milliLiter(s) Nebulizer every 6 hours  artificial  tears Solution 1 Drop(s) Both EYES every 2 hours  aspirin  chewable 81 milliGRAM(s) Oral daily  ceFAZolin   IVPB 1000 milliGRAM(s) IV Intermittent every 24 hours  chlorhexidine 2% Cloths 1 Application(s) Topical daily  chlorhexidine 4% Liquid 1 Application(s) Topical <User Schedule>  dexmedetomidine Infusion 0.2 MICROgram(s)/kG/Hr (3.415 mL/Hr) IV Continuous <Continuous>  dextrose 5%. 1000 milliLiter(s) (50 mL/Hr) IV Continuous <Continuous>  erythromycin   Ointment 1 Application(s) Both EYES four times a day  gentamicin   IVPB 130 milliGRAM(s) IV Intermittent <User Schedule>  heparin  Injectable 5000 Unit(s) SubCutaneous every 8 hours  hydrocortisone 1% Cream 1 Application(s) Topical every 6 hours  insulin lispro (HumaLOG) corrective regimen sliding scale   SubCutaneous every 6 hours  levothyroxine Injectable 88 MICROGram(s) IV Push at bedtime  midodrine 20 milliGRAM(s) Oral every 8 hours  ondansetron Injectable 4 milliGRAM(s) IV Push every 6 hours  pantoprazole  Injectable 40 milliGRAM(s) IV Push every 12 hours  petrolatum Ophthalmic Ointment 1 Application(s) Both EYES three times a day  phenylephrine    Infusion 2 MICROgram(s)/kG/Min (25.613 mL/Hr) IV Continuous <Continuous>  polyethylene glycol 3350 17 Gram(s) Oral daily  senna Syrup 10 milliLiter(s) Oral at bedtime  sodium chloride 3%  Inhalation 4 milliLiter(s) Inhalation every 12 hours    MEDICATIONS  (PRN):  acetaminophen    Suspension .. 650 milliGRAM(s) Oral every 6 hours PRN Temp greater or equal to 38C (100.4F)  fentaNYL    Injectable 50 MICROGram(s) IV Push every 15 minutes PRN Mild Pain (1 - 3)  HYDROmorphone  Injectable 0.2 milliGRAM(s) IV Push every 4 hours PRN Labored breathing or severe pain  lidocaine/prilocaine Cream 1 Application(s) Topical daily PRN hd days      Allergies    allopurinol (Rash)  Augmentin (Rash)  Levaquin (Rash)    Intolerances        Review of Systems:      Vital Signs Last 24 Hrs  T(C): 36.6 (23 Apr 2019 08:00), Max: 36.9 (23 Apr 2019 04:00)  T(F): 97.8 (23 Apr 2019 08:00), Max: 98.4 (23 Apr 2019 04:00)  HR: 76 (23 Apr 2019 12:20) (76 - 105)  BP: 94/47 (23 Apr 2019 10:15) (76/43 - 116/51)  BP(mean): 68 (23 Apr 2019 10:15) (54 - 78)  RR: 20 (23 Apr 2019 12:17) (0 - 38)  SpO2: 94% (23 Apr 2019 12:20) (83% - 100%)    PHYSICAL EXAM:    Constitutional: NAD  HEENT: EOMI, throat clear  Neck: No LAD, supple  Respiratory: CTA and P  Cardiovascular: S1 and S2, RRR, no M  Gastrointestinal: BS+, soft, NT/ND, neg HSM,  Extremities: No peripheral edema, neg clubbing, cyanosis  Vascular: 2+ peripheral pulses  Neurological: A/O, no focal deficits  Psychiatric: Normal mood, normal affect  Skin: No rashes      LABS:                        10.2   13.6  )-----------( 394      ( 22 Apr 2019 23:52 )             33.1     04-22    138  |  101  |  29<H>  ----------------------------<  131<H>  4.7   |  21<L>  |  3.19<H>    Ca    7.8<L>      22 Apr 2019 23:52  Phos  6.5     04-22  Mg     2.2     04-22    TPro  6.9  /  Alb  2.4<L>  /  TBili  0.4  /  DBili  x   /  AST  16  /  ALT  <5<L>  /  AlkPhos  122<H>  04-22    PT/INR - ( 22 Apr 2019 23:52 )   PT: 19.6 sec;   INR: 1.68 ratio         PTT - ( 22 Apr 2019 23:52 )  PTT:51.8 sec      RADIOLOGY & ADDITIONAL TESTS:

## 2019-04-23 NOTE — PROGRESS NOTE ADULT - PROVIDER SPECIALTY LIST ADULT
Cardiology
Gastroenterology
Infectious Disease
Internal Medicine
MICU
Nephrology
Ophthalmology
Pulmonology
Gastroenterology
Nephrology
MICU
Nephrology
Nephrology
Pulmonology
Nephrology
Internal Medicine
Internal Medicine
Gastroenterology
Palliative Care

## 2019-04-23 NOTE — PROGRESS NOTE ADULT - REASON FOR ADMISSION
R tib/fib fx
Right tib/fib fracture
R tib/fib fx

## 2019-04-23 NOTE — PROGRESS NOTE ADULT - SUBJECTIVE AND OBJECTIVE BOX
Follow Up:  MSSA bacteremia    Interval History:  pt stable and afebrile, extubated    ROS:      All other systems negative    Constitutional: no fever, no chills  Head: no trauma  Eyes: no vision changes, no eye pain  ENT:  no sore throat, no rhinorrhea  Cardiovascular:  no chest pain, no palpitation  Respiratory:  + SOB,  cough  GI:  no abd pain, + nausea, no vomiting, no diarrhea  urinary: no dysuria, no hematuria, no flank pain  musculoskeletal:  no joint pain, no joint swelling  skin:  no rash  neurology:  no headache, no seizure  psych: no anxiety, no depression         Allergies  allopurinol (Rash)  Augmentin (Rash)  Levaquin (Rash)        ANTIMICROBIALS:  ceFAZolin   IVPB 1000 every 24 hours  gentamicin   IVPB 130 <User Schedule>      OTHER MEDS:  acetaminophen    Suspension .. 650 milliGRAM(s) Oral every 6 hours PRN  ALBUTerol/ipratropium for Nebulization 3 milliLiter(s) Nebulizer every 6 hours  artificial  tears Solution 1 Drop(s) Both EYES every 2 hours  aspirin  chewable 81 milliGRAM(s) Oral daily  chlorhexidine 2% Cloths 1 Application(s) Topical daily  chlorhexidine 4% Liquid 1 Application(s) Topical <User Schedule>  dexmedetomidine Infusion 0.2 MICROgram(s)/kG/Hr IV Continuous <Continuous>  dextrose 5%. 1000 milliLiter(s) IV Continuous <Continuous>  erythromycin   Ointment 1 Application(s) Both EYES four times a day  fentaNYL    Injectable 50 MICROGram(s) IV Push every 15 minutes PRN  heparin  Injectable 5000 Unit(s) SubCutaneous every 8 hours  hydrocortisone 1% Cream 1 Application(s) Topical every 6 hours  insulin lispro (HumaLOG) corrective regimen sliding scale   SubCutaneous every 6 hours  levothyroxine Injectable 88 MICROGram(s) IV Push at bedtime  lidocaine/prilocaine Cream 1 Application(s) Topical daily PRN  midodrine 20 milliGRAM(s) Oral every 8 hours  ondansetron Injectable 4 milliGRAM(s) IV Push every 6 hours  pantoprazole  Injectable 40 milliGRAM(s) IV Push every 12 hours  petrolatum Ophthalmic Ointment 1 Application(s) Both EYES three times a day  phenylephrine    Infusion 2 MICROgram(s)/kG/Min IV Continuous <Continuous>  polyethylene glycol 3350 17 Gram(s) Oral daily  senna Syrup 10 milliLiter(s) Oral at bedtime  sodium chloride 3%  Inhalation 4 milliLiter(s) Inhalation every 12 hours      Vital Signs Last 24 Hrs  T(C): 36.6 (23 Apr 2019 08:00), Max: 36.9 (23 Apr 2019 04:00)  T(F): 97.8 (23 Apr 2019 08:00), Max: 98.4 (23 Apr 2019 04:00)  HR: 76 (23 Apr 2019 12:20) (76 - 105)  BP: 94/47 (23 Apr 2019 10:15) (76/43 - 116/51)  BP(mean): 68 (23 Apr 2019 10:15) (54 - 78)  RR: 20 (23 Apr 2019 12:17) (0 - 38)  SpO2: 94% (23 Apr 2019 12:20) (83% - 100%)    Physical Exam:  General:    NAD,  non toxic  Head: atraumatic, normocephalic  Eye: normal sclera and conjunctiva  ENT:    no oropharyngeal lesions,   no LAD,   neck supple  Cardio:     regular S1, S2,  no murmur  Respiratory:    clear b/l,    no wheezing  abd:     soft,   BS +,   no tenderness,    no organomegaly  :   no CVAT,  no suprapubic tenderness,   no  bagley  Musculoskeletal:   no joint swelling,   no edema  vascular: no phlebitis, normal pulses  Skin:    no rash  Neurologic:     no focal deficit  psych: normal affect, no suicidal ideation                          10.2   13.6  )-----------( 394      ( 22 Apr 2019 23:52 )             33.1       04-22    138  |  101  |  29<H>  ----------------------------<  131<H>  4.7   |  21<L>  |  3.19<H>    Ca    7.8<L>      22 Apr 2019 23:52  Phos  6.5     04-22  Mg     2.2     04-22    TPro  6.9  /  Alb  2.4<L>  /  TBili  0.4  /  DBili  x   /  AST  16  /  ALT  <5<L>  /  AlkPhos  122<H>  04-22          MICROBIOLOGY:  v  [place] TRAP  04-20-19   No growth at 48 hours  --    No polymorphonuclear cells seen per low power field  No squamous epithelial cells per low power field  No organisms seen per oil power field      .Blood  04-18-19   No growth to date.  --  --      .Body Fluid  04-17-19   No growth at 5 days  --    polymorphonuclear leukocytes seen  No organisms seen  by cytocentrifuge      .Blood  04-17-19   No growth at 5 days.  --    Growth in aerobic bottle: Gram Positive Cocci in Clusters      .Blood  04-14-19   No growth at 5 days.  --  --      .Blood  04-13-19   Growth in aerobic and anaerobic bottles: Staphylococcus aureus  See previous culture [id number]  --    Growth in aerobic bottle: Gram Positive Cocci in Clusters  Growth in anaerobic bottle: Gram Positive Cocci in Clusters      .Body Fluid  04-13-19   No growth at 1 week.  --    No polymorphonuclear leukocytes seen  No organisms seen  by cytocentrifuge      .Blood  04-12-19   Growth in aerobic and anaerobic bottles: Staphylococcus aureus  See previous culture [id number]  --  Blood Culture PCR  Staphylococcus aureus                RADIOLOGY:  Images below reviewed personally  < from: Xray Chest 1 View- PORTABLE-Urgent (04.23.19 @ 09:27) >  Impression:    The heart is enlarged. The left costophrenic is not included in this   study. Left lower lobe pneumonia and/or atelectasis. Right pleural   effusion. A central line seen on the right and the tip is in superior   vena cava. Status post sternotomy. Status post mitral valve annulus   repair. Follow Up:  MSSA bacteremia    Interval History:  pt stable and afebrile, extubated    ROS:      All other systems negative    Constitutional: no fever, no chills  Head: no trauma  Eyes: no vision changes, no eye pain  ENT:  no sore throat, no rhinorrhea  Cardiovascular:  no chest pain, no palpitation  Respiratory:  + SOB,  cough  GI:  no abd pain, + nausea, no vomiting, no diarrhea  urinary: no dysuria, no hematuria, no flank pain  musculoskeletal:  no joint pain, no joint swelling  skin:  no rash  neurology:  no headache, no seizure  psych: no anxiety, no depression         Allergies  allopurinol (Rash)  Augmentin (Rash)  Levaquin (Rash)        ANTIMICROBIALS:  ceFAZolin   IVPB 1000 every 24 hours  gentamicin   IVPB 130 <User Schedule>      OTHER MEDS:  acetaminophen    Suspension .. 650 milliGRAM(s) Oral every 6 hours PRN  ALBUTerol/ipratropium for Nebulization 3 milliLiter(s) Nebulizer every 6 hours  artificial  tears Solution 1 Drop(s) Both EYES every 2 hours  aspirin  chewable 81 milliGRAM(s) Oral daily  chlorhexidine 2% Cloths 1 Application(s) Topical daily  chlorhexidine 4% Liquid 1 Application(s) Topical <User Schedule>  dexmedetomidine Infusion 0.2 MICROgram(s)/kG/Hr IV Continuous <Continuous>  dextrose 5%. 1000 milliLiter(s) IV Continuous <Continuous>  erythromycin   Ointment 1 Application(s) Both EYES four times a day  fentaNYL    Injectable 50 MICROGram(s) IV Push every 15 minutes PRN  heparin  Injectable 5000 Unit(s) SubCutaneous every 8 hours  hydrocortisone 1% Cream 1 Application(s) Topical every 6 hours  insulin lispro (HumaLOG) corrective regimen sliding scale   SubCutaneous every 6 hours  levothyroxine Injectable 88 MICROGram(s) IV Push at bedtime  lidocaine/prilocaine Cream 1 Application(s) Topical daily PRN  midodrine 20 milliGRAM(s) Oral every 8 hours  ondansetron Injectable 4 milliGRAM(s) IV Push every 6 hours  pantoprazole  Injectable 40 milliGRAM(s) IV Push every 12 hours  petrolatum Ophthalmic Ointment 1 Application(s) Both EYES three times a day  phenylephrine    Infusion 2 MICROgram(s)/kG/Min IV Continuous <Continuous>  polyethylene glycol 3350 17 Gram(s) Oral daily  senna Syrup 10 milliLiter(s) Oral at bedtime  sodium chloride 3%  Inhalation 4 milliLiter(s) Inhalation every 12 hours      Vital Signs Last 24 Hrs  T(C): 36.6 (23 Apr 2019 08:00), Max: 36.9 (23 Apr 2019 04:00)  T(F): 97.8 (23 Apr 2019 08:00), Max: 98.4 (23 Apr 2019 04:00)  HR: 76 (23 Apr 2019 12:20) (76 - 105)  BP: 94/47 (23 Apr 2019 10:15) (76/43 - 116/51)  BP(mean): 68 (23 Apr 2019 10:15) (54 - 78)  RR: 20 (23 Apr 2019 12:17) (0 - 38)  SpO2: 94% (23 Apr 2019 12:20) (83% - 100%)    Physical Exam:  General:    NAD,  non toxic  Head: atraumatic, normocephalic  Eye: normal sclera and conjunctiva  ENT:    no oropharyngeal lesions,   no LAD,   neck supple  Cardio:     regular S1, S2,  no murmur  Respiratory:   on high flow O2, clear b/l,    no wheezing  abd:     soft,   BS +,   no tenderness,    no organomegaly  :   no CVAT,  no suprapubic tenderness,   no  bagley  Musculoskeletal:   no joint swelling,   no edema  vascular: no phlebitis, normal pulses  Skin:    no rash  Neurologic:     no focal deficit  psych: normal affect, no suicidal ideation                          10.2   13.6  )-----------( 394      ( 22 Apr 2019 23:52 )             33.1       04-22    138  |  101  |  29<H>  ----------------------------<  131<H>  4.7   |  21<L>  |  3.19<H>    Ca    7.8<L>      22 Apr 2019 23:52  Phos  6.5     04-22  Mg     2.2     04-22    TPro  6.9  /  Alb  2.4<L>  /  TBili  0.4  /  DBili  x   /  AST  16  /  ALT  <5<L>  /  AlkPhos  122<H>  04-22          MICROBIOLOGY:  v  [place] TRAP  04-20-19   No growth at 48 hours  --    No polymorphonuclear cells seen per low power field  No squamous epithelial cells per low power field  No organisms seen per oil power field      .Blood  04-18-19   No growth to date.  --  --      .Body Fluid  04-17-19   No growth at 5 days  --    polymorphonuclear leukocytes seen  No organisms seen  by cytocentrifuge      .Blood  04-17-19   No growth at 5 days.  --    Growth in aerobic bottle: Gram Positive Cocci in Clusters      .Blood  04-14-19   No growth at 5 days.  --  --      .Blood  04-13-19   Growth in aerobic and anaerobic bottles: Staphylococcus aureus  See previous culture [id number]  --    Growth in aerobic bottle: Gram Positive Cocci in Clusters  Growth in anaerobic bottle: Gram Positive Cocci in Clusters      .Body Fluid  04-13-19   No growth at 1 week.  --    No polymorphonuclear leukocytes seen  No organisms seen  by cytocentrifuge      .Blood  04-12-19   Growth in aerobic and anaerobic bottles: Staphylococcus aureus  See previous culture [id number]  --  Blood Culture PCR  Staphylococcus aureus                RADIOLOGY:  Images below reviewed personally  < from: Xray Chest 1 View- PORTABLE-Urgent (04.23.19 @ 09:27) >  Impression:    The heart is enlarged. The left costophrenic is not included in this   study. Left lower lobe pneumonia and/or atelectasis. Right pleural   effusion. A central line seen on the right and the tip is in superior   vena cava. Status post sternotomy. Status post mitral valve annulus   repair.

## 2019-04-23 NOTE — PROGRESS NOTE ADULT - PROBLEM SELECTOR PLAN 7
Family requested to focus on symptoms management, are aware pt has limited life expectancy. When discussed with family about potential transfer to PCU, family requested to stay in ICU for now. Palliative care will continue to follow for GOC / symptom management.

## 2019-04-23 NOTE — PROGRESS NOTE ADULT - PROBLEM SELECTOR PLAN 2
- s/p  bedside paracentesis 4/12 with 4.9L removed. fluid analysis cw CHF - s/p  bedside paracentesis 4/12 with 4.9L removed. fluid analysis cw CHF  - will sign off, please call with questions

## 2019-04-23 NOTE — PROGRESS NOTE ADULT - PROBLEM SELECTOR PLAN 4
Advanced care planning was discussed with patient and family.  Advanced care planning forms were reviewed and discussed.  Risks, benefits and alternatives of gastroenterologic procedures were discussed in detail and all questions were answered.    30 minutes spent.
Advanced care planning was discussed with patient and family.  Advanced care planning forms were reviewed and discussed.  Risks, benefits and alternatives of gastroenterologic procedures were discussed in detail and all questions were answered.    30 minutes spent.
pt currently on max dose phenylephrine   not able to tolerate HD today

## 2019-04-23 NOTE — PROGRESS NOTE ADULT - PROBLEM SELECTOR PLAN 2
pt became nauseous after dose of Dilaudid   pt was given dose of Zofran and Reglan with improvement of symptoms   discussed with family would recommend making Zofran ATC and to give before dose of Dilaudid in anticipation of Nausea

## 2019-04-23 NOTE — PROGRESS NOTE ADULT - ATTENDING COMMENTS
remain critically ill: for TEDDY: Cont antibiotics and full resp support!  4/15: Still febrile with respiratory failure: Being considered for TEDDY today :  4/16: for teddy today : remains intubated and sedated: Cont full vent support for now:  4/17: Now suspected to have IE: For repeat TEDDY today :  4/19: overall pt remains sick: initially admitted with rt tibial fracture then developed resp failure ? secondary to narcotics: And now has I.E: On antibiotics: not a surgical candidate per CTS: cont conservative management:  4/21: Pt remains critically ill: On treatment for infective endocarditis: still with rigth sided stable pneumothorax and remains intubated: Cont supportive care ? for PTX drainage ? DW MICU attending: would defer the decision to MICU team:  4/23: Pt a little better: extubated today with excessive secretions with weak cough: chest PT and percussive therapy: cont chest tube drainage:

## 2019-04-23 NOTE — PROGRESS NOTE ADULT - ASSESSMENT
78 F PMH CAD s/p CABG x 4 2015, T2DM with peripheral neuropathy, ESRD on HD T/Th/S via L AVF, hypotension on midodrine, HFrEF, HTN, HLD, b/l cataracts s/p surgery, p/w mechanical fall and R leg pain, found to have R tib/fib fracture.   pt with resp failure, now intubated in micu    resp failure  likely multifactorial  extubated last week, then re-intubated for resp distress   now extubated   doing ok  cont to monitor closely    bacteremia/sepsis / endocarditis   id f/u  iv abx as per id  not surgical candidate    open fracture of proximal end of right tibia,  confirmed on XR and CT  -ortho f/u  s/p cast. no surgical intervention at this time  outpt f/u  NWB RLE     Coronary artery disease without angina pectoris  -c/w aspirin, statin  -f/u cards .      Type 2 diabetes mellitus with hyperglycemia, with long-term current use of insulin.  start HISS  endo consult dr simons  -james, CC diet,    ESRD on hemodialysis.  HD as per renal      ascites.   s/p paracentesis   f/u fluid analysis     emesis  gi consulted  f/u guaiac  ppi  monitor cbc       mngt as per MICU

## 2019-04-23 NOTE — PROGRESS NOTE ADULT - SUBJECTIVE AND OBJECTIVE BOX
Patient is a 78y old  Female who presents with a chief complaint of R tib/fib fx (23 Apr 2019 07:41)      Any change in ROS: Doing OK;' extubated: had chest tube placement on the right side: She has secretions with weak cough: but is alert and awake:     MEDICATIONS  (STANDING):  ALBUTerol/ipratropium for Nebulization 3 milliLiter(s) Nebulizer every 6 hours  artificial  tears Solution 1 Drop(s) Both EYES every 2 hours  aspirin  chewable 81 milliGRAM(s) Oral daily  ceFAZolin   IVPB 1000 milliGRAM(s) IV Intermittent every 24 hours  chlorhexidine 2% Cloths 1 Application(s) Topical daily  chlorhexidine 4% Liquid 1 Application(s) Topical <User Schedule>  dexmedetomidine Infusion 0.2 MICROgram(s)/kG/Hr (3.415 mL/Hr) IV Continuous <Continuous>  dextrose 5%. 1000 milliLiter(s) (50 mL/Hr) IV Continuous <Continuous>  erythromycin   Ointment 1 Application(s) Both EYES four times a day  gentamicin   IVPB 130 milliGRAM(s) IV Intermittent <User Schedule>  heparin  Injectable 5000 Unit(s) SubCutaneous every 8 hours  hydrocortisone 1% Cream 1 Application(s) Topical every 6 hours  insulin lispro (HumaLOG) corrective regimen sliding scale   SubCutaneous every 6 hours  levothyroxine Injectable 88 MICROGram(s) IV Push at bedtime  midodrine 20 milliGRAM(s) Oral every 8 hours  pantoprazole  Injectable 40 milliGRAM(s) IV Push every 12 hours  petrolatum Ophthalmic Ointment 1 Application(s) Both EYES three times a day  phenylephrine    Infusion 2 MICROgram(s)/kG/Min (51.225 mL/Hr) IV Continuous <Continuous>  polyethylene glycol 3350 17 Gram(s) Oral daily  senna Syrup 10 milliLiter(s) Oral at bedtime  sodium chloride 3%  Inhalation 4 milliLiter(s) Inhalation every 12 hours    MEDICATIONS  (PRN):  acetaminophen    Suspension .. 650 milliGRAM(s) Oral every 6 hours PRN Temp greater or equal to 38C (100.4F)  fentaNYL    Injectable 50 MICROGram(s) IV Push every 15 minutes PRN Mild Pain (1 - 3)  HYDROmorphone  Injectable 0.2 milliGRAM(s) IV Push every 4 hours PRN Labored breathing or severe pain  lidocaine/prilocaine Cream 1 Application(s) Topical daily PRN hd days  ondansetron Injectable 4 milliGRAM(s) IV Push every 12 hours PRN Nausea and/or Vomiting    Vital Signs Last 24 Hrs  T(C): 36.6 (23 Apr 2019 08:00), Max: 36.9 (23 Apr 2019 04:00)  T(F): 97.8 (23 Apr 2019 08:00), Max: 98.4 (23 Apr 2019 04:00)  HR: 94 (23 Apr 2019 08:46) (64 - 107)  BP: 94/47 (23 Apr 2019 08:30) (76/43 - 116/51)  BP(mean): 66 (23 Apr 2019 08:30) (54 - 78)  RR: 25 (23 Apr 2019 08:30) (0 - 38)  SpO2: 99% (23 Apr 2019 08:46) (68% - 100%)  Mode: CPAP with PS  FiO2: 30  PEEP: 5  PS: 5  MAP: 8  PIP: 11    I&O's Summary    22 Apr 2019 07:01  -  23 Apr 2019 07:00  --------------------------------------------------------  IN: 1981.6 mL / OUT: 275 mL / NET: 1706.6 mL    23 Apr 2019 07:01  -  23 Apr 2019 09:15  --------------------------------------------------------  IN: 89.6 mL / OUT: 0 mL / NET: 89.6 mL          Physical Exam:   GENERAL: NAD, well-groomed, well-developed  HEENT: FLOWER/   Atraumatic, Normocephalic  ENMT: No tonsillar erythema, exudates, or enlargement; Moist mucous membranes, Good dentition, No lesions  NECK: Supple, No JVD, Normal thyroid  CHEST/LUNG: Coarse rhonchi+  CVS: Regular rate and rhythm; No murmurs, rubs, or gallops  GI: : Soft, Nontender, Nondistended; Bowel sounds present  NERVOUS SYSTEM:  Alert & awake : extubated  EXTREMITIES:  - edema  LYMPH: No lymphadenopathy noted  SKIN: No rashes or lesions  ENDOCRINOLOGY: No Thyromegaly  PSYCH: Appropriate    Labs:                              10.2   13.6  )-----------( 394      ( 22 Apr 2019 23:52 )             33.1                         10.5   10.1  )-----------( 378      ( 22 Apr 2019 00:19 )             31.6                         10.1   9.1   )-----------( 284      ( 21 Apr 2019 00:32 )             30.2                         10.2   15.3  )-----------( 349      ( 20 Apr 2019 01:52 )             32.1     04-22    138  |  101  |  29<H>  ----------------------------<  131<H>  4.7   |  21<L>  |  3.19<H>  04-22    137  |  101  |  23  ----------------------------<  165<H>  4.2   |  19<L>  |  2.61<H>  04-21    135  |  101  |  18  ----------------------------<  186<H>  3.7   |  22  |  1.88<H>  04-20    135  |  97  |  36<H>  ----------------------------<  118<H>  3.9   |  17<L>  |  3.28<H>    Ca    7.8<L>      22 Apr 2019 23:52  Ca    7.8<L>      22 Apr 2019 00:19  Phos  6.5     04-22  Phos  4.0     04-22  Mg     2.2     04-22  Mg     2.1     04-22    TPro  6.9  /  Alb  2.4<L>  /  TBili  0.4  /  DBili  x   /  AST  16  /  ALT  <5<L>  /  AlkPhos  122<H>  04-22  TPro  6.5  /  Alb  2.3<L>  /  TBili  0.5  /  DBili  x   /  AST  17  /  ALT  <5<L>  /  AlkPhos  119  04-22  TPro  6.1  /  Alb  2.1<L>  /  TBili  0.4  /  DBili  x   /  AST  19  /  ALT  <5<L>  /  AlkPhos  114  04-21  TPro  6.9  /  Alb  2.4<L>  /  TBili  0.4  /  DBili  x   /  AST  23  /  ALT  <5<L>  /  AlkPhos  117  04-20    CAPILLARY BLOOD GLUCOSE      POCT Blood Glucose.: 116 mg/dL (23 Apr 2019 07:57)  POCT Blood Glucose.: 112 mg/dL (23 Apr 2019 06:54)  POCT Blood Glucose.: 121 mg/dL (23 Apr 2019 02:02)  POCT Blood Glucose.: 133 mg/dL (22 Apr 2019 20:03)  POCT Blood Glucose.: 117 mg/dL (22 Apr 2019 13:42)     (04-17 @ 15:00)    LIVER FUNCTIONS - ( 22 Apr 2019 23:52 )  Alb: 2.4 g/dL / Pro: 6.9 g/dL / ALK PHOS: 122 U/L / ALT: <5 U/L / AST: 16 U/L / GGT: x           PT/INR - ( 22 Apr 2019 23:52 )   PT: 19.6 sec;   INR: 1.68 ratio         PTT - ( 22 Apr 2019 23:52 )  PTT:51.8 sec    Fluid Source --  Albumin, Fluid--  Glucose, Fluid--  Protein total, Fluid--  Lacatate Dehydrogenase, Fluid--  pH, Fluid--  Cytopathology-Non Gyn Report  ACCESSION No:  [id number]    VIRGILIO KAPLAN                          1        Cytopathology Report      < from: Xray Chest 1 View- PORTABLE-Urgent (04.22.19 @ 14:59) >    EXAM:  XR CHEST PORTABLE URGENT 1V                            PROCEDURE DATE:  04/22/2019            INTERPRETATION:  A single chest x-ray was obtained on April 22, 2019.    Indication: Shortness of breath.    Impression:    The heart is normal in size. Left pleural effusion. Small right   pneumothorax is still present. A small catheter is seen seen on the right   pleural space a central line seen on the right and the tip is in superior   vena cava. No pneumothorax. Status post sternotomy. Status post mitral   valve annulus repair.                    ISRAEL ROSENBAUM M.D., ATTENDING RADIOLOGIST  This document has been electronically signed. Apr 22 2019  3:11PM    < end of copied text >        Specimen(s) Submitted  PLEURAL FLUID      Clinical History  HF.      Gross Description  Received: 65  ml of dark-yellow fluid in CytoLyt  Prepared: 1 ThinPrep slide, 1 Cell block, 1 Smear      Final Diagnosis  PLEURAL FLUID  NEGATIVE FOR MALIGNANT CELLS.    Cytology slides and cell block are composed of benign mesothelial  cells, histiocytes and lymphocytes.    Screened by: [facility](ASCP)  Verified by: Paola Tracy MD  (Electronic Signature)  Reported on: 04/20/19 18:03 EDT, [address] [place] [address]  [id number]  Cytology technical processing performed at [address] [place] [id number]  _________________________________________________________________  Fluid Source --  Albumin, Fluid--  Glucose, [id number] mg/dL  Protein total, Fluid4.2 g/dL  Lacatate Dehydrogenase, [id number] U/L  pH, Fluid7.39  Cytopathology-Non Gyn Report--        RECENT CULTURES:  04-20 @ 08:26 Bronch Wash TRAP       No polymorphonuclear cells seen per low power field  No squamous epithelial cells per low power field  No organisms seen per oil power field           No growth at 48 hours    04-18 @ 21:34 .Blood                No growth to date.    04-17 @ 17:20 .Body Fluid       polymorphonuclear leukocytes seen  No organisms seen  by cytocentrifuge           No growth at 5 days    04-17 @ 15:13 .Blood       Growth in aerobic bottle: Gram Positive Cocci in Clusters           No growth at 5 days.          RESPIRATORY CULTURES:          Studies  Chest X-RAY  CT SCAN Chest   Venous Dopplers: LE:   CT Abdomen  Others

## 2019-04-23 NOTE — CHART NOTE - NSCHARTNOTEFT_GEN_A_CORE
79 yo Female with PMHx of CAD s/p CABG x4, HLD, HTN, HFrEF (EF 26% 4/2019), T2DM with peripheral neuropathy, ESRD on HD T/Th/S via L AVF, hypotension on midodrine, initially admitted 4/9 after mechanical fall c/b R proximal tib/fib fracture not requiring emergent surgical intervention s/p cast placement with hospital course c/b possible melena with +FOBT now resolved. RRT on 4/11 for acute hypercapnic respiratory failure in setting of opioid, received narcan with adequate response. The patient was transferred to the MICU 4/11, where her course was c/b SVT s/p synchronized cardioversion x2 now in sinus rhythm however noted to have worsening respiratory status immediately after requiring intubation. The patient was also found to have ascites, underwent paracentesis with 4.9L removed, SAAG of 0.6 - likely 2/2 heart failure (EG 26%).The patient also became febrile and was found to have high grade MSSA bacteremia on cultures from 4/12 and 4/13. ID was consulted and the patient was started on Ancef and Gentamicin with culture clearance. Bedside LISA on 4/16 showed possible vegetations. The patient underwent formal TTE on 4/17 showing the vegetation. Cardiothoracic surgery was consulted, who determined the patient was a poor surgical candidate. The patient underwent a thoracentesis of the R lung with 1 L removed on 4/17, studies consistent with exudate. Course further complicated by extubation and reintubation 2/2 respiratory failure (and associated sustained Vtach) on 4/18, possibly 2/2 mucous plugging. The patient had a chest tube placed for stable R pneumothorax on 4/21 to optimize prior to extubation. She was extubated on 4/22 but noted to be tachypneic with shallow breaths, requiring BIPAP and high flow nasal cannula. Also seen with increasing pressor requirements at this time. The patient, with discussion with her family, ICU Attending and palliative care, elected to be DNR/DNI with no significant medical interventions moving forward, however no withdrawal of care, pt was on maximum phenylephrine pressor requirements and continued on antibiotics. Family requested to focus on symptoms management while aware of patient's limited life expectancy; low dose medications for pain, nausea and shortness of breath were ordered. Pt began to drop her blood pressure evening of 4/23 while on capped pressors, daughter at bedside understanding of patient's prognosis and encouraged to spend time with her mother.    Called by bedside by nurse for bradycardia on monitor to 40s and hypoxemia to 60% at approximately 9:30 PM. Bradycardia progressed to asystole on monitor and patient seen nonresponsive on exam, hypoxemic on monitor without respirations.     On physical exam, no spontaneous movements were present. Patient did not respond to verbal or physical stimuli. Pupils were mid-dilated and fixed. No breath sounds were appreciated over either lung field. No carotid pulses were palpable. No heart sounds were auscultated.   Patient pronounced dead at 21:44 due to cardiopulmonary arrest. Attending notified. Daughter at bedside, condolences were given and emotional support was provided. Family declined autopsy. 77 yo Female with PMHx of CAD s/p CABG x4, HLD, HTN, HFrEF (EF 26% 4/2019), T2DM with peripheral neuropathy, ESRD on HD T/Th/S via L AVF, hypotension on midodrine, initially admitted 4/9 after mechanical fall c/b R proximal tib/fib fracture not requiring emergent surgical intervention s/p cast placement with hospital course c/b possible melena with +FOBT now resolved. RRT on 4/11 for acute hypercapnic respiratory failure in setting of opioid, received narcan with adequate response. The patient was transferred to the MICU 4/11, where her course was c/b SVT s/p synchronized cardioversion x2 now in sinus rhythm however noted to have worsening respiratory status immediately after requiring intubation. The patient was also found to have ascites, underwent paracentesis with 4.9L removed, SAAG of 0.6 - likely 2/2 heart failure (EG 26%).The patient also became febrile and was found to have high grade MSSA bacteremia on cultures from 4/12 and 4/13. ID was consulted and the patient was started on Ancef and Gentamicin with culture clearance. Bedside LISA on 4/16 showed possible vegetations. The patient underwent formal TTE on 4/17 showing the vegetation. Cardiothoracic surgery was consulted, who determined the patient was a poor surgical candidate. The patient underwent a thoracentesis of the R lung with 1 L removed on 4/17, studies consistent with exudate. Course further complicated by extubation and reintubation 2/2 respiratory failure (and associated sustained Vtach) on 4/18, possibly 2/2 mucous plugging. The patient had a chest tube placed for stable R pneumothorax on 4/21 to optimize prior to extubation. She was extubated on 4/22 but noted to be tachypneic with shallow breaths, requiring BIPAP and high flow nasal cannula. Also seen with increasing pressor requirements at this time. The patient, with discussion with her family, ICU Attending and palliative care, elected to be DNR/DNI with no significant medical interventions moving forward, however no withdrawal of care, pt was to be continued on antibiotics with no additional pressors to be added and no longer to undergo hemodialysis. Family requested to focus on symptoms management while aware of patient's limited life expectancy; low dose medications for pain, nausea and shortness of breath were ordered. Pt began to drop her blood pressure evening of 4/23 while on capped pressors, daughter at bedside understanding of patient's prognosis and encouraged to spend time with her mother.    Called by bedside by nurse for bradycardia on monitor to 40s and hypoxemia to 60% at approximately 9:30 PM. Bradycardia progressed to asystole on monitor and patient seen nonresponsive on exam, hypoxemic on monitor without respirations.     On physical exam, no spontaneous movements were present. Patient did not respond to verbal or physical stimuli. Pupils were mid-dilated and fixed. No breath sounds were appreciated over either lung field. No carotid pulses were palpable. No heart sounds were auscultated.   Patient pronounced dead at 21:44 due to cardiopulmonary arrest. Attending notified. Daughter at bedside, condolences were given and emotional support was provided. Family declined autopsy.

## 2019-04-23 NOTE — PROGRESS NOTE ADULT - ASSESSMENT
78F with CAD s/p CABG x4, HFrEF (EF 26% 4/2019), T2DM with peripheral neuropathy, ESRD on HD T/Th/S via L AVF, hypotension on midodrine, HTN, HLD initially admitted 4/9 after mechanical fall c/b R proximal tib/fib fracture not requiring emergent surgical intervention s/p cast placement with hospital course c/b possible melena now resolved, and RRT on 4/11 for acute hypercapnic respiratory failure in setting of opioid use requiring intubation. MICU course c/b SVT s/p synchronized cardioversion x2 now in sinus rhythm and Staph aureus bacteremia with noted vegetations seen on TTE, not a surgical candidate.  Hospital course further complicated by likely mucous plugging causing hypotension, Vtach and respiratory failure requiring urgent re-intubation on 4/18. S/p Chest tube placement for stable R pneumo (for extubation optimization) on 4/21.     Pt had respiratory failure after extubation yesterday 2/2 mucous plugging, family meeting with pts son Timoteo, daughter Sheridan and niece, who are aware pt has been deteriorating since admission and has poor prognosis. They would like to focus on pts comfort and requested for pt to be DNR / DNI. Agreed to start pt on Dilaudid low dose as needed for SOB, pt required BTD x 3 in past 24 hrs. Today pt IV pressor requirements increased (currently on max dose phenylephrine), pt was not able to go for HD today. Discussed with family potential transfer to the PCU, but family requested to stay in the ICU for now.

## 2019-04-24 PROCEDURE — 83615 LACTATE (LD) (LDH) ENZYME: CPT

## 2019-04-24 PROCEDURE — 85730 THROMBOPLASTIN TIME PARTIAL: CPT

## 2019-04-24 PROCEDURE — 82140 ASSAY OF AMMONIA: CPT

## 2019-04-24 PROCEDURE — 71250 CT THORAX DX C-: CPT

## 2019-04-24 PROCEDURE — 87206 SMEAR FLUORESCENT/ACID STAI: CPT

## 2019-04-24 PROCEDURE — 87075 CULTR BACTERIA EXCEPT BLOOD: CPT

## 2019-04-24 PROCEDURE — 82042 OTHER SOURCE ALBUMIN QUAN EA: CPT

## 2019-04-24 PROCEDURE — 80061 LIPID PANEL: CPT

## 2019-04-24 PROCEDURE — 82803 BLOOD GASES ANY COMBINATION: CPT

## 2019-04-24 PROCEDURE — 83735 ASSAY OF MAGNESIUM: CPT

## 2019-04-24 PROCEDURE — 84484 ASSAY OF TROPONIN QUANT: CPT

## 2019-04-24 PROCEDURE — 85652 RBC SED RATE AUTOMATED: CPT

## 2019-04-24 PROCEDURE — 82533 TOTAL CORTISOL: CPT

## 2019-04-24 PROCEDURE — 82570 ASSAY OF URINE CREATININE: CPT

## 2019-04-24 PROCEDURE — 76705 ECHO EXAM OF ABDOMEN: CPT

## 2019-04-24 PROCEDURE — 82330 ASSAY OF CALCIUM: CPT

## 2019-04-24 PROCEDURE — 87340 HEPATITIS B SURFACE AG IA: CPT

## 2019-04-24 PROCEDURE — 93005 ELECTROCARDIOGRAM TRACING: CPT

## 2019-04-24 PROCEDURE — 83540 ASSAY OF IRON: CPT

## 2019-04-24 PROCEDURE — 71045 X-RAY EXAM CHEST 1 VIEW: CPT

## 2019-04-24 PROCEDURE — 84132 ASSAY OF SERUM POTASSIUM: CPT

## 2019-04-24 PROCEDURE — 97162 PT EVAL MOD COMPLEX 30 MIN: CPT

## 2019-04-24 PROCEDURE — 86709 HEPATITIS A IGM ANTIBODY: CPT

## 2019-04-24 PROCEDURE — 83550 IRON BINDING TEST: CPT

## 2019-04-24 PROCEDURE — 94003 VENT MGMT INPAT SUBQ DAY: CPT

## 2019-04-24 PROCEDURE — 89051 BODY FLUID CELL COUNT: CPT

## 2019-04-24 PROCEDURE — 82435 ASSAY OF BLOOD CHLORIDE: CPT

## 2019-04-24 PROCEDURE — 93308 TTE F-UP OR LMTD: CPT

## 2019-04-24 PROCEDURE — 83986 ASSAY PH BODY FLUID NOS: CPT

## 2019-04-24 PROCEDURE — 73562 X-RAY EXAM OF KNEE 3: CPT

## 2019-04-24 PROCEDURE — 73610 X-RAY EXAM OF ANKLE: CPT

## 2019-04-24 PROCEDURE — 94640 AIRWAY INHALATION TREATMENT: CPT

## 2019-04-24 PROCEDURE — 73590 X-RAY EXAM OF LOWER LEG: CPT

## 2019-04-24 PROCEDURE — 73502 X-RAY EXAM HIP UNI 2-3 VIEWS: CPT

## 2019-04-24 PROCEDURE — 88112 CYTOPATH CELL ENHANCE TECH: CPT

## 2019-04-24 PROCEDURE — 85014 HEMATOCRIT: CPT

## 2019-04-24 PROCEDURE — 87186 SC STD MICRODIL/AGAR DIL: CPT

## 2019-04-24 PROCEDURE — 87102 FUNGUS ISOLATION CULTURE: CPT

## 2019-04-24 PROCEDURE — 84481 FREE ASSAY (FT-3): CPT

## 2019-04-24 PROCEDURE — 94799 UNLISTED PULMONARY SVC/PX: CPT

## 2019-04-24 PROCEDURE — 82947 ASSAY GLUCOSE BLOOD QUANT: CPT

## 2019-04-24 PROCEDURE — 83036 HEMOGLOBIN GLYCOSYLATED A1C: CPT

## 2019-04-24 PROCEDURE — 88305 TISSUE EXAM BY PATHOLOGIST: CPT

## 2019-04-24 PROCEDURE — 86706 HEP B SURFACE ANTIBODY: CPT

## 2019-04-24 PROCEDURE — 82553 CREATINE MB FRACTION: CPT

## 2019-04-24 PROCEDURE — 85027 COMPLETE CBC AUTOMATED: CPT

## 2019-04-24 PROCEDURE — 80202 ASSAY OF VANCOMYCIN: CPT

## 2019-04-24 PROCEDURE — 85610 PROTHROMBIN TIME: CPT

## 2019-04-24 PROCEDURE — 87205 SMEAR GRAM STAIN: CPT

## 2019-04-24 PROCEDURE — 99285 EMERGENCY DEPT VISIT HI MDM: CPT

## 2019-04-24 PROCEDURE — 84145 PROCALCITONIN (PCT): CPT

## 2019-04-24 PROCEDURE — 86850 RBC ANTIBODY SCREEN: CPT

## 2019-04-24 PROCEDURE — 84478 ASSAY OF TRIGLYCERIDES: CPT

## 2019-04-24 PROCEDURE — 94660 CPAP INITIATION&MGMT: CPT

## 2019-04-24 PROCEDURE — 86140 C-REACTIVE PROTEIN: CPT

## 2019-04-24 PROCEDURE — 82565 ASSAY OF CREATININE: CPT

## 2019-04-24 PROCEDURE — 87116 MYCOBACTERIA CULTURE: CPT

## 2019-04-24 PROCEDURE — C8929: CPT

## 2019-04-24 PROCEDURE — 82945 GLUCOSE OTHER FLUID: CPT

## 2019-04-24 PROCEDURE — 80048 BASIC METABOLIC PNL TOTAL CA: CPT

## 2019-04-24 PROCEDURE — 73552 X-RAY EXAM OF FEMUR 2/>: CPT

## 2019-04-24 PROCEDURE — 87040 BLOOD CULTURE FOR BACTERIA: CPT

## 2019-04-24 PROCEDURE — 80053 COMPREHEN METABOLIC PANEL: CPT

## 2019-04-24 PROCEDURE — 83605 ASSAY OF LACTIC ACID: CPT

## 2019-04-24 PROCEDURE — 86901 BLOOD TYPING SEROLOGIC RH(D): CPT

## 2019-04-24 PROCEDURE — 84100 ASSAY OF PHOSPHORUS: CPT

## 2019-04-24 PROCEDURE — 87015 SPECIMEN INFECT AGNT CONCNTJ: CPT

## 2019-04-24 PROCEDURE — 86704 HEP B CORE ANTIBODY TOTAL: CPT

## 2019-04-24 PROCEDURE — 84439 ASSAY OF FREE THYROXINE: CPT

## 2019-04-24 PROCEDURE — 93321 DOPPLER ECHO F-UP/LMTD STD: CPT

## 2019-04-24 PROCEDURE — 82962 GLUCOSE BLOOD TEST: CPT

## 2019-04-24 PROCEDURE — 86705 HEP B CORE ANTIBODY IGM: CPT

## 2019-04-24 PROCEDURE — P9047: CPT

## 2019-04-24 PROCEDURE — 82550 ASSAY OF CK (CPK): CPT

## 2019-04-24 PROCEDURE — 76377 3D RENDER W/INTRP POSTPROCES: CPT

## 2019-04-24 PROCEDURE — 86803 HEPATITIS C AB TEST: CPT

## 2019-04-24 PROCEDURE — 86900 BLOOD TYPING SEROLOGIC ABO: CPT

## 2019-04-24 PROCEDURE — 90714 TD VACC NO PRESV 7 YRS+ IM: CPT

## 2019-04-24 PROCEDURE — 99261: CPT

## 2019-04-24 PROCEDURE — 84443 ASSAY THYROID STIM HORMONE: CPT

## 2019-04-24 PROCEDURE — 94002 VENT MGMT INPAT INIT DAY: CPT

## 2019-04-24 PROCEDURE — 87070 CULTURE OTHR SPECIMN AEROBIC: CPT

## 2019-04-24 PROCEDURE — 82728 ASSAY OF FERRITIN: CPT

## 2019-04-24 PROCEDURE — 84157 ASSAY OF PROTEIN OTHER: CPT

## 2019-04-24 PROCEDURE — 73700 CT LOWER EXTREMITY W/O DYE: CPT

## 2019-04-24 PROCEDURE — 87150 DNA/RNA AMPLIFIED PROBE: CPT

## 2019-04-24 PROCEDURE — 73560 X-RAY EXAM OF KNEE 1 OR 2: CPT

## 2019-04-24 PROCEDURE — 84295 ASSAY OF SERUM SODIUM: CPT

## 2019-05-11 LAB
CULTURE RESULTS: SIGNIFICANT CHANGE UP
SPECIMEN SOURCE: SIGNIFICANT CHANGE UP

## 2019-05-18 LAB
CULTURE RESULTS: SIGNIFICANT CHANGE UP
SPECIMEN SOURCE: SIGNIFICANT CHANGE UP

## 2019-06-01 LAB
CULTURE RESULTS: SIGNIFICANT CHANGE UP
SPECIMEN SOURCE: SIGNIFICANT CHANGE UP

## 2019-06-10 ENCOUNTER — APPOINTMENT (OUTPATIENT)
Dept: CARDIOLOGY | Facility: CLINIC | Age: 79
End: 2019-06-10

## 2020-01-31 NOTE — PATIENT PROFILE ADULT - BRADEN SCORE
From: Jorge Gomez  To: Geneva Jon MD  Sent: 1/31/2020 1:26 PM CST  Subject: After-Visit Question    Naderlupe ulrich Doc,  Just submitted a request on-line to [facility] for the sildenafil prescription you provided during my visit with you earlier today. It looks like this is covered by my insurance. [facility] may reach out to you to authorize this prescription on-line. Once confirmed, should I just shred the paper prescription you provided my during my visit? Thank for your advice.  Warmest regards,  ---Gregory Gomez  
14

## 2020-09-14 NOTE — H&P ADULT - PROBLEM SELECTOR PLAN 7
09/14/2020OS-3.00+[id number] -&nbsp;SN &nbsp; &nbsp; melanyw -hsq vte ppx -will hold vte ppx as it is currently 1:45 am and patient is an add on case for ortho either in am or early afternoon.  -would start vte ppx post procedure pending ortho recs

## 2020-10-08 NOTE — PROGRESS NOTE ADULT - PROBLEM SELECTOR PLAN 2
Advanced care planning was discussed with patient and family.  Advanced care planning forms were reviewed and discussed.  Risks, benefits and alternatives of gastroenterologic procedures were discussed in detail and all questions were answered.    30 minutes spent. Graft Cartilage Fenestration Text: The cartilage was fenestrated with a 2mm punch biopsy to help facilitate graft survival and healing.

## 2020-10-23 NOTE — PROGRESS NOTE ADULT - SUBJECTIVE AND OBJECTIVE BOX
I placed orders for A1c, lipids. Thanks   CHIEF COMPLAINT:    Interval Events:    REVIEW OF SYSTEMS:  Constitutional: [ ] negative [ ] fevers [ ] chills [ ] weight loss [ ] weight gain  HEENT: [ ] negative [ ] dry eyes [ ] eye irritation [ ] postnasal drip [ ] nasal congestion  CV: [ ] negative  [ ] chest pain [ ] orthopnea [ ] palpitations [ ] murmur  Resp: [ ] negative [ ] cough [ ] shortness of breath [ ] dyspnea [ ] wheezing [ ] sputum [ ] hemoptysis  GI: [ ] negative [ ] nausea [ ] vomiting [ ] diarrhea [ ] constipation [ ] abd pain [ ] dysphagia   : [ ] negative [ ] dysuria [ ] nocturia [ ] hematuria [ ] increased urinary frequency  Musculoskeletal: [ ] negative [ ] back pain [ ] myalgias [ ] arthralgias [ ] fracture  Skin: [ ] negative [ ] rash [ ] itch  Neurological: [ ] negative [ ] headache [ ] dizziness [ ] syncope [ ] weakness [ ] numbness  Psychiatric: [ ] negative [ ] anxiety [ ] depression  Endocrine: [ ] negative [ ] diabetes [ ] thyroid problem  Hematologic/Lymphatic: [ ] negative [ ] anemia [ ] bleeding problem  Allergic/Immunologic: [ ] negative [ ] itchy eyes [ ] nasal discharge [ ] hives [ ] angioedema  [ ] All other systems negative  [ ] Unable to assess ROS because ________    OBJECTIVE:  ICU Vital Signs Last 24 Hrs  T(C): 36.9 (23 Apr 2019 04:00), Max: 37 (22 Apr 2019 08:00)  T(F): 98.4 (23 Apr 2019 04:00), Max: 98.6 (22 Apr 2019 08:00)  HR: 87 (23 Apr 2019 07:00) (64 - 107)  BP: 86/51 (23 Apr 2019 07:00) (76/43 - 116/51)  BP(mean): 66 (23 Apr 2019 07:00) (54 - 78)  ABP: --  ABP(mean): --  RR: 13 (23 Apr 2019 07:00) (8 - 38)  SpO2: 98% (23 Apr 2019 07:00) (68% - 100%)    Mode: CPAP with PS, FiO2: 30, PEEP: 5, PS: 5, MAP: 8, PIP: 11    04-22 @ 07:01  -  04-23 @ 07:00  --------------------------------------------------------  IN: 1981.6 mL / OUT: 275 mL / NET: 1706.6 mL      CAPILLARY BLOOD GLUCOSE      POCT Blood Glucose.: 112 mg/dL (23 Apr 2019 06:54)      PHYSICAL EXAM:  General:   HEENT:   Lymph Nodes:  Neck:   Respiratory:   Cardiovascular:   Abdomen:   Extremities:   Skin:   Neurological:  Psychiatry:    LINES:    HOSPITAL MEDICATIONS:  aspirin  chewable 81 milliGRAM(s) Oral daily  heparin  Injectable 5000 Unit(s) SubCutaneous every 8 hours    ceFAZolin   IVPB 1000 milliGRAM(s) IV Intermittent every 24 hours  gentamicin   IVPB 130 milliGRAM(s) IV Intermittent <User Schedule>    midodrine 20 milliGRAM(s) Oral every 8 hours  phenylephrine    Infusion 2 MICROgram(s)/kG/Min IV Continuous <Continuous>    insulin lispro (HumaLOG) corrective regimen sliding scale   SubCutaneous every 6 hours  levothyroxine Injectable 88 MICROGram(s) IV Push at bedtime    ALBUTerol/ipratropium for Nebulization 3 milliLiter(s) Nebulizer every 6 hours  sodium chloride 3%  Inhalation 4 milliLiter(s) Inhalation every 12 hours    acetaminophen    Suspension .. 650 milliGRAM(s) Oral every 6 hours PRN  dexmedetomidine Infusion 0.2 MICROgram(s)/kG/Hr IV Continuous <Continuous>  fentaNYL    Injectable 50 MICROGram(s) IV Push every 15 minutes PRN  HYDROmorphone  Injectable 0.2 milliGRAM(s) IV Push every 4 hours PRN  ondansetron Injectable 4 milliGRAM(s) IV Push every 12 hours PRN    pantoprazole  Injectable 40 milliGRAM(s) IV Push every 12 hours  polyethylene glycol 3350 17 Gram(s) Oral daily  senna Syrup 10 milliLiter(s) Oral at bedtime        dextrose 5%. 1000 milliLiter(s) IV Continuous <Continuous>      artificial  tears Solution 1 Drop(s) Both EYES every 2 hours  chlorhexidine 2% Cloths 1 Application(s) Topical daily  chlorhexidine 4% Liquid 1 Application(s) Topical <User Schedule>  erythromycin   Ointment 1 Application(s) Both EYES four times a day  hydrocortisone 1% Cream 1 Application(s) Topical every 6 hours  lidocaine/prilocaine Cream 1 Application(s) Topical daily PRN  petrolatum Ophthalmic Ointment 1 Application(s) Both EYES three times a day        LABS:                        10.2   13.6  )-----------( 394      ( 22 Apr 2019 23:52 )             33.1     Hgb Trend: 10.2<--, 10.5<--, 10.1<--, 10.2<--, 10.3<--  04-22    138  |  101  |  29<H>  ----------------------------<  131<H>  4.7   |  21<L>  |  3.19<H>    Ca    7.8<L>      22 Apr 2019 23:52  Phos  6.5     04-22  Mg     2.2     04-22    TPro  6.9  /  Alb  2.4<L>  /  TBili  0.4  /  DBili  x   /  AST  16  /  ALT  <5<L>  /  AlkPhos  122<H>  04-22    Creatinine Trend: 3.19<--, 2.61<--, 1.88<--, 3.28<--, 2.66<--, 2.25<--  PT/INR - ( 22 Apr 2019 23:52 )   PT: 19.6 sec;   INR: 1.68 ratio         PTT - ( 22 Apr 2019 23:52 )  PTT:51.8 sec          MICROBIOLOGY:     RADIOLOGY:  [ ] Reviewed and interpreted by me    EKG: CHIEF COMPLAINT:    Interval Events:    Pt comfortable on Dilaudid PRN for labored breathing. Has increased pressor requirements this AM.     OBJECTIVE:  ICU Vital Signs Last 24 Hrs  T(C): 36.9 (23 Apr 2019 04:00), Max: 37 (22 Apr 2019 08:00)  T(F): 98.4 (23 Apr 2019 04:00), Max: 98.6 (22 Apr 2019 08:00)  HR: 87 (23 Apr 2019 07:00) (64 - 107)  BP: 86/51 (23 Apr 2019 07:00) (76/43 - 116/51)  BP(mean): 66 (23 Apr 2019 07:00) (54 - 78)  ABP: --  ABP(mean): --  RR: 13 (23 Apr 2019 07:00) (8 - 38)  SpO2: 98% (23 Apr 2019 07:00) (68% - 100%)    Mode: CPAP with PS, FiO2: 30, PEEP: 5, PS: 5, MAP: 8, PIP: 11    04-22 @ 07:01  -  04-23 @ 07:00  --------------------------------------------------------  IN: 1981.6 mL / OUT: 275 mL / NET: 1706.6 mL      CAPILLARY BLOOD GLUCOSE      POCT Blood Glucose.: 112 mg/dL (23 Apr 2019 06:54)      PHYSICAL EXAM:  General: Intubated, arousable - follows commands   HEENT: EOMI. Mild erythema of sclera noted, no discharge/pus noted.   Respiratory: Coarse breath sounds heard in bilateral lung fields.   Cardiovascular: S1, S2 noted.   Abdomen: Soft, nontender, nondistended.   Extremities: RLE in cast and wrapped in ACE bandage. LLE AVF.   Skin: No lesions noted.    Neurological: Intubated, responsive, able to squeeze fingers    LINES:    HOSPITAL MEDICATIONS:  aspirin  chewable 81 milliGRAM(s) Oral daily  heparin  Injectable 5000 Unit(s) SubCutaneous every 8 hours    ceFAZolin   IVPB 1000 milliGRAM(s) IV Intermittent every 24 hours  gentamicin   IVPB 130 milliGRAM(s) IV Intermittent <User Schedule>    midodrine 20 milliGRAM(s) Oral every 8 hours  phenylephrine    Infusion 2 MICROgram(s)/kG/Min IV Continuous <Continuous>    insulin lispro (HumaLOG) corrective regimen sliding scale   SubCutaneous every 6 hours  levothyroxine Injectable 88 MICROGram(s) IV Push at bedtime    ALBUTerol/ipratropium for Nebulization 3 milliLiter(s) Nebulizer every 6 hours  sodium chloride 3%  Inhalation 4 milliLiter(s) Inhalation every 12 hours    acetaminophen    Suspension .. 650 milliGRAM(s) Oral every 6 hours PRN  dexmedetomidine Infusion 0.2 MICROgram(s)/kG/Hr IV Continuous <Continuous>  fentaNYL    Injectable 50 MICROGram(s) IV Push every 15 minutes PRN  HYDROmorphone  Injectable 0.2 milliGRAM(s) IV Push every 4 hours PRN  ondansetron Injectable 4 milliGRAM(s) IV Push every 12 hours PRN    pantoprazole  Injectable 40 milliGRAM(s) IV Push every 12 hours  polyethylene glycol 3350 17 Gram(s) Oral daily  senna Syrup 10 milliLiter(s) Oral at bedtime        dextrose 5%. 1000 milliLiter(s) IV Continuous <Continuous>      artificial  tears Solution 1 Drop(s) Both EYES every 2 hours  chlorhexidine 2% Cloths 1 Application(s) Topical daily  chlorhexidine 4% Liquid 1 Application(s) Topical <User Schedule>  erythromycin   Ointment 1 Application(s) Both EYES four times a day  hydrocortisone 1% Cream 1 Application(s) Topical every 6 hours  lidocaine/prilocaine Cream 1 Application(s) Topical daily PRN  petrolatum Ophthalmic Ointment 1 Application(s) Both EYES three times a day        LABS:                        10.2   13.6  )-----------( 394      ( 22 Apr 2019 23:52 )             33.1     Hgb Trend: 10.2<--, 10.5<--, 10.1<--, 10.2<--, 10.3<--  04-22    138  |  101  |  29<H>  ----------------------------<  131<H>  4.7   |  21<L>  |  3.19<H>    Ca    7.8<L>      22 Apr 2019 23:52  Phos  6.5     04-22  Mg     2.2     04-22    TPro  6.9  /  Alb  2.4<L>  /  TBili  0.4  /  DBili  x   /  AST  16  /  ALT  <5<L>  /  AlkPhos  122<H>  04-22    Creatinine Trend: 3.19<--, 2.61<--, 1.88<--, 3.28<--, 2.66<--, 2.25<--  PT/INR - ( 22 Apr 2019 23:52 )   PT: 19.6 sec;   INR: 1.68 ratio         PTT - ( 22 Apr 2019 23:52 )  PTT:51.8 sec          MICROBIOLOGY:     RADIOLOGY:  [ ] Reviewed and interpreted by me    EKG:

## 2021-06-14 NOTE — ED PROVIDER NOTE - PRO INTERPRETER NEED 2
[id number] has been cancelled as, unable to contact. Ordering provider has been notified.   English

## 2021-06-24 NOTE — CONSULT NOTE ADULT - PROBLEM SELECTOR PROBLEM 4
Called phone number [phone] for rooming.  No answer, left voicemail message to call back.   Medical orders for life-sustaining treatment (MOLST) form in chart

## 2023-02-17 NOTE — H&P ADULT. - PROBLEM SELECTOR PROBLEM 2
Rn called to follow-up on triage. No answer, LVM with callback. Pt was agreeable to ED disposition.    End stage chronic kidney disease

## 2024-01-16 NOTE — ED PROVIDER NOTE - CARDIOVASCULAR NEGATIVE STATEMENT, MLM
Department of [facility] Med Oncology     Attending Clinic Note    Reason for Visit: Follow-up on a patient with recurrent metastatic triple negative breast cancer.    PCP:  Ranjith Schafer PA-C    History of Present Illness:  The mass was located in the 12 o'clock position of the right breast.     Breast cancer risk factors include age, gender, mother with breast cancer.   Age of menarche was 14. Total hysterectomy at age 50.  Patient was on hormonal therapy, patient unsure of how long.  Patient is . Age of first live birth was 28. Patient did not breast feed.     Bilateral screening mammogram on 2018:  Probable 15 mm thick soft tissue mass in the 12:00 position of the right breast.     Right Diagnostic Mammogram on 2018:  there is an 18 mm mass in the 12:00 position of the right breast consistent with carcinoma until proven otherwise.   There is also an abnormal right axillary lymph node.     Right Breast U/S on 2018:  18 mm mass in the 12:00 position of the right breast consistent with carcinoma until proven otherwise. Enlarged right axillary lymph node.     On 04/10/2018:  A. Right breast, core biopsy, slides for review ( A): Poorly differentiated ductal carcinoma, Angel score 3+3+2 = 8.  B. Right lymph node, core biopsy, slides for review ( B):  Metastatic poorly differentiated carcinoma    Comment:   Per report from [facility] laboratory:  Estrogen receptor: Negative  Progesterone receptor: Negative  HER-2/jocelyn status: Negative (0)  Enclosed keratin immunohistochemical stains are positive    Stage IIIA T2 N2 M0 IDC Grade 3   Triple Negative Right Breast Cancer:  We recommended neoadjuvant chemotherapy consisting of Dose-Dense AC-T. Side effects of AC-T reviewed with patient. She agreed to proceed. Mediport was placed.   2018 2D-Echo: EF 60%.  Cycle # 1 Dose-Dense AC was on 2018.  Cycle # 2 Dose-Dense AC was on 2018.    Cycle # 3 Dose-Dense AC was on 
Patient provided with discharge instructions, received printed AVS.  All questions answered.  Patient understands follow up plan of care.        
normal rate and rhythm, no chest pain and no edema.

## 2024-02-20 NOTE — PROGRESS NOTE ADULT - ASSESSMENT
Pt requesting annual labs for upcoming visit in [place]   79 yo F with hx of cad, cabg, DM, esrd, on HD, chronic hypotension on midodrine 10 mg tid with sob, pleural effusions, chf, ascites, and new tib/fib fx    1- esrd  2- hypotension   3- chf  4- ascites   5- tib/fib fx   6- respiratory failure   cont levophed drip for bp support  hd revalclear 300 2 liter 2 k bath bfr 350 cc   daily phos.   wean vent as tolerated

## 2025-01-14 NOTE — PATIENT PROFILE ADULT - VISION (WITH CORRECTIVE LENSES IF THE PATIENT USUALLY WEARS THEM):
Normal vision: sees adequately in most situations; can see medication labels, newsprint Detail Level: Detailed Detail Level: Zone